# Patient Record
Sex: FEMALE | Race: WHITE | NOT HISPANIC OR LATINO | Employment: OTHER | URBAN - METROPOLITAN AREA
[De-identification: names, ages, dates, MRNs, and addresses within clinical notes are randomized per-mention and may not be internally consistent; named-entity substitution may affect disease eponyms.]

---

## 2017-01-23 ENCOUNTER — TELEPHONE (OUTPATIENT)
Dept: PEDIATRICS | Facility: CLINIC | Age: 72
End: 2017-01-23

## 2017-01-23 DIAGNOSIS — F98.8 ATTENTION DEFICIT DISORDER: Primary | ICD-10-CM

## 2017-01-23 RX ORDER — METHYLPHENIDATE HYDROCHLORIDE 20 MG/1
20 CAPSULE, EXTENDED RELEASE ORAL DAILY
Qty: 30 CAPSULE | Refills: 0 | Status: CANCELLED | OUTPATIENT
Start: 2017-01-23

## 2017-01-23 NOTE — TELEPHONE ENCOUNTER
[facility] Call Center    Phone Message    Name of Caller: Karen    Phone Number: Home number on file [phone] (home)    Best time to return call: Any    May a detailed message be left on voicemail: yes    Relation to patient: Self    Reason for Call: Karen is requesting a care team member review her chart to see if there a more cost effective alternatives to methylphenidate (RITALIN LA) 20 MG CP24 and Omeprazole.  Advised that Karen she could also check with her insurance carrier to see if they have suggestions as well.  Please advise.  Thank you.      Action Taken: [id number]

## 2017-01-23 NOTE — TELEPHONE ENCOUNTER
Routed to PCP.    Patient requesting cheaper alternatives to ritalin and omeprazole?    Antonette Main RN,   [facility]

## 2017-01-23 NOTE — TELEPHONE ENCOUNTER
[facility] Call Center    Phone Message    Name of Caller: RUDDY MA    Phone Number: Home number on file [phone] (home)    Best time to return call: ANY    May a detailed message be left on voicemail: no    Relation to patient: Self    Reason for Call: Other: Please call patient back regarding med check/alternatives.     Action Taken: Message routed to:  Primary Care p [id number]

## 2017-01-31 NOTE — TELEPHONE ENCOUNTER
Omeprazole: can use ranitidine 150 mg bid (OTC)  Ritalin: there are alternatives, may not have the same efficacy or side effect profile. If she would like to discuss alternative to Ritalin, please schedule a clinic appointment. Bring back the Rx for Ritalin I gave her at the last visit (future Rx not yet filled).

## 2017-01-31 NOTE — TELEPHONE ENCOUNTER
Notified patient of provider note below. Patient states that she should like to schedule an appointment with PCP to discuss an alternative to Ritalin as medication is costly for the patient. Patient states that scripts for Ritalin with start dates of 02/01/17 and 03/03/17 have been profiled at [facility] in [place]. Patient does not want prescriptions canceled until after appointment with PCP that has been scheduled for 02/01/17 at 2:40 PM in case an alternative is not decided on.    Called [facility]. Pharmacist, Felipe, states that prescriptions are both on profile for the patient at the pharmacy.  Maria Luisa Barkley, [facility]

## 2017-02-01 ENCOUNTER — OFFICE VISIT (OUTPATIENT)
Dept: PEDIATRICS | Facility: CLINIC | Age: 72
End: 2017-02-01
Payer: COMMERCIAL

## 2017-02-01 VITALS
WEIGHT: 225 LBS | DIASTOLIC BLOOD PRESSURE: 84 MMHG | TEMPERATURE: 96.8 F | SYSTOLIC BLOOD PRESSURE: 148 MMHG | BODY MASS INDEX: 40.17 KG/M2 | OXYGEN SATURATION: 95 % | HEART RATE: 86 BPM

## 2017-02-01 DIAGNOSIS — K21.9 GASTROESOPHAGEAL REFLUX DISEASE WITHOUT ESOPHAGITIS: ICD-10-CM

## 2017-02-01 DIAGNOSIS — F98.8 ATTENTION DEFICIT DISORDER: Primary | ICD-10-CM

## 2017-02-01 PROCEDURE — 99213 OFFICE O/P EST LOW 20 MIN: CPT | Performed by: INTERNAL MEDICINE

## 2017-02-01 RX ORDER — METHYLPHENIDATE HYDROCHLORIDE 18 MG/1
18 TABLET ORAL EVERY MORNING
Qty: 30 TABLET | Refills: 0 | Status: SHIPPED | OUTPATIENT
Start: 2017-02-01 | End: 2017-02-06

## 2017-02-01 RX ORDER — NICOTINE POLACRILEX 4 MG/1
20 GUM, CHEWING ORAL DAILY
Qty: 30 TABLET | Refills: 0 | Status: SHIPPED | OUTPATIENT
Start: 2017-02-01 | End: 2017-06-19

## 2017-02-01 NOTE — PROGRESS NOTES
SUBJECTIVE:                                                    Karen Donis is a 71 year old female who presents to clinic today for the following health issues:      Medication Followup of all meds    Taking Medication as prescribed: yes    Side Effects:  None    Medication Helping Symptoms:  yes       Patient with adult ADD, years ago, was on another drug for a long time. Change to Ritalin LA 20 mg since 2006. This is getting more expensive. She reports she needs long acting medication. She would not remember the second dose if only regular release.     She also needs a prescription of omeprazole for GERD.     Problem list, Medication list, Allergies, and Medical/Social/Surgical histories reviewed in [facility] and updated as appropriate.    OBJECTIVE:                                                    /84  Pulse 86  Temp 96.8  F (36  C) (Temporal)  Wt 225 lb (102.1 kg)  SpO2 95%  BMI 40.18 kg/m2    GEN: healthy, alert and no distress       Diagnostic test results:  No results found for this or any previous visit (from the past 24 hour(s)).       ASSESSMENT/PLAN:                                                      72 year old female with the following diagnoses and treatment plan:      ICD-10-CM    1. Attention deficit disorder F98.8 DISCONTINUED: methylphenidate ER (CONCERTA) 18 MG CR tablet   2. Gastroesophageal reflux disease without esophagitis K21.9 omeprazole ([facility] OMEPRAZOLE) 20 MG tablet     Will have her try Concerta. Refilled omeprazole.       Lakia Hunter MD-PhD  [facility]    (Note: Chart documentation was done in part with Dragon Voice Recognition software. Although reviewed after completion, some word and grammatical errors may remain.)

## 2017-02-01 NOTE — NURSING NOTE
"Chief Complaint   Patient presents with     Recheck Medication     Would like to change Ritalin       Initial /84 mmHg  Pulse 86  Temp(Src) 96.8  F (36  C) (Temporal)  Wt 225 lb (102.059 kg)  SpO2 95% Estimated body mass index is 40.17 kg/(m^2) as calculated from the following:    Height as of 6/6/16: 5' 2.75\" (1.594 m).    Weight as of this encounter: 225 lb (102.059 kg).  BP completed using cuff size: yasmine Hoover RMA        "

## 2017-02-01 NOTE — MR AVS SNAPSHOT
After Visit Summary   2017    Karen Donis    MRN: [id number]           Patient Information     Date Of Birth          1945        Visit Information        Provider Department      2017 2:40 PM Laika Hunter MD [facility]        Today's Diagnoses     Attention deficit disorder    -  1     Gastroesophageal reflux disease without esophagitis           Care Instructions    Do a nurse visit in 3 weeks in any of the [facility].         Follow-ups after your visit        Who to contact     If you have questions or need follow up information about today's clinic visit or your schedule please contact [facility] directly at [phone].  Normal or non-critical lab and imaging results will be communicated to you by Shiram Credithart, letter or phone within 4 business days after the clinic has received the results. If you do not hear from us within 7 days, please contact the clinic through Shiram Credithart or phone. If you have a critical or abnormal lab result, we will notify you by phone as soon as possible.  Submit refill requests through Mapluck or call your pharmacy and they will forward the refill request to us. Please allow 3 business days for your refill to be completed.          Additional Information About Your Visit        MyChart Information     Mapluck is an electronic gateway that provides easy, online access to your medical records. With Mapluck, you can request a clinic appointment, read your test results, renew a prescription or communicate with your care team.     To sign up for Mapluck visit the website at [email or web address]   You will be asked to enter the access code listed below, as well as some personal information. Please follow the directions to create your username and password.     Your access code is: [email or web address]  Expires: 3/7/2017 11:26 AM     Your access code will  in 90 days. If you need help or a new code, please contact your  [facility] or call [phone] for assistance.        Care EveryWhere ID     This is your Care EveryWhere ID. This could be used by other organizations to access your [facility] medical records  [id number]        Your Vitals Were     Pulse Temperature Pulse Oximetry             86 96.8  F (36  C) (Temporal) 95%          Blood Pressure from Last 3 Encounters:   02/01/17 148/84   12/07/16 124/78   10/07/16 142/90    Weight from Last 3 Encounters:   02/01/17 225 lb (102.059 kg)   12/08/16 228 lb (103.42 kg)   12/07/16 228 lb (103.42 kg)              Today, you had the following     No orders found for display         Today's Medication Changes          These changes are accurate as of: 2/1/17  3:33 PM.  If you have any questions, ask your nurse or doctor.               Start taking these medicines.        Dose/Directions    methylphenidate ER 18 MG CR tablet   Commonly known as:  CONCERTA   Used for:  Attention deficit disorder   Replaces:  methylphenidate 20 MG Cp24   Started by:  Lakia Hunter MD        Dose:  18 mg   Take 1 tablet (18 mg) by mouth every morning   Quantity:  30 tablet   Refills:  0       omeprazole 20 MG tablet   Commonly known as:  CVS OMEPRAZOLE   Used for:  Gastroesophageal reflux disease without esophagitis   Replaces:  omeprazole 20 MG CR capsule   Started by:  Lakia Hunter MD        Dose:  20 mg   Take 1 tablet (20 mg) by mouth daily   Quantity:  30 tablet   Refills:  0         These medicines have changed or have updated prescriptions.        Dose/Directions    econazole nitrate 1 % cream   This may have changed:    - when to take this  - reasons to take this   Used for:  Tinea pedis        Apply topically daily   Quantity:  30 g   Refills:  3         Stop taking these medicines if you haven't already. Please contact your care team if you have questions.     methylphenidate 20 MG Cp24   Commonly known as:  RITALIN LA   Replaced by:  methylphenidate ER 18 MG CR tablet    Stopped by:  Lakia Hunter MD           omeprazole 20 MG CR capsule   Commonly known as:  priLOSEC   Replaced by:  omeprazole 20 MG tablet   Stopped by:  Lakia Hunter MD                Where to get your medicines      These medications were sent to [id number] - [place] - [address]  [address] [address], [place] [address]     Phone:  [phone]    - omeprazole 20 MG tablet      Some of these will need a paper prescription and others can be bought over the counter.  Ask your nurse if you have questions.     Bring a paper prescription for each of these medications    - methylphenidate ER 18 MG CR tablet             Primary Care Provider Office Phone # Fax #    Lakia Hunter [phone]       [facility] [address] [facility] [address]        Thank you!     Thank you for choosing [facility]  for your care. Our goal is always to provide you with excellent care. Hearing back from our patients is one way we can continue to improve our services. Please take a few minutes to complete the written survey that you may receive in the mail after your visit with us. Thank you!             Your Updated Medication List - Protect others around you: Learn how to safely use, store and throw away your medicines at www.disposemymeds.org.          This list is accurate as of: 2/1/17  3:33 PM.  Always use your most recent med list.                   Brand Name Dispense Instructions for use    aspirin 81 MG tablet      1 TABLET DAILY       BEANO PO      Take by mouth as needed       biotin 1000 MCG Tabs tablet      Take 1,000 mcg by mouth daily       blood glucose lancing device     1 each    Device to be used with lancets.       * blood glucose monitoring lancets     1 Box    Use to test blood sugar 1 times daily or as directed.  Ok to substitute alternative if insurance prefers.       * blood glucose monitoring lancets     1 Box    Use to test blood sugar 1daily  or as directed.       * blood glucose monitoring test strip    ACCU-CHEK SMARTVIEW    50 each    Use to test blood sugar 1 times daily or as directed.  Ok to substitute alternative if insurance prefers.       * blood glucose monitoring test strip    no brand specified    100 each    Use to test blood sugars daily or as directed. Accu-check smartview strips       CALCIUM 1200 PO          cholecalciferol 1000 UNITS capsule    vitamin  -D    180 capsule    Take 2 capsules (2,000 Units) by mouth daily       citalopram 20 MG tablet    celeXA    90 tablet    TAKE ONE TABLET BY MOUTH ONE TIME DAILY       CO Q 10 PO      Take 1 capsule by mouth daily.       Cranberry 300 MG Tabs          econazole nitrate 1 % cream     30 g    Apply topically daily       flax seed oil 1000 MG capsule      Take 1 capsule by mouth daily       losartan-hydrochlorothiazide 100-25 MG per tablet    HYZAAR    90 tablet    Take 1 tablet by mouth daily       MAGNESIUM PO      1 capsule daily       methylphenidate ER 18 MG CR tablet    CONCERTA    30 tablet    Take 1 tablet (18 mg) by mouth every morning       omeprazole 20 MG tablet    CVS OMEPRAZOLE    30 tablet    Take 1 tablet (20 mg) by mouth daily       pravastatin 10 MG tablet    PRAVACHOL    90 tablet    Take 1 tablet (10 mg) by mouth daily       UNABLE TO FIND      MEDICATION NAME: Cheea seed       VITAMIN B COMPLEX-C Caps      1 capsule PO qd       vitamin E 400 UNIT capsule      Take by mouth daily       * Notice:  This list has 4 medication(s) that are the same as other medications prescribed for you. Read the directions carefully, and ask your doctor or other care provider to review them with you.

## 2017-02-02 ENCOUNTER — TELEPHONE (OUTPATIENT)
Dept: PEDIATRICS | Facility: CLINIC | Age: 72
End: 2017-02-02

## 2017-02-02 DIAGNOSIS — F98.8 ATTENTION DEFICIT DISORDER: Primary | ICD-10-CM

## 2017-02-02 NOTE — TELEPHONE ENCOUNTER
[facility] Call Center    Phone Message    Name of Caller: Karen     Phone Number: Home number on file [phone] (home) or Cell number on file:    Telephone Information:   Mobile [phone]       Best time to return call: ANy    May a detailed message be left on voicemail: yes    Relation to patient: Self    Reason for Call: Other: Patient is calling to let  know her insurance will be contacting her regarding a change in her insurance tier and for coverage regarding her medication. Patient is paying 111 dollars for the adderall and wanted to know if the tier change would help the cost of her Rx. If not able to get insurance to help cover the Rx wanted to know if there is an alternative for this. Please advise.      Action Taken: Message routed to:  Primary Care p [id number]

## 2017-02-02 NOTE — TELEPHONE ENCOUNTER
Please advise, is a tier change starting a PA?  So start a PA or change the medication?  Chasity ROOTA

## 2017-02-03 NOTE — TELEPHONE ENCOUNTER
Called patient, patient states that she took the Concerta prescription dated 02/01/17 to her pharmacy.  Medication cost patient $111.00.  Patient could not afford this so she had the pharmacy shred the prescription.  Medication costs this much as it is a tier 4 medication.  Is there an alternative medication that is more cost effective?  Patient was informed that PCP is out of clinic until 02/06/17.  Patient has enough medication until Monday.     Zohra Dean CMA

## 2017-02-03 NOTE — TELEPHONE ENCOUNTER
Please let patient know Dr. Hunter is out of office  Sometime we do not know how this will affect coverage until we place the next refill  Then Dr. Hunter can determine what would be appropriate substitute if need be

## 2017-02-06 RX ORDER — METHYLPHENIDATE HYDROCHLORIDE EXTENDED RELEASE 20 MG/1
20 TABLET ORAL DAILY
Qty: 30 TABLET | Refills: 0 | Status: SHIPPED | OUTPATIENT
Start: 2017-02-06 | End: 2017-03-13

## 2017-02-07 ENCOUNTER — TELEPHONE (OUTPATIENT)
Dept: PEDIATRICS | Facility: CLINIC | Age: 72
End: 2017-02-07

## 2017-02-07 NOTE — TELEPHONE ENCOUNTER
Call placed to pharmacy to clarify question of medication Concerta, as there is no active order for patient to take Concerta.  Pharmacy stated that patient does not have Concerta ordered and is uncertain why this was brought up.  Pharmacy is filling script for Ritalin.      Anita Vazquez at 2/7/2017 11:57 AM      Status: Signed         Expand All [facility] All    [facility] Call Center    Phone Message    Name of Caller: CAREBEBO.  Pharmacy states they need department to withdraw prescription for Concerta for patient.  They are aware that pt will  script for Ritalin instead.       Action Taken: Message routed to:  Primary Care p [phone]

## 2017-02-07 NOTE — TELEPHONE ENCOUNTER
[facility] Call Center    Phone Message    Name of Caller: CARMENZA.  Pharmacy states they need department to withdraw prescription for Concerta for patient.  They are aware that pt will  script for Ritalin instead.       Action Taken: Message routed to:  Primary Care p [phone]

## 2017-03-13 ENCOUNTER — TELEPHONE (OUTPATIENT)
Dept: PEDIATRICS | Facility: CLINIC | Age: 72
End: 2017-03-13

## 2017-03-13 DIAGNOSIS — F98.8 ATTENTION DEFICIT DISORDER: ICD-10-CM

## 2017-03-13 RX ORDER — METHYLPHENIDATE HYDROCHLORIDE EXTENDED RELEASE 20 MG/1
20 TABLET ORAL EVERY MORNING
Qty: 30 TABLET | Refills: 0 | Status: SHIPPED | OUTPATIENT
Start: 2017-03-13 | End: 2017-04-12

## 2017-03-13 NOTE — TELEPHONE ENCOUNTER
Rx given to pt while in clinic today. Chasity MEJIA    Advised to follow up in clinic with the new change. Chasity MEJIA

## 2017-03-13 NOTE — TELEPHONE ENCOUNTER
Patient is wanted to know if she can switch from the Methylphenidate back to the Metadate that she was on last month (Scott). Because it is much cheaper ($100 vs the $24 it used to be)

## 2017-04-06 DIAGNOSIS — F32.5 MAJOR DEPRESSION IN COMPLETE REMISSION (H): ICD-10-CM

## 2017-04-06 RX ORDER — CITALOPRAM HYDROBROMIDE 20 MG/1
20 TABLET ORAL DAILY
Qty: 90 TABLET | Refills: 1 | Status: CANCELLED | OUTPATIENT
Start: 2017-04-06

## 2017-04-06 NOTE — TELEPHONE ENCOUNTER
[facility] Call Center    Phone Message    Name of Caller: Karen    Phone Number: [phone]     Best time to return call: any    May a detailed message be left on voicemail: yes    Relation to patient: Self    Reason for Call: Patient is asking when she is supposed to be seen next, and what Labs need to be done, Patient requesting Refills: citalopram (CELEXA) 20 MG tablet, Pravastatin Sodium, 10 mg Tablets  Action Taken: Message routed to:  Primary Care p [id number]

## 2017-04-11 ENCOUNTER — TELEPHONE (OUTPATIENT)
Dept: PEDIATRICS | Facility: CLINIC | Age: 72
End: 2017-04-11

## 2017-04-11 DIAGNOSIS — F98.8 ATTENTION DEFICIT DISORDER: ICD-10-CM

## 2017-04-11 NOTE — TELEPHONE ENCOUNTER
Metadate 20 mg CR tablet      Last Written Prescription Date: 3/13/2017    Last Fill Quantity: 30  # refills: 0   Last Office Visit with [facility], [facility] or [facility] prescribing provider: 2/1/2017                                                 Refill routed to provider to review and approve.  Not part of the MG refill protocol.      Preferred pharmacy [facility] [address] [place]

## 2017-04-11 NOTE — TELEPHONE ENCOUNTER
[facility] Call Center    Phone Message    Name of Caller: Karen    Phone Number: Home number on file [phone] (home)    Best time to return call: Any    May a detailed message be left on voicemail: no    Relation to patient: Self    Reason for Call: Medication Refill Request    Has the patient contacted the pharmacy for the refill? Yes   Name of medication being requested: methylphenidate (METADATE ER) 20 MG CR tablet [82434] (Order [id number])    Provider who prescribed the medication: Dr. Hunter  Pharmacy: [facility]  Date medication is needed: ASAP   If possible patient would like to have the Rx ready for  tomorrow morning by 11 am. Please advise.       Action Taken: Message routed to:  Primary Care p [phone]

## 2017-04-11 NOTE — TELEPHONE ENCOUNTER
Patient would like to request that the Rx be sent home with her brother Madi Joshi who has an appt with Dr. Hunter.

## 2017-04-12 RX ORDER — METHYLPHENIDATE HYDROCHLORIDE EXTENDED RELEASE 20 MG/1
20 TABLET ORAL EVERY MORNING
Qty: 30 TABLET | Refills: 0 | Status: SHIPPED | OUTPATIENT
Start: 2017-04-12 | End: 2017-05-17

## 2017-04-12 NOTE — TELEPHONE ENCOUNTER
Left message with pt. And informed her that RX was filled and placed at check in 1 for .    Rx placed at check in 1.    Janeen Kelly, CMA

## 2017-04-12 NOTE — TELEPHONE ENCOUNTER
Left message for patient to return call to clinic and ask to speak with RN.      Pravastatin was discontinued on 2/6/17 due to side effects.   Is patient still taking?  She requested refill.    Due for nurse visit for blood pressure.  Also due for diabetic follow up with non fasting labs(A1c, BMP) in June 2017.    Antonette Main RN,   [facility]

## 2017-04-12 NOTE — TELEPHONE ENCOUNTER
celexa     Last Written Prescription Date: 12/26/16  Last Fill Quantity: 90, # refills: 1  Last Office Visit with [facility] primary care provider:  2/1/17        Last PHQ-9 score on record=   PHQ-9 SCORE 6/7/2016   Total Score -   Total Score 4               ______________________________________________________________________  pravastatin  Was discontinued on 2/6/17 due to side effects.      pravastatin (PRAVACHOL) 10 MG tablet (Discontinued) 90 tablet 3 8/23/2016 2/6/2017 --      Sig: Take 1 tablet (10 mg) by mouth daily     Class: E-Prescribe     Route: Oral     Reason for Discontinue: Side effects       Last Office Visit with [facility], [facility] or [facility] prescribing provider: 2/1/17       Lab Results   Component Value Date    CHOL 160 12/07/2016     Lab Results   Component Value Date    HDL 60 12/07/2016     Lab Results   Component Value Date    LDL 84 12/07/2016     Lab Results   Component Value Date    TRIG 82 12/07/2016     Lab Results   Component Value Date    CHOLHDLRATIO 3.3 10/28/2014

## 2017-04-20 NOTE — TELEPHONE ENCOUNTER
Left message for patient to return call to clinic and ask to speak with RN.    Antonette Main RN,   [facility]

## 2017-04-21 NOTE — TELEPHONE ENCOUNTER
Called [facility] pharmacy.  Patient picked up both refills of Celexa and pravastatin on 4/9/17.    Called patient to inquire if she has side effects from pravastatin and if she wishes to continue taking.    Routing to Rx response as no refill needed.    Maritza Wisdom RN, [facility]

## 2017-04-24 NOTE — TELEPHONE ENCOUNTER
Left message for patient.  Informed her we have pravastatin listed as discontinued due to side effects.  Is she still taking this med and if so is she having side effects?      Also informed patient she is due for nurse visit for blood pressure check and in June she will be due for diabetic recheck with labs.    Antonette Main RN,   [facility]

## 2017-04-25 ENCOUNTER — ALLIED HEALTH/NURSE VISIT (OUTPATIENT)
Dept: PEDIATRICS | Facility: CLINIC | Age: 72
End: 2017-04-25
Payer: COMMERCIAL

## 2017-04-25 VITALS — SYSTOLIC BLOOD PRESSURE: 124 MMHG | HEART RATE: 72 BPM | DIASTOLIC BLOOD PRESSURE: 76 MMHG

## 2017-04-25 DIAGNOSIS — I10 ESSENTIAL HYPERTENSION WITH GOAL BLOOD PRESSURE LESS THAN 140/90: Primary | ICD-10-CM

## 2017-04-25 PROCEDURE — 99207 ZZC NO CHARGE NURSE ONLY: CPT | Performed by: INTERNAL MEDICINE

## 2017-04-25 NOTE — MR AVS SNAPSHOT
After Visit Summary   2017    Karen Donis    MRN: [id number]           Patient Information     Date Of Birth          1945        Visit Information        Provider Department      2017 4:53 PM Lakia Hunter MD [facility]        Today's Diagnoses     Essential hypertension with goal blood pressure less than 140/90    -  1       Follow-ups after your visit        Who to contact     If you have questions or need follow up information about today's clinic visit or your schedule please contact [facility] directly at [phone].  Normal or non-critical lab and imaging results will be communicated to you by CBIT A/Shart, letter or phone within 4 business days after the clinic has received the results. If you do not hear from us within 7 days, please contact the clinic through CBIT A/Shart or phone. If you have a critical or abnormal lab result, we will notify you by phone as soon as possible.  Submit refill requests through [email or web address] or call your pharmacy and they will forward the refill request to us. Please allow 3 business days for your refill to be completed.          Additional Information About Your Visit        MyChart Information     [email or web address] is an electronic gateway that provides easy, online access to your medical records. With [email or web address], you can request a clinic appointment, read your test results, renew a prescription or communicate with your care team.     To sign up for [email or web address] visit the website at [email or web address]   You will be asked to enter the access code listed below, as well as some personal information. Please follow the directions to create your username and password.     Your access code is: [email or web address]  Expires: 2017  4:55 PM     Your access code will  in 90 days. If you need help or a new code, please contact your [facility] or call [phone] for assistance.        Care EveryWhere ID      This is your Care EveryWhere ID. This could be used by other organizations to access your [place] medical records  [id number]        Your Vitals Were     Pulse                   72            Blood Pressure from Last 3 Encounters:   04/25/17 124/76   02/01/17 148/84   12/07/16 124/78    Weight from Last 3 Encounters:   02/01/17 225 lb (102.1 kg)   12/08/16 228 lb (103.4 kg)   12/07/16 228 lb (103.4 kg)              Today, you had the following     No orders found for display         Today's Medication Changes          These changes are accurate as of: 4/25/17  4:55 PM.  If you have any questions, ask your nurse or doctor.               These medicines have changed or have updated prescriptions.        Dose/Directions    econazole nitrate 1 % cream   This may have changed:    - when to take this  - reasons to take this   Used for:  Tinea pedis        Apply topically daily   Quantity:  30 g   Refills:  3                Primary Care Provider Office Phone # Fax #    Lakia Hunter [phone]       [facility] [address] [facility] [address]        Thank you!     Thank you for choosing [facility]  for your care. Our goal is always to provide you with excellent care. Hearing back from our patients is one way we can continue to improve our services. Please take a few minutes to complete the written survey that you may receive in the mail after your visit with us. Thank you!             Your Updated Medication List - Protect others around you: Learn how to safely use, store and throw away your medicines at www.disposemymeds.org.          This list is accurate as of: 4/25/17  4:55 PM.  Always use your most recent med list.                   Brand Name Dispense Instructions for use    aspirin 81 MG tablet      1 TABLET DAILY       BEANO PO      Take by mouth as needed       biotin 1000 MCG Tabs tablet      Take 1,000 mcg by mouth daily       blood glucose lancing device     1  each    Device to be used with lancets.       * blood glucose monitoring lancets     1 Box    Use to test blood sugar 1 times daily or as directed.  Ok to substitute alternative if insurance prefers.       * blood glucose monitoring lancets     1 Box    Use to test blood sugar 1daily or as directed.       * blood glucose monitoring test strip    ACCU-CHEK SMARTVIEW    50 each    Use to test blood sugar 1 times daily or as directed.  Ok to substitute alternative if insurance prefers.       * blood glucose monitoring test strip    no brand specified    100 each    Use to test blood sugars daily or as directed. Accu-check smartview strips       CALCIUM 1200 PO          cholecalciferol 1000 UNITS capsule    vitamin  -D    180 capsule    Take 2 capsules (2,000 Units) by mouth daily       citalopram 20 MG tablet    celeXA    90 tablet    TAKE ONE TABLET BY MOUTH ONE TIME DAILY       CO Q 10 PO      Take 1 capsule by mouth daily.       Cranberry 300 MG Tabs          econazole nitrate 1 % cream     30 g    Apply topically daily       flax seed oil 1000 MG capsule      Take 1 capsule by mouth daily       losartan-hydrochlorothiazide 100-25 MG per tablet    HYZAAR    90 tablet    TAKE ONE TABLET BY MOUTH EVERY DAY       MAGNESIUM PO      1 capsule daily       methylphenidate 20 MG CR tablet    METADATE ER    30 tablet    Take 1 tablet (20 mg) by mouth every morning       omeprazole 20 MG tablet    CVS OMEPRAZOLE    30 tablet    Take 1 tablet (20 mg) by mouth daily       UNABLE TO FIND      MEDICATION NAME: Cheea seed       VITAMIN B COMPLEX-C Caps      1 capsule PO qd       vitamin E 400 UNIT capsule      Take by mouth daily       * Notice:  This list has 4 medication(s) that are the same as other medications prescribed for you. Read the directions carefully, and ask your doctor or other care provider to review them with you.

## 2017-04-25 NOTE — PROGRESS NOTES
Karen Donis is enrolled/participating in the retail pharmacy Blood Pressure Goals Achievement Program (BPGAP).  Karen Donis was evaluated at [facility] on April 25, 2017 at which time her blood pressure was:    BP Readings from Last 3 Encounters:   04/25/17 124/76   02/01/17 148/84   12/07/16 124/78     Reviewed lifestyle modifications for blood pressure control and reduction: including making healthy food choices, managing weight, getting regular exercise, smoking cessation, reducing alcohol consumption, monitoring blood pressure regularly.     Karen Donis is not experiencing symptoms.    Follow-Up: BP is at goal of < 140/90mmHg (patient 18+ years of age with or without diabetes).  Recommended follow-up at pharmacy in 6 months.     Completed by: Jolie Cote PharmD  [facility]

## 2017-04-26 ENCOUNTER — TELEPHONE (OUTPATIENT)
Dept: PEDIATRICS | Facility: CLINIC | Age: 72
End: 2017-04-26

## 2017-04-26 NOTE — TELEPHONE ENCOUNTER
[facility] Call Center    Phone Message    Name of Caller: Karen    Phone Number: Home number on file [phone] (home) or Cell number on file:    Telephone Information:   Mobile [phone]       Best time to return call: Any    May a detailed message be left on voicemail: yes    Relation to patient: Self    Reason for Call: Medication Question or concern regarding medication   Prescription Clarification:   Name of Medication: losartan-hydrochlorothiazide (HYZAAR) 100-25 MG per tablet [77103] (Order [id number]) ; pravastatin (PRAVACHOL) 10 MG tablet [05443] (Order [id number])       Prescribing Provider: Dr. Hunter   Pharmacy: [facility] #[id number] [place] [address] [address]   What on the order needs clarification?     Is patient symptomatic? No. Describe question or concern: Patient is calling to clarify if she should be taking either one of the Rx. Please call to advise.        Action Taken: Message routed to:  Primary Care p [phone]

## 2017-04-26 NOTE — TELEPHONE ENCOUNTER
Left message Informing her we have pravastatin listed as discontinued due to side effects.  Is she still taking this med and if so is she having side effects?      Also she is to be taking lisinopril-hctz and a new RX was sent to [facility] pharmacy on 4/4/17.    Antonette Main RN,   [facility], [facility]

## 2017-04-27 NOTE — TELEPHONE ENCOUNTER
Called patient, she is taking pravastatin, she was unaware this was for cholesterol or it was discontinued for side effects. She never stopped.    She does complain of ongoing muscle aches/pains buttocks, calves and on the sides of her legs.    Patient is taking losartan-hydrochlorothiazide as directed.  She denies taking any other medication for BP.     Routing to provider to please advise on pravastatin.    Maritza Wisdom RN, [facility]

## 2017-05-02 NOTE — TELEPHONE ENCOUNTER
Called patient, left message with male in home, left phone number to call back.       Maritza Wisdom RN, [facility]

## 2017-05-03 ENCOUNTER — OFFICE VISIT (OUTPATIENT)
Dept: ORTHOPEDICS | Facility: CLINIC | Age: 72
End: 2017-05-03
Payer: COMMERCIAL

## 2017-05-03 VITALS
DIASTOLIC BLOOD PRESSURE: 75 MMHG | SYSTOLIC BLOOD PRESSURE: 148 MMHG | HEIGHT: 63 IN | BODY MASS INDEX: 39.51 KG/M2 | WEIGHT: 223 LBS

## 2017-05-03 DIAGNOSIS — M25.521 RIGHT ELBOW PAIN: ICD-10-CM

## 2017-05-03 DIAGNOSIS — R20.2 PARESTHESIA OF RIGHT UPPER EXTREMITY: ICD-10-CM

## 2017-05-03 DIAGNOSIS — M25.531 PAIN IN JOINT, FOREARM, RIGHT: Primary | ICD-10-CM

## 2017-05-03 PROCEDURE — 99214 OFFICE O/P EST MOD 30 MIN: CPT | Performed by: FAMILY MEDICINE

## 2017-05-03 NOTE — PROGRESS NOTES
"Karen Donis  :  1945  DOS: 5/3/2017  MRN: [id number]    Sports Medicine Clinic Visit    PCP: Lakia Hunter    Karen Donis is a 72 year old Right hand dominant female who is seen as an AIC patient presenting with right hand numbness/tingling.    Injury: Insidious onset of right hand numbness/tingling over last 3 days (17).  Pain located over right lateral hand, little and ring fingers, radiating to right elbow.  Additional Features:  Positive: numbness and weakness.  Symptoms are better with Rest.  Symptoms are worse with: gripping/grasping activity.  Other evaluation and/or treatments so far consists of: Ibuprofen and Rest.  Recent imaging completed: No recent imaging completed.  Prior History of related problems: none    Social History: retired     Review of Systems  Musculoskeletal: as above  Remainder of review of systems is negative including constitutional, CV, pulmonary, GI, Skin and Neurologic except as noted in HPI or medical history.    Past Medical History:   Diagnosis Date     Colon cancer (H)      Hypertension      Shortness of breath      Sleep apnea      Thyroid disease      Past Surgical History:   Procedure Laterality Date     COLON SURGERY       THYROIDECTOMY  2011    Procedure:THYROIDECTOMY; Right Thyroid Lobectomy and bilateral removal of skin tags; Surgeon:SAJI ZAZUETA; Location:[facility] OR     TONSILLECTOMY      While she was in 6th grade       Objective  /75  Ht 5' 2.75\" (1.594 m)  Wt 223 lb (101.2 kg)  BMI 39.82 kg/m2    General: healthy, alert and in no distress    HEENT: no scleral icterus or conjunctival erythema   Skin: no suspicious lesions or rash. No jaundice.   CV: regular rhythm by palpation, 2+ distal pulses, no pedal edema    Resp: normal respiratory effort without conversational dyspnea   Psych: normal mood and affect    Gait: nonantalgic, appropriate coordination and balance   Neuro: normal light touch sensory exam of the extremities. Motor " strength as noted below     Right Wrist and Hand exam    Inspection:       No swelling, bruising or deformity bilateral    Non Tender:       distal radius right,      anatomic snuffbox right,      scapholunate interval right and      TFCC right    ROM:       Full and symmetric active and passive range of motion of the forearm, wrist and digits right    Strength:       5/5 strength in the muscles of the hand, wrist and forearm bilateral    Special Tests:        neg (-) Tinel's test right,       neg (-) Phatlen's test right,       neg (-) TFCC compression test right and       neg (-) Finkelstein's maneuver right    Neurovascular:       2+ radial pulses bilaterally with brisk capillary refill and      normal sensation to light touch in the radial, median and ulnar nerve distributions    Right Elbow exam:    Inspection:       no ecchymosis       no edema or effusion    ROM:       full with flexion, extension, forearm supination and pronation.    Strength:       flexion 5/5       extension 5/5       forearm supination 5/5       forearm pronation 5/5    Tender:       medial epicondyle       Cubital tunnel    Non-Tender:       remainder of elbow area       lateral epicondyle       radial head       olecranon       antecubital space    Sensation:       intact throughout hand       intact throughout forearm     Skin:       well perfused       capillary refill less than 2 seconds    Neg spurling's and adson's, full ROM of cervical spine without increase in sx    Radiology:  None performed today    Assessment:  1. Pain in joint, forearm, right    2. Right elbow pain    3. Paresthesia of right upper extremity        Plan:  Discussed the assessment with the patient.  Follow up: 1-2 weeks prn  ddx includes wrist strain with ulnar neuritis vs cubital tunnel syndrome vs less likely more proximal radiculopathy  Use of short 1-2 wk course of NSAIDs reviewed, dosing and precautions reviewed if utilized  Wrist brace and elbow  compression sleeve use reviewed  Suspect will resolve with some time  Consider advanced imaging vs OT vs EMG for ongoing sx, depending on clinical progress  Can consider gabapentin for sx management if persistent  We discussed modified progressive pain-free activity as tolerated  Home handouts provided and supportive care reviewed  All questions were answered today  Contact us with additional questions or concerns  Signs and sx of concern reviewed      Dagoberto Foster DO, SIDNEY  Primary Care Sports Medicine  [facility]             Disclaimer: This note consists of symbols derived from keyboarding, dictation and/or voice recognition software. As a result, there may be errors in the script that have gone undetected. Please consider this when interpreting information found in this chart.

## 2017-05-03 NOTE — NURSING NOTE
"Chief Complaint   Patient presents with     Musculoskeletal Problem     right hand numbness/tingling > 3 days       Initial /75  Ht 5' 2.75\" (1.594 m)  Wt 223 lb (101.2 kg)  BMI 39.82 kg/m2 Estimated body mass index is 39.82 kg/(m^2) as calculated from the following:    Height as of this encounter: 5' 2.75\" (1.594 m).    Weight as of this encounter: 223 lb (101.2 kg).  Medication Reconciliation: complete     Isidro Mora ATC  "

## 2017-05-15 NOTE — TELEPHONE ENCOUNTER
Gave patient the message from Dr. Hunter below.  She verbalized understanding.    She states she found a sheet from [employer] long ago that puts the losartan-hctz under cholesterol medication category.  She thought she wasn't taking a blood pressure medication.  Informed her that the losartan-hctz is for her high blood pressure.  She then states well it isn't helping my blood pressure very good.  BP , yesterday 148/ 75.  Today /77.  Patient denies headache, chest pain/pressure or dizziness.  She states she does have weakness but its from her muscles klaudia so much from the pravastatin.    She has an appointment on 6/19/17 with Dr. Hunter.  Will route to Dr. Hunter to address BP issues.    Antonette Main RN,   [facility]

## 2017-05-15 NOTE — TELEPHONE ENCOUNTER
Late follow up: have patient stop pravastatin for 2 weeks and let us know if the muscle aches resolve. If they do, we'll try an alternative statin.

## 2017-05-16 ENCOUNTER — TELEPHONE (OUTPATIENT)
Dept: PEDIATRICS | Facility: CLINIC | Age: 72
End: 2017-05-16

## 2017-05-16 NOTE — TELEPHONE ENCOUNTER
Called patient.   This is been an ongoing problem that she has seen Sports Medicine, tingling, numbness right hand.  Today, her symptom is more of a quivering from the R forearm.  She has no other symptoms.  Please see office visit notes on 5/3/17 from Dr. Foster.    She will call sports medicine provider to advise.      Will route to Dr. Hunter to advise as well when she returns to clinic.    Maritza Wisdom RN, [facility]

## 2017-05-16 NOTE — TELEPHONE ENCOUNTER
"[facility] Call Center    Phone Message    Name of Caller: Karen    Phone Number: Home number on file [phone] (home)    Best time to return call: Any    May a detailed message be left on voicemail: yes    Relation to patient: Self    Reason for Call: Symptoms or Concerns     Does patient have any of the following \"red flag\" symptoms: None    Does patient have any \"Red Flag\" symptoms? No.     Current symptom or concern: Patient is having a tingle feeling in her right hand and also some shakiness in her right elbow toward the hand. She would like to discuss her symptoms with a nurse. Please advise.     Symptoms have been present for:  1 day(s)          Action Taken: Message routed to:  Primary Care p [id number]    "

## 2017-05-17 DIAGNOSIS — F98.8 ATTENTION DEFICIT DISORDER: ICD-10-CM

## 2017-05-17 RX ORDER — METHYLPHENIDATE HYDROCHLORIDE EXTENDED RELEASE 20 MG/1
20 TABLET ORAL EVERY MORNING
Qty: 30 TABLET | Refills: 0 | Status: SHIPPED | OUTPATIENT
Start: 2017-05-17 | End: 2017-06-22

## 2017-05-17 NOTE — TELEPHONE ENCOUNTER
Dr. Foster has been trying to reach her with a plan. Ask patient to contact Dr. Foster's office to connect with him.

## 2017-05-17 NOTE — TELEPHONE ENCOUNTER
Patient notified.  She will call Dr. Fraga office.      Antonette Main RN,   [facility]

## 2017-05-17 NOTE — TELEPHONE ENCOUNTER
Routing refill request to provider for review/approval because:  Drug not on the [facility] refill protocol         metadate ER             Last Written Prescription Date: 4/12/17  Last Fill Quantity: 30, # refills: 0    Last Office Visit with [facility], [facility] or [facility] prescribing provider:  2/1/17   Future Office Visit:    Next 5 appointments (look out 90 days)     Jun 19, 2017  1:20 PM CDT   Return Visit with Lakia Hunter MD   [facility]    [address] [place] [address] [phone]                    BP Readings from Last 3 Encounters:   05/03/17 148/75   04/25/17 124/76   02/01/17 148/84       Antonette Main RN,   [facility]

## 2017-05-17 NOTE — TELEPHONE ENCOUNTER
[facility] Call Center    Phone Message    Name of Caller: Karen  Phone Number: Home number on file [phone] (home)    Best time to return call: any    May a detailed message be left on voicemail: yes    Relation to patient: Self    Reason for Call: methylphenidate (METADATE ER) 20 MG CR tablet, patient has 2 left, and would like it to be picked up today at the       Action Taken: Message routed to:  Primary Care p [id number]

## 2017-05-24 ENCOUNTER — THERAPY VISIT (OUTPATIENT)
Dept: OCCUPATIONAL THERAPY | Facility: CLINIC | Age: 72
End: 2017-05-24
Payer: COMMERCIAL

## 2017-05-24 DIAGNOSIS — R20.2 PARESTHESIAS IN RIGHT HAND: ICD-10-CM

## 2017-05-24 DIAGNOSIS — M25.521 RIGHT ELBOW PAIN: Primary | ICD-10-CM

## 2017-05-24 PROCEDURE — 97112 NEUROMUSCULAR REEDUCATION: CPT | Mod: GO | Performed by: OCCUPATIONAL THERAPIST

## 2017-05-24 PROCEDURE — 97110 THERAPEUTIC EXERCISES: CPT | Mod: GO | Performed by: OCCUPATIONAL THERAPIST

## 2017-05-24 PROCEDURE — 97165 OT EVAL LOW COMPLEX 30 MIN: CPT | Mod: GO | Performed by: OCCUPATIONAL THERAPIST

## 2017-05-24 NOTE — MR AVS SNAPSHOT
"              After Visit Summary   5/24/2017    Karen Donis    MRN: [id number]           Patient Information     Date Of Birth          1945        Visit Information        Provider Department      5/24/2017 2:00 PM Virgen Tian [facility] [place] Jose Daniel        Today's Diagnoses     Right elbow pain    -  1    Paresthesias in right hand           Follow-ups after your visit        Your next 10 appointments already scheduled     Jun 19, 2017  1:20 PM CDT   Return Visit with Lakia Hunter MD   [facility]    [address] [place] [id number] [phone]              Who to contact     If you have questions or need follow up information about today's clinic visit or your schedule please contact [facility] JOSE DANIEL directly at [phone].  Normal or non-critical lab and imaging results will be communicated to you by MyChart, letter or phone within 4 business days after the clinic has received the results. If you do not hear from us within 7 days, please contact the clinic through Blazenthart or phone. If you have a critical or abnormal lab result, we will notify you by phone as soon as possible.  Submit refill requests through AirXpanders or call your pharmacy and they will forward the refill request to us. Please allow 3 business days for your refill to be completed.          Additional Information About Your Visit        MyChart Information     AirXpanders lets you send messages to your doctor, view your test results, renew your prescriptions, schedule appointments and more. To sign up, go to [email or web address] . Click on \"Log in\" on the left side of the screen, which will take you to the Welcome page. Then click on \"Sign up Now\" on the right side of the page.     You will be asked to enter the access code listed below, as well as some personal information. Please follow the directions to " create your username and password.     Your access code is: [email or web address]  Expires: 2017  4:55 PM     Your access code will  in 90 days. If you need help or a new code, please call your [place] clinic or [phone].        Care EveryWhere ID     This is your Care EveryWhere ID. This could be used by other organizations to access your [place] medical records  [id number]         Blood Pressure from Last 3 Encounters:   17 148/75   17 124/76   17 148/84    Weight from Last 3 Encounters:   17 101.2 kg (223 lb)   17 102.1 kg (225 lb)   16 103.4 kg (228 lb)              We Performed the Following     HC OT EVAL, LOW COMPLEXITY     NEUROMUSCULAR RE-EDUCATION     THERAPEUTIC EXERCISES        Primary Care Provider Office Phone # Fax #    Lakia Hunter [phone]       [employer] [address]  [facility] [address]        Thank you!     Thank you for choosing [facility]  for your care. Our goal is always to provide you with excellent care. Hearing back from our patients is one way we can continue to improve our services. Please take a few minutes to complete the written survey that you may receive in the mail after your visit with us. Thank you!             Your Updated Medication List - Protect others around you: Learn how to safely use, store and throw away your medicines at www.disposemymeds.org.          This list is accurate as of: 17  3:12 PM.  Always use your most recent med list.                   Brand Name Dispense Instructions for use    aspirin 81 MG tablet      1 TABLET DAILY       BEANO PO      Take by mouth as needed Reported on 5/3/2017       biotin 1000 MCG Tabs tablet      Take 1,000 mcg by mouth daily       blood glucose lancing device     1 each    Device to be used with lancets.       * blood glucose monitoring lancets     1 Box    Use to test blood sugar 1 times daily or as  directed.  Ok to substitute alternative if insurance prefers.       * blood glucose monitoring lancets     1 Box    Use to test blood sugar 1daily or as directed.       * blood glucose monitoring test strip    ACCU-CHEK SMARTVIEW    50 each    Use to test blood sugar 1 times daily or as directed.  Ok to substitute alternative if insurance prefers.       * blood glucose monitoring test strip    no brand specified    100 each    Use to test blood sugars daily or as directed. Accu-check smartview strips       CALCIUM 1200 PO          cholecalciferol 1000 UNITS capsule    vitamin  -D    180 capsule    Take 2 capsules (2,000 Units) by mouth daily       citalopram 20 MG tablet    celeXA    90 tablet    TAKE ONE TABLET BY MOUTH ONE TIME DAILY       CO Q 10 PO      Take 1 capsule by mouth daily.       Cranberry 300 MG Tabs          econazole nitrate 1 % cream     30 g    Apply topically daily       flax seed oil 1000 MG capsule      Take 1 capsule by mouth daily       losartan-hydrochlorothiazide 100-25 MG per tablet    HYZAAR    90 tablet    TAKE ONE TABLET BY MOUTH EVERY DAY       MAGNESIUM PO      1 capsule daily       methylphenidate 20 MG CR tablet    METADATE ER    30 tablet    Take 1 tablet (20 mg) by mouth every morning       omeprazole 20 MG tablet    [facility]S OMEPRAZOLE    30 tablet    Take 1 tablet (20 mg) by mouth daily       UNABLE TO FIND      Reported on 5/3/2017       VITAMIN B COMPLEX-C Caps      1 capsule PO qd       vitamin E 400 UNIT capsule      Take by mouth daily       * Notice:  This list has 4 medication(s) that are the same as other medications prescribed for you. Read the directions carefully, and ask your doctor or other care provider to review them with you.

## 2017-05-24 NOTE — PROGRESS NOTES
Hand Therapy Initial Evaluation    Current Date:  5/24/2017    Subjective:  Karen Donis is a 72 year old right hand dominant female.    Diagnosis:   Right elbow pain and hand numbness  DOI:  5/5/17 (therapy referral)    Patient reports symptoms of pain, stiffness/loss of motion, weakness/loss of strength, numbness and tingling  of the right hand and elbow which occurred due to gradual onset over the past 3-4 weeks with unknown etiology. Since onset symptoms are gradually getting better.  Special tests:  none.  Previous treatment: elbow sleeve but too tight and arm band without relief.  General health as reported by patient is fair.  Pertinent medical history includes:osteoarthritis, cancer, diabetes, overweight, high blood pressure, depression , fibromyalgia, menopausal, numbness/tingling, pain at rest/night, weakness  Medical allergies: See list in EMR.  Surgical history: cancer: colon, orthopedic: right trigger thumb, other: tonsils.  Medication history: anti-depressants, high blood pressure, see list in EMR.    Current occupation is Retired   Barriers include:none  Prior functional level:  no limitations    Additional Occupational Profile Information (patterns of daily living, interests, values and needs): Cannot currently shashank     Functional Outcome Measure:  Upper Extremity Functional Index  SCORE:   Column Totals: 50/80  (A lower score indicates greater disability.)    Objective:  MMT:   Date 5/24/17   Elbow ext -   Elbow flex -   Wrist flexion with RD -   Wrist flexion with UD -   Pronation +   Supination -   FDS + I, S fingers     Strength:   (Measured in pounds)    5/24/17   Trials Left Right   1  2  3 20  24  28 20-  15-  20-   Average: 24 17     Special Tests:  Pain Report:  - none    + mild    ++ moderate    +++ severe   Special Tests:   5/24/17   Tinels at CT -   Phalens -   Median nerve compression at CT -   Newberry test for lumbrical incursion  (fist x 30 secs) -   Median nerve compression at  Pronator -   Tinels at cubital tunnel -   Elbow Flexion test -   ULTT ulnar ++   ULTT median ++   Ulnar nerve subluxation at elbow -       Palpation:  TP trigger point, + mild pain, ++ moderate pain, +++ severe pain  Date 5/24/17   MEP -   Flexor Wad Origin +   Flexors ++   Cubital Tunnel -   Volar wrist -   LEP +   Extensors + slight   1st dc -     Sensation:  Decreased Median and Ulnar Nerve distribution; per patient report  Collins-Balbir Monofilament Sensory Testing:  Right hand 5/24/17   Thumb 2.83   Index 2.83   Long 2.83   Ring RDN 2.83   Ring UDN 2.83   Small 2.83   2.83:  Normal  3.61:  Diminished light touch  4.31:  Diminished protective sensation  4.56:  Loss of protective sensation  6.65:  Deep pressure sensation  Absent:  Tested with no response    Pain Report:  VAS(0-10) 5/24/17   Least: 2-3/10   Worst: 3/10   Location:  elbow and index finger  Pain Quality:  Tingling and Tight  Frequency: constant    Pain is worst:  Sleeping  Exacerbated by:  Crocheting   Relieved by:  none  Progression:  Gradually improving  Assessment:  Patient presents with symptoms consistent with diagnosis of diffuse arm pain and paraesthesias (unable to localize symptoms today on exam), with conservative intervention.     Patient's limitations or Problem List includes:  Pain, Weakness, Sensory disturbance, Decreased  and Tightness in musculature of the right elbow, wrist and hand which interferes with the patient's ability to perform Self Care Tasks (dressing, eating, bathing), Sleep Patterns, Recreational Activities, Household Chores and Driving  as compared to previous level of function.    Rehab Potential:  Good - Return to full activity, some limitations    Patient will benefit from skilled Occupational Therapy to increase flexibility, overall strength,  strength and sensation and decrease pain to return to previous activity level and resume normal daily tasks and to reach their rehab potential.    Barriers to  Learning:  No barrier    Communication Issues:  Patient appears to be able to clearly communicate and understand verbal and written communication and follow directions correctly.    Assessment of Occupational Performance:  5 or more Performance Deficits  Identified Performance Deficits: bathing/showering, dressing, feeding, hygiene and grooming, communication management, driving and community mobility, home establishment and management, meal preparation and cleanup, sleep and leisure activities      Clinical Decision Making (Complexity): Low complexity    Treatment Explanation:  The following has been discussed with the patient:  RX ordered/plan of care  Anticipated outcomes  Possible risks and side effects    Plan:  Frequency:  1 X week, once daily  Duration:  for 6 weeks  Treatment Plan:    Modalities:  US and Paraffin  Therapeutic Exercise:  AROM, PROM, Tendon Gliding, Isotonics and Isometrics  Neuromuscular re-education:  Nerve Gliding, Posture and Kinesiotaping  Manual Techniques:  Myofascial release  Orthotic Fabrication:  Forearm based orthosis  Self Care:  Self Care Tasks and Diagnostic Education    Discharge Plan:  Achieve all LTG.  Independent in home treatment program.  Reach maximal therapeutic benefit.    Home Exercise Program:  Warmth for stiffness to forearm, wrist and hand  MFR/ball massage to flexors and extensors  Tendon gliding with 3 fists  Flexor and extensor wad stretches  Partial proximal median nerve gliding    Next Visit:  See in 1 week  Progress nerve gliding as tolerated  Add strengthening for   Consider night resting orthosis to improve sleeping  Consider adaptive equipment to increase ease with ADL's

## 2017-06-19 ENCOUNTER — OFFICE VISIT (OUTPATIENT)
Dept: PEDIATRICS | Facility: CLINIC | Age: 72
End: 2017-06-19
Payer: COMMERCIAL

## 2017-06-19 ENCOUNTER — OFFICE VISIT (OUTPATIENT)
Dept: DERMATOLOGY | Facility: CLINIC | Age: 72
End: 2017-06-19
Attending: INTERNAL MEDICINE
Payer: COMMERCIAL

## 2017-06-19 VITALS
HEART RATE: 80 BPM | OXYGEN SATURATION: 93 % | WEIGHT: 228.7 LBS | SYSTOLIC BLOOD PRESSURE: 116 MMHG | TEMPERATURE: 97.8 F | BODY MASS INDEX: 40.84 KG/M2 | DIASTOLIC BLOOD PRESSURE: 62 MMHG

## 2017-06-19 DIAGNOSIS — F33.41 RECURRENT MAJOR DEPRESSIVE DISORDER, IN PARTIAL REMISSION (H): ICD-10-CM

## 2017-06-19 DIAGNOSIS — L65.9 NON-SCARRING HAIR LOSS: Primary | ICD-10-CM

## 2017-06-19 DIAGNOSIS — I10 ESSENTIAL HYPERTENSION WITH GOAL BLOOD PRESSURE LESS THAN 140/90: ICD-10-CM

## 2017-06-19 DIAGNOSIS — L98.9 FACIAL LESION: ICD-10-CM

## 2017-06-19 DIAGNOSIS — E78.5 HYPERLIPIDEMIA LDL GOAL <100: ICD-10-CM

## 2017-06-19 DIAGNOSIS — F42.4 SKIN PICKING HABIT: ICD-10-CM

## 2017-06-19 DIAGNOSIS — K21.9 GASTROESOPHAGEAL REFLUX DISEASE WITHOUT ESOPHAGITIS: ICD-10-CM

## 2017-06-19 DIAGNOSIS — E11.9 DIET-CONTROLLED DIABETES MELLITUS (H): Primary | ICD-10-CM

## 2017-06-19 DIAGNOSIS — L98.1 NEUROTIC EXCORIATIONS: ICD-10-CM

## 2017-06-19 LAB
ANION GAP SERPL CALCULATED.3IONS-SCNC: 5 MMOL/L (ref 3–14)
BUN SERPL-MCNC: 17 MG/DL (ref 7–30)
CALCIUM SERPL-MCNC: 9.4 MG/DL (ref 8.5–10.1)
CHLORIDE SERPL-SCNC: 103 MMOL/L (ref 94–109)
CO2 SERPL-SCNC: 31 MMOL/L (ref 20–32)
CREAT SERPL-MCNC: 1.01 MG/DL (ref 0.52–1.04)
FERRITIN SERPL-MCNC: 72 NG/ML (ref 8–252)
GFR SERPL CREATININE-BSD FRML MDRD: 54 ML/MIN/1.7M2
GLUCOSE SERPL-MCNC: 194 MG/DL (ref 70–99)
HBA1C MFR BLD: 6.5 % (ref 4.3–6)
IRON SATN MFR SERPL: 28 % (ref 15–46)
IRON SERPL-MCNC: 88 UG/DL (ref 35–180)
POTASSIUM SERPL-SCNC: 4.1 MMOL/L (ref 3.4–5.3)
SODIUM SERPL-SCNC: 139 MMOL/L (ref 133–144)
TIBC SERPL-MCNC: 319 UG/DL (ref 240–430)
TSH SERPL DL<=0.005 MIU/L-ACNC: 1.5 MU/L (ref 0.4–4)

## 2017-06-19 PROCEDURE — 82728 ASSAY OF FERRITIN: CPT | Performed by: DERMATOLOGY

## 2017-06-19 PROCEDURE — 99214 OFFICE O/P EST MOD 30 MIN: CPT | Performed by: INTERNAL MEDICINE

## 2017-06-19 PROCEDURE — 36415 COLL VENOUS BLD VENIPUNCTURE: CPT | Performed by: DERMATOLOGY

## 2017-06-19 PROCEDURE — 99202 OFFICE O/P NEW SF 15 MIN: CPT | Performed by: DERMATOLOGY

## 2017-06-19 PROCEDURE — 80048 BASIC METABOLIC PNL TOTAL CA: CPT | Performed by: INTERNAL MEDICINE

## 2017-06-19 PROCEDURE — 83540 ASSAY OF IRON: CPT | Performed by: DERMATOLOGY

## 2017-06-19 PROCEDURE — 84443 ASSAY THYROID STIM HORMONE: CPT | Performed by: DERMATOLOGY

## 2017-06-19 PROCEDURE — 83036 HEMOGLOBIN GLYCOSYLATED A1C: CPT | Performed by: INTERNAL MEDICINE

## 2017-06-19 PROCEDURE — 83550 IRON BINDING TEST: CPT | Performed by: DERMATOLOGY

## 2017-06-19 RX ORDER — DULOXETIN HYDROCHLORIDE 30 MG/1
30 CAPSULE, DELAYED RELEASE ORAL DAILY
Qty: 30 CAPSULE | Refills: 0 | Status: SHIPPED | OUTPATIENT
Start: 2017-06-19 | End: 2017-08-21

## 2017-06-19 RX ORDER — DULOXETIN HYDROCHLORIDE 30 MG/1
30 CAPSULE, DELAYED RELEASE ORAL DAILY
Qty: 60 CAPSULE | Refills: 0 | Status: SHIPPED | OUTPATIENT
Start: 2017-06-19 | End: 2017-06-19

## 2017-06-19 ASSESSMENT — ANXIETY QUESTIONNAIRES
3. WORRYING TOO MUCH ABOUT DIFFERENT THINGS: NOT AT ALL
7. FEELING AFRAID AS IF SOMETHING AWFUL MIGHT HAPPEN: NOT AT ALL
GAD7 TOTAL SCORE: 0
2. NOT BEING ABLE TO STOP OR CONTROL WORRYING: NOT AT ALL
6. BECOMING EASILY ANNOYED OR IRRITABLE: NOT AT ALL
1. FEELING NERVOUS, ANXIOUS, OR ON EDGE: NOT AT ALL
5. BEING SO RESTLESS THAT IT IS HARD TO SIT STILL: NOT AT ALL

## 2017-06-19 ASSESSMENT — PATIENT HEALTH QUESTIONNAIRE - PHQ9: 5. POOR APPETITE OR OVEREATING: NOT AT ALL

## 2017-06-19 NOTE — TELEPHONE ENCOUNTER
Omeprazole 20mg capsule    - last prescribed as tablet, pharmacy requesting capsule form of medication  Last Written Prescription Date: 2/1/17  Last Fill Quantity: 30,  # refills: 0   Last Office Visit with [facility], [facility] or [facility] prescribing provider: 2/1/17                                         Next 5 appointments (look out 90 days)     Jun 19, 2017  1:20 PM CDT   Return Visit with Lakia Hunter MD   [facility] ([facility])    [address] [place] [id number] [phone]

## 2017-06-19 NOTE — MR AVS SNAPSHOT
After Visit Summary   6/19/2017    Karen Donis    MRN: [id number]           Patient Information     Date Of Birth          1945        Visit Information        Provider Department      6/19/2017 4:15 PM Will Longoria MD [facility]        Today's Diagnoses     Non-scarring hair loss    -  1    Neurotic excoriations          Care Instructions    1) Benzoyl peroxide wash 5-10% soap. This can bleach fabrics. I use this in the shower. Just do it 3 time a week. Please also wash the back of the neck.    2) Vanicream moisturizer twice a day at least. Whenever you feel like picking, please use a moisturizer.    3) I will check to see if the blood drawn already can be used for the lab testing. If not, we will call you and you can schedule a lab appointment at [facility].          Follow-ups after your visit        Your next 10 appointments already scheduled     Jul 05, 2017  1:00 PM CDT   New Visit with Dejon Horvath DPM   [facility]    [address] [id number] [phone]            Jul 17, 2017 11:15 AM CDT   Return Visit with Will Longoria MD   [facility])    [address] [place] [id number] [phone]            Jul 17, 2017  1:20 PM CDT   Return Visit with Lakia Hunter MD   [facility])    [address] [place] [id number] [phone]              Who to contact     If you have questions or need follow up information about today's clinic visit or your schedule please contact [facility] directly at [phone].  Normal or non-critical lab and imaging results will be communicated to you by MyChart, letter or phone within 4 business days after the clinic has received the results. If you do not hear from us within 7 days, please contact the clinic through [employer]  or phone. If you have a critical or abnormal lab result, we will notify you by phone as soon as possible.  Submit refill requests through [employer] or call your pharmacy and they will forward the refill request to us. Please allow 3 business days for your refill to be completed.          Additional Information About Your Visit        [email or web address] Information     [employer] is an electronic gateway that provides easy, online access to your medical records. With [employer], you can request a clinic appointment, read your test results, renew a prescription or communicate with your care team.     To sign up for [employer] visit the website at [email or web address]   You will be asked to enter the access code listed below, as well as some personal information. Please follow the directions to create your username and password.     Your access code is: [email or web address]  Expires: 2017  4:55 PM     Your access code will  in 90 days. If you need help or a new code, please contact your [facility] or call [phone] for assistance.        Care EveryWhere ID     This is your Care EveryWhere ID. This could be used by other organizations to access your [place] medical records  [id number]         Blood Pressure from Last 3 Encounters:   17 116/62   17 148/75   17 124/76    Weight from Last 3 Encounters:   17 103.7 kg (228 lb 11.2 oz)   17 101.2 kg (223 lb)   17 102.1 kg (225 lb)              Today, you had the following     No orders found for display         Today's Medication Changes          These changes are accurate as of: 17  4:29 PM.  If you have any questions, ask your nurse or doctor.               Start taking these medicines.        Dose/Directions    DULoxetine 30 MG EC capsule   Commonly known as:  CYMBALTA   Used for:  Recurrent major depressive disorder, in partial remission (H)   Started by:  Lakia Hunter MD        Dose:  30 mg   Take 1 capsule  (30 mg) by mouth daily   Quantity:  30 capsule   Refills:  0       omeprazole 20 MG CR capsule   Commonly known as:  priLOSEC   Used for:  Gastroesophageal reflux disease without esophagitis   Started by:  Lakia Hunter MD        Dose:  20 mg   Take 1 capsule (20 mg) by mouth daily   Quantity:  30 capsule   Refills:  3            Where to get your medicines      These medications were sent to [id number] - [place], [place] - [address]  [address] [place] [address]     Phone:  [phone]     DULoxetine 30 MG EC capsule    omeprazole 20 MG CR capsule                Primary Care Provider Office Phone # Fax #    Lakia Hunter [phone]       [facility] [address] [facility] [address]        Thank you!     Thank you for choosing [facility]  for your care. Our goal is always to provide you with excellent care. Hearing back from our patients is one way we can continue to improve our services. Please take a few minutes to complete the written survey that you may receive in the mail after your visit with us. Thank you!             Your Updated Medication List - Protect others around you: Learn how to safely use, store and throw away your medicines at www.disposemymeds.org.          This list is accurate as of: 6/19/17  4:29 PM.  Always use your most recent med list.                   Brand Name Dispense Instructions for use    aspirin 81 MG tablet      1 TABLET DAILY       BEANO PO      Take by mouth as needed Reported on 5/3/2017       biotin 1000 MCG Tabs tablet      Take 1,000 mcg by mouth daily       blood glucose lancing device     1 each    Device to be used with lancets.       * blood glucose monitoring lancets     1 Box    Use to test blood sugar 1 times daily or as directed.  Ok to substitute alternative if insurance prefers.       * blood glucose monitoring lancets     1 Box    Use to test blood sugar 1daily or as directed.       *  blood glucose monitoring test strip    ACCU-CHEK SMARTVIEW    50 each    Use to test blood sugar 1 times daily or as directed.  Ok to substitute alternative if insurance prefers.       * blood glucose monitoring test strip    no brand specified    100 each    Use to test blood sugars daily or as directed. Accu-check smartview strips       CALCIUM 1200 PO      Take by mouth daily       cholecalciferol 1000 UNITS capsule    vitamin  -D    180 capsule    Take 2 capsules (2,000 Units) by mouth daily       CO Q 10 PO      Take 1 capsule by mouth daily.       Cranberry 300 MG Tabs      Take by mouth daily       DULoxetine 30 MG EC capsule    CYMBALTA    30 capsule    Take 1 capsule (30 mg) by mouth daily       econazole nitrate 1 % cream     30 g    Apply topically daily       flax seed oil 1000 MG capsule      Take 1 capsule by mouth daily       losartan-hydrochlorothiazide 100-25 MG per tablet    HYZAAR    90 tablet    TAKE ONE TABLET BY MOUTH EVERY DAY       MAGNESIUM PO      1 capsule daily       methylphenidate 20 MG CR tablet    METADATE ER    30 tablet    Take 1 tablet (20 mg) by mouth every morning       omeprazole 20 MG CR capsule    priLOSEC    30 capsule    Take 1 capsule (20 mg) by mouth daily       UNABLE TO FIND      Reported on 5/3/2017       VITAMIN B COMPLEX-C Caps      1 capsule PO qd       vitamin E 400 UNIT capsule      Take by mouth daily       * Notice:  This list has 4 medication(s) that are the same as other medications prescribed for you. Read the directions carefully, and ask your doctor or other care provider to review them with you.

## 2017-06-19 NOTE — PATIENT INSTRUCTIONS
Make appointment(s) for:   -- get A1c today.   -- dermatology  -- follow up in 4 weeks      Stop citalopram, and start duloxetine (new)      Medication(s) prescribed today:    Orders Placed This Encounter   Medications     DULoxetine (CYMBALTA) 30 MG EC capsule     Sig: Take 1 capsule (30 mg) by mouth daily     Dispense:  60 capsule     Refill:  0

## 2017-06-19 NOTE — PROGRESS NOTES
Send letter with the following comment:         Dear Karen Donis,     Enclosed is a copy of your test results.    -- A1c is excellent  -- Electrolytes are at baseline. Slightly reduced GFR which is overall stable.    If you have additional question, please call or make a follow-up appointment.    Lakia Hunter MD

## 2017-06-19 NOTE — NURSING NOTE
"Chief Complaint   Patient presents with     Recheck Medication       Initial /62  Pulse 80  Temp 97.8  F (36.6  C) (Oral)  Wt 228 lb 11.2 oz (103.7 kg)  SpO2 93%  BMI 40.84 kg/m2 Estimated body mass index is 40.84 kg/(m^2) as calculated from the following:    Height as of 5/3/17: 5' 2.75\" (1.594 m).    Weight as of this encounter: 228 lb 11.2 oz (103.7 kg).  BP completed using cuff size: yasmine Charles, CMA    "

## 2017-06-19 NOTE — PROGRESS NOTES
SUBJECTIVE:                                                    Karen Donis is a 72 year old female who presents to clinic today for the following health issues:      Diabetes Follow-up    Patient is checking blood sugars: once daily.  Results are as follows:         lunchtime - 130-150's    Diabetic concerns: None     Symptoms of hypoglycemia (low blood sugar): none     Paresthesias (numbness or burning in feet) or sores: No     Date of last diabetic eye exam: 2017 (had eye surgery 2/14/2017)       Amount of exercise or physical activity: None    Problems taking medications regularly: Yes,  problems remembering to take    Medication side effects: none    Diet: low salt, low fat/cholesterol and diabetic         Problem list, Medication list, Allergies, and Medical/Social/Surgical histories reviewed in [facility] and updated as appropriate.    OBJECTIVE:                                                    /62  Pulse 80  Temp 97.8  F (36.6  C) (Oral)  Wt 228 lb 11.2 oz (103.7 kg)  SpO2 93%  BMI 40.84 kg/m2    GEN: healthy, alert and no distress  HEENT: unremarkable.  Neck:     supple, no thyromegaly, no cervical lymphadenopathy.   LACHELLE:  CTA B/L, no wheezing or crackles.  CV:  RRR no murmur.  Intact distal pulses, good cap refill.  Abd: soft, normal active bowel sounds, non tender, non distended. No mass or organomegaly.   Ext:  no cyanosis, clubbing or edema.    Foot exam: normal sensation, DP 2+, filament test normal.   Skin; multiple scabs from picking and some dry patches. Heavy foundation so difficult to assess underlying rash       Diagnostic test results:  Results for orders placed or performed in visit on 06/19/17   Hemoglobin A1c   Result Value Ref Range    Hemoglobin A1C 6.5 (H) 4.3 - 6.0 %   Basic metabolic panel  (Ca, Cl, CO2, Creat, Gluc, K, Na, BUN)   Result Value Ref Range    Sodium 139 133 - 144 mmol/L    Potassium 4.1 3.4 - 5.3 mmol/L    Chloride 103 94 - 109 mmol/L    Carbon Dioxide 31 20 - 32  mmol/L    Anion Gap 5 3 - 14 mmol/L    Glucose 194 (H) 70 - 99 mg/dL    Urea Nitrogen 17 7 - 30 mg/dL    Creatinine 1.01 0.52 - 1.04 mg/dL    GFR Estimate 54 (L) >60 mL/min/1.7m2    GFR Estimate If Black 65 >60 mL/min/1.7m2    Calcium 9.4 8.5 - 10.1 mg/dL           ASSESSMENT/PLAN:                                                      72 year old female with the following diagnoses and treatment plan:      ICD-10-CM    1. Diet-controlled diabetes mellitus (H) E11.9 Hemoglobin A1c     PODIATRY/FOOT & ANKLE SURGERY REFERRAL   2. Facial lesion L98.9 DERMATOLOGY REFERRAL   3. Recurrent major depressive disorder, in partial remission (H) F33.41 DULoxetine (CYMBALTA) 30 MG EC capsule     DISCONTINUED: DULoxetine (CYMBALTA) 30 MG EC capsule   4. Essential hypertension with goal blood pressure less than 140/90 I10 Basic metabolic panel  (Ca, Cl, CO2, Creat, Gluc, K, Na, BUN)   5. Skin picking habit F42.4 DERMATOLOGY REFERRAL   6. Hyperlipidemia LDL goal <100 E78.5        -- diabetes well controlled. Depression is a little worse. Pt also reports body aches. So will change citalopram to cymbalta.   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     Lakia Hunter MD-PhD  [facility]    (Note: Chart documentation was done in part with Dragon Voice Recognition software. Although reviewed after completion, some word and grammatical errors may remain.)

## 2017-06-19 NOTE — PROGRESS NOTES
"Former[facility] Dermatology Note      Dermatology Problem List:  1. 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  2. Androgenetic alopecia. Check TSH, Ferritin, and Iron studies,.    Encounter Date: Jun 19, 2017    CC:  Chief Complaint   Patient presents with     Derm Problem     spots on the face. she has dry skin and picks her face. She is also loosing her hair as well from the dry skin       History of Present Illness:  Ms. Karen Donis is a 72 year old female who is a referral from Dr. Lakia Hunter for a facial lesion and xerosis causing her to pick at her skin and to lose hair. She reports tiny \"bumps\" underneath her skin that she will find and feels compelled to pick at. If she gets it out, it looks like a small \"red ball\". She states she is continually searching for these lesions. She says she has ADHD and will just pick at things whether or not they itch. Pt's mother has hx of thyroid disease. Pt reports using econazole cream on feet. No other medications for today's problems. She has ADD and says she forgets to take medications.       In terms of hair loss, no family history of hair loss in women. Hair loss is mostly in the frontal scalp with retention of hairs in the occipitals and parietal scalp.    Past Medical History:   Patient Active Problem List   Diagnosis     Seborrheic dermatitis     Alopecia     Xerosis cutis     Vitamin D deficiency     Nodules, thyroid     Postmenopausal     Colon cancer     CARDIOVASCULAR SCREENING; LDL GOAL LESS THAN 160     Impingement syndrome of right shoulder     AC (acromioclavicular) joint arthritis     Anxiety state     Attention deficit disorder     ACP (advance care planning)     Gastroesophageal reflux disease without esophagitis     Diet-controlled diabetes mellitus (H)     Hyperlipidemia LDL goal <100     Essential hypertension with goal blood pressure less than 140/90     Fatty liver     Major depression in complete " remission (H)     Chronic bilateral low back pain with bilateral sciatica     Morbid obesity due to excess calories (H)     Right elbow pain     Paresthesias in right hand     Past Medical History:   Diagnosis Date     Colon cancer (H)      Hypertension      Shortness of breath      Sleep apnea      Thyroid disease      Past Surgical History:   Procedure Laterality Date     COLON SURGERY  2003     THYROIDECTOMY  12/6/2011    Procedure:THYROIDECTOMY; Right Thyroid Lobectomy and bilateral removal of skin tags; Surgeon:SAJI ZAZUETA; Location:[facility] OR     TONSILLECTOMY      While she was in 6th grade       Social History:  The patient is retired. The patient denies use of tanning beds.    Family History:  There is a family history of unspecified skin cancer in the patient's paternal grandfather, mother, and sister.    Medications:  Current Outpatient Prescriptions   Medication Sig Dispense Refill     omeprazole (PRILOSEC) 20 MG CR capsule Take 1 capsule (20 mg) by mouth daily 30 capsule 3     DULoxetine (CYMBALTA) 30 MG EC capsule Take 1 capsule (30 mg) by mouth daily 30 capsule 0     [DISCONTINUED] DULoxetine (CYMBALTA) 30 MG EC capsule Take 1 capsule (30 mg) by mouth daily 60 capsule 0     methylphenidate (METADATE ER) 20 MG CR tablet Take 1 tablet (20 mg) by mouth every morning 30 tablet 0     losartan-hydrochlorothiazide (HYZAAR) 100-25 MG per tablet TAKE ONE TABLET BY MOUTH EVERY DAY 90 tablet 1     blood glucose monitoring (ACCU-CHEK FASTCLIX) lancets Use to test blood sugar 1 times daily or as directed.  Ok to substitute alternative if insurance prefers. 1 Box 6     blood glucose monitoring (ACCU-CHEK SMARTVIEW) test strip Use to test blood sugar 1 times daily or as directed.  Ok to substitute alternative if insurance prefers. 50 each 6     blood glucose (ACCU-CHEK FASTCLIX) lancing device Device to be used with lancets. 1 each 0     blood glucose monitoring (ACCU-CHEK FASTCLIX) lancets Use to test blood  sugar 1daily or as directed. 1 Box 3     blood glucose monitoring (NO BRAND SPECIFIED) test strip Use to test blood sugars daily or as directed. Accu-check smartview strips 100 each 3     Calcium Carbonate-Vit D-Min (CALCIUM 1200 PO) Take by mouth daily        Flaxseed, Linseed, (FLAX SEED OIL) 1000 MG capsule Take 1 capsule by mouth daily       UNABLE TO FIND Reported on 5/3/2017       vitamin E 400 UNIT capsule Take by mouth daily       econazole nitrate 1 % cream Apply topically daily 30 g 3     Cranberry 300 MG TABS Take by mouth daily        biotin 1000 MCG TABS tablet Take 1,000 mcg by mouth daily       Alpha-D-Galactosidase (BEANO PO) Take by mouth as needed Reported on 5/3/2017       cholecalciferol (VITAMIN  -D) 1000 UNITS capsule Take 2 capsules (2,000 Units) by mouth daily 180 capsule 3     Coenzyme Q10 (CO Q 10 PO) Take 1 capsule by mouth daily.       ASPIRIN 81 MG OR TABS 1 TABLET DAILY       MAGNESIUM OR 1 capsule daily       VITAMIN B COMPLEX-C OR CAPS 1 capsule PO qd         Allergies   Allergen Reactions     Bactrim Other (See Comments)     Burning when voiding     Belladonna Alkaloids-Opium [B & O] Blisters     Bellagril     Definity [Octafluoropropane] Other (See Comments)     Pain in the tailbone     Ergotamine Other (See Comments)     Patient unsure of reaction     Phenobarbital Other (See Comments)     Patient unsure of reaction     Sulfa Drugs Blisters     Tape [Adhesive Tape] Other (See Comments)     Burning feeling of the skin     Lisinopril Cough              Review of Systems:  -Skin: The patient denies any new rash, pruritus, or lesions that are symptomatic, changing or bleeding, except as per HPI.  -Constitutional: The patient denies fatigue, fevers, chills, unintended weight loss, and night sweats.    Physical exam:  Vitals: There were no vitals taken for this visit.  GEN: This is a well developed, well-nourished female in no acute distress, in a pleasant mood.    NEURO: Alert and  oriented  PSYCH: In pleasant mood, appropriate affect  SKIN: Focused examination of the face, scalp, posterior neck, and hair was performed.  -there are numerous areas of excoriation on the forehead, cheeks, upper cutaneous lip with hemorrhagic crust. Some look very fresh.   -there is decrease in hair density in the front scalp, with intact frontal hairline with unaffected hair density in temporal and parietal scalps  -No other lesions of concern on areas examined.       Impression/Plan:  1. Neurotic Excoriations, pt admits to picking 2/2 to her ADHD. Unclear if there is actually a rash. Possibly superinfected. Will treat with BPO wash 3x a week.    Start Vanicream BID and in lieu of picking    Start OTC BPO 5-10% soap x3 week    2. Non-Scarring Alopecia, most likely Androgenetic alopecia. Pt declined biopsy will check labs. Pt seemed unconvinced but I'm fairly certain.    TSH, Ferritin, Iron studies.     CC Lakia Hunter MD, MD on close of this encounter.  Follow-up in 1 months, earlier for new or changing lesions.       Staff Involved:  Scribe/Staff    Scribe Disclosure:   I, Merritt Shah, am serving as a scribe to document services personally performed by Dr. Will Longoria, based on data collection and the provider's statements to me.     Provider Disclosure:   I have reviewed the documentation recorded by the scribe and have edited it as needed. I have personally performed the services documented here and the documentation accurately represents those services and the decisions made by me.     Will Longoria MD, MS    Department of Dermatology  [facility]: Phone: [phone], Fax:[phone]  [facility]: Phone: [phone], Fax: [phone]

## 2017-06-19 NOTE — PATIENT INSTRUCTIONS
1) Benzoyl peroxide wash 5-10% soap. This can bleach fabrics. I use this in the shower. Just do it 3 time a week. Please also wash the back of the neck.    2) Vanicream moisturizer twice a day at least. Whenever you feel like picking, please use a moisturizer.    3) I will check to see if the blood drawn already can be used for the lab testing. If not, we will call you and you can schedule a lab appointment at [facility].

## 2017-06-19 NOTE — LETTER
June 19, 2017      Karen Donis  [address] [address] [place] [address]                  Dear Karen Donis,     Enclosed is a copy of your test results.    -- A1c is excellent  -- Electrolytes are at baseline. Slightly reduced GFR which is overall stable.    If you have additional question, please call or make a follow-up appointment.    Lakia Hunter MD                                      Resulted Orders   Hemoglobin A1c   Result Value Ref Range    Hemoglobin A1C 6.5 (H) 4.3 - 6.0 %   Basic metabolic panel  (Ca, Cl, CO2, Creat, Gluc, K, Na, BUN)   Result Value Ref Range    Sodium 139 133 - 144 mmol/L    Potassium 4.1 3.4 - 5.3 mmol/L    Chloride 103 94 - 109 mmol/L    Carbon Dioxide 31 20 - 32 mmol/L    Anion Gap 5 3 - 14 mmol/L    Glucose 194 (H) 70 - 99 mg/dL      Comment:      Non Fasting    Urea Nitrogen 17 7 - 30 mg/dL    Creatinine 1.01 0.52 - 1.04 mg/dL    GFR Estimate 54 (L) >60 mL/min/1.7m2      Comment:      Non  GFR Calc    GFR Estimate If Black 65 >60 mL/min/1.7m2      Comment:       GFR Calc    Calcium 9.4 8.5 - 10.1 mg/dL

## 2017-06-19 NOTE — MR AVS SNAPSHOT
After Visit Summary   6/19/2017    Karen Donis    MRN: [id number]           Patient Information     Date Of Birth          1945        Visit Information        Provider Department      6/19/2017 1:20 PM Lakia Hunter MD [facility]        Today's Diagnoses     Diet-controlled diabetes mellitus (H)    -  1    Facial lesion        Recurrent major depressive disorder, in partial remission (H)        Essential hypertension with goal blood pressure less than 140/90        Skin picking habit        Hyperlipidemia LDL goal <100          Care Instructions       Make appointment(s) for:   -- get A1c today.   -- dermatology  -- follow up in 4 weeks      Stop citalopram, and start duloxetine (new)      Medication(s) prescribed today:    Orders Placed This Encounter   Medications     DULoxetine (CYMBALTA) 30 MG EC capsule     Sig: Take 1 capsule (30 mg) by mouth daily     Dispense:  60 capsule     Refill:  0                   Follow-ups after your visit        Additional Services     DERMATOLOGY REFERRAL       Your provider has referred you to: [facility]: [facility] [phone]   [email or web address]    Neurodermatitis, dry patches    Please be aware that coverage of these services is subject to the terms and limitations of your health insurance plan.  Call member services at your health plan with any benefit or coverage questions.      Please bring the following with you to your appointment:    (1) Any X-Rays, CTs or MRIs which have been performed.  Contact the facility where they were done to arrange for  prior to your scheduled appointment.    (2) List of current medications  (3) This referral request   (4) Any documents/labs given to you for this referral            PODIATRY/FOOT & ANKLE SURGERY REFERRAL       Your provider has referred you to: [facility]: [place] [facility] [place] [phone]    [email or web address]    Diabetes foot care, shoe inserts    Please be aware that coverage of these services is subject to the terms and limitations of your health insurance plan.  Call member services at your health plan with any benefit or coverage questions.      Please bring the following to your appointment:  >>   Any x-rays, CTs or MRIs which have been performed.  Contact the facility where they were done to arrange for  prior to your scheduled appointment.  Any new CT, MRI or other procedures ordered by your specialist must be performed at a [facility] facility or coordinated by your clinic's referral office.    >>   List of current medications   >>   This referral request   >>   Any documents/labs given to you for this referral                  Who to contact     If you have questions or need follow up information about today's clinic visit or your schedule please contact [facility] directly at [phone].  Normal or non-critical lab and imaging results will be communicated to you by MyChart, letter or phone within 4 business days after the clinic has received the results. If you do not hear from us within 7 days, please contact the clinic through [employer] or phone. If you have a critical or abnormal lab result, we will notify you by phone as soon as possible.  Submit refill requests through [employer] or call your pharmacy and they will forward the refill request to us. Please allow 3 business days for your refill to be completed.          Additional Information About Your Visit        [employer] Information     [employer] is an electronic gateway that provides easy, online access to your medical records. With [employer], you can request a clinic appointment, read your test results, renew a prescription or communicate with your care team.     To sign up for [employer] visit the website at [email or web address]   You will be asked to enter the access code listed below, as  well as some personal information. Please follow the directions to create your username and password.     Your access code is: [email or web address]  Expires: 2017  4:55 PM     Your access code will  in 90 days. If you need help or a new code, please contact your [facility] or call [phone] for assistance.        Care EveryWhere ID     This is your Care EveryWhere ID. This could be used by other organizations to access your [facility] medical records  [id number]        Your Vitals Were     Pulse Temperature Pulse Oximetry BMI (Body Mass Index)          80 97.8  F (36.6  C) (Oral) 93% 40.84 kg/m2         Blood Pressure from Last 3 Encounters:   17 116/62   17 148/75   17 124/76    Weight from Last 3 Encounters:   17 228 lb 11.2 oz (103.7 kg)   17 223 lb (101.2 kg)   17 225 lb (102.1 kg)              We Performed the Following     Basic metabolic panel  (Ca, Cl, CO2, Creat, Gluc, K, Na, BUN)     DERMATOLOGY REFERRAL     Hemoglobin A1c     PODIATRY/FOOT & ANKLE SURGERY REFERRAL          Today's Medication Changes          These changes are accurate as of: 17  2:01 PM.  If you have any questions, ask your nurse or doctor.               Start taking these medicines.        Dose/Directions    DULoxetine 30 MG EC capsule   Commonly known as:  CYMBALTA   Used for:  Recurrent major depressive disorder, in partial remission (H)   Started by:  Lakia Hunter MD        Dose:  30 mg   Take 1 capsule (30 mg) by mouth daily   Quantity:  30 capsule   Refills:  0       omeprazole 20 MG CR capsule   Commonly known as:  priLOSEC   Used for:  Gastroesophageal reflux disease without esophagitis   Started by:  Lakia Hunter MD        Dose:  20 mg   Take 1 capsule (20 mg) by mouth daily   Quantity:  30 capsule   Refills:  3            Where to get your medicines      These medications were sent to [id number] - [place] - [address] [address]  [address]  [address] [place] [address]     Phone:  [phone]     DULoxetine 30 MG EC capsule    omeprazole 20 MG CR capsule                Primary Care Provider Office Phone # Fax #    Lakia Hunter [phone]       [facility] [address]  [facility] [address]        Thank you!     Thank you for choosing [facility]  for your care. Our goal is always to provide you with excellent care. Hearing back from our patients is one way we can continue to improve our services. Please take a few minutes to complete the written survey that you may receive in the mail after your visit with us. Thank you!             Your Updated Medication List - Protect others around you: Learn how to safely use, store and throw away your medicines at www.disposemymeds.org.          This list is accurate as of: 6/19/17  2:01 PM.  Always use your most recent med list.                   Brand Name Dispense Instructions for use    aspirin 81 MG tablet      1 TABLET DAILY       BEANO PO      Take by mouth as needed Reported on 5/3/2017       biotin 1000 MCG Tabs tablet      Take 1,000 mcg by mouth daily       blood glucose lancing device     1 each    Device to be used with lancets.       * blood glucose monitoring lancets     1 Box    Use to test blood sugar 1 times daily or as directed.  Ok to substitute alternative if insurance prefers.       * blood glucose monitoring lancets     1 Box    Use to test blood sugar 1daily or as directed.       * blood glucose monitoring test strip    ACCU-CHEK SMARTVIEW    50 each    Use to test blood sugar 1 times daily or as directed.  Ok to substitute alternative if insurance prefers.       * blood glucose monitoring test strip    no brand specified    100 each    Use to test blood sugars daily or as directed. Accu-check smartview strips       CALCIUM 1200 PO      Take by mouth daily       cholecalciferol 1000 UNITS capsule    vitamin  -D    180 capsule    Take 2  capsules (2,000 Units) by mouth daily       CO Q 10 PO      Take 1 capsule by mouth daily.       Cranberry 300 MG Tabs      Take by mouth daily       DULoxetine 30 MG EC capsule    CYMBALTA    30 capsule    Take 1 capsule (30 mg) by mouth daily       econazole nitrate 1 % cream     30 g    Apply topically daily       flax seed oil 1000 MG capsule      Take 1 capsule by mouth daily       losartan-hydrochlorothiazide 100-25 MG per tablet    HYZAAR    90 tablet    TAKE ONE TABLET BY MOUTH EVERY DAY       MAGNESIUM PO      1 capsule daily       methylphenidate 20 MG CR tablet    METADATE ER    30 tablet    Take 1 tablet (20 mg) by mouth every morning       omeprazole 20 MG CR capsule    priLOSEC    30 capsule    Take 1 capsule (20 mg) by mouth daily       UNABLE TO FIND      Reported on 5/3/2017       VITAMIN B COMPLEX-C Caps      1 capsule PO qd       vitamin E 400 UNIT capsule      Take by mouth daily       * Notice:  This list has 4 medication(s) that are the same as other medications prescribed for you. Read the directions carefully, and ask your doctor or other care provider to review them with you.

## 2017-06-20 ENCOUNTER — TELEPHONE (OUTPATIENT)
Dept: DERMATOLOGY | Facility: CLINIC | Age: 72
End: 2017-06-20

## 2017-06-20 ASSESSMENT — ANXIETY QUESTIONNAIRES: GAD7 TOTAL SCORE: 0

## 2017-06-20 ASSESSMENT — PATIENT HEALTH QUESTIONNAIRE - PHQ9: SUM OF ALL RESPONSES TO PHQ QUESTIONS 1-9: 12

## 2017-06-20 NOTE — PROGRESS NOTES
Please call pt and let her know that we do not need for her to do any extra blood draws as I was able to add on those labs I spoke about yesterday during clinic.    Her thyroid hormone is normal. Her iron is normal.    I still emphasize that her hair loss is androgenetic alopecia (female pattern) and due to her genetics. Topical Minoxidil 5% daily or twice a day (over the counter) may help over time with a transient hair shedding in the beginning of using it. Again, I offered biopsies which she turned down. We can still do biopsies if she doesn't believe that I told her.    Will Longoria MD, MS    Department of Dermatology  [facility]: Phone: [phone], Fax:[phone]  [facility]: Phone: [phone], Fax: [phone]

## 2017-06-20 NOTE — TELEPHONE ENCOUNTER
Attempted to contact pt.  No answer.  Message left on machine to return call.    Fauzia Sullivan LPN      Calling to notifiy pt of lab results dated 6/19/17 and Dr. Longoria's Result Note with recommendations:    Notes Recorded by Will Longoria MD on 6/20/2017 at 7:41 AM  Please call pt and let her know that we do not need for her to do any extra blood draws as I was able to add on those labs I spoke about yesterday during clinic.    Her thyroid hormone is normal. Her iron is normal.    I still emphasize that her hair loss is androgenetic alopecia (female pattern) and due to her genetics. Topical Minoxidil 5% daily or twice a day (over the counter) may help over time with a transient hair shedding in the beginning of using it. Again, I offered biopsies which she turned down. We can still do biopsies if she doesn't believe that I told her.    Results for orders placed or performed in visit on 06/19/17   Ferritin   Result Value Ref Range    Ferritin 72 8 - 252 ng/mL   Iron and iron binding capacity   Result Value Ref Range    Iron 88 35 - 180 ug/dL    Iron Binding Cap 319 240 - 430 ug/dL    Iron Saturation Index 28 15 - 46 %   TSH with free T4 reflex   Result Value Ref Range    TSH 1.50 0.40 - 4.00 mU/L

## 2017-06-20 NOTE — TELEPHONE ENCOUNTER
Voicemail message received from pt.  Attempted return call.  No answer.  Message left on machine to return call to [facility] [place] at [phone]    Fauzia Sullivan LPN

## 2017-06-22 DIAGNOSIS — F98.8 ATTENTION DEFICIT DISORDER: ICD-10-CM

## 2017-06-22 RX ORDER — METHYLPHENIDATE HYDROCHLORIDE EXTENDED RELEASE 20 MG/1
20 TABLET ORAL EVERY MORNING
Qty: 30 TABLET | Refills: 0 | Status: SHIPPED | OUTPATIENT
Start: 2017-06-22 | End: 2017-07-21

## 2017-06-22 NOTE — TELEPHONE ENCOUNTER
Routing refill request to provider for review/approval because:  Drug not on the [facility] refill protocol     Methylphenidate 20 mg cr tablet  Last Written Prescription Date: 5/17/2017  Last Fill Quantity: 30,  # refills: 0   Last Office Visit with [facility] or [facility] prescribing provider: 6/19/2017                                         Next 5 appointments (look out 90 days)     Jul 17, 2017 11:15 AM CDT   Return Visit with Will Longoria MD   [facility])    [address] [place] [id number] [phone]            Jul 17, 2017  1:20 PM CDT   Return Visit with Lakia Hunter MD   [facility])    [address] [place] [id number] [phone]                  Jane Choe RN

## 2017-06-22 NOTE — TELEPHONE ENCOUNTER
Called and spoke with pt.  Read back to pt Dr. Longoria's Result Note dated 6/20/17 with Recommendations.    Pt verbalized understanding and agreed with plan.   Return appt scheduled with Dr. Longoria 7/17/17 at 11:15am.    Advised pt to call if any further questions or concerns.    Fauzia Sullivan LPN

## 2017-07-21 DIAGNOSIS — F98.8 ATTENTION DEFICIT DISORDER (ADD) WITHOUT HYPERACTIVITY: ICD-10-CM

## 2017-07-24 RX ORDER — METHYLPHENIDATE HYDROCHLORIDE EXTENDED RELEASE 20 MG/1
20 TABLET ORAL EVERY MORNING
Qty: 30 TABLET | Refills: 0 | Status: SHIPPED | OUTPATIENT
Start: 2017-07-24 | End: 2017-08-21

## 2017-07-24 NOTE — TELEPHONE ENCOUNTER
Routing refill request to provider for review/approval because:  Drug not on the [facility] refill protocol     methylphenidate (METADATE ER) 20 MG CR tablet 30 tablet 0 6/22/2017  No      Sig: Take 1 tablet (20 mg) by mouth every morning     Maritza Wisdom RN, [facility]

## 2017-07-26 PROBLEM — R20.2 PARESTHESIAS IN RIGHT HAND: Status: RESOLVED | Noted: 2017-05-24 | Resolved: 2017-07-26

## 2017-07-26 PROBLEM — M25.521 RIGHT ELBOW PAIN: Status: RESOLVED | Noted: 2017-05-24 | Resolved: 2017-07-26

## 2017-08-08 ENCOUNTER — NURSE TRIAGE (OUTPATIENT)
Dept: NURSING | Facility: CLINIC | Age: 72
End: 2017-08-08

## 2017-08-08 ENCOUNTER — TELEPHONE (OUTPATIENT)
Dept: NURSING | Facility: CLINIC | Age: 72
End: 2017-08-08

## 2017-08-08 NOTE — TELEPHONE ENCOUNTER
Clinic Action Needed: please call back Patient to advise.   Reason for Call: Patient requesting a referral - had dental procedure 1 month ago  and having persisting lip  numbness / burning sensation to the touch . Dentist aware of symptom  . Dentist  at [facility] in [place]   on 7/20/17 and gave novacaine to cap a tooth and novacaine caused and persisting to cause  around my  lip area  Numbness  &  burning pain to the touch  since 7/20/17  .   Dentist is aware of it and not concerned .  Patient would like note sent to PCP requesting a referral , declines triage  .Patient Recommendations/Teaching: Advised to call back if symptom changes or if triage requested.   Routed to: Sent to PCP's nurse pool.   Celsa Alberts RN, [facility]

## 2017-08-09 NOTE — TELEPHONE ENCOUNTER
Does she want to see a different dentist for evaluation? Or see ENT? We cannot refer to dentistry but can refer to ENT, not sure if they will be able to address this issue.

## 2017-08-09 NOTE — TELEPHONE ENCOUNTER
This is a dental question. I would suggest seeing a different dentist for a second opinion. I don't have any specific dentist in mind, so she will check with her dental coverage.

## 2017-08-09 NOTE — TELEPHONE ENCOUNTER
Gave patient information below from Dr. Hunter.  She states she just wants to know if the numbness and burning pain to her lip will go away and when it will go away.  Patient has had this since she had the novacaine on 7/20/17 at the dentist office.    Patient asked me to call ENT to see if they would be able to help.    Spoke to JULISSA Vernon in ENT and she states they usually see patient who have seen the dentist and then have sinus problems following not usually for the numbness.  Unsure what ENT would do for this.  Probably would need to follow up with dentist since it could be nerve damage and also they would know more about the novacaine side effects.    Informed patient of ENT information.  She states when she spoke to her dentist he was surprised it was happening and said very little and told her to rinse with warm water.  She called [facility] and they told her to see what her PCP says.    Routed to Dr. Hunter.  Patient would like to know if numbness and burning pain to her lip will go away and when it would go away?  Should she see another dentist for 2nd opinion?    Antonette Main RN,   [facility]

## 2017-08-09 NOTE — TELEPHONE ENCOUNTER
Left detailed message with Dr. Hunter's note below.  If she has further questions to please contact clinic and ask to speak with nurse.    Antonette Main RN,   [facility]

## 2017-08-21 ENCOUNTER — TELEPHONE (OUTPATIENT)
Dept: PEDIATRICS | Facility: CLINIC | Age: 72
End: 2017-08-21

## 2017-08-21 DIAGNOSIS — F98.8 ATTENTION DEFICIT DISORDER (ADD) WITHOUT HYPERACTIVITY: ICD-10-CM

## 2017-08-21 DIAGNOSIS — F33.41 RECURRENT MAJOR DEPRESSIVE DISORDER, IN PARTIAL REMISSION (H): ICD-10-CM

## 2017-08-21 DIAGNOSIS — E11.9 DIET-CONTROLLED DIABETES MELLITUS (H): Primary | ICD-10-CM

## 2017-08-21 NOTE — TELEPHONE ENCOUNTER
[facility] Call Center    Phone Message    Name of Caller: Karen    Phone Number: Home number on file [phone] (home)    Best time to return call: Any    May a detailed message be left on voicemail: yes    Relation to patient: Self    Reason for Call: Karen is requesting refills of methylphenidate (METADATE ER) 20 MG CR tablet and Duloxetine.  She is requesting the Duloxetine is sent to the [facility] in [place] and she would like the methylphenidate (METADATE ER) 20 MG CR tablet filled at the [facility] located in the ealth building.  Thank you.     Action Taken: Message routed to:  Primary Care p [phone]

## 2017-08-21 NOTE — TELEPHONE ENCOUNTER
Duloxetine RX to [facility]    METHYLPHENIDATE TO [facility]    Antonette Main RN,   [facility]

## 2017-08-21 NOTE — TELEPHONE ENCOUNTER
Routing refill request to provider for review/approval because:  PHQ9 score>4.  To provider to authorize per RN refill protocol      duloxetine    Last Written Prescription Date: 6/19/17  Last Fill Quantity: 30, # refills: 0  Last Office Visit with [facility], [facility] or  Health prescribing provider: 6/19/17        BP Readings from Last 3 Encounters:   06/19/17 116/62   05/03/17 148/75   04/25/17 124/76     Pulse: (for Fetzima)  Creatinine   Date Value Ref Range Status   06/19/2017 1.01 0.52 - 1.04 mg/dL Final   ]    Last PHQ-9 score on record=   PHQ-9 SCORE 6/19/2017   Total Score -   Total Score 12     ______________________________________________________________________    Routing refill request to provider for review/approval because:  Drug not on the [facility] refill protocol       methylphenidate             Last Written Prescription Date: 7/24/17  Last Fill Quantity: 30, # refills: 0    Last Office Visit with [facility], [facility] or  Health prescribing provider:  6/19/17   Future Office Visit:        BP Readings from Last 3 Encounters:   06/19/17 116/62   05/03/17 148/75   04/25/17 124/76

## 2017-08-21 NOTE — TELEPHONE ENCOUNTER
We can have her see nutritionist first to go over her diet and see if there is room for improvement in her diet and then see me in 2 weeks to review her readings and decide if we need to consider starting medication for diabetes.

## 2017-08-21 NOTE — TELEPHONE ENCOUNTER
Patient given note from Dr. Hunter below.  She will call KENYETTA Sky to schedule nutritionist appoinment as this is closer to her home.    Antonette Main RN,   [facility]

## 2017-08-21 NOTE — TELEPHONE ENCOUNTER
Karen called and said her Blood Glucose has been trending high.  She take readings between 11a and 12p.  Yesterday her BG was 186mg/dl and today it was 176mg/dl.      She is trying to keep her blood sugars under 150.  Her blood sugars have been trending upwards since the 1st of August.  She cannot think of any changes, illness, stress that she has had differently.  She is currently not on diabetic medication.  Patient denies, thirst, frequent need to urinate, n/v , fasth breathing or fruity breath, blurry vision, weakness or dizziness.  She states she always feels tired, that is not new.    Routed to Dr. Hunter to advise.    Antonette Main RN,   [facility]

## 2017-08-22 RX ORDER — METHYLPHENIDATE HYDROCHLORIDE EXTENDED RELEASE 20 MG/1
20 TABLET ORAL EVERY MORNING
Qty: 30 TABLET | Refills: 0 | Status: SHIPPED | OUTPATIENT
Start: 2017-08-22 | End: 2017-09-21

## 2017-08-22 RX ORDER — DULOXETIN HYDROCHLORIDE 30 MG/1
30 CAPSULE, DELAYED RELEASE ORAL DAILY
Qty: 30 CAPSULE | Refills: 3 | Status: SHIPPED | OUTPATIENT
Start: 2017-08-22 | End: 2017-09-20

## 2017-08-22 RX ORDER — DULOXETIN HYDROCHLORIDE 30 MG/1
CAPSULE, DELAYED RELEASE ORAL
Qty: 30 CAPSULE | Refills: 0 | OUTPATIENT
Start: 2017-08-22

## 2017-08-22 NOTE — TELEPHONE ENCOUNTER
This is a duplicate, was just ordered today by Dr. Hunter.    DULoxetine (CYMBALTA) 30 MG EC capsule 30 capsule 3 8/22/2017  No      Sig: Take 1 capsule (30 mg) by mouth daily     Maritza Wisdom RN, [facility]

## 2017-08-22 NOTE — TELEPHONE ENCOUNTER
DULoxetine (CYMBALTA) 30 MG EC capsule    Last Written Prescription Date: 6/19/17  Last Fill Quantity: 30, # refills: 0  Last Office Visit with [facility], [facility] or [facility] prescribing provider: 6/19/17        BP Readings from Last 3 Encounters:   06/19/17 116/62   05/03/17 148/75   04/25/17 124/76     Pulse: (for Fetzima)  Creatinine   Date Value Ref Range Status   06/19/2017 1.01 0.52 - 1.04 mg/dL Final   ]    Last PHQ-9 score on record=   PHQ-9 SCORE 6/19/2017   Total Score -   Total Score 12

## 2017-08-22 NOTE — TELEPHONE ENCOUNTER
Patient advised methylphenidate script has been sent to [facility] pharmacy per patient requested.  Patient advised via voicemail message.    Zohra Dean CMA

## 2017-08-25 DIAGNOSIS — E11.9 DIET-CONTROLLED DIABETES MELLITUS (H): ICD-10-CM

## 2017-08-25 NOTE — TELEPHONE ENCOUNTER
ACCU-CHECK STRIPS         Last Written Prescription Date: 6-24-17  Last Fill Quantity: 50, # refills: 6  Last Office Visit with [facility] or [facility] prescribing provider:  6-19-17   Next 5 appointments (look out 90 days)     Sep 20, 2017 12:00 PM CDT   PHYSICAL with Lakia [facility] MD   [facility]    [address] [place] [address] [phone]                   BP Readings from Last 3 Encounters:   06/19/17 116/62   05/03/17 148/75   04/25/17 124/76     Lab Results   Component Value Date    MICROL 9 12/07/2016     Lab Results   Component Value Date    UMALCR 6.34 12/07/2016     Creatinine   Date Value Ref Range Status   06/19/2017 1.01 0.52 - 1.04 mg/dL Final   ]  GFR Estimate   Date Value Ref Range Status   06/19/2017 54 (L) >60 mL/min/1.7m2 Final     Comment:     Non  GFR Calc   06/06/2016 69 >60 mL/min/1.7m2 Final     Comment:     Non  GFR Calc   10/27/2015 56 (L) >60 mL/min/1.7m2 Final     Comment:     Non  GFR Calc     GFR Estimate If Black   Date Value Ref Range Status   06/19/2017 65 >60 mL/min/1.7m2 Final     Comment:      GFR Calc   06/06/2016 83 >60 mL/min/1.7m2 Final     Comment:      GFR Calc   10/27/2015 68 >60 mL/min/1.7m2 Final     Comment:      GFR Calc     Lab Results   Component Value Date    CHOL 160 12/07/2016     Lab Results   Component Value Date    HDL 60 12/07/2016     Lab Results   Component Value Date    LDL 84 12/07/2016     Lab Results   Component Value Date    TRIG 82 12/07/2016     Lab Results   Component Value Date    CHOLHDLRATIO 3.3 10/28/2014     Lab Results   Component Value Date    AST 17 06/06/2016     Lab Results   Component Value Date    ALT 29 06/06/2016     Lab Results   Component Value Date    A1C 6.5 06/19/2017    A1C 7.0 12/07/2016    A1C 7.1 06/06/2016    A1C 6.7 10/27/2015    A1C 7.0 06/15/2015     Potassium   Date  Value Ref Range Status   06/19/2017 4.1 3.4 - 5.3 mmol/L Final

## 2017-08-28 ENCOUNTER — TELEPHONE (OUTPATIENT)
Dept: PEDIATRICS | Facility: CLINIC | Age: 72
End: 2017-08-28

## 2017-08-28 NOTE — TELEPHONE ENCOUNTER
Patient called back, wants referral to [facility] off [address] for diagnosis and treatment. Please call if any questions [phone] (jheg)

## 2017-08-28 NOTE — TELEPHONE ENCOUNTER
Ask the patient if she can see me to go over these requests. I'm not clear about the facial pain issue.     There are two referral request in this message. Oral and maxillofacial surgeons do not treat facial pain unless it is caused by dental issues so they are usually referred to by dentist, not us.     Plastic surgeons do not usually evaluate and treat facial pain unless it is caused by scars that needs revision.

## 2017-08-28 NOTE — TELEPHONE ENCOUNTER
[facility] Call Center    Phone Message    Name of Caller: RUDDY MA    Phone Number: Home number on file [phone] (home)    Best time to return call: TODAY    May a detailed message be left on voicemail: yes    Relation to patient: Self    Reason for Call: Other: Pt is asking for a referral for facial pain.  She looked in her medical book and found a Dr Michaela Xiong. Plastic surgery in [place].  [address] [place] [address] [phone]     Action Taken: Message routed to:  Primary Care p [phone]   Subsequent Stages Histo Method Verbiage: Using a similar technique to that described above, a thin layer of tissue was removed from all areas where tumor was visible on the previous stage.  The tissue was again oriented, mapped, dyed, and processed as above.

## 2017-08-29 ENCOUNTER — ALLIED HEALTH/NURSE VISIT (OUTPATIENT)
Dept: EDUCATION SERVICES | Facility: CLINIC | Age: 72
End: 2017-08-29
Attending: INTERNAL MEDICINE
Payer: COMMERCIAL

## 2017-08-29 DIAGNOSIS — E11.9 DIET-CONTROLLED DIABETES MELLITUS (H): Primary | ICD-10-CM

## 2017-08-29 PROCEDURE — G0108 DIAB MANAGE TRN  PER INDIV: HCPCS

## 2017-08-29 NOTE — PROGRESS NOTES
Diabetes Self Management Training: Individual Review Visit    Karen Donis presents today for education and evaluation of glucose control related to Type 2 diabetes.    She is accompanied by self    Patient could only stay for 30 minutes of 60 minute appointment, so time was limited    Patient's diabetes management related comments/concerns: stays up late, eats all night till bedtime - always wants to snack. bedtime 12:30 - 1 am, wakes up around 10:30 - 11 am. Blood sugars are higher than she wants them to be, BP are also higher than she would like.    Patient's emotional response to diabetes: expresses readiness to learn and concern for health and well-being    Patient would like this visit to be focused around the following diabetes-related behaviors and goals: Assistance with making lifestyle changes and Healthy Eating    ASSESSMENT:  Patient Problem List and Family Medical History reviewed for relevant medical history, current medical status, and diabetes risk factors.    Current Diabetes Management per Patient:  Taking diabetes medications? no    Past Diabetes Education: Yes    Patient glucose self monitoring as follows: one time daily, fasting most often.   BG meter: Accu-Chek Mirela Connect meter  BG results: fasting glucose- 186, 176, 169, 157, 152, 169, 176, pre-supper 130 mg/dL    BG values are: Not in goal  Patient's most recent   Lab Results   Component Value Date    A1C 6.5 06/19/2017    is meeting goal of <7.0    Nutrition:  Patient eats 3 meals per day, snacks frequently throughout the day and has an inconsistent intake of carbohydrates    Breakfast - 11 am - Elder oatmeal with apples OR unsweetened almond milk in cereal  Lunch - tortilla with slice of ham, spinach and spinach dip spread OR 1/4 banana   Dinner - 5:30 pm - pork chop, ham sandwich wrap and 2 thin mint cookies, Chill water  Snacks - 2-3 cookies at [employer] on Sunday, peanuts, string cheese    Beverages: Milk (almond) in cereal/day and  "Water    Cultural/Faith diet restrictions: No     Biggest Challenge to Healthy Eating: portion control, emotional eating and evening snacking    Physical Activity:    Limitations: none    Did not have time to assess activity further    Diabetes Risk Factors:  family history, age over 45 years, overweight/obesity and inactivity    Diabetes Complications:  Not discussed today.    Vitals:  Estimated body mass index is 40.84 kg/(m^2) as calculated from the following:    Height as of 5/3/17: 1.594 m (5' 2.75\").    Weight as of 6/19/17: 103.7 kg (228 lb 11.2 oz).   Last 3 BP:   BP Readings from Last 3 Encounters:   06/19/17 116/62   05/03/17 148/75   04/25/17 124/76       History   Smoking Status     Former Smoker     Quit date: 12/12/1982   Smokeless Tobacco     Never Used       Labs:  Lab Results   Component Value Date    A1C 6.5 06/19/2017     Lab Results   Component Value Date     06/19/2017     Lab Results   Component Value Date    LDL 84 12/07/2016     HDL Cholesterol   Date Value Ref Range Status   12/07/2016 60 >49 mg/dL Final   ]  GFR Estimate   Date Value Ref Range Status   06/19/2017 54 (L) >60 mL/min/1.7m2 Final     Comment:     Non  GFR Calc     GFR Estimate If Black   Date Value Ref Range Status   06/19/2017 65 >60 mL/min/1.7m2 Final     Comment:      GFR Calc     Lab Results   Component Value Date    CR 1.01 06/19/2017     No results found for: MICROALBUMIN    Socio/Economic Considerations:    Support system: family and friend(s)    Health Beliefs and Attitudes:   Patient Activation Measure Survey Score:  HEIDY Score (Last Two) 6/7/2016   HEIDY Raw Score 32   Activation Score 40.1   HEIDY Level 1       Stage of Change: PREPARATION (Decided to change - considering how)      Diabetes knowledge and skills assessment:     Patient is knowledgeable in diabetes management concepts related to: n/a - would benefit from additional diabetes education visits in addition to " today    Patient needs further education on the following diabetes management concepts: Healthy Eating, Being Active, Problem Solving, Reducing Risks and Healthy Coping    Barriers to Learning Assessment: No Barriers identified    Based on learning assessment above, most appropriate setting for further diabetes education would be: Group or Individual setting.    INTERVENTION:   Education provided today on:  AADE Self-Care Behaviors:  Healthy Eating: consistency in amount, composition, and timing of food intake, portion control and timing bedtime snack to reduce hepatic glucose output overnight  Healthy Coping: recognize feelings about diagnosis and benefits of making appropriate lifestyle changes    Opportunities for ongoing education and support in diabetes-self management were discussed.    Pt verbalized understanding of concepts discussed and recommendations provided today.       Education Materials Provided:  Diabetes Self-Management magazine    PLAN:  See Patient Instructions for co-developed, patient-stated behavior change goals.  Instructions written and given to patient on business card, per her preference.    FOLLOW-UP:  Follow-up appointment scheduled on 11/10/17 at 1 pm.  Chart routed to referring provider.    Ongoing plan for education and support: Written resources (magazines, books, etc.), Follow-up visit with diabetes educator in 2-3 months and Follow-up with primary care provider    Radha Mora, MPH, RD, LD, CDE   Time Spent: 30 minutes  Encounter Type: Individual

## 2017-08-29 NOTE — Clinical Note
Boo Hunter, I met with Karen today for diabetes education. Her fasting BG are elevated most of the time. We discussed adjusting evening snacking pattern to help prevent hepatic glucose output overnight. I see that she is following up with you in September and she will follow-up with diabetes education in ~2 months. If no improvement in FBG when you see her, may want to consider addition of medication to target fasting glucose (range was 152-186 mg/dL in the last week).   Thanks! Radha Mora, MPH, RD, LD, CDE  [facility]

## 2017-08-29 NOTE — MR AVS SNAPSHOT
"              After Visit Summary   8/29/2017    Karen Donis    MRN: [id number]           Patient Information     Date Of Birth          1945        Visit Information        Provider Department      8/29/2017 10:15 AM [facility]        Today's Diagnoses     Diet-controlled diabetes mellitus (H)    -  1       Follow-ups after your visit        Your next 10 appointments already scheduled     Sep 20, 2017 12:00 PM CDT   PHYSICAL with Lakia Hunter MD   [facility] ([facility]    [address] [place] [address] [phone]            Nov 10, 2017  1:00 PM CST   Diabetic Education with BE DIABETIC ED RESOURCE   [facility] ([facility])    [address] [facility] [address] [phone]              Who to contact     If you have questions or need follow up information about today's clinic visit or your schedule please contact [facility] directly at [phone].  Normal or non-critical lab and imaging results will be communicated to you by [employer], letter or phone within 4 business days after the clinic has received the results. If you do not hear from us within 7 days, please contact the clinic through [employer] or phone. If you have a critical or abnormal lab result, we will notify you by phone as soon as possible.  Submit refill requests through [employer] or call your pharmacy and they will forward the refill request to us. Please allow 3 business days for your refill to be completed.          Additional Information About Your Visit        [employer] Information     [employer] lets you send messages to your doctor, view your test results, renew your prescriptions, schedule appointments and more. To sign up, go to [email or web address] . Click on \"Log in\" on the left side of the screen, which will take you to the Welcome page. Then click on \"Sign up Now\" on the right side of the " page.     You will be asked to enter the access code listed below, as well as some personal information. Please follow the directions to create your username and password.     Your access code is: [email or web address]  Expires: 2017  4:47 PM     Your access code will  in 90 days. If you need help or a new code, please call your [facility] clinic or [phone].        Care EveryWhere ID     This is your Care EveryWhere ID. This could be used by other organizations to access your [facility] medical records  [id number]         Blood Pressure from Last 3 Encounters:   17 116/62   17 148/75   17 124/76    Weight from Last 3 Encounters:   17 103.7 kg (228 lb 11.2 oz)   17 101.2 kg (223 lb)   17 102.1 kg (225 lb)              We Performed the Following     DIABETES EDUCATION - Individual  []        Primary Care Provider Office Phone # Fax #    Lakia Hunter [phone]       [address]  [facility] [address]        Equal Access to Services     [facility]: Hadii aad ku hadasho Soomaali, waaxda luqadaha, qaybta kaalmada adeegyada, luiza robles hayrajann logan espinal . So [facility] [phone].    ATENCIÓN: Si habla español, tiene a ashley disposición servicios gratuitos de asistencia lingüística. Llame al [phone].    We comply with applicable federal civil rights laws and [place] laws. We do not discriminate on the basis of race, color, national origin, age, disability sex, sexual orientation or gender identity.            Thank you!     Thank you for choosing [facility]  for your care. Our goal is always to provide you with excellent care. Hearing back from our patients is one way we can continue to improve our services. Please take a few minutes to complete the written survey that you may receive in the mail after your visit with us. Thank you!             Your Updated Medication List - Protect others around you: Learn how to safely use,  store and throw away your medicines at www.disposemymeds.org.          This list is accurate as of: 8/29/17  4:47 PM.  Always use your most recent med list.                   Brand Name Dispense Instructions for use Diagnosis    aspirin 81 MG tablet      1 TABLET DAILY        BEANO PO      Take by mouth as needed Reported on 5/3/2017        biotin 1000 MCG Tabs tablet      Take 1,000 mcg by mouth daily        blood glucose lancing device     1 each    Device to be used with lancets.    Diet-controlled diabetes mellitus (H)       * blood glucose monitoring lancets     1 Box    Use to test blood sugar 1 times daily or as directed.  Ok to substitute alternative if insurance prefers.    Diet-controlled diabetes mellitus (H)       * blood glucose monitoring lancets     1 Box    Use to test blood sugar 1daily or as directed.    Diet-controlled diabetes mellitus (H)       * blood glucose monitoring test strip    no brand specified    100 each    Use to test blood sugars daily or as directed. Accu-check smartview strips    Diet-controlled diabetes mellitus (H)       * blood glucose monitoring test strip    ACCU-CHEK SMARTVIEW    100 each    Use to test blood sugar 1 times daily or as directed.  Ok to substitute alternative if insurance prefers.    Diet-controlled diabetes mellitus (H)       CALCIUM 1200 PO      Take by mouth daily        cholecalciferol 1000 UNITS capsule    vitamin  -D    180 capsule    Take 2 capsules (2,000 Units) by mouth daily    Vitamin D deficiency       CO Q 10 PO      Take 1 capsule by mouth daily.        Cranberry 300 MG Tabs      Take by mouth daily        DULoxetine 30 MG EC capsule    CYMBALTA    30 capsule    Take 1 capsule (30 mg) by mouth daily    Recurrent major depressive disorder, in partial remission (H)       econazole nitrate 1 % cream     30 g    Apply topically daily    Tinea pedis       flax seed oil 1000 MG capsule      Take 1 capsule by mouth daily         losartan-hydrochlorothiazide 100-25 MG per tablet    HYZAAR    90 tablet    TAKE ONE TABLET BY MOUTH EVERY DAY    Essential hypertension with goal blood pressure less than 140/90       MAGNESIUM PO      1 capsule daily        methylphenidate 20 MG CR tablet    METADATE ER    30 tablet    Take 1 tablet (20 mg) by mouth every morning    Attention deficit disorder (ADD) without hyperactivity       omeprazole 20 MG CR capsule    priLOSEC    30 capsule    Take 1 capsule (20 mg) by mouth daily    Gastroesophageal reflux disease without esophagitis       UNABLE TO FIND      Reported on 5/3/2017        VITAMIN B COMPLEX-C Caps      1 capsule PO qd        vitamin E 400 UNIT capsule      Take by mouth daily        * Notice:  This list has 4 medication(s) that are the same as other medications prescribed for you. Read the directions carefully, and ask your doctor or other care provider to review them with you.

## 2017-08-29 NOTE — TELEPHONE ENCOUNTER
Called and spoke with patient she reports that the facial pain is caused from a dental procedure she had done. The procedure involved Novocain and having had a cap put on a tooth. Writer advised patient to speak with the dentist who performed the procedure, and that they will be able to further assist her. Patient verbalized understanding. Jane Choe RN

## 2017-09-20 ENCOUNTER — OFFICE VISIT (OUTPATIENT)
Dept: PEDIATRICS | Facility: CLINIC | Age: 72
End: 2017-09-20
Payer: COMMERCIAL

## 2017-09-20 ENCOUNTER — TELEPHONE (OUTPATIENT)
Dept: PEDIATRICS | Facility: CLINIC | Age: 72
End: 2017-09-20

## 2017-09-20 VITALS
WEIGHT: 225 LBS | OXYGEN SATURATION: 97 % | SYSTOLIC BLOOD PRESSURE: 118 MMHG | BODY MASS INDEX: 40.18 KG/M2 | TEMPERATURE: 96.6 F | HEART RATE: 84 BPM | DIASTOLIC BLOOD PRESSURE: 72 MMHG

## 2017-09-20 DIAGNOSIS — G89.29 CHRONIC MIDLINE LOW BACK PAIN WITHOUT SCIATICA: ICD-10-CM

## 2017-09-20 DIAGNOSIS — M54.50 CHRONIC MIDLINE LOW BACK PAIN WITHOUT SCIATICA: ICD-10-CM

## 2017-09-20 DIAGNOSIS — F33.41 RECURRENT MAJOR DEPRESSIVE DISORDER, IN PARTIAL REMISSION (H): ICD-10-CM

## 2017-09-20 DIAGNOSIS — Z23 FLU VACCINE NEED: ICD-10-CM

## 2017-09-20 DIAGNOSIS — E11.9 DIET-CONTROLLED DIABETES MELLITUS (H): ICD-10-CM

## 2017-09-20 DIAGNOSIS — E11.9 TYPE 2 DIABETES MELLITUS WITHOUT COMPLICATION, WITHOUT LONG-TERM CURRENT USE OF INSULIN (H): ICD-10-CM

## 2017-09-20 DIAGNOSIS — M79.7 FIBROMYALGIA: ICD-10-CM

## 2017-09-20 DIAGNOSIS — E11.9 DIET-CONTROLLED DIABETES MELLITUS (H): Primary | ICD-10-CM

## 2017-09-20 DIAGNOSIS — H81.11 BPPV (BENIGN PAROXYSMAL POSITIONAL VERTIGO), RIGHT: Primary | ICD-10-CM

## 2017-09-20 DIAGNOSIS — M54.2 CERVICALGIA: ICD-10-CM

## 2017-09-20 DIAGNOSIS — M54.6 PAIN IN THORACIC SPINE: ICD-10-CM

## 2017-09-20 LAB — HBA1C MFR BLD: 7.2 % (ref 4.3–6)

## 2017-09-20 PROCEDURE — 36415 COLL VENOUS BLD VENIPUNCTURE: CPT | Performed by: INTERNAL MEDICINE

## 2017-09-20 PROCEDURE — 99214 OFFICE O/P EST MOD 30 MIN: CPT | Mod: 25 | Performed by: INTERNAL MEDICINE

## 2017-09-20 PROCEDURE — 90471 IMMUNIZATION ADMIN: CPT | Performed by: INTERNAL MEDICINE

## 2017-09-20 PROCEDURE — 90662 IIV NO PRSV INCREASED AG IM: CPT | Performed by: INTERNAL MEDICINE

## 2017-09-20 PROCEDURE — 83036 HEMOGLOBIN GLYCOSYLATED A1C: CPT | Performed by: INTERNAL MEDICINE

## 2017-09-20 RX ORDER — MECLIZINE HYDROCHLORIDE 25 MG/1
25 TABLET ORAL EVERY 6 HOURS PRN
Qty: 30 TABLET | Refills: 1 | Status: SHIPPED | OUTPATIENT
Start: 2017-09-20 | End: 2020-03-11

## 2017-09-20 RX ORDER — METFORMIN HCL 500 MG
500 TABLET, EXTENDED RELEASE 24 HR ORAL
Qty: 30 TABLET | Refills: 1 | Status: SHIPPED | OUTPATIENT
Start: 2017-09-20 | End: 2017-12-15

## 2017-09-20 RX ORDER — DULOXETIN HYDROCHLORIDE 30 MG/1
30 CAPSULE, DELAYED RELEASE ORAL 2 TIMES DAILY
Qty: 60 CAPSULE | Refills: 1 | Status: SHIPPED | OUTPATIENT
Start: 2017-09-20 | End: 2018-02-05

## 2017-09-20 ASSESSMENT — ANXIETY QUESTIONNAIRES
GAD7 TOTAL SCORE: 3
6. BECOMING EASILY ANNOYED OR IRRITABLE: MORE THAN HALF THE DAYS
5. BEING SO RESTLESS THAT IT IS HARD TO SIT STILL: NOT AT ALL
2. NOT BEING ABLE TO STOP OR CONTROL WORRYING: NOT AT ALL
1. FEELING NERVOUS, ANXIOUS, OR ON EDGE: NOT AT ALL
3. WORRYING TOO MUCH ABOUT DIFFERENT THINGS: NOT AT ALL
7. FEELING AFRAID AS IF SOMETHING AWFUL MIGHT HAPPEN: NOT AT ALL

## 2017-09-20 ASSESSMENT — PATIENT HEALTH QUESTIONNAIRE - PHQ9
SUM OF ALL RESPONSES TO PHQ QUESTIONS 1-9: 6
5. POOR APPETITE OR OVEREATING: SEVERAL DAYS

## 2017-09-20 NOTE — MR AVS SNAPSHOT
After Visit Summary   9/20/2017    Karen Donis    MRN: [id number]           Patient Information     Date Of Birth          1945        Visit Information        Provider Department      9/20/2017 12:00 PM Lakia Hunter, PhD [facility]        Today's Diagnoses     Fibromyalgia    -  1    Recurrent major depressive disorder, in partial remission (H)        BPPV (benign paroxysmal positional vertigo), right        Cervicalgia        Pain in thoracic spine        Chronic midline low back pain without sciatica        Flu vaccine need        Type 2 diabetes mellitus without complication, without long-term current use of insulin (H)          Care Instructions    Make appointment(s) for:   -- physical therapy.   -- sports medicine at [place]  -- clinic follow up in one month  -- mammogram        Medication(s) prescribed today:    Orders Placed This Encounter   Medications     DULoxetine (CYMBALTA) 30 MG EC capsule     Sig: Take 1 capsule (30 mg) by mouth 2 times daily     Dispense:  60 capsule     Refill:  1     Dose increased     meclizine (ANTIVERT) 25 MG tablet     Sig: Take 1 tablet (25 mg) by mouth every 6 hours as needed for dizziness     Dispense:  30 tablet     Refill:  1           Benign Paroxysmal Positional Vertigo    Benign paroxysmal positional vertigo is a common condition. You feel as if the room is spinning after changing position, moving your head quickly, or even just rolling over in bed.  Vertigo is a false feeling of motion plus disorientation that makes it seem as though the room is spinning. A vertigo attack may cause sudden nausea, vomiting, and heavy sweating. Severe vertigo causes a loss of balance. You may even fall down.  Vertigo is caused by a problem with the inner ear. The inner ear is located behind the middle ear. It is a part of the balance center of the body. It contains small calcium particles within fluid-filled canals (semi-circular canals). These  particles can move out of position as a result of aging, head trauma, or disease of the inner ear. Once that happens, moving your head in certain ways may cause the particles to stimulate the inner ear. This creates the feeling of vertigo.  An episode of vertigo may last seconds, minutes, or hours. Once you are over the first episode of vertigo, it may never return. Sometimes symptoms return off and on over several weeks or longer.  Home care  Follow these guidelines when caring for yourself at home:    Rest quietly in bed if your symptoms are severe. Change position slowly. There is usually one position that will feel best. This might be lying on one side or lying on your back with your head slightly raised on pillows.    Do not drive or work with dangerous machinery for one week after symptoms disappear. This is in case symptoms return suddenly.    Take medicine as prescribed to relieve your symptoms. Unless another medicine was prescribed for nausea, vomiting, and vertigo, you may use over-the-counter motion sickness medicine, such as meclizine or dimenhydrinate.  Follow-up care  Follow up with your healthcare provider, or as directed. Tell your provider about any ringing in the ear or hearing loss.  If you had a CT or MRI scan, a specialist will review it. You will be told of any new findings that may affect your care.  When to seek medical advice  Call your healthcare provider right away if any of these occur:    Vertigo gets worse even after taking prescribed medicine    Repeated vomiting even after taking prescribed medicine    Increased weakness or fainting    Severe headache or unusual drowsiness or confusion    Weakness of an arm or leg or one side of the face    Difficulty walking    Difficulty with speech or vision    Seizure    Trouble hearing    Fever of 100.4 F (38 C) or higher, or as directed by your healthcare provider    Fast heart rate    Chest pain   Date Last Reviewed: 7/10/2015    3118-2571 The  "[employer]. [address] [place] [address], [place] [address]. All rights reserved. This information is not intended as a substitute for professional medical care. Always follow your healthcare professional's instructions.                Follow-ups after your visit        Additional Services     PHYSICAL THERAPY REFERRAL       *This therapy referral will be filtered to a centralized scheduling office at [facility] and the patient will receive a call to schedule an appointment at a [place] location most convenient for them. *     [facility] provides Physical Therapy evaluation and treatment and many specialty services across the [place] system.  If requesting a specialty program, please choose from the list below.    If you have not heard from the scheduling office within 2 business days, please call [phone] for all locations, with the exception of [place], please call [phone].  Treatment: Evaluation & Treatment  Special Instructions/Modalities:   Special Programs: Balance/Vestibular    Please be aware that coverage of these services is subject to the terms and limitations of your health insurance plan.  Call member services at your health plan with any benefit or coverage questions.      **Note to Provider:  If you are referring outside of [place] for the therapy appointment, please list the name of the location in the \"special instructions\" above, print the referral and give to the patient to schedule the appointment.            SPORTS MEDICINE REFERRAL       Your provider has referred you to:  [facility]: [facility] [place] [facility]  [phone]   [email or web address]    Please be aware that coverage of these services is subject to the terms and limitations of your health insurance plan.  Call member services at your health plan with any benefit or coverage " questions.      Please bring the following to your appointment:    >>   Any x-rays, CTs or MRIs which have been performed.  Contact the facility where they were done to arrange for  prior to your scheduled appointment.    >>   List of current medications   >>   This referral request   >>   Any documents/labs given to you for this referral                  Your next 10 appointments already scheduled     Oct 25, 2017 12:10 PM CDT   Return Visit with Lakia Hunter, PhD   [facility] ([facility])    [address] [place] [address] [phone]            Nov 10, 2017  1:00 PM CST   Diabetic Education with  DIABETIC ED RESOURCE   [facility] ([facility])    [address] [address] [facility] [address] [phone]              Who to contact     If you have questions or need follow up information about today's clinic visit or your schedule please contact [facility] directly at [phone].  Normal or non-critical lab and imaging results will be communicated to you by [employer], letter or phone within 4 business days after the clinic has received the results. If you do not hear from us within 7 days, please contact the clinic through [employer] or phone. If you have a critical or abnormal lab result, we will notify you by phone as soon as possible.  Submit refill requests through [employer] or call your pharmacy and they will forward the refill request to us. Please allow 3 business days for your refill to be completed.          Additional Information About Your Visit        [employer] Information     [employer] is an electronic gateway that provides easy, online access to your medical records. With [employer], you can request a clinic appointment, read your test results, renew a prescription or communicate with your care team.     To sign up for [employer] visit the website at [email or web address]   You will be asked to enter  the access code listed below, as well as some personal information. Please follow the directions to create your username and password.     Your access code is: [email or web address]  Expires: 2017  4:47 PM     Your access code will  in 90 days. If you need help or a new code, please contact your [facility] or call [phone] for assistance.        Care EveryWhere ID     This is your Care EveryWhere ID. This could be used by other organizations to access your [facility] medical records  [id number]        Your Vitals Were     Pulse Temperature Pulse Oximetry BMI (Body Mass Index)          84 96.6  F (35.9  C) (Temporal) 97% 40.18 kg/m2         Blood Pressure from Last 3 Encounters:   17 118/72   17 116/62   17 148/75    Weight from Last 3 Encounters:   17 225 lb (102.1 kg)   17 228 lb 11.2 oz (103.7 kg)   17 223 lb (101.2 kg)              We Performed the Following     HC FLU VACCINE, INCREASED ANTIGEN, PRESV FREE     PHYSICAL THERAPY REFERRAL     SPORTS MEDICINE REFERRAL          Today's Medication Changes          These changes are accurate as of: 17 12:48 PM.  If you have any questions, ask your nurse or doctor.               Start taking these medicines.        Dose/Directions    meclizine 25 MG tablet   Commonly known as:  ANTIVERT   Used for:  BPPV (benign paroxysmal positional vertigo), right   Started by:  Lakia Hunter PhD        Dose:  25 mg   Take 1 tablet (25 mg) by mouth every 6 hours as needed for dizziness   Quantity:  30 tablet   Refills:  1       metFORMIN 500 MG 24 hr tablet   Commonly known as:  GLUCOPHAGE-XR   Used for:  Type 2 diabetes mellitus without complication, without long-term current use of insulin (H)   Started by:  Lakia Hunter PhD        Dose:  500 mg   Take 1 tablet (500 mg) by mouth daily (with dinner)   Quantity:  30 tablet   Refills:  1         These medicines have changed or have updated prescriptions.         Dose/Directions    DULoxetine 30 MG EC capsule   Commonly known as:  CYMBALTA   This may have changed:  when to take this   Used for:  Recurrent major depressive disorder, in partial remission (H), Fibromyalgia   Changed by:  Lakia Hunter, PhD        Dose:  30 mg   Take 1 capsule (30 mg) by mouth 2 times daily   Quantity:  60 capsule   Refills:  1            Where to get your medicines      These medications were sent to [id number] - [place] - [address]  [address] [address] [address]     Phone:  [phone]     DULoxetine 30 MG EC capsule         These medications were sent to [facility] - [place], [place] - [address]  [address], [facility] [address]     Phone:  [phone]     meclizine 25 MG tablet    metFORMIN 500 MG 24 hr tablet                Primary Care Provider Office Phone # Fax #    Lakia Hunter, PhD [phone]       [address]  [facility] [address]        Equal Access to Services     [facility]: Hadii stephanie ku hadasho Soomaali, waaxda luqadaha, qaybta kaalmada adeegyanicole, luiza espinal . So [facility] [phone].    ATENCIÓN: Si habla español, tiene a ashley disposición servicios gratuitos de asistencia lingüística. [facility] [phone].    We comply with applicable federal civil rights laws and [place] laws. We do not discriminate on the basis of race, color, national origin, age, disability sex, sexual orientation or gender identity.            Thank you!     Thank you for choosing [facility]  for your care. Our goal is always to provide you with excellent care. Hearing back from our patients is one way we can continue to improve our services. Please take a few minutes to complete the written survey that you may receive in the mail after your visit with us. Thank you!             Your Updated Medication List - Protect others around you: Learn how to  safely use, store and throw away your medicines at www.disposemymeds.org.          This list is accurate as of: 9/20/17 12:48 PM.  Always use your most recent med list.                   Brand Name Dispense Instructions for use Diagnosis    aspirin 81 MG tablet      1 TABLET DAILY        BEANO PO      Take by mouth as needed Reported on 5/3/2017        biotin 1000 MCG Tabs tablet      Take 1,000 mcg by mouth daily        blood glucose lancing device     1 each    Device to be used with lancets.    Diet-controlled diabetes mellitus (H)       * blood glucose monitoring lancets     1 Box    Use to test blood sugar 1 times daily or as directed.  Ok to substitute alternative if insurance prefers.    Diet-controlled diabetes mellitus (H)       * blood glucose monitoring lancets     1 Box    Use to test blood sugar 1daily or as directed.    Diet-controlled diabetes mellitus (H)       * blood glucose monitoring test strip    no brand specified    100 each    Use to test blood sugars daily or as directed. Accu-check smartview strips    Diet-controlled diabetes mellitus (H)       * blood glucose monitoring test strip    ACCU-CHEK SMARTVIEW    100 each    Use to test blood sugar 1 times daily or as directed.  Ok to substitute alternative if insurance prefers.    Diet-controlled diabetes mellitus (H)       CALCIUM 1200 PO      Take by mouth daily        cholecalciferol 1000 UNITS capsule    vitamin  -D    180 capsule    Take 2 capsules (2,000 Units) by mouth daily    Vitamin D deficiency       CO Q 10 PO      Take 1 capsule by mouth daily.        Cranberry 300 MG Tabs      Take by mouth daily        DULoxetine 30 MG EC capsule    CYMBALTA    60 capsule    Take 1 capsule (30 mg) by mouth 2 times daily    Recurrent major depressive disorder, in partial remission (H), Fibromyalgia       econazole nitrate 1 % cream     30 g    Apply topically daily    Tinea pedis       flax seed oil 1000 MG capsule      Take 1 capsule by mouth  daily        losartan-hydrochlorothiazide 100-25 MG per tablet    HYZAAR    90 tablet    TAKE ONE TABLET BY MOUTH EVERY DAY    Essential hypertension with goal blood pressure less than 140/90       MAGNESIUM PO      1 capsule daily        meclizine 25 MG tablet    ANTIVERT    30 tablet    Take 1 tablet (25 mg) by mouth every 6 hours as needed for dizziness    BPPV (benign paroxysmal positional vertigo), right       metFORMIN 500 MG 24 hr tablet    GLUCOPHAGE-XR    30 tablet    Take 1 tablet (500 mg) by mouth daily (with dinner)    Type 2 diabetes mellitus without complication, without long-term current use of insulin (H)       methylphenidate 20 MG CR tablet    METADATE ER    30 tablet    Take 1 tablet (20 mg) by mouth every morning    Attention deficit disorder (ADD) without hyperactivity       omeprazole 20 MG CR capsule    priLOSEC    30 capsule    Take 1 capsule (20 mg) by mouth daily    Gastroesophageal reflux disease without esophagitis       UNABLE TO FIND      Reported on 5/3/2017        VITAMIN B COMPLEX-C Caps      1 capsule PO qd        vitamin E 400 UNIT capsule      Take by mouth daily        * Notice:  This list has 4 medication(s) that are the same as other medications prescribed for you. Read the directions carefully, and ask your doctor or other care provider to review them with you.

## 2017-09-20 NOTE — TELEPHONE ENCOUNTER
[facility] Call Center    Phone Message    Name of Caller: Karne    Phone Number: Home number on file [phone] (home)    Best time to return call: Any    May a detailed message be left on voicemail: yes    Relation to patient: Self    Reason for Call: Order(s): Other:   What is being requested: Lab A1C  Reason for requested:   Date needed: Today, Patient has appt scheduled with Dr. Hunter at Noon today  Provider name: Dr. Hunter        Action Taken: Message routed to:  Primary Care p [id number]

## 2017-09-20 NOTE — LETTER
September 20, 2017      Karen Donis  [address] [place] [address]              Dear Karen,        Enclosed is a copy of your test results of the labs we reviewed at your last appointment.  Please keep this for your record.     Start metformin as we discussed.       Hemoglobin A1c   Status:  Final result   Visible to patient:  No (Not Released) Dx:  Diet-controlled diabetes mellitus (H) Order: [id number]                  Ref Range & Units 11:51 AM   3mo ago   9mo ago        Hemoglobin A1C 4.3 - 6.0 % 7.2 (H) 6.5 (H) 7.0 (H)     Resulting Agency  MG MG MG              If you have additional question, please call or make a follow-up appointment.      Lakia Hunter MD

## 2017-09-20 NOTE — PATIENT INSTRUCTIONS
Make appointment(s) for:   -- physical therapy.   -- sports medicine at [place]  -- clinic follow up in one month  -- mammogram        Medication(s) prescribed today:    Orders Placed This Encounter   Medications     DULoxetine (CYMBALTA) 30 MG EC capsule     Sig: Take 1 capsule (30 mg) by mouth 2 times daily     Dispense:  60 capsule     Refill:  1     Dose increased     meclizine (ANTIVERT) 25 MG tablet     Sig: Take 1 tablet (25 mg) by mouth every 6 hours as needed for dizziness     Dispense:  30 tablet     Refill:  1           Benign Paroxysmal Positional Vertigo    Benign paroxysmal positional vertigo is a common condition. You feel as if the room is spinning after changing position, moving your head quickly, or even just rolling over in bed.  Vertigo is a false feeling of motion plus disorientation that makes it seem as though the room is spinning. A vertigo attack may cause sudden nausea, vomiting, and heavy sweating. Severe vertigo causes a loss of balance. You may even fall down.  Vertigo is caused by a problem with the inner ear. The inner ear is located behind the middle ear. It is a part of the balance center of the body. It contains small calcium particles within fluid-filled canals (semi-circular canals). These particles can move out of position as a result of aging, head trauma, or disease of the inner ear. Once that happens, moving your head in certain ways may cause the particles to stimulate the inner ear. This creates the feeling of vertigo.  An episode of vertigo may last seconds, minutes, or hours. Once you are over the first episode of vertigo, it may never return. Sometimes symptoms return off and on over several weeks or longer.  Home care  Follow these guidelines when caring for yourself at home:    Rest quietly in bed if your symptoms are severe. Change position slowly. There is usually one position that will feel best. This might be lying on one side or lying on your back with your head  slightly raised on pillows.    Do not drive or work with dangerous machinery for one week after symptoms disappear. This is in case symptoms return suddenly.    Take medicine as prescribed to relieve your symptoms. Unless another medicine was prescribed for nausea, vomiting, and vertigo, you may use over-the-counter motion sickness medicine, such as meclizine or dimenhydrinate.  Follow-up care  Follow up with your healthcare provider, or as directed. Tell your provider about any ringing in the ear or hearing loss.  If you had a CT or MRI scan, a specialist will review it. You will be told of any new findings that may affect your care.  When to seek medical advice  Call your healthcare provider right away if any of these occur:    Vertigo gets worse even after taking prescribed medicine    Repeated vomiting even after taking prescribed medicine    Increased weakness or fainting    Severe headache or unusual drowsiness or confusion    Weakness of an arm or leg or one side of the face    Difficulty walking    Difficulty with speech or vision    Seizure    Trouble hearing    Fever of 100.4 F (38 C) or higher, or as directed by your healthcare provider    Fast heart rate    Chest pain   Date Last Reviewed: 7/10/2015    [id number] The [employer]. [address] [place] [address], [place] [address]. All rights reserved. This information is not intended as a substitute for professional medical care. Always follow your healthcare professional's instructions.

## 2017-09-20 NOTE — TELEPHONE ENCOUNTER
Patient has a physical appt today with Dr. Hunter and requesting labs to be ordered prior to appt. Hgb A1C     Zohra Dean [facility]    Message routed to Dr. Hunter

## 2017-09-20 NOTE — PROGRESS NOTES
Send letter with the following comment:         Dear Karen Donis,     Enclosed is a copy of your test results of the labs we reviewed at your last appointment.  Please keep this for your record.     Start metformin as we discussed.       If you have additional question, please call or make a follow-up appointment.    Lakia Hunter MD

## 2017-09-20 NOTE — NURSING NOTE
"Chief Complaint   Patient presents with     Recheck Medication       Initial /72  Pulse 84  Temp 96.6  F (35.9  C) (Temporal)  Wt 225 lb (102.1 kg)  SpO2 97%  BMI 40.18 kg/m2 Estimated body mass index is 40.18 kg/(m^2) as calculated from the following:    Height as of 5/3/17: 5' 2.75\" (1.594 m).    Weight as of this encounter: 225 lb (102.1 kg).  Medication Reconciliation: complete    "

## 2017-09-21 ENCOUNTER — TELEPHONE (OUTPATIENT)
Dept: PEDIATRICS | Facility: CLINIC | Age: 72
End: 2017-09-21

## 2017-09-21 DIAGNOSIS — F98.8 ATTENTION DEFICIT DISORDER (ADD) WITHOUT HYPERACTIVITY: ICD-10-CM

## 2017-09-21 RX ORDER — METHYLPHENIDATE HYDROCHLORIDE EXTENDED RELEASE 20 MG/1
20 TABLET ORAL EVERY MORNING
Qty: 30 TABLET | Refills: 0 | Status: SHIPPED | OUTPATIENT
Start: 2017-09-21 | End: 2017-10-20

## 2017-09-21 ASSESSMENT — ANXIETY QUESTIONNAIRES: GAD7 TOTAL SCORE: 3

## 2017-09-21 NOTE — TELEPHONE ENCOUNTER
Routing refill request to provider for review/approval because:  Drug not on the [facility] refill protocol       methylphenidate             Last Written Prescription Date: 8/22/17  Last Fill Quantity: 30, # refills: 0    [place] [facility] [place] [facility] prescribing provider:  9/21/17   Future Office Visit:    Next 5 appointments (look out 90 days)     Oct 25, 2017 12:10 PM CDT   Return Visit with Lakia Hunter, PhD   [facility]    [address] [place] [address] [phone]                    BP Readings from Last 3 Encounters:   09/20/17 118/72   06/19/17 116/62   05/03/17 148/75       Antonette Main RN,   [facility]

## 2017-09-21 NOTE — TELEPHONE ENCOUNTER
[facility] Call Center    Phone Message    Name of Caller: Karen    Phone Number: Home number on file [phone] (home)    Best time to return call: ANY    May a detailed message be left on voicemail: yes    Relation to patient: Self    Reason for Call: Other: Refill of methylphenidate (METADATE ER) 20 MG CR tablet [90832] (Order [id number]) . call once ready for      Action Taken: Message routed to:  Primary Care p [id number]

## 2017-09-22 ENCOUNTER — OFFICE VISIT (OUTPATIENT)
Dept: ORTHOPEDICS | Facility: CLINIC | Age: 72
End: 2017-09-22
Payer: COMMERCIAL

## 2017-09-22 VITALS
SYSTOLIC BLOOD PRESSURE: 118 MMHG | HEIGHT: 63 IN | WEIGHT: 230 LBS | BODY MASS INDEX: 40.75 KG/M2 | DIASTOLIC BLOOD PRESSURE: 76 MMHG

## 2017-09-22 DIAGNOSIS — G89.29 CHRONIC BILATERAL LOW BACK PAIN WITHOUT SCIATICA: Primary | ICD-10-CM

## 2017-09-22 DIAGNOSIS — M54.50 CHRONIC BILATERAL LOW BACK PAIN WITHOUT SCIATICA: Primary | ICD-10-CM

## 2017-09-22 DIAGNOSIS — G89.29 CHRONIC BILATERAL THORACIC BACK PAIN: ICD-10-CM

## 2017-09-22 DIAGNOSIS — M51.369 LUMBAR DEGENERATIVE DISC DISEASE: ICD-10-CM

## 2017-09-22 DIAGNOSIS — M54.6 CHRONIC BILATERAL THORACIC BACK PAIN: ICD-10-CM

## 2017-09-22 PROCEDURE — 99214 OFFICE O/P EST MOD 30 MIN: CPT | Performed by: PEDIATRICS

## 2017-09-22 NOTE — Clinical Note
I had the opportunity to see Karen Donis in [facility] clinic for her back pain. Please see chart for details of the visit. Thanks.

## 2017-09-22 NOTE — TELEPHONE ENCOUNTER
Unable to reach patient. LM letting patient know script is at  waiting and hours of operation today. Jane Choe RN

## 2017-09-22 NOTE — PATIENT INSTRUCTIONS
Schedule MRI     Begin Physical Therapy       Advanced imaging is done by appointment. Some insurance require a prior authorization to be completed which may delay the time until you are able to schedule your appointment.  Please call [place], [place] and [place]: [phone] to schedule your MRI.  Depending on your availability you can usually schedule within the next 1-2 days.    The clinic will call you with results, if you have not heard from the clinic within 3-4 days following your MRI please contact us at the number listed below.     If you have any further questions for your physician or physician s care team you can call [phone] and use option 3 to leave a voice message. Calls received during business hours will be returned same day.

## 2017-09-22 NOTE — PROGRESS NOTES
Sports Medicine Clinic Visit    PCP: Lakia Hunter CORINE Donis is a 72 year old female who is seen  in consultation at the request of  Lakia Hunter  PhD presenting with low back pain which has been present for at least 6 months.  No known injury.  No treatment to day.  Denies any pain or numbness/tingling down her legs.  Pain around her waist on both side.  Denies any groin pain.      **  Patient explains that she has a bone spur, previously diagnosed, in the thoracic spine, encroaching on the spinal cord.  Was told she may need surgery, but surgeon decided not to operate.  [facility].    Pain in the low back worsened with standing.  Feels knot in the back.  Has not had treatment recently.  No radicular symptoms.  Does have a history of radicular pain into the right leg, which was relieved by a spine injection (NEETA).  Also has pain in tailbone.  Right index finger is numb and tingles, may be related to carrying plastic bags.      Left shoulder pain was relieved with injection.     Injury: gradual onset     Location of Pain: bilateral low back, midline   Duration of Pain: 6 month(s)  Rating of Pain at worst: 8/10  Rating of Pain Currently: 3/10  Symptoms are better with: heat  Symptoms are worse with: walking, bending over, lifting   Additional Features:   Positive:    Negative: swelling, bruising, popping, grinding, catching, locking, instability, paresthesias, numbness, weakness, pain in other joints and systemic symptoms  Other evaluation and/or treatments so far consists of: Nothing  Prior History of related problems: HX of back sprain.  Chart review shows MRI of thoracic and Lumbar 2005.  Possible injection about 10 years ago.     Social History: retired     Karen was asked to complete the Oswestry Low Back Disability Index today in the office.  Disability score: 53.33%.     Review of Systems  Musculoskeletal: as above  Remainder of review of systems is negative including constitutional, CV,  "pulmonary, GI, Skin and Neurologic except as noted in HPI or medical history.    This document serves as a record of the services and decisions personally performed and made by Dagoberto Swanson DO, CAQ. It was created on his behalf by Luis F Duran, a trained medical scribe. The creation of this document is based the provider's statements to the medical scribe.  Luis F Duran September 22, 2017 2:07 PM       Past Medical History:   Diagnosis Date     Colon cancer (H)      Hypertension      Shortness of breath      Sleep apnea      Thyroid disease      Past Surgical History:   Procedure Laterality Date     COLON SURGERY  2003     THYROIDECTOMY  12/6/2011    Procedure:THYROIDECTOMY; Right Thyroid Lobectomy and bilateral removal of skin tags; Surgeon:SAJI ZAZUETA; Location:[facility]     TONSILLECTOMY      While she was in 6th grade     Family History   Problem Relation Age of Onset     DIABETES Mother      Hypertension Mother      Thyroid Disease Mother      HEART DISEASE Father      Cardiovascular Father      Neurologic Disorder Father      Parkinson's     Arthritis Father      Fibromyalgia     GASTROINTESTINAL DISEASE Maternal Grandmother      gallbladder removed     HEART DISEASE Maternal Grandfather      DIABETES Brother      Hypertension Brother      Neurologic Disorder Brother      ADHD     Unknown/Adopted Son      Unknown/Adopted Daughter      Neurologic Disorder Brother      ADHD     Social History     Social History     Marital status:      Spouse name: Anand     Number of children: 2     Years of education: 12     Occupational History     Assembly/factory Homemaker     2005     Social History Main Topics     Smoking status: Former Smoker     Quit date: 12/12/1982     Smokeless tobacco: Never Used     Alcohol use No     Drug use: No     Sexual activity: Yes     Partners: Male     Other Topics Concern     Not on file     Social History Narrative       Objective  /76  Ht 5' 2.75\" (1.594 " m)  Wt 230 lb (104.3 kg)  BMI 41.07 kg/m2    GENERAL APPEARANCE: alert, no distress and obese   GAIT: ambulates independently; pain with position changes  SKIN: no suspicious lesions or rashes  NEURO: Normal strength and tone, mentation intact and speech normal  PSYCH:  mentation appears normal and affect normal/bright  HEENT: no scleral icterus  CV: no extremity edema   RESP: nonlabored breathing      Low back exam:    Inspection:       no visible deformity in the low back       normal skin       normal vascular       normal lymphatic  Sits with slumped posture, leaning back in chair    ROM:      limited flexion, hands to mid lower leg, + pain      Mildly limited extension, pain in the low mid back    Tender:       Midline T7-T8 area  Lower thoracic and upper lumbar paraspinals, mild         Non Tender:       remainder of lumbar spine       SI joint s    Strength:       hip flexion 5-/5 increased pain in the low back and the paraspinals bilaterally   No change in pain with below        knee extension 5/5       ankle dorsiflexion 5/5       ankle plantarflexion 5/5       dorsiflexion of the great toe 5/5       Knee flexion 5-/5    Reflexes:       patellar (L3, L4) symmetric normal       achilles tendons (S1) symmetric diminished    Sensation:      grossly intact throughout lower extremities    Skin:       well perfused       capillary refill brisk    Special tests:       straight leg raise left no change in pain         straight leg raise right no change in pain     **  Hip ROM symmetric     ** **  She has difficulty holding her head off pillow in supine position.  Difficulty with trying to raise straight leg actively bilaterally. Unable to hold both legs off table. + pain with testing.      Radiology:  Visualized radiographs of lumbar spine 10/16, and reviewed the images with the patient.  Impression: DDD, facet arthrosis.  Report reviewed:    3 views lumbar spine radiographs     History: Low back pain, Other  chronic pain     Comparison: December 2, 2005     Findings:     AP, lateral including lumbosacral spot film lateral view views of the  lumbar spine were obtained.     5  lumbar type vertebral bodies are assumed for the purpose of this  dictation.     There is no acute osseous abnormality.       There is progression of degenerative changes of the lumbar spine  particularly at L1-L2 with associated Modic type III endplate  degenerative changes with moderate disc space loss. Disc vacuum  phenomenon suspected at L1-L2, and L2-L3. Additionally lower lumbar  spine predominant facet arthropathy.     Vascular calcifications. The visualized bowel gas pattern is  non-obstructive.         Impression: Progression of degenerative changes of lumbar spine  especially pronounced at the L1-L2.     CHRIS HALL  =================================================  Reviewed report of thoracic MRI 2007 (she states bone spur on this): no noted bone spur.    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     TECHNIQUE: Sagittal T1, T2, gradient-echo, and inversion recovery, and  transverse gradient echo and T2-weighted pulse sequences.      FINDINGS: Vertebral body heights and sagittal alignment are within  normal limits. Marrow signal is within normal limits. No abnormal  signal is seen within the thoracic cord.     At T3-4, there is a small 1-2 mm central disc herniation or protrusion  which does not contact or efface the thoracic cord. There is no  central stenosis. The appearance is essentially unchanged from  12/2/2005.     At T10-11, there are degenerative changes in the apophyseal joints  bilaterally with some ligamentum flavum thickening dorsally, indenting  the thecal sac with mild to moderate central stenosis. This also is  essentially unchanged from 2005. No effacement of the distal thoracic  cord is  demonstrated.     IMPRESSION:     1. Mild to moderate central stenosis at T10-11 due to prominent  ligamentum flavum thickening and dorsal indentation upon the thecal  sac which is similar if not unchanged from 12/2/2005.     2. Small central disc herniation at T3-4, also unchanged.    Assessment:  1. Chronic bilateral low back pain without sciatica    2. Chronic bilateral thoracic back pain    3. Lumbar degenerative disc disease    decreased core strength, with underlying degenerative change.    Plan:  Discussed the assessment with the patient. I think rehab and exercise are her best options. She is very skeptical of therapy however given lack of benefit in past, and her perception of her pain.    Pertinent imaging of the area reviewed with the patient.    Options:  *Symptom Treatment   *Activity Modification   *Injection - Facet joint injection pending MRI   *Rehab  *Imaging     Discussed trying PT first, 4-6 weeks, then considering MRI/Injection if symptoms persisted;  However, once we discussed injection, she was very keen to pursue that. Advanced imaging required first.  Topical Treatments: Ice or heat prn   Over the counter medication: Patient's preferred OTC medication as directed on packaging.  Rehab: Physical Therapy: [facility] - [phone]; referral updated to include lumbar and thoracic spine (not just vertigo treatment); patient will start when able.  Encouraged to start right away.  MRI of the Lumbar spine; considered open MRI or standing MRI . She is concerned about not tolerating regular MRI, getting stuck or pressure on arms. We will start with that regardless.   Once again, I advised PT first. However, I don't have the sense that she will give PT a full effort if she is experiencing pain, additionally she has concerns about an increase in pain with therapy. Will obtain MRI for better characterization of degenerative changes, possibly injection target.  Activity Modification:  as discussed   Consider injection pending MRI results, facet.   Follow up: call with results of MRI.   Questions answered. The patient indicates understanding of these issues and agrees with the plan.     Dagoberto Swanson DO, CAQ    CC: Lakia Hunter        Disclaimer: This note consists of symbols derived from keyboarding, dictation and/or voice recognition software. As a result, there may be errors in the script that have gone undetected. Please consider this when interpreting information found in this chart.    The information in this document, created by the medical scribe for me, accurately reflects the services I personally performed and the decisions made by me. I have reviewed and approved this document for accuracy.   Dagoberto Swanson DO, CAQ

## 2017-09-22 NOTE — MR AVS SNAPSHOT
After Visit Summary   9/22/2017    Karen Donis    MRN: [id number]           Patient Information     Date Of Birth          1945        Visit Information        Provider Department      9/22/2017 2:00 PM Dagoberot Swanson,  [facility] [place]        Today's Diagnoses     Chronic bilateral low back pain without sciatica    -  1    Chronic bilateral thoracic back pain          Care Instructions    Schedule MRI     Begin Physical Therapy       Advanced imaging is done by appointment. Some insurance require a prior authorization to be completed which may delay the time until you are able to schedule your appointment.  Please call [facility]: [phone] to schedule your MRI.  Depending on your availability you can usually schedule within the next 1-2 days.    The clinic will call you with results, if you have not heard from the clinic within 3-4 days following your MRI please contact us at the number listed below.     If you have any further questions for your physician or physician s care team you can call [phone] and use option 3 to leave a voice message. Calls received during business hours will be returned same day.                  Follow-ups after your visit        Additional Services     [facility] PT, HAND, AND CHIROPRACTIC REFERRAL       **This order will print in the [facility] Scheduling Office**    Physical Therapy, Hand Therapy and Chiropractic Care are available through:    *[facility]  *[facility]  *[facility]    Call one number to schedule at any of the above locations: [phone].    Your provider has referred you to: Physical Therapy at [facility] or [facility]    Indication/Reason for Referral: Low Back Pain  Onset of Illness:   Therapy Orders: Evaluate and Treat  Special Programs: None  Special Request: None    Luly Mauricio      Additional Comments for the Therapist or Chiropractor:  "      Please be aware that coverage of these services is subject to the terms and limitations of your health insurance plan.  Call member services at your health plan with any benefit or coverage questions.      Please bring the following to your appointment:    *Your personal calendar for scheduling future appointments  *Comfortable clothing                  Your next 10 appointments already scheduled     Sep 26, 2017 11:20 AM CDT   (Arrive by 11:05 AM)   IDA Extremity with Antony Baugh PT   [facility] [place] [facility] ([facility] [place])    [address]  [address]  [place] [id number] [phone]            Oct 25, 2017 11:45 AM CDT   MA SCREENING DIGITAL BILATERAL with MGMA1, MG MA TECH   [facility]    [address] [place] [id number] [phone]           Do not use any powder, lotion or deodorant under your arms or on your breast. If you do, we will ask you to remove it before your exam.  Wear comfortable, two-piece clothing.  If you have any allergies, tell your care team.  Bring any previous mammograms from other facilities or have them mailed to the breast center. Three-dimensional (3D) mammograms are available at [facility] locations in [facility], [facility], [facility], and [place]. [facility] locations include [place] [facility] in [place]. Benefits of 3D mammograms include: - Improved rate of cancer detection - Decreases your chance of having to go back for more tests, which means fewer: - \"False-positive\" results (This means that there is an abnormal area but it isn't cancer.) - Invasive testing procedures, such as a biopsy or surgery - Can provide clearer images of the breast if you have dense breast tissue. 3D mammography is an optional exam that anyone can have with a 2D mammogram. It doesn't replace or take the place of a 2D mammogram. " "2D mammograms remain an effective screening test for all women.  Not all insurance companies cover the cost of a 3D mammogram. Check with your insurance.            Oct 25, 2017 12:10 PM CDT   Return Visit with Lakia Hunter MD PhD   [facility] ([facility])    [address] [place] [id number] [phone]            Nov 10, 2017  1:00 PM CST   Diabetic Education with BE DIABETIC ED RESOURCE   [facility] ([facility])    [address] [facility] [id number] [phone]              Future tests that were ordered for you today     Open Future Orders        Priority Expected Expires Ordered    MR Lumbar Spine w/o Contrast Routine  9/22/2018 9/22/2017            Who to contact     If you have questions or need follow up information about today's clinic visit or your schedule please contact [facility] directly at [phone].  Normal or non-critical lab and imaging results will be communicated to you by [employer], letter or phone within 4 business days after the clinic has received the results. If you do not hear from us within 7 days, please contact the clinic through [employer] or phone. If you have a critical or abnormal lab result, we will notify you by phone as soon as possible.  Submit refill requests through [employer] or call your pharmacy and they will forward the refill request to us. Please allow 3 business days for your refill to be completed.          Additional Information About Your Visit        [employer] Information     [employer] lets you send messages to your doctor, view your test results, renew your prescriptions, schedule appointments and more. To sign up, go to [email or web address] . Click on \"Log in\" on the left side of the screen, which will take you to the Welcome page. Then click on \"Sign up Now\" on the right side of the page.     You will be asked to enter the access code listed " "below, as well as some personal information. Please follow the directions to create your username and password.     Your access code is: [email or web address]  Expires: 2017  4:47 PM     Your access code will  in 90 days. If you need help or a new code, please call your [facility] clinic or [phone].        Care EveryWhere ID     This is your Care EveryWhere ID. This could be used by other organizations to access your [facility] medical records  [id number]        Your Vitals Were     Height BMI (Body Mass Index)                5' 2.75\" (1.594 m) 41.07 kg/m2           Blood Pressure from Last 3 Encounters:   17 118/76   17 118/72   17 116/62    Weight from Last 3 Encounters:   17 230 lb (104.3 kg)   17 225 lb (102.1 kg)   17 228 lb 11.2 oz (103.7 kg)              We Performed the Following     IDA PT, HAND, AND CHIROPRACTIC REFERRAL        Primary Care Provider Office Phone # Fax #    Lakia Hunter MD PhD [phone]       [address] [place] [address]        Equal Access to Services     LEE STREETER : Hadii stephanie ku hadasho Socharbelali, waaxda luqadaha, qaybta kaalmada adeegyada, waxay margaret espinal . So [facility] [phone].    ATENCIÓN: Si habla español, tiene a ashley disposición servicios gratuitos de asistencia lingüística. Llame al [phone].    We comply with applicable federal civil rights laws and [place] laws. We do not discriminate on the basis of race, color, national origin, age, disability sex, sexual orientation or gender identity.            Thank you!     Thank you for choosing [facility]  for your care. Our goal is always to provide you with excellent care. Hearing back from our patients is one way we can continue to improve our services. Please take a few minutes to complete the written survey that you may receive in the mail after your visit with us. Thank you!             Your Updated " Medication List - Protect others around you: Learn how to safely use, store and throw away your medicines at www.disposemymeds.org.          This list is accurate as of: 9/22/17  3:02 PM.  Always use your most recent med list.                   Brand Name Dispense Instructions for use Diagnosis    aspirin 81 MG tablet      1 TABLET DAILY        BEANO PO      Take by mouth as needed Reported on 5/3/2017        biotin 1000 MCG Tabs tablet      Take 1,000 mcg by mouth daily        blood glucose lancing device     1 each    Device to be used with lancets.    Diet-controlled diabetes mellitus (H)       * blood glucose monitoring lancets     1 Box    Use to test blood sugar 1 times daily or as directed.  Ok to substitute alternative if insurance prefers.    Diet-controlled diabetes mellitus (H)       * blood glucose monitoring lancets     1 Box    Use to test blood sugar 1daily or as directed.    Diet-controlled diabetes mellitus (H)       * blood glucose monitoring test strip    no brand specified    100 each    Use to test blood sugars daily or as directed. Accu-check smartview strips    Diet-controlled diabetes mellitus (H)       * blood glucose monitoring test strip    ACCU-CHEK SMARTVIEW    100 each    Use to test blood sugar 1 times daily or as directed.  Ok to substitute alternative if insurance prefers.    Diet-controlled diabetes mellitus (H)       CALCIUM 1200 PO      Take by mouth daily        cholecalciferol 1000 UNITS capsule    vitamin  -D    180 capsule    Take 2 capsules (2,000 Units) by mouth daily    Vitamin D deficiency       CO Q 10 PO      Take 1 capsule by mouth daily.        Cranberry 300 MG Tabs      Take by mouth daily        DULoxetine 30 MG EC capsule    CYMBALTA    60 capsule    Take 1 capsule (30 mg) by mouth 2 times daily    Recurrent major depressive disorder, in partial remission (H), Fibromyalgia       econazole nitrate 1 % cream     30 g    Apply topically daily    Tinea pedis       flax  seed oil 1000 MG capsule      Take 1 capsule by mouth daily        losartan-hydrochlorothiazide 100-25 MG per tablet    HYZAAR    90 tablet    TAKE ONE TABLET BY MOUTH EVERY DAY    Essential hypertension with goal blood pressure less than 140/90       MAGNESIUM PO      1 capsule daily        meclizine 25 MG tablet    ANTIVERT    30 tablet    Take 1 tablet (25 mg) by mouth every 6 hours as needed for dizziness    BPPV (benign paroxysmal positional vertigo), right       metFORMIN 500 MG 24 hr tablet    GLUCOPHAGE-XR    30 tablet    Take 1 tablet (500 mg) by mouth daily (with dinner)    Type 2 diabetes mellitus without complication, without long-term current use of insulin (H)       methylphenidate 20 MG CR tablet    METADATE ER    30 tablet    Take 1 tablet (20 mg) by mouth every morning    Attention deficit disorder (ADD) without hyperactivity       omeprazole 20 MG CR capsule    priLOSEC    30 capsule    Take 1 capsule (20 mg) by mouth daily    Gastroesophageal reflux disease without esophagitis       UNABLE TO FIND      Reported on 5/3/2017        VITAMIN B COMPLEX-C Caps      1 capsule PO qd        vitamin E 400 UNIT capsule      Take by mouth daily        * Notice:  This list has 4 medication(s) that are the same as other medications prescribed for you. Read the directions carefully, and ask your doctor or other care provider to review them with you.

## 2017-09-27 ENCOUNTER — HOSPITAL ENCOUNTER (OUTPATIENT)
Dept: PHYSICAL THERAPY | Facility: CLINIC | Age: 72
Setting detail: THERAPIES SERIES
End: 2017-09-27
Attending: INTERNAL MEDICINE
Payer: MEDICARE

## 2017-09-27 PROCEDURE — 97112 NEUROMUSCULAR REEDUCATION: CPT | Mod: GP | Performed by: PHYSICAL THERAPIST

## 2017-09-27 PROCEDURE — G8981 BODY POS CURRENT STATUS: HCPCS | Mod: GP,CJ | Performed by: PHYSICAL THERAPIST

## 2017-09-27 PROCEDURE — G8982 BODY POS GOAL STATUS: HCPCS | Mod: GP,CI | Performed by: PHYSICAL THERAPIST

## 2017-09-27 PROCEDURE — 97161 PT EVAL LOW COMPLEX 20 MIN: CPT | Mod: GP | Performed by: PHYSICAL THERAPIST

## 2017-09-27 PROCEDURE — 40000840 ZZHC STATISTIC PT VESTIBULAR VISIT: Performed by: PHYSICAL THERAPIST

## 2017-09-27 NOTE — DISCHARGE INSTRUCTIONS
9/27/17  - Habituation exercises: When an activity makes you dizzy, please repeat activity 3-5 times. Do not repeat until symptoms of dizziness goes away.   - VOR exercises: 3-5x/day
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
## 2017-09-27 NOTE — PROGRESS NOTES
[facility]        OUTPATIENT PHYSICAL THERAPY FUNCTIONAL EVALUATION  PLAN OF TREATMENT FOR OUTPATIENT REHABILITATION  (COMPLETE FOR INITIAL CLAIMS ONLY)  Patient's Last Name, First Name, M.I.  YOB: 1945  Karen Donis     Provider's Name   [facility]   Medical Record No.  [id number]     Start of Care Date:  09/27/17   Onset Date:  09/20/17   Type:     _X__PT   ____OT  ____SLP Medical Diagnosis:  Vertigo      PT Diagnosis:  Dizziness Visits from SOC:  1                              __________________________________________________________________________________  Plan of Treatment/Functional Goals:  other (see comments) (Canalith repositioning if needed )           GOALS  HEP   Pt will be independent in HEP in order to decrease symptoms of dizziness and return to prior level of function   12/25/17    DHI  Pt will score < 20 in order to demonstrate improvements of dizziness symptoms   12/25/17        Therapy Frequency:  other (see comments) (Patient to make appt if dizziness does not go away )   Predicted Duration of Therapy Intervention:  Up to 90 days     Rin Montesinos, PT                                    I CERTIFY THE NEED FOR THESE SERVICES FURNISHED UNDER        THIS PLAN OF TREATMENT AND WHILE UNDER MY CARE     (Physician co-signature of this document indicates review and certification of the therapy plan).                  Certification Date From:  09/27/17   Certification Date To:  12/25/17    Referring Provider:  Lakia Hunter, PhD     Initial Assessment  See Epic Evaluation- Start of Care Date: 09/27/17

## 2017-09-27 NOTE — IP AVS SNAPSHOT
"                  MRN:[id number]                      After Visit Summary   9/27/2017    Karen Donis    MRN: [id number]           Visit Information        Provider Department      9/27/2017  2:00 PM Rin Montesinos, PT [facility]        Your next 10 appointments already scheduled     Oct 25, 2017 11:45 AM CDT   MA SCREENING DIGITAL BILATERAL with MGMA1, MG MA TECH   [facility]    [address] [place] [id number] [phone]           Do not use any powder, lotion or deodorant under your arms or on your breast. If you do, we will ask you to remove it before your exam.  Wear comfortable, two-piece clothing.  If you have any allergies, tell your care team.  Bring any previous mammograms from other facilities or have them mailed to the breast center. Three-dimensional (3D) mammograms are available at [place] locations in [facility], [facility], [facility], and [place]. [facility] locations include [place] [facility] in [place]. Benefits of 3D mammograms include: - Improved rate of cancer detection - Decreases your chance of having to go back for more tests, which means fewer: - \"False-positive\" results (This means that there is an abnormal area but it isn't cancer.) - Invasive testing procedures, such as a biopsy or surgery - Can provide clearer images of the breast if you have dense breast tissue. 3D mammography is an optional exam that anyone can have with a 2D mammogram. It doesn't replace or take the place of a 2D mammogram. 2D mammograms remain an effective screening test for all women.  Not all insurance companies cover the cost of a 3D mammogram. Check with your insurance.            Oct 25, 2017 12:10 PM CDT   Return Visit with Lakia Hunter MD PhD   [facility]    [address] [place] [id number] " "  [phone]            Nov 10, 2017  1:00 PM CST   Diabetic Education with [facility] DIABETIC ED RESOURCE   [facility] [place] ([facility] [place])    [address]  [place] [address] [phone]                Further instructions from your care team       17  - Habituation exercises: When an activity makes you dizzy, please repeat activity 3-5 times. Do not repeat until symptoms of dizziness goes away.   - VOR exercises: 3-5x/day     MyChart Information     [employer] lets you send messages to your doctor, view your test results, renew your prescriptions, schedule appointments and more. To sign up, go to [email or web address] . Click on \"Log in\" on the left side of the screen, which will take you to the Welcome page. Then click on \"Sign up Now\" on the right side of the page.     You will be asked to enter the access code listed below, as well as some personal information. Please follow the directions to create your username and password.     Your access code is: [email or web address]  Expires: 2017  4:47 PM     Your access code will  in 90 days. If you need help or a new code, please call your [place] clinic or [phone].        Care EveryWhere ID     This is your Care EveryWhere ID. This could be used by other organizations to access your [place] medical records  [id number]        Equal Access to Services     LEE STREETER : Hadii stephanie cartero Somaury, waaxda luqadaha, qaybta kaalmada adesherylyada, luiza espinal . So [facility] [phone].    ATENCIÓN: Si habla español, tiene a ashley disposición servicios gratuitos de asistencia lingüística. Llame al [phone].    We comply with applicable federal civil rights laws and [place] laws. We do not discriminate on the basis of race, color, national origin, age, disability sex, sexual orientation or gender identity.            "

## 2017-09-30 ENCOUNTER — HEALTH MAINTENANCE LETTER (OUTPATIENT)
Age: 72
End: 2017-09-30

## 2017-10-02 ENCOUNTER — TELEPHONE (OUTPATIENT)
Dept: PEDIATRICS | Facility: CLINIC | Age: 72
End: 2017-10-02

## 2017-10-02 DIAGNOSIS — E78.5 HYPERLIPIDEMIA LDL GOAL <100: Primary | ICD-10-CM

## 2017-10-04 RX ORDER — PRAVASTATIN SODIUM 10 MG
10 TABLET ORAL DAILY
Qty: 90 TABLET | Refills: 0 | Status: SHIPPED | OUTPATIENT
Start: 2017-10-04 | End: 2018-01-22

## 2017-10-04 NOTE — TELEPHONE ENCOUNTER
Pravastatin Sodium Oral Tablet 10 mg, Take one tablet (10 mg) by oral route once daily  Last Written Prescription Date:  04/09/17  Last Fill Quantity: 90,   # refills: 3  Last Office Visit with KISHOR, SANDRA or [facility] prescribing provider: 09/20/17  Future Office visit:  10/25/17  Next 5 appointments (look out 90 days)     Oct 25, 2017 12:10 PM CDT   [facility]    [address] [place] [id number] [phone]                   Routing refill request to provider for review/approval because:  Drug not active on patient's medication list    [facility] [id number] [place] [address] [place]  Routed to Dr. Dale Dean CMA

## 2017-10-13 DIAGNOSIS — I10 ESSENTIAL HYPERTENSION WITH GOAL BLOOD PRESSURE LESS THAN 140/90: ICD-10-CM

## 2017-10-13 RX ORDER — LOSARTAN POTASSIUM AND HYDROCHLOROTHIAZIDE 25; 100 MG/1; MG/1
TABLET ORAL
Qty: 90 TABLET | Refills: 1 | Status: SHIPPED | OUTPATIENT
Start: 2017-10-13 | End: 2018-05-05

## 2017-10-13 NOTE — TELEPHONE ENCOUNTER
hyzaar      Last Written Prescription Date: 4/4/17  Last Fill Quantity: 90, # refills: 1  Last Office Visit with FMEPI, SANDRA or [facility] prescribing provider: 9/20/17  Next 5 appointments (look out 90 days)     Oct 25, 2017 12:10 PM CDT   [facility]    [address] [place] [address] [phone]                   Potassium   Date Value Ref Range Status   06/19/2017 4.1 3.4 - 5.3 mmol/L Final     Creatinine   Date Value Ref Range Status   06/19/2017 1.01 0.52 - 1.04 mg/dL Final     BP Readings from Last 3 Encounters:   09/22/17 118/76   09/20/17 118/72   06/19/17 116/62     Medication filled for 6 months per protocol.      Antonette Main RN, [facility]

## 2017-10-20 DIAGNOSIS — F98.8 ATTENTION DEFICIT DISORDER (ADD) WITHOUT HYPERACTIVITY: ICD-10-CM

## 2017-10-20 NOTE — TELEPHONE ENCOUNTER
[facility] Call Center    Phone Message    Name of Caller: Karen    Phone Number: Home number on file [phone] (home)    Best time to return call: Any    May a detailed message be left on voicemail: yes    Relation to patient: Self    Reason for Call: Karen is requesting a refill of methylphenidate (METADATE ER) 20 MG CR tablet.  [facility] [address]  She will be at the facility on Wednesday and would like to pick the medication up at that time.  Thank you.    Action Taken: Message routed to:  Primary Care p [phone]

## 2017-10-23 NOTE — TELEPHONE ENCOUNTER
methylphenidate      Last Written Prescription Date:  9/21/17  Last Fill Quantity: 30,   # refills: 0      Routing refill request to provider for review/approval because:  Drug not on the [facility], [facility] or [facility] refill protocol or controlled substance    Kelley Gill RN   [facility]

## 2017-10-24 RX ORDER — METHYLPHENIDATE HYDROCHLORIDE EXTENDED RELEASE 20 MG/1
20 TABLET ORAL EVERY MORNING
Qty: 30 TABLET | Refills: 0 | Status: SHIPPED | OUTPATIENT
Start: 2017-10-24 | End: 2017-11-17

## 2017-10-24 NOTE — TELEPHONE ENCOUNTER
Rx tubed to onsite pharmacy. Per original request patient will  at onsite pharmacy tomorrow.  Jocelyn Charles, CMA

## 2017-10-25 ENCOUNTER — RADIANT APPOINTMENT (OUTPATIENT)
Dept: MAMMOGRAPHY | Facility: CLINIC | Age: 72
End: 2017-10-25
Attending: INTERNAL MEDICINE
Payer: COMMERCIAL

## 2017-10-25 ENCOUNTER — TELEPHONE (OUTPATIENT)
Dept: PEDIATRICS | Facility: CLINIC | Age: 72
End: 2017-10-25

## 2017-10-25 DIAGNOSIS — E11.9 DIET-CONTROLLED DIABETES MELLITUS (H): Primary | ICD-10-CM

## 2017-10-25 DIAGNOSIS — Z12.31 VISIT FOR SCREENING MAMMOGRAM: ICD-10-CM

## 2017-10-25 PROCEDURE — 77063 BREAST TOMOSYNTHESIS BI: CPT | Performed by: STUDENT IN AN ORGANIZED HEALTH CARE EDUCATION/TRAINING PROGRAM

## 2017-10-25 PROCEDURE — G0202 SCR MAMMO BI INCL CAD: HCPCS | Performed by: STUDENT IN AN ORGANIZED HEALTH CARE EDUCATION/TRAINING PROGRAM

## 2017-10-25 NOTE — TELEPHONE ENCOUNTER
[facility] Call Center    Phone Message    Name of Caller: Karen    Phone Number: Home number on file [phone] (home)    Best time to return call: ANY    May a detailed message be left on voicemail: yes    Relation to patient: Self    Reason for Call: Other: Patient is going to come in today for labs at 11:20. She wants an a1c ordered. please call if there are any issues     Action Taken: Message routed to:  Primary Care p [phone]

## 2017-10-26 ENCOUNTER — TELEPHONE (OUTPATIENT)
Dept: PHARMACY | Facility: OTHER | Age: 72
End: 2017-10-26

## 2017-10-26 ENCOUNTER — TELEPHONE (OUTPATIENT)
Dept: PEDIATRICS | Facility: CLINIC | Age: 72
End: 2017-10-26

## 2017-11-17 DIAGNOSIS — F98.8 ATTENTION DEFICIT DISORDER (ADD) WITHOUT HYPERACTIVITY: ICD-10-CM

## 2017-11-17 RX ORDER — METHYLPHENIDATE HYDROCHLORIDE EXTENDED RELEASE 20 MG/1
20 TABLET ORAL EVERY MORNING
Qty: 30 TABLET | Refills: 0 | Status: SHIPPED | OUTPATIENT
Start: 2017-11-23 | End: 2017-11-17

## 2017-11-17 RX ORDER — METHYLPHENIDATE HYDROCHLORIDE EXTENDED RELEASE 20 MG/1
20 TABLET ORAL EVERY MORNING
Qty: 30 TABLET | Refills: 0 | Status: SHIPPED | OUTPATIENT
Start: 2017-11-22 | End: 2018-01-03

## 2017-11-17 NOTE — TELEPHONE ENCOUNTER
Patient is here with her brother.  She was not seen today, but was with her bother at an office visit.    She is asking for methylphenidate. Due on 11/23/17.  Can call her when she an pick it up as 23rd is on a holiday      Maritza Wisdom RN, [facility]

## 2017-11-17 NOTE — TELEPHONE ENCOUNTER
Please inform patient, refill/prescription approved and when prescription hard copy is ready to be picked up at .  She can fill on 11/22/17

## 2017-11-20 NOTE — TELEPHONE ENCOUNTER
Patient advised of information below.  [facility]ethylphenidate script tube to  [facility] per patient request.    Zohra Dean CMA

## 2018-01-03 DIAGNOSIS — F98.8 ATTENTION DEFICIT DISORDER (ADD) WITHOUT HYPERACTIVITY: ICD-10-CM

## 2018-01-03 NOTE — TELEPHONE ENCOUNTER
Metadate      Last Written Prescription Date:  11/22/17  Last Fill Quantity: 30,   # refills: 0  Last Office Visit: 09/20/17  Future Office visit:       Routing refill request to provider for review/approval because:  Drug not on the [facility], [facility] or [facility] refill protocol or controlled substance

## 2018-01-04 RX ORDER — METHYLPHENIDATE HYDROCHLORIDE EXTENDED RELEASE 20 MG/1
20 TABLET ORAL EVERY MORNING
Qty: 30 TABLET | Refills: 0 | Status: SHIPPED | OUTPATIENT
Start: 2018-01-04 | End: 2018-02-05

## 2018-01-22 DIAGNOSIS — E78.5 HYPERLIPIDEMIA LDL GOAL <100: ICD-10-CM

## 2018-01-22 NOTE — TELEPHONE ENCOUNTER
Pravastatin    Last Written Prescription Date:  10/4/17  Last Fill Quantity: 90 tab,  # refills: 0   Last Office Visit with [facility], [facility] or [facility] prescribing provider:  9/20/17   Future Office Visit:

## 2018-01-22 NOTE — LETTER
January 23, 2018      Karen Donis  [address] [place] [address]              Dear Karen,      We recently received a call from your pharmacy requesting a refill of your medication. We have provided a 30 day refill of your medication, pravastatin (PRAVACHOL) 10 MG tablet, as requested.      A review of your chart indicates that your last fasting labs (lipids/cholesterol) was on  12/7/2016.  Lab appointments are required yearly with your provider.    We have authorized one time 30 day refill of your medication to allow time for you to schedule.     If you have a history of diabetes or high cholesterol, please come in fasting for the appointment. Fasting entails nothing to eat or drink 8 hours prior to your appointment; with the exception on water. You may take your medication the day of the appointment.    Please call the clinic to schedule your appointment.    Thank you for taking an active role in your healthcare.      Sincerely,    [facility]

## 2018-01-23 RX ORDER — PRAVASTATIN SODIUM 10 MG
10 TABLET ORAL DAILY
Qty: 30 TABLET | Refills: 0 | Status: SHIPPED | OUTPATIENT
Start: 2018-01-23 | End: 2018-02-27

## 2018-01-23 NOTE — TELEPHONE ENCOUNTER
LDL Cholesterol Calculated   Date Value Ref Range Status   12/07/2016 84 <100 mg/dL Final     Comment:     Desirable:       <100 mg/dl   ]  Patient due for fasting labs. No future appointments at this time. [place] refill. Letter sent to patient. Jane Choe RN

## 2018-02-05 DIAGNOSIS — F33.41 RECURRENT MAJOR DEPRESSIVE DISORDER, IN PARTIAL REMISSION (H): ICD-10-CM

## 2018-02-05 DIAGNOSIS — M79.7 FIBROMYALGIA: ICD-10-CM

## 2018-02-05 DIAGNOSIS — F98.8 ATTENTION DEFICIT DISORDER (ADD) WITHOUT HYPERACTIVITY: ICD-10-CM

## 2018-02-05 RX ORDER — METHYLPHENIDATE HYDROCHLORIDE EXTENDED RELEASE 20 MG/1
20 TABLET ORAL EVERY MORNING
Qty: 30 TABLET | Refills: 0 | Status: SHIPPED | OUTPATIENT
Start: 2018-02-05 | End: 2018-03-01

## 2018-02-05 RX ORDER — DULOXETIN HYDROCHLORIDE 30 MG/1
30 CAPSULE, DELAYED RELEASE ORAL 2 TIMES DAILY
Qty: 60 CAPSULE | Refills: 0 | Status: SHIPPED | OUTPATIENT
Start: 2018-02-05 | End: 2018-03-01

## 2018-02-05 NOTE — TELEPHONE ENCOUNTER
methylphenidate (METADATE ER) 20 MG CR tablet      Last Written Prescription Date:  01/04/18  Last Fill Quantity: 30,   # refills: 0  Last Office Visit: 09/20/17  Future Office visit:       SEND SCRIPT TO ONSITE PHARMACY    Routing refill request to provider for review/approval because:  Drug not on the [facility], [facility] or [facility] refill protocol or controlled substance    DULoxetine (CYMBALTA) 30 MG EC capsule      Last Written Prescription Date:  09/20/17  Last Fill Quantity: 60,   # refills: 1  Last Office Visit: 09/20/17  Future Office visit:       [facility]

## 2018-02-05 NOTE — TELEPHONE ENCOUNTER
Patient is calling to request a refill for methylphenidate (METADATE ER) 20 MG CR tablet [07383] (Order [id number]) and DULoxetine (CYMBALTA) 30 MG EC capsule [[id number]] (Order [id number]) . Please send the Metadate ER to the [facility] pharmacy and the Cymbalta to the [facility] in [place] off [address]. Please advise.

## 2018-02-27 DIAGNOSIS — E78.5 HYPERLIPIDEMIA LDL GOAL <100: ICD-10-CM

## 2018-02-28 RX ORDER — PRAVASTATIN SODIUM 10 MG
10 TABLET ORAL DAILY
Qty: 30 TABLET | Refills: 0 | Status: SHIPPED | OUTPATIENT
Start: 2018-02-28 | End: 2018-03-01

## 2018-02-28 NOTE — TELEPHONE ENCOUNTER
pravastatin (PRAVACHOL) 10 MG tablet 30 tablet 0 1/23/2018  No   Sig: Take 1 tablet (10 mg) by mouth daily   Class: E-Prescribe   Notes to Pharmacy: Due for fasting labs for further refill. Farzaneh refill. Letter sent to patient.     Last OV with Dr Hunter: 9/20/2017    Future OV with Dr. Hunter; 3/1/2018      Statins Protocol Failed2/27 1:11 PM   LDL on file in past 12 months    No positive pregnancy test in past 12 months    No abnormal creatine kinase in past 12 months    Recent or future visit with authorizing provider    Patient is age 18 or older    No active pregnancy on record     2nd farzaneh refill to get patient to appointment above. Jane Choe RN

## 2018-03-01 ENCOUNTER — TELEPHONE (OUTPATIENT)
Dept: PEDIATRICS | Facility: CLINIC | Age: 73
End: 2018-03-01

## 2018-03-01 ENCOUNTER — OFFICE VISIT (OUTPATIENT)
Dept: PEDIATRICS | Facility: CLINIC | Age: 73
End: 2018-03-01
Payer: COMMERCIAL

## 2018-03-01 VITALS
OXYGEN SATURATION: 95 % | DIASTOLIC BLOOD PRESSURE: 80 MMHG | HEIGHT: 63 IN | SYSTOLIC BLOOD PRESSURE: 144 MMHG | BODY MASS INDEX: 39.34 KG/M2 | HEART RATE: 72 BPM | WEIGHT: 222 LBS

## 2018-03-01 DIAGNOSIS — C18.2 MALIGNANT NEOPLASM OF ASCENDING COLON (H): ICD-10-CM

## 2018-03-01 DIAGNOSIS — M79.7 FIBROMYALGIA: ICD-10-CM

## 2018-03-01 DIAGNOSIS — E11.9 CONTROLLED TYPE 2 DIABETES MELLITUS WITHOUT COMPLICATION, WITHOUT LONG-TERM CURRENT USE OF INSULIN (H): Primary | ICD-10-CM

## 2018-03-01 DIAGNOSIS — I10 HYPERTENSION GOAL BP (BLOOD PRESSURE) < 140/90: ICD-10-CM

## 2018-03-01 DIAGNOSIS — B35.1 DERMATOPHYTOSIS OF NAIL: ICD-10-CM

## 2018-03-01 DIAGNOSIS — E11.9 DIET-CONTROLLED DIABETES MELLITUS (H): ICD-10-CM

## 2018-03-01 DIAGNOSIS — F98.8 ATTENTION DEFICIT DISORDER (ADD) WITHOUT HYPERACTIVITY: ICD-10-CM

## 2018-03-01 DIAGNOSIS — Z51.81 ENCOUNTER FOR THERAPEUTIC DRUG MONITORING: ICD-10-CM

## 2018-03-01 DIAGNOSIS — L84 CALLUS OF FOOT: ICD-10-CM

## 2018-03-01 DIAGNOSIS — F33.41 RECURRENT MAJOR DEPRESSIVE DISORDER, IN PARTIAL REMISSION (H): ICD-10-CM

## 2018-03-01 DIAGNOSIS — Z12.11 SPECIAL SCREENING FOR MALIGNANT NEOPLASMS, COLON: ICD-10-CM

## 2018-03-01 DIAGNOSIS — K21.9 GASTROESOPHAGEAL REFLUX DISEASE WITHOUT ESOPHAGITIS: ICD-10-CM

## 2018-03-01 DIAGNOSIS — E78.5 HYPERLIPIDEMIA LDL GOAL <100: ICD-10-CM

## 2018-03-01 LAB
CHOLEST SERPL-MCNC: 161 MG/DL
CREAT UR-MCNC: NORMAL MG/DL
HBA1C MFR BLD: 6.9 % (ref 4.3–6)
HDLC SERPL-MCNC: 65 MG/DL
HGB BLD-MCNC: 13.7 G/DL (ref 11.7–15.7)
LDLC SERPL CALC-MCNC: 72 MG/DL
MICROALBUMIN UR-MCNC: NORMAL MG/L
MICROALBUMIN/CREAT UR: NORMAL MG/G CR (ref 0–25)
NONHDLC SERPL-MCNC: 96 MG/DL
PAIN DRUG SCR UR W RPTD MEDS: NORMAL
TRIGL SERPL-MCNC: 119 MG/DL

## 2018-03-01 PROCEDURE — 36415 COLL VENOUS BLD VENIPUNCTURE: CPT | Performed by: INTERNAL MEDICINE

## 2018-03-01 PROCEDURE — 99215 OFFICE O/P EST HI 40 MIN: CPT | Performed by: INTERNAL MEDICINE

## 2018-03-01 PROCEDURE — 80061 LIPID PANEL: CPT | Performed by: INTERNAL MEDICINE

## 2018-03-01 PROCEDURE — 99207 C FOOT EXAM  NO CHARGE: CPT | Performed by: INTERNAL MEDICINE

## 2018-03-01 PROCEDURE — 85018 HEMOGLOBIN: CPT | Performed by: INTERNAL MEDICINE

## 2018-03-01 PROCEDURE — 83036 HEMOGLOBIN GLYCOSYLATED A1C: CPT | Performed by: INTERNAL MEDICINE

## 2018-03-01 RX ORDER — METOPROLOL SUCCINATE 25 MG/1
25 TABLET, EXTENDED RELEASE ORAL DAILY
Qty: 30 TABLET | Refills: 0 | Status: SHIPPED | OUTPATIENT
Start: 2018-03-01 | End: 2018-03-27

## 2018-03-01 RX ORDER — DULOXETIN HYDROCHLORIDE 60 MG/1
60 CAPSULE, DELAYED RELEASE ORAL DAILY
Qty: 90 CAPSULE | Refills: 1 | Status: SHIPPED | OUTPATIENT
Start: 2018-03-01 | End: 2018-09-04

## 2018-03-01 RX ORDER — PRAVASTATIN SODIUM 10 MG
10 TABLET ORAL DAILY
Qty: 90 TABLET | Refills: 3 | Status: SHIPPED | OUTPATIENT
Start: 2018-03-01 | End: 2019-03-26

## 2018-03-01 RX ORDER — METHYLPHENIDATE HYDROCHLORIDE EXTENDED RELEASE 20 MG/1
20 TABLET ORAL EVERY MORNING
Qty: 30 TABLET | Refills: 0 | Status: SHIPPED | OUTPATIENT
Start: 2018-03-07 | End: 2018-04-22

## 2018-03-01 ASSESSMENT — PATIENT HEALTH QUESTIONNAIRE - PHQ9: 5. POOR APPETITE OR OVEREATING: NOT AT ALL

## 2018-03-01 ASSESSMENT — ANXIETY QUESTIONNAIRES
3. WORRYING TOO MUCH ABOUT DIFFERENT THINGS: NOT AT ALL
1. FEELING NERVOUS, ANXIOUS, OR ON EDGE: NOT AT ALL
5. BEING SO RESTLESS THAT IT IS HARD TO SIT STILL: NOT AT ALL
GAD7 TOTAL SCORE: 1
7. FEELING AFRAID AS IF SOMETHING AWFUL MIGHT HAPPEN: NOT AT ALL
2. NOT BEING ABLE TO STOP OR CONTROL WORRYING: NOT AT ALL
6. BECOMING EASILY ANNOYED OR IRRITABLE: SEVERAL DAYS

## 2018-03-01 NOTE — NURSING NOTE
"Chief Complaint   Patient presents with     Recheck Medication       Initial Pulse 72  Ht 5' 2.75\" (1.594 m)  Wt 222 lb (100.7 kg)  SpO2 95%  BMI 39.64 kg/m2 Estimated body mass index is 39.64 kg/(m^2) as calculated from the following:    Height as of this encounter: 5' 2.75\" (1.594 m).    Weight as of this encounter: 222 lb (100.7 kg).  Medication Reconciliation: complete    "

## 2018-03-01 NOTE — LETTER
My Depression Action Plan  Name: Karen Donis   Date of Birth 1945  Date: 3/1/2018    My doctor: Lakia Hunter   My clinic: [address] [address] [phone]          GREEN    ZONE   Good Control    What it looks like:     Things are going generally well. You have normal up s and down s. You may even feel depressed from time to time, but bad moods usually last less than a day.   What you need to do:  1. Continue to care for yourself (see self care plan)  2. Check your depression survival kit and update it as needed  3. Follow your physician s recommendations including any medication.  4. Do not stop taking medication unless you consult with your physician first.           YELLOW         ZONE Getting Worse    What it looks like:     Depression is starting to interfere with your life.     It may be hard to get out of bed; you may be starting to isolate yourself from others.    Symptoms of depression are starting to last most all day and this has happened for several days.     You may have suicidal thoughts but they are not constant.   What you need to do:     1. Call your care team, your response to treatment will improve if you keep your care team informed of your progress. Yellow periods are signs an adjustment may need to be made.     2. Continue your self-care, even if you have to fake it!    3. Talk to someone in your support network    4. Open up your depression survival kit           RED    ZONE Medical Alert - Get Help    What it looks like:     Depression is seriously interfering with your life.     You may experience these or other symptoms: You can t get out of bed most days, can t work or engage in other necessary activities, you have trouble taking care of basic hygiene, or basic responsibilities, thoughts of suicide or death that will not go away, self-injurious behavior.     What you need to do:  1. Call your care team and request  a same-day appointment. If they are not available (weekends or after hours) call your local crisis line, emergency room or 911.      Electronically signed by: Chasity Hoover, March 1, 2018    Depression Self Care Plan / Survival Kit    Self-Care for Depression  Here s the deal. Your body and mind are really not as separate as most people think.  What you do and think affects how you feel and how you feel influences what you do and think. This means if you do things that people who feel good do, it will help you feel better.  Sometimes this is all it takes.  There is also a place for medication and therapy depending on how severe your depression is, so be sure to consult with your medical provider and/ or Behavioral Health Consultant if your symptoms are worsening or not improving.     In order to better manage my stress, I will:    Exercise  Get some form of exercise, every day. This will help reduce pain and release endorphins, the  feel good  chemicals in your brain. This is almost as good as taking antidepressants!  This is not the same as joining a gym and then never going! (they count on that by the way ) It can be as simple as just going for a walk or doing some gardening, anything that will get you moving.      Hygiene   Maintain good hygiene (Get out of bed in the morning, Make your bed, Brush your teeth, Take a shower, and Get dressed like you were going to work, even if you are unemployed).  If your clothes don't fit try to get ones that do.    Diet  I will strive to eat foods that are good for me, drink plenty of water, and avoid excessive sugar, caffeine, alcohol, and other mood-altering substances.  Some foods that are helpful in depression are: complex carbohydrates, B vitamins, flaxseed, fish or fish oil, fresh fruits and vegetables.    Psychotherapy  I agree to participate in Individual Therapy (if recommended).    Medication  If prescribed medications, I agree to take them.  Missing doses can  result in serious side effects.  I understand that drinking alcohol, or other illicit drug use, may cause potential side effects.  I will not stop my medication abruptly without first discussing it with my provider.    Staying Connected With Others  I will stay in touch with my friends, family members, and my primary care provider/team.    Use your imagination  Be creative.  We all have a creative side; it doesn t matter if it s oil painting, sand castles, or mud pies! This will also kick up the endorphins.    Witness Beauty  (AKA stop and smell the roses) Take a look outside, even in mid-winter. Notice colors, textures. Watch the squirrels and birds.     Service to others  Be of service to others.  There is always someone else in need.  By helping others we can  get out of ourselves  and remember the really important things.  This also provides opportunities for practicing all the other parts of the program.    Humor  Laugh and be silly!  Adjust your TV habits for less news and crime-drama and more comedy.    Control your stress  Try breathing deep, massage therapy, biofeedback, and meditation. Find time to relax each day.     My support system    Clinic Contact:  Phone number:    Contact 1:  Phone number:    Contact 2:  Phone number:    Jew/:  Phone number:    Therapist:  Phone number:    Local crisis center:    Phone number:    Other community support:  Phone number:

## 2018-03-01 NOTE — TELEPHONE ENCOUNTER
Please call to notify the patient of the below.     Notes Recorded by Lakia Hunter MD PhD on 3/1/2018 at 1:36 PM  Please call patient. She needs to do the urine drug screen for future refills of methophenidate. She can get it done at any [facility] today.    Notes Recorded by Lakia Hunter MD PhD on 3/1/2018 at 1:37 PM  Please send normal result letter.     Dear Karen,    The results of your recent tests were normal. Enclosed is a copy of your test results.      If you have additional question, please call or make a follow-up appointment.    Lakia Hunter MD-PhD    Kelley Gill RN   [facility], Primary Care

## 2018-03-01 NOTE — PATIENT INSTRUCTIONS
Make appointment(s) for:   -- get labs today.   -- nurse visit for BP check in 2-3 weeks.   -- wellness visit with non fasting lab in 6 months.   -- colonoscopy sometimes this year.         Medication(s) prescribed today:    Orders Placed This Encounter   Medications     DULoxetine (CYMBALTA) 60 MG EC capsule     Sig: Take 1 capsule (60 mg) by mouth daily     Dispense:  90 capsule     Refill:  1     pravastatin (PRAVACHOL) 10 MG tablet     Sig: Take 1 tablet (10 mg) by mouth daily     Dispense:  90 tablet     Refill:  3     methylphenidate (METADATE ER) 20 MG CR tablet     Sig: Take 1 tablet (20 mg) by mouth every morning     Dispense:  30 tablet     Refill:  0     blood glucose monitoring (NO BRAND SPECIFIED) meter device kit     Sig: Use to test blood sugar daily or as directed.     Dispense:  1 kit     Refill:  0     omeprazole (PRILOSEC) 20 MG CR capsule     Sig: Take 1 capsule (20 mg) by mouth daily     Dispense:  90 capsule     Refill:  3     metoprolol succinate (TOPROL-XL) 25 MG 24 hr tablet     Sig: Take 1 tablet (25 mg) by mouth daily     Dispense:  30 tablet     Refill:  0

## 2018-03-01 NOTE — MR AVS SNAPSHOT
After Visit Summary   3/1/2018    Karen Donis    MRN: [id number]           Patient Information     Date Of Birth          1945        Visit Information        Provider Department      3/1/2018 11:50 AM Lakia Hunter MD PhD [facility]        Today's Diagnoses     Controlled type 2 diabetes mellitus without complication, without long-term current use of insulin (H)    -  1    Hyperlipidemia LDL goal <100        Attention deficit disorder (ADD) without hyperactivity        Encounter for therapeutic drug monitoring        Special screening for malignant neoplasms, colon        Malignant neoplasm of ascending colon (H)        Recurrent major depressive disorder, in partial remission (H)        Fibromyalgia        Dermatophytosis of nail        Callus of foot        Gastroesophageal reflux disease without esophagitis        Hypertension goal BP (blood pressure) < 140/90          Care Instructions    Make appointment(s) for:   -- get labs today.   -- nurse visit for BP check in 2-3 weeks.   -- wellness visit with non fasting lab.   -- colonoscopy sometimes this year.         Medication(s) prescribed today:    Orders Placed This Encounter   Medications     DULoxetine (CYMBALTA) 60 MG EC capsule     Sig: Take 1 capsule (60 mg) by mouth daily     Dispense:  90 capsule     Refill:  1     pravastatin (PRAVACHOL) 10 MG tablet     Sig: Take 1 tablet (10 mg) by mouth daily     Dispense:  90 tablet     Refill:  3     methylphenidate (METADATE ER) 20 MG CR tablet     Sig: Take 1 tablet (20 mg) by mouth every morning     Dispense:  30 tablet     Refill:  0     blood glucose monitoring (NO BRAND SPECIFIED) meter device kit     Sig: Use to test blood sugar daily or as directed.     Dispense:  1 kit     Refill:  0     omeprazole (PRILOSEC) 20 MG CR capsule     Sig: Take 1 capsule (20 mg) by mouth daily     Dispense:  90 capsule     Refill:  3     metoprolol succinate (TOPROL-XL) 25 MG 24 hr tablet      Sig: Take 1 tablet (25 mg) by mouth daily     Dispense:  30 tablet     Refill:  0                   Follow-ups after your visit        Additional Services     GASTROENTEROLOGY ADULT REF PROCEDURE ONLY       Last Lab Result: Creatinine (mg/dL)       Date                     Value                 06/19/2017               1.01             ----------  Body mass index is 39.64 kg/(m^2).     Needed:  No  Language:  English    Patient will be contacted to schedule procedure.     Please be aware that coverage of these services is subject to the terms and limitations of your health insurance plan.  Call member services at your health plan with any benefit or coverage questions.  Any procedures must be performed at a [facility] facility OR coordinated by your clinic's referral office.    Please bring the following with you to your appointment:    (1) Any X-Rays, CTs or MRIs which have been performed.  Contact the facility where they were done to arrange for  prior to your scheduled appointment.    (2) List of current medications   (3) This referral request   (4) Any documents/labs given to you for this referral            PODIATRY/FOOT & ANKLE SURGERY REFERRAL       Your provider has referred you to: [facility]: [place] [facility]: [address] [place] [address] Patient Clinic Line: [phone]     Please be aware that coverage of these services is subject to the terms and limitations of your health insurance plan.  Call member services at your health plan with any benefit or coverage questions.      Please bring the following to your appointment:  >>   Any x-rays, CTs or MRIs which have been performed.  Contact the facility where they were done to arrange for  prior to your scheduled appointment.  Any new CT, MRI or other procedures ordered by your specialist must be performed at a [facility] facility or coordinated by your clinic's referral office.    >>   List of current medications   >>   This  referral request   >>   Any documents/labs given to you for this referral                  Your next 10 appointments already scheduled     May 11, 2018 11:30 AM CDT   LAB with LAB FIRST FLOOR [facility] ([facility]    [address] [place] [id number] [phone]           Please do not eat 10-12 hours before your appointment if you are coming in fasting for labs on lipids, cholesterol, or glucose (sugar). This does not apply to pregnant women. Water, hot tea and black coffee (with nothing added) are okay. Do not drink other fluids, diet soda or chew gum.            May 11, 2018 11:50 AM CDT   PHYSICAL with Lakia Hunter MD PhD   [facility]    [address] [address] [place] [id number] [phone]              Who to contact     If you have questions or need follow up information about today's clinic visit or your schedule please contact [facility] directly at [phone].  Normal or non-critical lab and imaging results will be communicated to you by [employer], letter or phone within 4 business days after the clinic has received the results. If you do not hear from us within 7 days, please contact the clinic through [employer] or phone. If you have a critical or abnormal lab result, we will notify you by phone as soon as possible.  Submit refill requests through [employer] or call your pharmacy and they will forward the refill request to us. Please allow 3 business days for your refill to be completed.          Additional Information About Your Visit        [employer] Information     [employer] is an electronic gateway that provides easy, online access to your medical records. With [employer], you can request a clinic appointment, read your test results, renew a prescription or communicate with your care team.     To sign up for [employer] visit the website at [email or web address]   You will be  "asked to enter the access code listed below, as well as some personal information. Please follow the directions to create your username and password.     Your access code is: [email or web address]  Expires: 2018 12:52 PM     Your access code will  in 90 days. If you need help or a new code, please contact your [facility] or call [phone] for assistance.        Care EveryWhere ID     This is your Care EveryWhere ID. This could be used by other organizations to access your [facility] medical records  [id number]        Your Vitals Were     Pulse Height Pulse Oximetry BMI (Body Mass Index)          72 5' 2.75\" (1.594 m) 95% 39.64 kg/m2         Blood Pressure from Last 3 Encounters:   18 144/80   17 118/76   17 118/72    Weight from Last 3 Encounters:   18 222 lb (100.7 kg)   17 230 lb (104.3 kg)   17 225 lb (102.1 kg)              We Performed the Following     Albumin Random Urine Quantitative with Creat Ratio     C FOOT EXAM  NO CHARGE     DEPRESSION ACTION PLAN (DAP)     Drug  Screen Comprehensive , Urine with Reported Meds (Med[employer]ox) (Pain Care Package)     GASTROENTEROLOGY ADULT REF PROCEDURE ONLY     Hemoglobin     Lipid panel reflex to direct LDL Fasting     PODIATRY/FOOT & ANKLE SURGERY REFERRAL          Today's Medication Changes          These changes are accurate as of 3/1/18 12:52 PM.  If you have any questions, ask your nurse or doctor.               Start taking these medicines.        Dose/Directions    blood glucose monitoring meter device kit   Commonly known as:  no brand specified   Used for:  Controlled type 2 diabetes mellitus without complication, without long-term current use of insulin (H)   Started by:  Lakia Hunter MD PhD        Use to test blood sugar daily or as directed.   Quantity:  1 kit   Refills:  0       metoprolol succinate 25 MG 24 hr tablet   Commonly known as:  TOPROL-XL   Used for:  Hypertension goal BP (blood " pressure) < 140/90   Started by:  Lakia Hunter MD PhD        Dose:  25 mg   Take 1 tablet (25 mg) by mouth daily   Quantity:  30 tablet   Refills:  0         These medicines have changed or have updated prescriptions.        Dose/Directions    DULoxetine 60 MG EC capsule   Commonly known as:  CYMBALTA   This may have changed:    - medication strength  - how much to take  - when to take this   Used for:  Recurrent major depressive disorder, in partial remission (H), Fibromyalgia   Changed by:  Lakia Hunter MD PhD        Dose:  60 mg   Take 1 capsule (60 mg) by mouth daily   Quantity:  90 capsule   Refills:  1            Where to get your medicines      These medications were sent to [id number] - [place], [place] - [address]  [address] [address] [address]     Phone:  [phone]     blood glucose monitoring meter device kit    DULoxetine 60 MG EC capsule    metoprolol succinate 25 MG 24 hr tablet    omeprazole 20 MG CR capsule    pravastatin 10 MG tablet         Some of these will need a paper prescription and others can be bought over the counter.  Ask your nurse if you have questions.     Bring a paper prescription for each of these medications     methylphenidate 20 MG CR tablet                Primary Care Provider Office Phone # Fax #    Lakia Hunter MD PhD [phone]       [address]  [facility] [address]        Equal Access to Services     ASTER STREETER AH: Hadhansa cartero Soomaali, waaxda luqadaha, qaybta kaalmada adeegyada, luiza vanegas. So [facility] [phone].    ATENCIÓN: Si habla español, tiene a ashley disposición servicios gratuitos de asistencia lingüística. Llame al [phone].    We comply with applicable federal civil rights laws and [place] laws. We do not discriminate on the basis of race, color, national origin, age, disability, sex, sexual orientation, or gender identity.            Thank you!     Thank you for choosing [facility]  for your care. Our goal is always to provide you with excellent care. Hearing back from our patients is one way we can continue to improve our services. Please take a few minutes to complete the written survey that you may receive in the mail after your visit with us. Thank you!             Your Updated Medication List - Protect others around you: Learn how to safely use, store and throw away your medicines at www.disposemymeds.org.          This list is accurate as of 3/1/18 12:52 PM.  Always use your most recent med list.                   Brand Name Dispense Instructions for use Diagnosis    aspirin 81 MG tablet      1 TABLET DAILY        BEANO PO      Take by mouth as needed Reported on 5/3/2017        biotin 1000 MCG Tabs tablet      Take 1,000 mcg by mouth daily        blood glucose lancing device     1 each    Device to be used with lancets.    Diet-controlled diabetes mellitus (H)       * blood glucose monitoring lancets     1 Box    Use to test blood sugar 1 times daily or as directed.  Ok to substitute alternative if insurance prefers.    Diet-controlled diabetes mellitus (H)       * blood glucose monitoring lancets     1 Box    Use to test blood sugar 1daily or as directed.    Diet-controlled diabetes mellitus (H)       blood glucose monitoring meter device kit    no brand specified    1 kit    Use to test blood sugar daily or as directed.    Controlled type 2 diabetes mellitus without complication, without long-term current use of insulin (H)       * blood glucose monitoring test strip    no brand specified    100 each    Use to test blood sugars daily or as directed. Accu-check smartview strips    Diet-controlled diabetes mellitus (H)       * blood glucose monitoring test strip    ACCU-CHEK SMARTVIEW    100 each    Use to test blood sugar 1 times daily or as directed.  Ok to substitute alternative if insurance prefers.    Diet-controlled diabetes mellitus (H)       CALCIUM  1200 PO      Take by mouth daily        cholecalciferol 1000 UNITS capsule    vitamin  -D    180 capsule    Take 2 capsules (2,000 Units) by mouth daily    Vitamin D deficiency       CO Q 10 PO      Take 1 capsule by mouth daily.        Cranberry 300 MG Tabs      Take by mouth daily        DULoxetine 60 MG EC capsule    CYMBALTA    90 capsule    Take 1 capsule (60 mg) by mouth daily    Recurrent major depressive disorder, in partial remission (H), Fibromyalgia       econazole nitrate 1 % cream     30 g    Apply topically daily    Tinea pedis       flax seed oil 1000 MG capsule      Take 1 capsule by mouth daily        losartan-hydrochlorothiazide 100-25 MG per tablet    HYZAAR    90 tablet    TAKE 1 TABLET BY MOUTH EVERY DAY    Essential hypertension with goal blood pressure less than 140/90       MAGNESIUM PO      1 capsule daily        meclizine 25 MG tablet    ANTIVERT    30 tablet    Take 1 tablet (25 mg) by mouth every 6 hours as needed for dizziness    BPPV (benign paroxysmal positional vertigo), right       metFORMIN 500 MG 24 hr tablet    GLUCOPHAGE-XR    30 tablet    Take 1 tablet (500 mg) by mouth daily (with dinner)    Type 2 diabetes mellitus without complication, without long-term current use of insulin (H)       methylphenidate 20 MG CR tablet   Start taking on:  3/7/2018    METADATE ER    30 tablet    Take 1 tablet (20 mg) by mouth every morning    Attention deficit disorder (ADD) without hyperactivity       metoprolol succinate 25 MG 24 hr tablet    TOPROL-XL    30 tablet    Take 1 tablet (25 mg) by mouth daily    Hypertension goal BP (blood pressure) < 140/90       omeprazole 20 MG CR capsule    priLOSEC    90 capsule    Take 1 capsule (20 mg) by mouth daily    Gastroesophageal reflux disease without esophagitis       pravastatin 10 MG tablet    PRAVACHOL    90 tablet    Take 1 tablet (10 mg) by mouth daily    Hyperlipidemia LDL goal <100       UNABLE TO FIND      Reported on 5/3/2017        VITAMIN  B COMPLEX-C Caps      1 capsule PO qd        vitamin E 400 UNIT capsule      Take by mouth daily        * Notice:  This list has 4 medication(s) that are the same as other medications prescribed for you. Read the directions carefully, and ask your doctor or other care provider to review them with you.

## 2018-03-01 NOTE — PROGRESS NOTES
Please send normal result letter.         Dear Karen,    The results of your recent tests were normal. Enclosed is a copy of your test results.      If you have additional question, please call or make a follow-up appointment.    Lakia Hunter MD-PhD

## 2018-03-01 NOTE — PROGRESS NOTES
SUBJECTIVE:   Karen Donis is a 73 year old female who presents to clinic today for the following health issues:      Diabetes Follow-up      Patient is checking blood sugars: Downloaded    Diabetic concerns: None     Symptoms of hypoglycemia (low blood sugar): none     Paresthesias (numbness or burning in feet) or sores: Yes Stinging     Date of last diabetic eye exam: UTD    BP Readings from Last 2 Encounters:   03/01/18 144/80   09/22/17 118/76     Hemoglobin A1C (%)   Date Value   03/01/2018 6.9 (H)   09/20/2017 7.2 (H)     LDL Cholesterol Calculated (mg/dL)   Date Value   03/01/2018 72   12/07/2016 84     Hypertension Follow-up      Outpatient blood pressures are being checked at home.  Results are 134/83, 158/88.    Low Salt Diet: no added salt      Amount of exercise or physical activity: None    Problems taking medications regularly: No    Medication side effects: none    Diet: low salt and carbohydrate counting    Acid reflux: Patient stopped taking omeprazole after hearing about potential side effects of PPIs on kidney function.  Since then she has had more reflux symptoms.    ADHD: She is currently taking methylphenidate.  She has noted intermittent increased heart rates.  She is on metoprolol.    Fibromyalgia and depression.  She is on duloxetine 30 mg twice a day.  Her depression is adequately controlled.  PHQ 9 score is 9, RAGHAVENDRA 7 score is 8 today.  Patient reports that she does not remember taking duloxetine twice a day.  On average she remembers the second dose about 2 days a week.     She has history of facial rash, refer to see dermatology.  Diagnosed with neurodermatitis, advised her to stop picking.  Patient was disappointed with that visit.    Reports that the right foot sometimes is painful at the callus.  She has had more trouble trimming her nails.  When she tries to bend over to trim her nails, her back hurts.  .    ROS:  Constitutional, HEENT, cardiovascular, pulmonary, gi and gu systems  are negative, except as otherwise noted.         Current Outpatient Prescriptions on File Prior to Visit:  metFORMIN (GLUCOPHAGE-XR) 500 MG 24 hr tablet Take 1 tablet (500 mg) by mouth daily (with dinner)   losartan-hydrochlorothiazide (HYZAAR) 100-25 MG per tablet TAKE 1 TABLET BY MOUTH EVERY DAY   econazole nitrate 1 % cream Apply topically daily   cholecalciferol (VITAMIN  -D) 1000 UNITS capsule Take 2 capsules (2,000 Units) by mouth daily   Coenzyme Q10 (CO Q 10 PO) Take 1 capsule by mouth daily.   ASPIRIN 81 MG OR TABS 1 TABLET DAILY   meclizine (ANTIVERT) 25 MG tablet Take 1 tablet (25 mg) by mouth every 6 hours as needed for dizziness   blood glucose monitoring (ACCU-CHEK SMARTVIEW) test strip Use to test blood sugar 1 times daily or as directed.  Ok to substitute alternative if insurance prefers.   blood glucose monitoring (ACCU-CHEK FASTCLIX) lancets Use to test blood sugar 1 times daily or as directed.  Ok to substitute alternative if insurance prefers.   blood glucose (ACCU-CHEK FASTCLIX) lancing device Device to be used with lancets.   blood glucose monitoring (ACCU-CHEK FASTCLIX) lancets Use to test blood sugar 1daily or as directed.   blood glucose monitoring (NO BRAND SPECIFIED) test strip Use to test blood sugars daily or as directed. Accu-check smartview strips   Calcium Carbonate-Vit D-Min (CALCIUM 1200 PO) Take by mouth daily    Flaxseed, Linseed, (FLAX SEED OIL) 1000 MG capsule Take 1 capsule by mouth daily   UNABLE TO FIND Reported on 5/3/2017   vitamin E 400 UNIT capsule Take by mouth daily   Cranberry 300 MG TABS Take by mouth daily    biotin 1000 MCG TABS tablet Take 1,000 mcg by mouth daily   Alpha-D-Galactosidase (BEANO PO) Take by mouth as needed Reported on 5/3/2017   MAGNESIUM OR 1 capsule daily   VITAMIN B COMPLEX-C OR CAPS 1 capsule PO qd     No current facility-administered medications on file prior to visit.        Patient Active Problem List   Diagnosis     Seborrheic dermatitis      Alopecia     Xerosis cutis     Vitamin D deficiency     Nodules, thyroid     Postmenopausal     Colon cancer     CARDIOVASCULAR SCREENING; LDL GOAL LESS THAN 160     Impingement syndrome of right shoulder     AC (acromioclavicular) joint arthritis     Anxiety state     Attention deficit disorder     ACP (advance care planning)     Gastroesophageal reflux disease without esophagitis     Diet-controlled diabetes mellitus (H)     Hyperlipidemia LDL goal <100     Essential hypertension with goal blood pressure less than 140/90     Fatty liver     Major depression in complete remission (H)     Chronic bilateral low back pain with bilateral sciatica     Morbid obesity due to excess calories (H)     BPPV (benign paroxysmal positional vertigo), right     Past Surgical History:   Procedure Laterality Date     COLON SURGERY  2003     THYROIDECTOMY  12/6/2011    Procedure:THYROIDECTOMY; Right Thyroid Lobectomy and bilateral removal of skin tags; Surgeon:SAJI ZAZUETA; Location:[facility] OR     TONSILLECTOMY      While she was in 6th grade       Social History   Substance Use Topics     Smoking status: Former Smoker     Quit date: 12/12/1982     Smokeless tobacco: Never Used     Alcohol use No     Family History   Problem Relation Age of Onset     DIABETES Mother      Hypertension Mother      Thyroid Disease Mother      HEART DISEASE Father      Cardiovascular Father      Neurologic Disorder Father      Parkinson's     Arthritis Father      Fibromyalgia     GASTROINTESTINAL DISEASE Maternal Grandmother      gallbladder removed     HEART DISEASE Maternal Grandfather      DIABETES Brother      Hypertension Brother      Neurologic Disorder Brother      ADHD     Unknown/Adopted Son      Unknown/Adopted Daughter      Neurologic Disorder Brother      ADHD             Problem list, Medication list, Allergies, and Medical/Social/Surgical histories reviewed in [facility] and updated as appropriate.    OBJECTIVE:                                 "                    /80  Pulse 72  Ht 5' 2.75\" (1.594 m)  Wt 222 lb (100.7 kg)  SpO2 95%  BMI 39.64 kg/m2    GENERAL: healthy, alert and no distress  HEENT: unremarkable  Neck: no adenopathy/mass/stiffness. Thyroid normal.  Lung: clear, no wheezing/rhonchi/crackles  Heart: RRR, normal S1/2, no murmur/gallop/rup  Abd: soft, normal BS, non tender, no organomegaly   Ext: no cyanosis/clubbing/edema   Foot exam: normal sensation, DP 2+, filament test normal.   she has a big callus at the bottom of the right first metatarsal, also multiple thickened nails on both feet.      Diagnostic test results:  Orders Only on 03/01/2018   Component Date Value Ref Range Status     Hemoglobin A1C 03/01/2018 6.9* 4.3 - 6.0 % Final            ASSESSMENT/PLAN:                                                      73 year old female with the following diagnoses and treatment plan:      ICD-10-CM    1. Controlled type 2 diabetes mellitus without complication, without long-term current use of insulin (H) E11.9 Hemoglobin     Albumin Random Urine Quantitative with Creat Ratio     PODIATRY/FOOT & ANKLE SURGERY REFERRAL     C FOOT EXAM  NO CHARGE     blood glucose monitoring (NO BRAND SPECIFIED) meter device kit   2. Hyperlipidemia LDL goal <100 E78.5 Lipid panel reflex to direct LDL Fasting     pravastatin (PRAVACHOL) 10 MG tablet   3. Attention deficit disorder (ADD) without hyperactivity F98.8 Drug  Screen Comprehensive , Urine with Reported Meds (MedTox) (Pain Care Package)     methylphenidate (METADATE ER) 20 MG CR tablet   4. Encounter for therapeutic drug monitoring Z51.81 Drug  Screen Comprehensive , Urine with Reported Meds (MedTox) (Pain Care Package)   5. Special screening for malignant neoplasms, colon Z12.11 GASTROENTEROLOGY ADULT REF PROCEDURE ONLY   6. Malignant neoplasm of ascending colon (H) C18.2 GASTROENTEROLOGY ADULT REF PROCEDURE ONLY   7. Recurrent major depressive disorder, in partial remission (H) F33.41 " DULoxetine (CYMBALTA) 60 MG EC capsule   8. Fibromyalgia M79.7 DULoxetine (CYMBALTA) 60 MG EC capsule   9. Dermatophytosis of nail B35.1 PODIATRY/FOOT & ANKLE SURGERY REFERRAL     C FOOT EXAM  NO CHARGE   10. Callus of foot L84 PODIATRY/FOOT & ANKLE SURGERY REFERRAL     C FOOT EXAM  NO CHARGE   11. Gastroesophageal reflux disease without esophagitis K21.9 omeprazole (PRILOSEC) 20 MG CR capsule   12. Hypertension goal BP (blood pressure) < 140/90 I10 metoprolol succinate (TOPROL-XL) 25 MG 24 hr tablet       --Diabetes is well controlled with metformin.  No change go to  -- Depression and fibromyalgia: She has not been consistent in taking duloxetine twice a day due to ADHD.  We will have her take 60 mg once a day  --Acid reflux: It is okay to continue take omeprazole on a regular basis.    -- hypertension: Not well controlled.  Increase metoprolol to 25 mg daily.  --Patient is due for colon cancer screening this year.  Order placed.    --Drug monitoring: Being on controlled medication methylphenidate.  Patient will get urine drug screen test today.  --Nurse visit for blood pressure recheck in 2-3 weeks, see me back in 6 months for wellness visit and nonfasting lab (A1c)    Will call or return to clinic if worsening or symptoms not improving as discussed.  See Patient Instructions.      Lakia Hunter MD-PhD  [facility]    (Note: Chart documentation was done in part with Dragon Voice Recognition software. Although reviewed after completion, some word and grammatical errors may remain.)

## 2018-03-03 ASSESSMENT — PATIENT HEALTH QUESTIONNAIRE - PHQ9: SUM OF ALL RESPONSES TO PHQ QUESTIONS 1-9: 13

## 2018-03-03 ASSESSMENT — ANXIETY QUESTIONNAIRES: GAD7 TOTAL SCORE: 1

## 2018-03-06 ENCOUNTER — DOCUMENTATION ONLY (OUTPATIENT)
Dept: LAB | Facility: CLINIC | Age: 73
End: 2018-03-06

## 2018-03-06 DIAGNOSIS — Z51.81 THERAPEUTIC DRUG MONITORING: Primary | ICD-10-CM

## 2018-03-06 NOTE — PROGRESS NOTES
This patient has a lab only appointment on 3/7/2018 but does not have future orders. Telephone chart notes state she is supposed to have a urine drug screen. Please place orders for this test. If not needed, please have your care team contact the patient and have her cancel this appointment.    Thank you,    Gómez Erazo

## 2018-03-07 DIAGNOSIS — Z51.81 THERAPEUTIC DRUG MONITORING: ICD-10-CM

## 2018-03-07 PROCEDURE — 80307 DRUG TEST PRSMV CHEM ANLYZR: CPT | Mod: 90 | Performed by: INTERNAL MEDICINE

## 2018-03-07 PROCEDURE — 99000 SPECIMEN HANDLING OFFICE-LAB: CPT | Performed by: INTERNAL MEDICINE

## 2018-03-09 NOTE — PROGRESS NOTES
Dear Karen,   Here are your recent results.   -- A1c improved. No change in diabetes medication.     Please call or Mychart to our office if you have further questions.     Lakia Hunter MD-PhD

## 2018-03-10 LAB — PAIN DRUG SCR UR W RPTD MEDS: NORMAL

## 2018-03-27 DIAGNOSIS — I10 HYPERTENSION GOAL BP (BLOOD PRESSURE) < 140/90: ICD-10-CM

## 2018-03-27 RX ORDER — METOPROLOL SUCCINATE 25 MG/1
TABLET, EXTENDED RELEASE ORAL
Qty: 90 TABLET | Refills: 0 | Status: SHIPPED | OUTPATIENT
Start: 2018-03-27 | End: 2018-06-08

## 2018-03-27 NOTE — TELEPHONE ENCOUNTER
metoprolol succinate (TOPROL-XL) 25 MG 24 hr tablet 30 tablet 0 3/1/2018  --   Sig: Take 1 tablet (25 mg) by mouth daily     Last OV with Dr. Hunter: 3/1/2018    Next 5 appointments (look out 90 days)     May 11, 2018 11:50 AM CDT   PHYSICAL with Lakia Hunter MD PhD   [facility] ([facility])    [address] [place] [id number] [phone]                BP Readings from Last 3 Encounters:   03/01/18 144/80   09/22/17 118/76   09/20/17 118/72     Beta-Blockers Protocol Failed3/27 7:01 AM   Blood pressure under 140/90 in past 12 months    Patient is age 6 or older    Recent (12 mo) or future (30 days) visit within the authorizing provider's specialty     Refilled per [facility] protocol.    Jane Choe RN

## 2018-04-22 DIAGNOSIS — F98.8 ATTENTION DEFICIT DISORDER (ADD) WITHOUT HYPERACTIVITY: ICD-10-CM

## 2018-04-23 RX ORDER — METHYLPHENIDATE HYDROCHLORIDE EXTENDED RELEASE 20 MG/1
20 TABLET ORAL EVERY MORNING
Qty: 30 TABLET | Refills: 0 | Status: SHIPPED | OUTPATIENT
Start: 2018-04-23 | End: 2018-07-31

## 2018-05-05 DIAGNOSIS — I10 ESSENTIAL HYPERTENSION WITH GOAL BLOOD PRESSURE LESS THAN 140/90: ICD-10-CM

## 2018-05-07 RX ORDER — LOSARTAN POTASSIUM AND HYDROCHLOROTHIAZIDE 25; 100 MG/1; MG/1
TABLET ORAL
Qty: 90 TABLET | Refills: 0 | Status: SHIPPED | OUTPATIENT
Start: 2018-05-07 | End: 2018-06-08

## 2018-05-07 NOTE — TELEPHONE ENCOUNTER
losartan-hydrochlorothiazide (HYZAAR) 100-25 MG per tablet 90 tablet 1 10/13/2017  No   Sig: TAKE 1 TABLET BY MOUTH EVERY DAY     Last OV with Dr. Hunter: 3/1/2018    Next 5 appointments (look out 90 days)     May 11, 2018 11:50 AM CDT   [facility] MD [facility]    [address] [place] [id number] [phone]                BP Readings from Last 3 Encounters:   03/01/18 144/80   09/22/17 118/76   09/20/17 118/72     Creatinine   Date Value Ref Range Status   06/19/2017 1.01 0.52 - 1.04 mg/dL Final   ]  Potassium   Date Value Ref Range Status   06/19/2017 4.1 3.4 - 5.3 mmol/L Final   ]    Angiotensin-II Receptors Failed5/5 2:35 PM   Blood pressure under 140/90 in past 12 months    Recent (12 mo) or future (30 days) visit within the authorizing provider's specialty    Patient is age 18 or older    No active pregnancy on record    Normal serum creatinine on file in past 12 months    Normal serum potassium on file in past 12 months    No positive pregnancy test in past 12 months     Refilled per [facility] protocol.    Jane Choe RN

## 2018-05-09 ENCOUNTER — DOCUMENTATION ONLY (OUTPATIENT)
Dept: LAB | Facility: CLINIC | Age: 73
End: 2018-05-09

## 2018-05-09 DIAGNOSIS — Z13.6 CARDIOVASCULAR SCREENING; LDL GOAL LESS THAN 160: Primary | ICD-10-CM

## 2018-05-21 ENCOUNTER — TELEPHONE (OUTPATIENT)
Dept: PEDIATRICS | Facility: CLINIC | Age: 73
End: 2018-05-21

## 2018-05-21 DIAGNOSIS — F98.8 ATTENTION DEFICIT DISORDER (ADD) WITHOUT HYPERACTIVITY: Primary | ICD-10-CM

## 2018-05-21 RX ORDER — METHYLPHENIDATE HYDROCHLORIDE 20 MG/1
1 CAPSULE, EXTENDED RELEASE ORAL DAILY
Qty: 30 CAPSULE | Refills: 0 | Status: SHIPPED | OUTPATIENT
Start: 2018-05-23 | End: 2018-06-08

## 2018-05-21 NOTE — TELEPHONE ENCOUNTER
[facility] Call Center    Phone Message: Karen  Phone: [phone]    May a detailed message be left on voicemail: yes    Reason for Call: Karen called and said the  is no longer making methylphenidate (METADATE ER) 20 MG CR tablet.  She is requesting a call to discuss alternatives.  Please advise.  Thank you.     Action Taken: [id number]

## 2018-05-21 NOTE — TELEPHONE ENCOUNTER
Routing to Dr. Hunter.,    Antonette Main RN,   [facility]

## 2018-05-22 NOTE — TELEPHONE ENCOUNTER
Rx placed on Dr. Lakia Hunter's desk to sign upon return to clinic on 5/23/18.  Jocelyn Charles, CMA

## 2018-05-24 ENCOUNTER — TELEPHONE (OUTPATIENT)
Dept: PEDIATRICS | Facility: CLINIC | Age: 73
End: 2018-05-24

## 2018-05-24 NOTE — TELEPHONE ENCOUNTER
methylphenidate (METADATE ER) 20 MG CR tablet not covered by insurance so Methylphenidate HCl ER, XR, 20 MG CP24 was prescribed as an alternative. Medication is also not covered by insurance. Pharmacy requesting another change of medication or a Prior Authorization. Per call from pharmacy staffLucy, patient is almost out of medication.  Maria Luisa Barkley, CMA

## 2018-05-24 NOTE — TELEPHONE ENCOUNTER
Prior Authorization Retail Medication Request    Medication/Dose: metadate cd 20mg capsule  ICD code (if different than what is on RX):  F98.8  Previously Tried and Failed:  n/a  Rationale:  n/a    Insurance Name:  [employer] Part D  Insurance ID:  [id number]  Insurance Phone # [phone]    Pharmacy Information (if different than what is on RX)  Name:  [facility]  Phone:  [phone]

## 2018-05-25 ENCOUNTER — TELEPHONE (OUTPATIENT)
Dept: PEDIATRICS | Facility: CLINIC | Age: 73
End: 2018-05-25

## 2018-05-25 NOTE — TELEPHONE ENCOUNTER
Called onsite pharmacy and spoke with Lucy. Lucy states that she spoke with Dr. Hunter over the phone last evening. The only medication that Lucy was able to get patient's insurance to approve was Ritalin SR (generic) but would still cost the patient $84. Per Lucy, patient was in the clinic last evening and picked up the paper script for Methylphenidate HCl ER, XR, 20 MG CP24. She was going to contact her insurance regarding coverage and try to fill script over the weekend at a different pharmacy as onsite pharmacy will not be open.     Lucy did note that patient stated to her that she does not want to change medications and would like a Prior Authorization initiated for Methylphenidate HCl ER, XR, 20 MG CP24. Routing message to Primary Care provider pool as PCP is now out of clinic until 05/31.  Maria Luisa Barkley, [facility]

## 2018-05-25 NOTE — TELEPHONE ENCOUNTER
Patient states its the [facility] in [place].      Methylphenidate HCl ER, XR, 20 MG CP24 30 capsule 0 5/23/2018        Sig - Route: Take 1 capsule by mouth daily - Oral      Class: Local Print          Cannot get the brand name metadate tabs any longer but can get the methylphenidate HCI ER, XR .  Got PA approval for the medication above.  Called pharmacist, he needs to know if the capsules will be CD or LA? Or do we want tablets 20mg of above medication?    Routed to covering provider for Dr. Hunter.    Antonette Main RN,   [facility]

## 2018-05-25 NOTE — TELEPHONE ENCOUNTER
Pharmacist informed its the LA.  He ran the med through insurance and the LA and the CD are not covered by insurance.  The 20mg TABLETS are covered for 2$.  He states that he can do a one time approval to dispense meds without a hard copy since the clinic closes at 5pm.    Dr. Davidson left for the day.  Asked Tisha Carrillo for approval for the methlyphenidate 20mg HCL ER tablets.  Verbal order given by Tisha Carrillo NP.    Gave verbal order to  Jose Daniel pharmacist.    Called patient to inform her I called in the script and pharmacy will be calling her to inform her when its ready to .    Antonette Main RN,   [facility]

## 2018-05-25 NOTE — TELEPHONE ENCOUNTER
Routing message to Prior Authorization department to initiate PA for Methylphenidate HCl ER, XR, 20 MG CP24.  Maria Luisa Barkley, CMA

## 2018-05-25 NOTE — TELEPHONE ENCOUNTER
[facility] Call Center    Phone Message    May a detailed message be left on voicemail: yes    Reason for Call: Other: Pharmacy needs clarification on Methylphenidate HCl ER, XR, 20 MG CP24 [[id number]] (Order [id number]) . Please call them ASAP     Action Taken: Message routed to:  Primary Care p [phone]

## 2018-05-25 NOTE — TELEPHONE ENCOUNTER
Central Prior Authorization Team   Phone: [phone]      Left message for patient to call back. Per insurance, PA is not needed. Need to know where she is filling the script to have the pharmacy reprocess.

## 2018-06-08 ENCOUNTER — OFFICE VISIT (OUTPATIENT)
Dept: PEDIATRICS | Facility: CLINIC | Age: 73
End: 2018-06-08
Payer: COMMERCIAL

## 2018-06-08 VITALS
BODY MASS INDEX: 39.94 KG/M2 | SYSTOLIC BLOOD PRESSURE: 122 MMHG | HEART RATE: 67 BPM | TEMPERATURE: 97.8 F | DIASTOLIC BLOOD PRESSURE: 78 MMHG | OXYGEN SATURATION: 96 % | HEIGHT: 63 IN | WEIGHT: 225.4 LBS

## 2018-06-08 DIAGNOSIS — Z23 NEED FOR TDAP VACCINATION: ICD-10-CM

## 2018-06-08 DIAGNOSIS — Z00.00 MEDICARE ANNUAL WELLNESS VISIT, SUBSEQUENT: Primary | ICD-10-CM

## 2018-06-08 DIAGNOSIS — E11.9 TYPE 2 DIABETES MELLITUS WITHOUT COMPLICATION, WITHOUT LONG-TERM CURRENT USE OF INSULIN (H): ICD-10-CM

## 2018-06-08 DIAGNOSIS — I10 HYPERTENSION GOAL BP (BLOOD PRESSURE) < 140/90: ICD-10-CM

## 2018-06-08 DIAGNOSIS — K21.9 GASTROESOPHAGEAL REFLUX DISEASE WITHOUT ESOPHAGITIS: ICD-10-CM

## 2018-06-08 DIAGNOSIS — R41.3 MEMORY DEFICIT: ICD-10-CM

## 2018-06-08 DIAGNOSIS — F33.41 RECURRENT MAJOR DEPRESSIVE DISORDER, IN PARTIAL REMISSION (H): ICD-10-CM

## 2018-06-08 DIAGNOSIS — H91.93 DECREASED HEARING OF BOTH EARS: ICD-10-CM

## 2018-06-08 DIAGNOSIS — Z13.6 CARDIOVASCULAR SCREENING; LDL GOAL LESS THAN 160: ICD-10-CM

## 2018-06-08 DIAGNOSIS — M79.7 FIBROMYALGIA: ICD-10-CM

## 2018-06-08 DIAGNOSIS — F98.8 ATTENTION DEFICIT DISORDER (ADD) WITHOUT HYPERACTIVITY: ICD-10-CM

## 2018-06-08 DIAGNOSIS — I10 ESSENTIAL HYPERTENSION WITH GOAL BLOOD PRESSURE LESS THAN 140/90: ICD-10-CM

## 2018-06-08 DIAGNOSIS — E78.5 HYPERLIPIDEMIA LDL GOAL <100: ICD-10-CM

## 2018-06-08 LAB
ANION GAP SERPL CALCULATED.3IONS-SCNC: 4 MMOL/L (ref 3–14)
BUN SERPL-MCNC: 20 MG/DL (ref 7–30)
CALCIUM SERPL-MCNC: 9.3 MG/DL (ref 8.5–10.1)
CHLORIDE SERPL-SCNC: 104 MMOL/L (ref 94–109)
CHOLEST SERPL-MCNC: 179 MG/DL
CO2 SERPL-SCNC: 31 MMOL/L (ref 20–32)
CREAT SERPL-MCNC: 1.12 MG/DL (ref 0.52–1.04)
GFR SERPL CREATININE-BSD FRML MDRD: 47 ML/MIN/1.7M2
GLUCOSE SERPL-MCNC: 179 MG/DL (ref 70–99)
HDLC SERPL-MCNC: 59 MG/DL
LDLC SERPL CALC-MCNC: 96 MG/DL
NONHDLC SERPL-MCNC: 120 MG/DL
POTASSIUM SERPL-SCNC: 4.2 MMOL/L (ref 3.4–5.3)
SODIUM SERPL-SCNC: 139 MMOL/L (ref 133–144)
TRIGL SERPL-MCNC: 118 MG/DL

## 2018-06-08 PROCEDURE — 99213 OFFICE O/P EST LOW 20 MIN: CPT | Mod: 25 | Performed by: INTERNAL MEDICINE

## 2018-06-08 PROCEDURE — 36415 COLL VENOUS BLD VENIPUNCTURE: CPT | Performed by: INTERNAL MEDICINE

## 2018-06-08 PROCEDURE — 90471 IMMUNIZATION ADMIN: CPT | Performed by: INTERNAL MEDICINE

## 2018-06-08 PROCEDURE — 80048 BASIC METABOLIC PNL TOTAL CA: CPT | Performed by: INTERNAL MEDICINE

## 2018-06-08 PROCEDURE — 90715 TDAP VACCINE 7 YRS/> IM: CPT | Performed by: INTERNAL MEDICINE

## 2018-06-08 PROCEDURE — G0439 PPPS, SUBSEQ VISIT: HCPCS | Performed by: INTERNAL MEDICINE

## 2018-06-08 PROCEDURE — 80061 LIPID PANEL: CPT | Performed by: INTERNAL MEDICINE

## 2018-06-08 RX ORDER — METFORMIN HCL 500 MG
500 TABLET, EXTENDED RELEASE 24 HR ORAL
Qty: 90 TABLET | Refills: 1 | Status: SHIPPED | OUTPATIENT
Start: 2018-06-08 | End: 2018-10-04

## 2018-06-08 RX ORDER — OMEPRAZOLE 40 MG/1
40 CAPSULE, DELAYED RELEASE ORAL DAILY
Qty: 90 CAPSULE | Refills: 3 | Status: SHIPPED | OUTPATIENT
Start: 2018-06-08 | End: 2019-05-11

## 2018-06-08 RX ORDER — METHYLPHENIDATE HYDROCHLORIDE 20 MG/1
1 CAPSULE, EXTENDED RELEASE ORAL DAILY
Qty: 30 CAPSULE | Refills: 0 | Status: SHIPPED | OUTPATIENT
Start: 2018-06-23 | End: 2018-06-08

## 2018-06-08 RX ORDER — METOPROLOL SUCCINATE 25 MG/1
TABLET, EXTENDED RELEASE ORAL
Qty: 90 TABLET | Refills: 3 | Status: SHIPPED | OUTPATIENT
Start: 2018-06-08 | End: 2019-06-12

## 2018-06-08 RX ORDER — LOSARTAN POTASSIUM AND HYDROCHLOROTHIAZIDE 25; 100 MG/1; MG/1
1 TABLET ORAL DAILY
Qty: 90 TABLET | Refills: 3 | Status: SHIPPED | OUTPATIENT
Start: 2018-06-08 | End: 2019-06-12

## 2018-06-08 ASSESSMENT — ANXIETY QUESTIONNAIRES
5. BEING SO RESTLESS THAT IT IS HARD TO SIT STILL: NOT AT ALL
6. BECOMING EASILY ANNOYED OR IRRITABLE: NOT AT ALL
7. FEELING AFRAID AS IF SOMETHING AWFUL MIGHT HAPPEN: NOT AT ALL
GAD7 TOTAL SCORE: 0
2. NOT BEING ABLE TO STOP OR CONTROL WORRYING: NOT AT ALL
1. FEELING NERVOUS, ANXIOUS, OR ON EDGE: NOT AT ALL
3. WORRYING TOO MUCH ABOUT DIFFERENT THINGS: NOT AT ALL

## 2018-06-08 ASSESSMENT — PATIENT HEALTH QUESTIONNAIRE - PHQ9: 5. POOR APPETITE OR OVEREATING: NOT AT ALL

## 2018-06-08 NOTE — PROGRESS NOTES
SUBJECTIVE:   Karen Donis is a 73 year old female who presents for Preventive Visit.    Has nasal drainage and cough since April. Has been to ER 3 times, had Zpak with amoxicillin, it got rid of it but came back. The second time, told to continue with inhaler and cough suppressant, the third time, ER didn't do anything.   Has had palptation for 10 years off and on. Once a week, seconds to 5 minutes. Goes away. On Methylphenidate ER for ADD. Recently had some formulary issue. One of the ones we prescribed is covered. She is not sure which one.   Are you in the first 12 months of your Medicare Part B coverage?  No    Healthy Habits:    Do you get at least three servings of calcium containing foods daily (dairy, green leafy vegetables, etc.)? yes    Amount of exercise or daily activities, outside of work: None    Problems taking medications regularly No    Medication side effects: No    Have you had an eye exam in the past two years? yes    Do you see a dentist twice per year? Once a year    Do you have sleep apnea, excessive snoring or daytime drowsiness?yes/has a sleep apnea machine      Ability to successfully perform activities of daily living: Yes, no assistance needed    Home safety:  none identified     Hearing impairment: Yes, Hard to hear certain letters when people speak to her    Fall risk:  Fallen 2 or more times in the past year?: No  Any fall with injury in the past year?: No        COGNITIVE SCREEN  1) Repeat 3 items (Banana, Sunrise, Chair)    2) Clock draw: NORMAL  3) 3 item recall: Recalls 3 objects  Results: 3 items recalled: COGNITIVE IMPAIRMENT LESS LIKELY    Mini-CogTM Gil Hernandez. Licensed by the author for use in [facility]; reprinted with permission ([email or web address]). All rights reserved.        Reviewed and updated as needed this visit by clinical staff      Reviewed and updated as needed this visit by Provider        Social History   Substance Use Topics     Smoking  status: Former Smoker     Quit date: 12/12/1982     Smokeless tobacco: Never Used     Alcohol use No       If you drink alcohol do you typically have >3 drinks per day or >7 drinks per week? No                        Today's PHQ-2 Score:   PHQ-2 ( 1999 Pfizer) 3/1/2018 9/20/2017   Q1: Little interest or pleasure in doing things 2 1   Q2: Feeling down, depressed or hopeless 1 0   PHQ-2 Score 3 1       Do you feel safe in your environment - Yes    Do you have a Health Care Directive?: Yes: Advance Directive has been received and scanned.    Current providers sharing in care for this patient include:   Patient Care Team:  Lakia Hunter MD PhD as PCP - General (Internal Medicine)    The following health maintenance items are reviewed in Epic and correct as of today:  Health Maintenance   Topic Date Due     WELLNESS VISIT Q1 YR  06/15/2016     FALL RISK ASSESSMENT  06/06/2017     TETANUS IMMUNIZATION (SYSTEM ASSIGNED)  04/14/2018     EYE EXAM Q1 YEAR  06/19/2018     BMP Q1 YR  06/19/2018     A1C Q6 MO  09/01/2018     PHQ-9 Q6 MONTHS  09/01/2018     MAMMO Q1 YR  10/25/2018     COLONOSCOPY Q5 YR  11/26/2018     FOOT EXAM Q1 YEAR  03/01/2019     HEMOGLOBIN Q1 YR  03/01/2019     LIPID MONITORING Q1 YEAR  03/01/2019     MICROALBUMIN Q1 YEAR  03/01/2019     RAGHAVENDRA QUESTIONNAIRE 1 YEAR  03/01/2019     DEPRESSION ACTION PLAN Q1 YR  03/01/2019     URINE DRUG SCREEN Q1 YR  03/07/2019     TSH W/ FREE T4 REFLEX Q2 YEAR  06/19/2019     ADVANCE DIRECTIVE PLANNING Q5 YRS  08/19/2020     DEXA SCAN SCREENING (SYSTEM ASSIGNED)  Completed     PNEUMOCOCCAL  Completed     INFLUENZA VACCINE  Completed     Labs reviewed in [facility]    Mammogram Screening: Patient over age 75, has elected to continue with mammography screening.    ROS:  Constitutional, HEENT, cardiovascular, pulmonary, gi and gu systems are negative, except as otherwise noted.    OBJECTIVE:   There were no vitals taken for this visit. Estimated body mass index is 39.64 kg/(m^2) as  "calculated from the following:    Height as of 3/1/18: 5' 2.75\" (1.594 m).    Weight as of 3/1/18: 222 lb (100.7 kg).  EXAM:   GENERAL: healthy, alert and no distress  EYES: Eyes grossly normal to inspection, PERRL and conjunctivae and sclerae normal  HENT: ear canals and TM's normal, nose and mouth without ulcers or lesions  NECK: no adenopathy, no asymmetry, masses, or scars and thyroid normal to palpation  RESP: lungs clear to auscultation - no rales, rhonchi or wheezes  BREAST: normal without masses, tenderness or nipple discharge and no palpable axillary masses or adenopathy  CV: regular rate and rhythm, normal S1 S2, no S3 or S4, no murmur, click or rub, no peripheral edema and peripheral pulses strong  ABDOMEN: soft, nontender, no hepatosplenomegaly, no masses and bowel sounds normal  MS: no gross musculoskeletal defects noted, no edema  SKIN: no suspicious lesions or rashes  NEURO: Normal strength and tone, mentation intact and speech normal  PSYCH: mentation appears normal, affect normal/bright    Results for orders placed or performed in visit on 06/08/18   Lipid panel reflex to direct LDL Non-fasting   Result Value Ref Range    Cholesterol 179 <200 mg/dL    Triglycerides 118 <150 mg/dL    HDL Cholesterol 59 >49 mg/dL    LDL Cholesterol Calculated 96 <100 mg/dL    Non HDL Cholesterol 120 <130 mg/dL          ASSESSMENT / PLAN:       ICD-10-CM    1. Medicare annual wellness visit, subsequent Z00.00    2. Hyperlipidemia LDL goal <100 E78.5    3. Essential hypertension with goal blood pressure less than 140/90 I10 Basic metabolic panel     losartan-hydrochlorothiazide (HYZAAR) 100-25 MG per tablet   4. Hypertension goal BP (blood pressure) < 140/90 I10 metoprolol succinate (TOPROL-XL) 25 MG 24 hr tablet   5. Attention deficit disorder (ADD) without hyperactivity F98.8 DISCONTINUED: Methylphenidate HCl ER, XR, 20 MG CP24   6. Type 2 diabetes mellitus without complication, without long-term current use of " "insulin (H) E11.9 metFORMIN (GLUCOPHAGE-XR) 500 MG 24 hr tablet     **A1C FUTURE 3mo   7. Recurrent major depressive disorder, in partial remission (H) F33.41    8. Fibromyalgia M79.7    9. Decreased hearing of both ears H91.93 AUDIOLOGY ADULT REFERRAL   10. Need for Tdap vaccination Z23 TDAP VACCINE (ADACEL)     -- decreased hearing. Refer to audiology  -- stable chronic health issues. Medications refilled.    -- diabetes: due for lab in 3 months. Follow up in September.    End of Life Planning:  Patient currently has an advanced directive: Yes.  Practitioner is supportive of decision.    COUNSELING:  Reviewed preventive health counseling, as reflected in patient instructions        Estimated body mass index is 39.64 kg/(m^2) as calculated from the following:    Height as of 3/1/18: 5' 2.75\" (1.594 m).    Weight as of 3/1/18: 222 lb (100.7 kg).  Weight management plan: stable     reports that she quit smoking about 35 years ago. She has never used smokeless tobacco.      Appropriate preventive services were discussed with this patient, including applicable screening as appropriate for cardiovascular disease, diabetes, osteopenia/osteoporosis, and glaucoma.  As appropriate for age/gender, discussed screening for colorectal cancer, prostate cancer, breast cancer, and cervical cancer. Checklist reviewing preventive services available has been given to the patient.    Reviewed patients plan of care and provided an AVS. The Intermediate Care Plan ( asthma action plan, low back pain action plan, and migraine action plan) for Karen meets the Care Plan requirement. This Care Plan has been established and reviewed with the Patient.    Counseling Resources:  ATP IV Guidelines  Pooled Cohorts Equation Calculator  Breast Cancer Risk Calculator  FRAX Risk Assessment  ICSI Preventive Guidelines  Dietary Guidelines for Americans, 2010  [employer]'s MyPlate  ASA Prophylaxis  Lung CA Screening    Lakia Hunter MD PhD  [facility] " CLINICS

## 2018-06-08 NOTE — MR AVS SNAPSHOT
After Visit Summary   6/8/2018    Karen Donis    MRN: [id number]           Patient Information     Date Of Birth          1945        Visit Information        Provider Department      6/8/2018 1:30 PM Lakia Hunter MD PhD [facility]        Today's Diagnoses     Medicare annual wellness visit, subsequent    -  1    Hyperlipidemia LDL goal <100        Essential hypertension with goal blood pressure less than 140/90        Hypertension goal BP (blood pressure) < 140/90        Attention deficit disorder (ADD) without hyperactivity        Type 2 diabetes mellitus without complication, without long-term current use of insulin (H)        Recurrent major depressive disorder, in partial remission (H)        Fibromyalgia        Decreased hearing of both ears        Need for Tdap vaccination        Gastroesophageal reflux disease without esophagitis        Memory deficit          Care Instructions    Make appointment(s) for:   -- diabetes follow up in 3 months with non fasting lab.   -- audiology.   -- optometry for eye exam.       Medication(s) prescribed today:    Orders Placed This Encounter   Medications     losartan-hydrochlorothiazide (HYZAAR) 100-25 MG per tablet     Sig: Take 1 tablet by mouth daily     Dispense:  90 tablet     Refill:  3     Please put on file. Do not fill until patient calls.     metoprolol succinate (TOPROL-XL) 25 MG 24 hr tablet     Sig: Take 1 tablet (25 mg) by mouth daily     Dispense:  90 tablet     Refill:  3     Please put on file. Do not fill until patient calls.     DISCONTD: Methylphenidate HCl ER, XR, 20 MG CP24     Sig: Take 1 capsule by mouth daily     Dispense:  30 capsule     Refill:  0     metFORMIN (GLUCOPHAGE-XR) 500 MG 24 hr tablet     Sig: Take 1 tablet (500 mg) by mouth daily (with dinner)     Dispense:  90 tablet     Refill:  1     Please put on file. Do not fill until patient calls.     omeprazole (PRILOSEC) 40 MG capsule     Sig: Take 1  capsule (40 mg) by mouth daily Take 30-60 minutes before a meal.     Dispense:  90 capsule     Refill:  3     Dose increased.           Preventive Health Recommendations    Female Ages 65 +    Yearly exam:     See your health care provider every year in order to  o Review health changes.   o Discuss preventive care.    o Review your medicines if your doctor has prescribed any.      You no longer need a yearly Pap test unless you've had an abnormal Pap test in the past 10 years. If you have vaginal symptoms, such as bleeding or discharge, be sure to talk with your provider about a Pap test.      Every 1 to 2 years, have a mammogram.  If you are over 69, talk with your health care provider about whether or not you want to continue having screening mammograms.      Every 10 years, have a colonoscopy. Or, have a yearly FIT test (stool test). These exams will check for colon cancer.       Have a cholesterol test every 5 years, or more often if your doctor advises it.       Have a diabetes test (fasting glucose) every three years. If you are at risk for diabetes, you should have this test more often.       At age 65, have a bone density scan (DEXA) to check for osteoporosis (brittle bone disease).    Shots:    Get a flu shot each year.    Get a tetanus shot every 10 years.    Talk to your doctor about your pneumonia vaccines. There are now two you should receive - Pneumovax (PPSV 23) and Prevnar (PCV 13).    Talk to your doctor about the shingles vaccine.    Talk to your doctor about the hepatitis B vaccine.    Nutrition:     Eat at least 5 servings of fruits and vegetables each day.      Eat whole-grain bread, whole-wheat pasta and brown rice instead of white grains and rice.      Talk to your provider about Calcium and Vitamin D.     Lifestyle    Exercise at least 150 minutes a week (30 minutes a day, 5 days a week). This will help you control your weight and prevent disease.      Limit alcohol to one drink per  day.      No smoking.       Wear sunscreen to prevent skin cancer.       See your dentist twice a year for an exam and cleaning.      See your eye doctor every 1 to 2 years to screen for conditions such as glaucoma, macular degeneration and cataracts.          Follow-ups after your visit        Additional Services     AUDIOLOGY ADULT REFERRAL       Your provider has referred you to: [facility]: [facility] [place] [phone]   [email or web address]    Treatment:  Evaluation & Treatment  Specialty Testing:  Audiogram w/Tymps and Reflexes    Please be aware that coverage of these services is subject to the terms and limitations of your health insurance plan.  Call member services at your health plan with any benefit or coverage questions.      Please bring the following to your appointment:  >>   Any x-rays, CTs or MRIs which have been performed.  Contact the facility where they were done to arrange for  prior to your scheduled appointment.   >>   List of current medications  >>   This referral request   >>   Any documents/labs given to you for this referral            NEUROPSYCHOLOGY REFERRAL       Your provider has referred you to:    [facility]: [facility], [place] [facility] [phone]   [email or web address]    All scheduling is subject to the client's specific insurance plan & benefits, provider/location availability, and provider clinical specialities.  Please arrive 15 minutes early for your first appointment and bring your completed paperwork.    Please be aware that coverage of these services is subject to the terms and limitations of your health insurance plan.  Call member services at your health plan with any benefit or coverage questions.    Please bring the following to your appointment:  >>   Any x-rays, CTs or MRIs which have been performed.  Contact the facility where they were done to arrange  for  prior to your scheduled appointment.  Any new CT, MRI or other procedures ordered by your specialist must be performed at a [place] facility or coordinated by your clinic's referral office.    >>   List of current medications   >>   This referral request   >>   Any documents/labs given to you for this referral                  Future tests that were ordered for you today     Open Future Orders        Priority Expected Expires Ordered    **A1C FUTURE 3mo Routine 2018 10/6/2018 2018            Who to contact     If you have questions or need follow up information about today's clinic visit or your schedule please contact [facility] directly at [phone].  Normal or non-critical lab and imaging results will be communicated to you by Helpstreamhart, letter or phone within 4 business days after the clinic has received the results. If you do not hear from us within 7 days, please contact the clinic through Helpstream[employer] or phone. If you have a critical or abnormal lab result, we will notify you by phone as soon as possible.  Submit refill requests through [employer] or call your pharmacy and they will forward the refill request to us. Please allow 3 business days for your refill to be completed.          Additional Information About Your Visit        Al[employer] Information     Al[employer] is an electronic gateway that provides easy, online access to your medical records. With Al[employer], you can request a clinic appointment, read your test results, renew a prescription or communicate with your care team.     To sign up for [employer] visit the website at [email or web address]   You will be asked to enter the access code listed below, as well as some personal information. Please follow the directions to create your username and password.     Your access code is: [email or web address]  Expires: 2018  2:30 PM     Your access code will  in 90 days. If you need help or a new code, please contact your  "[facility] or call [phone] for assistance.        Care EveryWhere ID     This is your Care EveryWhere ID. This could be used by other organizations to access your [facility] medical records  [id number]        Your Vitals Were     Pulse Temperature Height Pulse Oximetry BMI (Body Mass Index)       67 97.8  F (36.6  C) (Temporal) 5' 2.75\" (1.594 m) 96% 40.25 kg/m2        Blood Pressure from Last 3 Encounters:   06/08/18 122/78   03/01/18 144/80   09/22/17 118/76    Weight from Last 3 Encounters:   06/08/18 225 lb 6.4 oz (102.2 kg)   03/01/18 222 lb (100.7 kg)   09/22/17 230 lb (104.3 kg)              We Performed the Following     AUDIOLOGY ADULT REFERRAL     Basic metabolic panel     NEUROPSYCHOLOGY REFERRAL     TDAP VACCINE (ADACEL)          Today's Medication Changes          These changes are accurate as of 6/8/18  2:30 PM.  If you have any questions, ask your nurse or doctor.               These medicines have changed or have updated prescriptions.        Dose/Directions    losartan-hydrochlorothiazide 100-25 MG per tablet   Commonly known as:  HYZAAR   This may have changed:  See the new instructions.   Used for:  Essential hypertension with goal blood pressure less than 140/90   Changed by:  Lakia Hunter MD PhD        Dose:  1 tablet   Take 1 tablet by mouth daily   Quantity:  90 tablet   Refills:  3       methylphenidate 20 MG CR tablet   Commonly known as:  METADATE ER   This may have changed:  Another medication with the same name was removed. Continue taking this medication, and follow the directions you see here.   Used for:  Attention deficit disorder (ADD) without hyperactivity   Changed by:  Lakia Hunter MD PhD        Dose:  20 mg   Take 1 tablet (20 mg) by mouth every morning   Quantity:  30 tablet   Refills:  0       omeprazole 40 MG capsule   Commonly known as:  priLOSEC   This may have changed:    - medication strength  - how much to take  - additional instructions "   Used for:  Gastroesophageal reflux disease without esophagitis   Changed by:  Lakia Hunter MD PhD        Dose:  40 mg   Take 1 capsule (40 mg) by mouth daily Take 30-60 minutes before a meal.   Quantity:  90 capsule   Refills:  3            Where to get your medicines      These medications were sent to [id number] - [place] - [address]  [address] [facility], [place] [address]     Phone:  [phone]     losartan-hydrochlorothiazide 100-25 MG per tablet    metFORMIN 500 MG 24 hr tablet    metoprolol succinate 25 MG 24 hr tablet    omeprazole 40 MG capsule                Primary Care Provider Office Phone # Fax #    Lakia Hunter MD PhD [phone] [address] [facility] [address]        Equal Access to Services     LEE [facility]aksRAVINDER AH: Hadii stephanie ríos hadasho Soomaali, waaxda luqadaha, qaybta kaalmada adeegyada, luiza espinal . So [facility] [phone].    ATENCIÓN: Si habla español, tiene a ashley disposición servicios gratuitos de asistencia lingüística. Ll[facility] [phone].    We comply with applicable federal civil rights laws and [place] laws. We do not discriminate on the basis of race, color, national origin, age, disability, sex, sexual orientation, or gender identity.            Thank you!     Thank you for choosing [facility]  for your care. Our goal is always to provide you with excellent care. Hearing back from our patients is one way we can continue to improve our services. Please take a few minutes to complete the written survey that you may receive in the mail after your visit with us. Thank you!             Your Updated Medication List - Protect others around you: Learn how to safely use, store and throw away your medicines at www.disposemymeds.org.          This list is accurate as of 6/8/18  2:30 PM.  Always use your most recent med list.                   Brand Name Dispense Instructions for use Diagnosis     aspirin 81 MG tablet      1 TABLET DAILY        BEANO PO      Take by mouth as needed Reported on 5/3/2017        biotin 1000 MCG Tabs tablet      Take 1,000 mcg by mouth daily        blood glucose lancing device     1 each    Device to be used with lancets.    Diet-controlled diabetes mellitus (H)       * blood glucose monitoring lancets     1 Box    Use to test blood sugar 1 times daily or as directed.  Ok to substitute alternative if insurance prefers.    Diet-controlled diabetes mellitus (H)       * blood glucose monitoring lancets     1 Box    Use to test blood sugar 1daily or as directed.    Diet-controlled diabetes mellitus (H)       blood glucose monitoring meter device kit    no brand specified    1 kit    Use to test blood sugar daily or as directed.    Controlled type 2 diabetes mellitus without complication, without long-term current use of insulin (H)       * blood glucose monitoring test strip    no brand specified    100 each    Use to test blood sugars daily or as directed. Accu-check smartview strips    Diet-controlled diabetes mellitus (H)       * blood glucose monitoring test strip    ACCU-CHEK SMARTVIEW    100 each    Use to test blood sugar 1 times daily or as directed.  Ok to substitute alternative if insurance prefers.    Diet-controlled diabetes mellitus (H)       CALCIUM 1200 PO      Take by mouth daily        cholecalciferol 1000 units capsule    vitamin  -D    180 capsule    Take 2 capsules (2,000 Units) by mouth daily    Vitamin D deficiency       CO Q 10 PO      Take 1 capsule by mouth daily.        Cranberry 300 MG Tabs      Take by mouth daily        DULoxetine 60 MG EC capsule    CYMBALTA    90 capsule    Take 1 capsule (60 mg) by mouth daily    Recurrent major depressive disorder, in partial remission (H), Fibromyalgia       econazole nitrate 1 % cream     30 g    Apply topically daily    Tinea pedis       flax seed oil 1000 MG capsule      Take 1 capsule by mouth daily         losartan-hydrochlorothiazide 100-25 MG per tablet    HYZAAR    90 tablet    Take 1 tablet by mouth daily    Essential hypertension with goal blood pressure less than 140/90       MAGNESIUM PO      1 capsule daily        meclizine 25 MG tablet    ANTIVERT    30 tablet    Take 1 tablet (25 mg) by mouth every 6 hours as needed for dizziness    BPPV (benign paroxysmal positional vertigo), right       metFORMIN 500 MG 24 hr tablet    GLUCOPHAGE-XR    90 tablet    Take 1 tablet (500 mg) by mouth daily (with dinner)    Type 2 diabetes mellitus without complication, without long-term current use of insulin (H)       methylphenidate 20 MG CR tablet    METADATE ER    30 tablet    Take 1 tablet (20 mg) by mouth every morning    Attention deficit disorder (ADD) without hyperactivity       metoprolol succinate 25 MG 24 hr tablet    TOPROL-XL    90 tablet    Take 1 tablet (25 mg) by mouth daily    Hypertension goal BP (blood pressure) < 140/90       omeprazole 40 MG capsule    priLOSEC    90 capsule    Take 1 capsule (40 mg) by mouth daily Take 30-60 minutes before a meal.    Gastroesophageal reflux disease without esophagitis       pravastatin 10 MG tablet    PRAVACHOL    90 tablet    Take 1 tablet (10 mg) by mouth daily    Hyperlipidemia LDL goal <100       UNABLE TO FIND      Reported on 5/3/2017        VITAMIN B COMPLEX-C Caps      1 capsule PO qd        vitamin E 400 UNIT capsule      Take by mouth daily        * Notice:  This list has 4 medication(s) that are the same as other medications prescribed for you. Read the directions carefully, and ask your doctor or other care provider to review them with you.

## 2018-06-08 NOTE — PATIENT INSTRUCTIONS
Make appointment(s) for:   -- diabetes follow up in 3 months with non fasting lab.   -- audiology.   -- optometry for eye exam.     See referral for neuropsychologic testing.     Call us about the Methylphenidate formulation that is covered.     Medication(s) prescribed today:    Orders Placed This Encounter   Medications     losartan-hydrochlorothiazide (HYZAAR) 100-25 MG per tablet     Sig: Take 1 tablet by mouth daily     Dispense:  90 tablet     Refill:  3     Please put on file. Do not fill until patient calls.     metoprolol succinate (TOPROL-XL) 25 MG 24 hr tablet     Sig: Take 1 tablet (25 mg) by mouth daily     Dispense:  90 tablet     Refill:  3     Please put on file. Do not fill until patient calls.     DISCONTD: Methylphenidate HCl ER, XR, 20 MG CP24     Sig: Take 1 capsule by mouth daily     Dispense:  30 capsule     Refill:  0     metFORMIN (GLUCOPHAGE-XR) 500 MG 24 hr tablet     Sig: Take 1 tablet (500 mg) by mouth daily (with dinner)     Dispense:  90 tablet     Refill:  1     Please put on file. Do not fill until patient calls.     omeprazole (PRILOSEC) 40 MG capsule     Sig: Take 1 capsule (40 mg) by mouth daily Take 30-60 minutes before a meal.     Dispense:  90 capsule     Refill:  3     Dose increased.           Preventive Health Recommendations    Female Ages 65 +    Yearly exam:     See your health care provider every year in order to  o Review health changes.   o Discuss preventive care.    o Review your medicines if your doctor has prescribed any.      You no longer need a yearly Pap test unless you've had an abnormal Pap test in the past 10 years. If you have vaginal symptoms, such as bleeding or discharge, be sure to talk with your provider about a Pap test.      Every 1 to 2 years, have a mammogram.  If you are over 69, talk with your health care provider about whether or not you want to continue having screening mammograms.      Every 10 years, have a colonoscopy. Or, have a yearly FIT  test (stool test). These exams will check for colon cancer.       Have a cholesterol test every 5 years, or more often if your doctor advises it.       Have a diabetes test (fasting glucose) every three years. If you are at risk for diabetes, you should have this test more often.       At age 65, have a bone density scan (DEXA) to check for osteoporosis (brittle bone disease).    Shots:    Get a flu shot each year.    Get a tetanus shot every 10 years.    Talk to your doctor about your pneumonia vaccines. There are now two you should receive - Pneumovax (PPSV 23) and Prevnar (PCV 13).    Talk to your doctor about the shingles vaccine.    Talk to your doctor about the hepatitis B vaccine.    Nutrition:     Eat at least 5 servings of fruits and vegetables each day.      Eat whole-grain bread, whole-wheat pasta and brown rice instead of white grains and rice.      Talk to your provider about Calcium and Vitamin D.     Lifestyle    Exercise at least 150 minutes a week (30 minutes a day, 5 days a week). This will help you control your weight and prevent disease.      Limit alcohol to one drink per day.      No smoking.       Wear sunscreen to prevent skin cancer.       See your dentist twice a year for an exam and cleaning.      See your eye doctor every 1 to 2 years to screen for conditions such as glaucoma, macular degeneration and cataracts.   Breath sounds clear and equal bilaterally.

## 2018-06-08 NOTE — PROGRESS NOTES
Dear Karen,  Enclosed is a copy of your test results of the labs we reviewed at your last appointment.    Please keep this for your record and follow up as planned.   -- labs were stable.     If you have additional question, please call or make a follow-up appointment.    Lakia Hunter MD

## 2018-06-09 ASSESSMENT — ANXIETY QUESTIONNAIRES: GAD7 TOTAL SCORE: 0

## 2018-06-09 ASSESSMENT — PATIENT HEALTH QUESTIONNAIRE - PHQ9: SUM OF ALL RESPONSES TO PHQ QUESTIONS 1-9: 16

## 2018-06-11 ENCOUNTER — NURSE TRIAGE (OUTPATIENT)
Dept: NURSING | Facility: CLINIC | Age: 73
End: 2018-06-11

## 2018-06-11 ENCOUNTER — TELEPHONE (OUTPATIENT)
Dept: PEDIATRICS | Facility: CLINIC | Age: 73
End: 2018-06-11

## 2018-06-11 NOTE — TELEPHONE ENCOUNTER
Patient states her blood sugars typically had been running 150, 160.  She sleeps til noon then checks her blood sugars.    Last week Dr. Hunter told her that her blood sugars would be higher when she woke up.  She told her to get up, eat breakfast and then check her blood sugars 1-2 hours later.    For the last week her blood sugars have been 170, 199 but today it was 302, patient waited a while to recheck it then it was 264.    Patient states that she did speak with FNA.  She is not having any symptoms other than being tired.      Discussed with Tisha Carrillo.  Verbal order:  Monitor blood sugars and call tomorrow with update.  Check blood sugars 2 hours after eating.      She will call back tomorrow with update on blood sugars.      Patient also said Dr. Hunter wanted to confirm what ritalin med she was taking.  She is taking the medthyphenidate (metadate ER) 20mg CR tablet as listed on current med list.        Antonette Main RN,   [facility]

## 2018-06-11 NOTE — TELEPHONE ENCOUNTER
Home advice, also routed message to PCP.  Anita Britt RN  [facility]      Additional Information    Negative: Unconscious or difficult to awaken    Negative: Acting confused (e.g., disoriented, slurred speech)    Negative: Very weak (e.g., can't stand)    Negative: Sounds like a life-threatening emergency to the triager    Negative: [1] Vomiting AND [2] signs of dehydration (e.g., very dry mouth, lightheaded, etc.)    Negative: [1] Blood glucose > 240 mg/dl (13 mmol/l) AND [2] rapid breathing    Negative: Blood glucose > 500 mg/dl (27.5 mmol/l)    Negative: [1] Blood glucose > 240 mg/dl (13 mmol/l) AND [2] urine ketones moderate-large (or more than 1+)    Negative: [1] Blood glucose > 240 mg/dl (13 mmol/l) AND [2] blood ketones > 1.5 mmol/l    Negative: [1] Blood glucose > 240 mg/dl (13 mmol/l) AND [2] vomiting AND [3] unable to check for ketones (in blood or urine)    Negative: [1] New onset Diabetes suspected (e.g., frequent urination, weak, weight loss) AND [2] vomiting or rapid breathing    Negative: Vomiting lasts > 4 hours    Negative: Patient sounds very sick or weak to the triager    Negative: Fever > 100.5 F (38.1 C)    Negative: Blood glucose > 400 mg/dl (22 mmol/l)    Negative: [1] Blood glucose > 300 mg/dl (16.5 mmol/l) AND [2] two or more times in a row    Negative: Urine ketones moderate - large    Negative: Caller has URGENT medication or insulin pump question and triager unable to answer question    Negative: [1] Symptoms of high blood sugar (e.g., frequent urination, weak, weight loss) AND [2] not able to test blood glucose    Negative: New onset Diabetes suspected (e.g., frequent urination, weakness, weight loss)    Negative: [1] Blood glucose > 240 mg/dl (13 mmol/l) AND [2] uses insulin (e.g., insulin-dependent, all type 1 diabetics)   (all triage questions negative)    [1] Blood glucose > 240  mg/dl (13 mmol/l) AND [2] does not  use insulin (e.g., not insulin-dependent; most type  2 diabetics)   (all triage questions negative)    Protocols used: DIABETES - HIGH BLOOD SUGAR-ADULT-AH

## 2018-06-11 NOTE — TELEPHONE ENCOUNTER
Patient would like a call about her blood sugar, which was 302 then down to 264 upon recheck. Care guidelines were home care but patient would like a call from clinic.  Routed to [facility]  Anita Britt RN  [facility]

## 2018-07-31 ENCOUNTER — TELEPHONE (OUTPATIENT)
Dept: PEDIATRICS | Facility: CLINIC | Age: 73
End: 2018-07-31

## 2018-07-31 DIAGNOSIS — F98.8 ATTENTION DEFICIT DISORDER (ADD) WITHOUT HYPERACTIVITY: ICD-10-CM

## 2018-07-31 NOTE — TELEPHONE ENCOUNTER
Routing refill request to provider for review/approval because:  Drug not on the [facility] refill protocol   **please mail to [facility]    methylphenidate (METADATE ER) 20 MG CR tablet 30 tablet 0 4/23/2018  No   Sig - Route: Take 1 tablet (20 mg) by mouth every morning - Oral     Last OV with Dr. Hunter: 3/1/2018    No future apts.     Jane Choe RN

## 2018-07-31 NOTE — TELEPHONE ENCOUNTER
[facility] Call Center    Phone Message    May a detailed message be left on voicemail: yes    Reason for Call: Other: methylphenidate (METADATE ER) 20 MG CR tablet  pateint requests that a hard copy be mailed to the [facility] pharmacy....     Action Taken: Message routed to:  Primary Care p [phone]

## 2018-08-01 RX ORDER — METHYLPHENIDATE HYDROCHLORIDE EXTENDED RELEASE 20 MG/1
20 TABLET ORAL EVERY MORNING
Qty: 30 TABLET | Refills: 0 | Status: SHIPPED | OUTPATIENT
Start: 2018-08-01 | End: 2018-09-04

## 2018-08-01 NOTE — TELEPHONE ENCOUNTER
Metadate ER 20 mg script mailed to [facility]: [id number]  [place] [address].    Zohra Dean CMA

## 2018-08-26 DIAGNOSIS — E11.9 DIET-CONTROLLED DIABETES MELLITUS (H): ICD-10-CM

## 2018-08-27 RX ORDER — BLOOD SUGAR DIAGNOSTIC
STRIP MISCELLANEOUS
Qty: 50 EACH | Refills: 5 | Status: SHIPPED | OUTPATIENT
Start: 2018-08-27 | End: 2020-03-31

## 2018-08-27 NOTE — TELEPHONE ENCOUNTER
blood glucose monitoring (NO BRAND SPECIFIED) test strip 100 each 3 9/8/2016  --   Sig: Use to test blood sugars daily or as directed. Accu-check smartview strips     Last OV with Dr. Hunter: 6/8/2018    No future apt.s     Diabetic Supplies Protocol Passed8/26 7:01 AM   Patient is 18 years of age or older    Recent (6 mo) or future (30 days) visit within the authorizing provider's specialty     Refilled per P protocol.    Jane Choe RN

## 2018-08-31 ENCOUNTER — TELEPHONE (OUTPATIENT)
Dept: PEDIATRICS | Facility: CLINIC | Age: 73
End: 2018-08-31

## 2018-08-31 DIAGNOSIS — F98.8 ATTENTION DEFICIT DISORDER (ADD) WITHOUT HYPERACTIVITY: ICD-10-CM

## 2018-08-31 RX ORDER — METHYLPHENIDATE HYDROCHLORIDE EXTENDED RELEASE 20 MG/1
20 TABLET ORAL EVERY MORNING
Qty: 30 TABLET | Refills: 0 | Status: CANCELLED | OUTPATIENT
Start: 2018-08-31

## 2018-08-31 NOTE — TELEPHONE ENCOUNTER
"Clinic Action Needed:YES, please check with MD and call pharmacy and patient 8/31/18.  Patient Recommendations/Teaching:I called this am regarding my RX for   methylphenidate (METADATE ER) 20 MG CR tablet 30 tablet 0 8/1/2018  No   Sig - Route: Take 1 tablet (20 mg) by mouth every morning - Oral     I haven't heard back form anyone, I need it mailed to my pharmacy. Monday is a holiday.\"   Routed to:I will route message to PCP  Taylor Osorio RN [facility]          "

## 2018-08-31 NOTE — TELEPHONE ENCOUNTER
Methylphenidate 20 mg cr tablet  Last Written Prescription Date:  08/01/2018  Last Fill Quantity: 30,   # refills: 0  Last Office Visit: 06/08/18  Future Office visit:    Next 5 appointments (look out 90 days)     Sep 13, 2018  1:30 PM CDT   Return Visit with Lakia Hunter MD PhD   [facility]    [address] [place] [id number] [phone]                   Routing refill request to provider for review/approval because:  Drug not on the [facility], [facility] or [facility] refill protocol or controlled substance    *THE PATIENT WOULD LIKE THE HARD COPY TO BE MAILED TO THE [facility].     Starr Chacko CPhT  [facility]

## 2018-09-04 RX ORDER — METHYLPHENIDATE HYDROCHLORIDE EXTENDED RELEASE 20 MG/1
20 TABLET ORAL EVERY MORNING
Qty: 30 TABLET | Refills: 0 | Status: SHIPPED | OUTPATIENT
Start: 2018-09-04 | End: 2018-10-04

## 2018-09-04 NOTE — TELEPHONE ENCOUNTER
methylphenidate (METADATE ER) 20 MG CR tablet 30 tablet 0 9/4/2018  No   Sig - Route: Take 1 tablet (20 mg) by mouth every morning - Oral   Class: Local [facility]     Pharmacy   [facility] - [address] [place]

## 2018-09-04 NOTE — TELEPHONE ENCOUNTER
Rx placed in mail to [facility] as requested. Patient informed.  Jocelyn Charles, [facility]

## 2018-09-04 NOTE — TELEPHONE ENCOUNTER
This request was first received on Friday, 8/31/18.    Will route to covering providers to please advise on Rx refill request.  To be mailed to [facility].  Can this be called in also?    Maritza Wisdom RN, [facility]

## 2018-09-06 ENCOUNTER — OFFICE VISIT (OUTPATIENT)
Dept: PODIATRY | Facility: CLINIC | Age: 73
End: 2018-09-06
Attending: INTERNAL MEDICINE
Payer: COMMERCIAL

## 2018-09-06 VITALS — DIASTOLIC BLOOD PRESSURE: 68 MMHG | SYSTOLIC BLOOD PRESSURE: 122 MMHG

## 2018-09-06 DIAGNOSIS — E11.49 TYPE II OR UNSPECIFIED TYPE DIABETES MELLITUS WITH NEUROLOGICAL MANIFESTATIONS, NOT STATED AS UNCONTROLLED(250.60) (H): ICD-10-CM

## 2018-09-06 DIAGNOSIS — L84 TYLOMA: ICD-10-CM

## 2018-09-06 DIAGNOSIS — M20.41 HAMMER TOES OF BOTH FEET: ICD-10-CM

## 2018-09-06 DIAGNOSIS — B35.3 TINEA PEDIS OF BOTH FEET: ICD-10-CM

## 2018-09-06 DIAGNOSIS — B35.1 DERMATOPHYTOSIS OF NAIL: Primary | ICD-10-CM

## 2018-09-06 DIAGNOSIS — M20.42 HAMMER TOES OF BOTH FEET: ICD-10-CM

## 2018-09-06 PROCEDURE — 99202 OFFICE O/P NEW SF 15 MIN: CPT | Performed by: PODIATRIST

## 2018-09-06 NOTE — PATIENT INSTRUCTIONS
Thanks for coming today.  [facility]  [address] [place] [address]    To schedule future appointments in Ortho Clinic, you may call [phone].    To schedule ordered imaging by your provider:   Call Central Imaging [phone]    To schedule an injection ordered by your provider:  Call Central Imaging Injection scheduling line: [phone]  [email or web address] available online at:  [email or web address]    Please call if any further questions or concerns ([phone]).  Clinic hours 8 am to 5 pm.    Return to clinic (call) if symptoms worsen or fail to improve.

## 2018-09-06 NOTE — NURSING NOTE
Karen Donis's chief complaint for this visit includes:  Chief Complaint   Patient presents with     Right Foot - Pain   Hit toe with can, nail started growing up.  PCP: Lakia Hunter    Referring Provider:  Lakia Hunter MD PhD  [address] [place] [address]    There were no vitals taken for this visit.  Data Unavailable     Do you need any medication refills at today's visit? No  Nallely Kendrick RN

## 2018-09-06 NOTE — LETTER
9/6/2018         RE: Karen Donis  [address] [place] [address]        Dear Colleague,    Thank you for referring your patient, Karen Donis, to the [facility]. Please see a copy of my visit note below.    Past Medical History:   Diagnosis Date     Colon cancer (H)      Hypertension      Shortness of breath      Sleep apnea      Thyroid disease      Patient Active Problem List   Diagnosis     Seborrheic dermatitis     Alopecia     Xerosis cutis     Vitamin D deficiency     Nodules, thyroid     Postmenopausal     Colon cancer     CARDIOVASCULAR SCREENING; LDL GOAL LESS THAN 160     Impingement syndrome of right shoulder     AC (acromioclavicular) joint arthritis     Anxiety state     Attention deficit disorder     ACP (advance care planning)     Gastroesophageal reflux disease without esophagitis     Diet-controlled diabetes mellitus (H)     Hyperlipidemia LDL goal <100     Essential hypertension with goal blood pressure less than 140/90     Fatty liver     Major depression in complete remission (H)     Chronic bilateral low back pain with bilateral sciatica     Morbid obesity due to excess calories (H)     BPPV (benign paroxysmal positional vertigo), right     Past Surgical History:   Procedure Laterality Date     COLON SURGERY  2003     THYROIDECTOMY  12/6/2011    Procedure:THYROIDECTOMY; Right Thyroid Lobectomy and bilateral removal of skin tags; Surgeon:SAJI ZAZUETA; Location:[facility]     TONSILLECTOMY      While she was in 6th grade     Social History     Social History     Marital status:      Spouse name: Anand     Number of children: 2     Years of education: 12     Occupational History     Assembly/factory Homemaker     2005     Social History Main Topics     Smoking status: Former Smoker     Quit date: 12/12/1982     Smokeless tobacco: Never Used     Alcohol use No     Drug use: No     Sexual activity: Yes     Partners: Male     Other Topics Concern      Not on file     Social History Narrative     Family History   Problem Relation Age of Onset     Diabetes Mother      Hypertension Mother      Thyroid Disease Mother      HEART DISEASE Father      Cardiovascular Father      Neurologic Disorder Father      Parkinson's     Arthritis Father      Fibromyalgia     GASTROINTESTINAL DISEASE Maternal Grandmother      gallbladder removed     HEART DISEASE Maternal Grandfather      Diabetes Brother      Hypertension Brother      Neurologic Disorder Brother      ADHD     Unknown/Adopted Son      Unknown/Adopted Daughter      Neurologic Disorder Brother      ADHD     Lab Results   Component Value Date    A1C 6.9 03/01/2018      Last Comprehensive Metabolic Panel:  Sodium   Date Value Ref Range Status   06/08/2018 139 133 - 144 mmol/L Final     Potassium   Date Value Ref Range Status   06/08/2018 4.2 3.4 - 5.3 mmol/L Final     Chloride   Date Value Ref Range Status   06/08/2018 104 94 - 109 mmol/L Final     Carbon Dioxide   Date Value Ref Range Status   06/08/2018 31 20 - 32 mmol/L Final     Anion Gap   Date Value Ref Range Status   06/08/2018 4 3 - 14 mmol/L Final     Glucose   Date Value Ref Range Status   06/08/2018 179 (H) 70 - 99 mg/dL Final     Comment:     Fasting specimen     Urea Nitrogen   Date Value Ref Range Status   06/08/2018 20 7 - 30 mg/dL Final     Creatinine   Date Value Ref Range Status   06/08/2018 1.12 (H) 0.52 - 1.04 mg/dL Final     GFR Estimate   Date Value Ref Range Status   06/08/2018 47 (L) >60 mL/min/1.7m2 Final     Comment:     Non  GFR Calc     Calcium   Date Value Ref Range Status   06/08/2018 9.3 8.5 - 10.1 mg/dL Final       SUBJECTIVE FINDINGS:  A 73-year-old female presents for right foot.  She relates the big toenail, something fell on it, like a can.  This was 6+ months ago.  It turned black and blue and then it kind of got loose.  She kind of cut and tore it off.  She relates it does not hurt.  It is a little tender at times.   She relates she is diabetic.  She relates she has numbness and tingling in her feet.  No history of ulcers or sores.  She relates she is now wearing diabetic shoes.  She has SAS shoes with inserts in it, in which she needs new ones.  She is intermittently using econazole cream on her feet.      OBJECTIVE FINDINGS:  DP and PT are 2/4 bilaterally.  She has hyperkeratotic tissue buildup, plantar first and fifth MPJ bilaterally.  Plantar medial hallux on the right.  She has dorsally contracted digits 2-5 bilaterally.  She has dorsomedial first MPJ prominence bilaterally.  Sharp/dull is intact with 5.07 Hammond-Balbir monofilament bilaterally, although she did not feel it on the plantar first and second MPJs the first time I did it.  Then the second time, she did.  She has dry scaly skin in a moccasin pattern bilaterally.  She has right hallux nails with some subungual debris, dystrophy, new nail growing in, the nails come off.  There is no erythema, no drainage, no odor, no calor bilaterally.  She has some dystrophy and discoloration of the nails bilaterally with some subungual debris and thickening to differing degrees.      ASSESSMENT AND PLAN:  Onychomycosis bilaterally.  Tinea pedis bilaterally.  She is diabetic with peripheral neuropathy.  She has hammertoes and callus present.  Diagnosis and treatment options discussed with the patient.  I advised her on the econazole cream use.  She will start using that regularly on the toes and the feet.  Prescription for diabetic shoes and inserts given and use discussed with her.  She will return to clinic and see me in about 3 months.         Again, thank you for allowing me to participate in the care of your patient.        Sincerely,        Brent Bunn DPM

## 2018-09-06 NOTE — MR AVS SNAPSHOT
After Visit Summary   2018    Karen Donis    MRN: [id number]           Patient Information     Date Of Birth          1945        Visit Information        Provider Department      2018 12:30 PM Brent Bunn DPM [facility]        Care Instructions    Thanks for coming today.  [facility]  [address] [place] [id number]    To schedule future appointments in Ortho Clinic, you may call [phone].    To schedule ordered imaging by your provider:   Call Central Imaging [phone]    To schedule an injection ordered by your provider:  Call Central Imaging Injection scheduling line: [phone]  InThrMahart available online at:  [email or web address]    Please call if any further questions or concerns ([phone]).  Clinic hours 8 am to 5 pm.    Return to clinic (call) if symptoms worsen or fail to improve.            Follow-ups after your visit        Your next 10 appointments already scheduled     Sep 13, 2018  1:10 PM CDT   LAB with LAB FIRST FLOOR [facility])    [address] [place] [id number] [phone]           Please do not eat 10-12 hours before your appointment if you are coming in fasting for labs on lipids, cholesterol, or glucose (sugar). This does not apply to pregnant women. Water, hot tea and black coffee (with nothing added) are okay. Do not drink other fluids, diet soda or chew gum.            Sep 13, 2018  1:30 PM CDT   Return Visit with Lakia Hunter MD PhD   [facility])    [id number] [place] [id number] [phone]            2018  2:00 PM CST   Return Visit with Brent Bunn DPM   [facility])    [address] [place] [id number] [phone]              Who to contact     If you have questions  or need follow up information about today's clinic visit or your schedule please contact [facility] directly at [phone].  Normal or non-critical lab and imaging results will be communicated to you by Solexanthart, letter or phone within 4 business days after the clinic has received the results. If you do not hear from us within 7 days, please contact the clinic through Solexanthart or phone. If you have a critical or abnormal lab result, we will notify you by phone as soon as possible.  Submit refill requests through Flowdock or call your pharmacy and they will forward the refill request to us. Please allow 3 business days for your refill to be completed.          Additional Information About Your Visit        Flowdock Information     Flowdock is an electronic gateway that provides easy, online access to your medical records. With Flowdock, you can request a clinic appointment, read your test results, renew a prescription or communicate with your care team.     To sign up for Flowdock visit the website at [email or web address]   You will be asked to enter the access code listed below, as well as some personal information. Please follow the directions to create your username and password.     Your access code is: [email or web address]  Expires: 2018  2:30 PM     Your access code will  in 90 days. If you need help or a new code, please contact your [facility] or call [phone] for assistance.        Care EveryWhere ID     This is your Care EveryWhere ID. This could be used by other organizations to access your [place] medical records  [id number]         Blood Pressure from Last 3 Encounters:   18 122/68   18 122/78   18 144/80    Weight from Last 3 Encounters:   18 102.2 kg (225 lb 6.4 oz)   18 100.7 kg (222 lb)   17 104.3 kg (230 lb)              Today, you had the following     No orders found for display       Primary Care  Provider Office Phone # Fax #    Lakia Hunter MD PhD [phone]       [address]  [facility] [address]        Equal Access to Services     ASTER STREETER : Ming ríos lux Santos, waashleyda luqbernardo, qabetsyta kaazulda tanglea, luiza calzada laRichardgaro vanegas. So [facility] [phone].    ATENCIÓN: Si habla español, tiene a ashley disposición servicios gratuitos de asistencia lingüística. Llame al [phone].    We comply with applicable federal civil rights laws and [place] laws. We do not discriminate on the basis of race, color, national origin, age, disability, sex, sexual orientation, or gender identity.            Thank you!     Thank you for choosing [facility]  for your care. Our goal is always to provide you with excellent care. Hearing back from our patients is one way we can continue to improve our services. Please take a few minutes to complete the written survey that you may receive in the mail after your visit with us. Thank you!             Your Updated Medication List - Protect others around you: Learn how to safely use, store and throw away your medicines at www.disposemymeds.org.          This list is accurate as of 9/6/18 12:48 PM.  Always use your most recent med list.                   Brand Name Dispense Instructions for use Diagnosis    aspirin 81 MG tablet      1 TABLET DAILY        BEANO PO      Take by mouth as needed Reported on 5/3/2017        biotin 1000 MCG Tabs tablet      Take 1,000 mcg by mouth daily        blood glucose lancing device     1 each    Device to be used with lancets.    Diet-controlled diabetes mellitus (H)       * blood glucose monitoring lancets     1 Box    Use to test blood sugar 1 times daily or as directed.  Ok to substitute alternative if insurance prefers.    Diet-controlled diabetes mellitus (H)       * blood glucose monitoring lancets     1 Box    Use to test blood sugar 1daily or as directed.    Diet-controlled  diabetes mellitus (H)       blood glucose monitoring meter device kit    no brand specified    1 kit    Use to test blood sugar daily or as directed.    Controlled type 2 diabetes mellitus without complication, without long-term current use of insulin (H)       * blood glucose monitoring test strip    no brand specified    100 each    Use to test blood sugars daily or as directed. Accu-check smartview strips    Diet-controlled diabetes mellitus (H)       * ACCU-CHEK SMARTVIEW test strip   Generic drug:  blood glucose monitoring     50 each    use to test blood sugar once daily or as directed    Diet-controlled diabetes mellitus (H)       CALCIUM 1200 PO      Take by mouth daily        cholecalciferol 1000 units capsule    vitamin  -D    180 capsule    Take 2 capsules (2,000 Units) by mouth daily    Vitamin D deficiency       CO Q 10 PO      Take 1 capsule by mouth daily.        Cranberry 300 MG Tabs      Take by mouth daily        DULoxetine 60 MG EC capsule    CYMBALTA    90 capsule    Take 1 capsule (60 mg) by mouth daily    Recurrent major depressive disorder, in partial remission (H), Fibromyalgia       econazole nitrate 1 % cream     30 g    Apply topically daily    Tinea pedis       flax seed oil 1000 MG capsule      Take 1 capsule by mouth daily        losartan-hydrochlorothiazide 100-25 MG per tablet    HYZAAR    90 tablet    Take 1 tablet by mouth daily    Essential hypertension with goal blood pressure less than 140/90       MAGNESIUM PO      1 capsule daily        meclizine 25 MG tablet    ANTIVERT    30 tablet    Take 1 tablet (25 mg) by mouth every 6 hours as needed for dizziness    BPPV (benign paroxysmal positional vertigo), right       metFORMIN 500 MG 24 hr tablet    GLUCOPHAGE-XR    90 tablet    Take 1 tablet (500 mg) by mouth daily (with dinner)    Type 2 diabetes mellitus without complication, without long-term current use of insulin (H)       methylphenidate 20 MG CR tablet    METADATE ER    30  tablet    Take 1 tablet (20 mg) by mouth every morning    Attention deficit disorder (ADD) without hyperactivity       metoprolol succinate 25 MG 24 hr tablet    TOPROL-XL    90 tablet    Take 1 tablet (25 mg) by mouth daily    Hypertension goal BP (blood pressure) < 140/90       omeprazole 40 MG capsule    priLOSEC    90 capsule    Take 1 capsule (40 mg) by mouth daily Take 30-60 minutes before a meal.    Gastroesophageal reflux disease without esophagitis       pravastatin 10 MG tablet    PRAVACHOL    90 tablet    Take 1 tablet (10 mg) by mouth daily    Hyperlipidemia LDL goal <100       UNABLE TO FIND      Reported on 5/3/2017        VITAMIN B COMPLEX-C Caps      1 capsule PO qd        vitamin E 400 UNIT capsule      Take by mouth daily        * Notice:  This list has 4 medication(s) that are the same as other medications prescribed for you. Read the directions carefully, and ask your doctor or other care provider to review them with you.

## 2018-09-26 ENCOUNTER — MEDICAL CORRESPONDENCE (OUTPATIENT)
Dept: HEALTH INFORMATION MANAGEMENT | Facility: CLINIC | Age: 73
End: 2018-09-26

## 2018-10-02 ENCOUNTER — DOCUMENTATION ONLY (OUTPATIENT)
Dept: LAB | Facility: CLINIC | Age: 73
End: 2018-10-02

## 2018-10-02 NOTE — PROGRESS NOTES
A1c has already been pended.   Please review and add labs needed.     Patient has a fasting lab appt. Do not see Lipids are due.    Lab Results   Component Value Date    CHOL 179 06/08/2018     Lab Results   Component Value Date    HDL 59 06/08/2018     Lab Results   Component Value Date    LDL 96 06/08/2018     Lab Results   Component Value Date    TRIG 118 06/08/2018     Lab Results   Component Value Date    CHOLHDLRATIO 3.3 10/28/2014     Maritza Wisdom RN, [facility]

## 2018-10-04 ENCOUNTER — OFFICE VISIT (OUTPATIENT)
Dept: PEDIATRICS | Facility: CLINIC | Age: 73
End: 2018-10-04
Payer: COMMERCIAL

## 2018-10-04 VITALS
DIASTOLIC BLOOD PRESSURE: 74 MMHG | WEIGHT: 230 LBS | HEART RATE: 67 BPM | TEMPERATURE: 96 F | BODY MASS INDEX: 41.07 KG/M2 | OXYGEN SATURATION: 95 % | SYSTOLIC BLOOD PRESSURE: 126 MMHG

## 2018-10-04 DIAGNOSIS — E11.9 TYPE 2 DIABETES MELLITUS WITHOUT COMPLICATION, WITHOUT LONG-TERM CURRENT USE OF INSULIN (H): ICD-10-CM

## 2018-10-04 DIAGNOSIS — F98.8 ATTENTION DEFICIT DISORDER (ADD) WITHOUT HYPERACTIVITY: ICD-10-CM

## 2018-10-04 DIAGNOSIS — F33.41 RECURRENT MAJOR DEPRESSIVE DISORDER, IN PARTIAL REMISSION (H): ICD-10-CM

## 2018-10-04 DIAGNOSIS — M79.7 FIBROMYALGIA: ICD-10-CM

## 2018-10-04 LAB — HBA1C MFR BLD: 7.2 % (ref 0–5.6)

## 2018-10-04 PROCEDURE — 99214 OFFICE O/P EST MOD 30 MIN: CPT | Performed by: INTERNAL MEDICINE

## 2018-10-04 PROCEDURE — 83036 HEMOGLOBIN GLYCOSYLATED A1C: CPT | Performed by: INTERNAL MEDICINE

## 2018-10-04 PROCEDURE — 36416 COLLJ CAPILLARY BLOOD SPEC: CPT | Performed by: INTERNAL MEDICINE

## 2018-10-04 RX ORDER — METHYLPHENIDATE HYDROCHLORIDE EXTENDED RELEASE 20 MG/1
20 TABLET ORAL EVERY MORNING
Qty: 30 TABLET | Refills: 0 | Status: SHIPPED | OUTPATIENT
Start: 2018-10-04 | End: 2018-12-06

## 2018-10-04 RX ORDER — METHYLPHENIDATE HYDROCHLORIDE EXTENDED RELEASE 20 MG/1
20 TABLET ORAL EVERY MORNING
Qty: 30 TABLET | Refills: 0 | Status: SHIPPED | OUTPATIENT
Start: 2018-11-03 | End: 2018-12-06

## 2018-10-04 RX ORDER — METHYLPHENIDATE HYDROCHLORIDE EXTENDED RELEASE 20 MG/1
20 TABLET ORAL EVERY MORNING
Qty: 30 TABLET | Refills: 0 | Status: SHIPPED | OUTPATIENT
Start: 2018-12-03 | End: 2018-12-06

## 2018-10-04 RX ORDER — METFORMIN HCL 500 MG
1000 TABLET, EXTENDED RELEASE 24 HR ORAL
Qty: 180 TABLET | Refills: 1 | Status: SHIPPED | OUTPATIENT
Start: 2018-10-04 | End: 2019-03-13

## 2018-10-04 RX ORDER — DULOXETIN HYDROCHLORIDE 60 MG/1
60 CAPSULE, DELAYED RELEASE ORAL 2 TIMES DAILY
Qty: 60 CAPSULE | Refills: 5 | Status: SHIPPED | OUTPATIENT
Start: 2018-10-04 | End: 2019-03-13

## 2018-10-04 ASSESSMENT — ANXIETY QUESTIONNAIRES
3. WORRYING TOO MUCH ABOUT DIFFERENT THINGS: NOT AT ALL
6. BECOMING EASILY ANNOYED OR IRRITABLE: SEVERAL DAYS
1. FEELING NERVOUS, ANXIOUS, OR ON EDGE: NOT AT ALL
2. NOT BEING ABLE TO STOP OR CONTROL WORRYING: NOT AT ALL
7. FEELING AFRAID AS IF SOMETHING AWFUL MIGHT HAPPEN: SEVERAL DAYS
5. BEING SO RESTLESS THAT IT IS HARD TO SIT STILL: NOT AT ALL
GAD7 TOTAL SCORE: 2

## 2018-10-04 ASSESSMENT — PATIENT HEALTH QUESTIONNAIRE - PHQ9: 5. POOR APPETITE OR OVEREATING: NOT AT ALL

## 2018-10-04 NOTE — PROGRESS NOTES
Please mail result letter with the following comment:    Dear Karen,   Here is a copy of your test results that we reviewed at your recent clinic visit. Please continue the plan of care during the visit and follow up as planned.     Please call or make an appointment if you have further questions.     Lakia Hunter MD-PhD

## 2018-10-04 NOTE — PROGRESS NOTES
SUBJECTIVE:   Karen Donis is a 73 year old female who presents to clinic today for the following health issues:      Diabetes Follow-up      Patient is checking blood sugars: Downloaded. Not checking glucose regularly.     Diabetic concerns: None     Symptoms of hypoglycemia (low blood sugar): none     Paresthesias (numbness or burning in feet) or sores: Yes stinging     Date of last diabetic eye exam: DUE    BP Readings from Last 2 Encounters:   10/04/18 126/74   09/06/18 122/68     Hemoglobin A1C (%)   Date Value   10/04/2018 7.2 (H)   03/01/2018 6.9 (H)     LDL Cholesterol Calculated (mg/dL)   Date Value   06/08/2018 96   03/01/2018 72       Diabetes Management Resources    Amount of exercise or physical activity: None    Problems taking medications regularly: No    Medication side effects: none    Diet: regular (no restrictions)      Stressed lately due to brother's health. He has been hospitalized again.   Checked glucose only 4 days in the last 2 weeks. 152 and 267; 179, 173, 183  Sister passed away in February. Younger brother very irritable, gets angry easily.   More depressed lately.     Phq9=15, RAGHAVENDRA 7=2    ROS:  Constitutional, HEENT, cardiovascular, pulmonary, gi and gu systems are negative, except as otherwise noted.         Current Outpatient Prescriptions on File Prior to Visit:  ASPIRIN 81 MG OR TABS 1 TABLET DAILY   biotin 1000 MCG TABS tablet Take 1,000 mcg by mouth daily   Calcium Carbonate-Vit D-Min (CALCIUM 1200 PO) Take by mouth daily    cholecalciferol (VITAMIN  -D) 1000 UNITS capsule Take 2 capsules (2,000 Units) by mouth daily   Coenzyme Q10 (CO Q 10 PO) Take 1 capsule by mouth daily.   Cranberry 300 MG TABS Take by mouth daily    econazole nitrate 1 % cream Apply topically daily   losartan-hydrochlorothiazide (HYZAAR) 100-25 MG per tablet Take 1 tablet by mouth daily   MAGNESIUM OR 1 capsule daily   metoprolol succinate (TOPROL-XL) 25 MG 24 hr tablet Take 1 tablet (25 mg) by mouth daily    omeprazole (PRILOSEC) 40 MG capsule Take 1 capsule (40 mg) by mouth daily Take 30-60 minutes before a meal.   pravastatin (PRAVACHOL) 10 MG tablet Take 1 tablet (10 mg) by mouth daily   VITAMIN B COMPLEX-C OR CAPS 1 capsule PO qd   vitamin E 400 UNIT capsule Take by mouth daily   ACCU-CHEK SMARTVIEW test strip use to test blood sugar once daily or as directed   Alpha-D-Galactosidase (BEANO PO) Take by mouth as needed Reported on 5/3/2017   blood glucose (ACCU-CHEK FASTCLIX) lancing device Device to be used with lancets.   blood glucose monitoring (ACCU-CHEK FASTCLIX) lancets Use to test blood sugar 1 times daily or as directed.  Ok to substitute alternative if insurance prefers.   blood glucose monitoring (ACCU-CHEK FASTCLIX) lancets Use to test blood sugar 1daily or as directed.   blood glucose monitoring (NO BRAND SPECIFIED) meter device kit Use to test blood sugar daily or as directed.   blood glucose monitoring (NO BRAND SPECIFIED) test strip Use to test blood sugars daily or as directed. Accu-check smartview strips   Flaxseed, Linseed, (FLAX SEED OIL) 1000 MG capsule Take 1 capsule by mouth daily   meclizine (ANTIVERT) 25 MG tablet Take 1 tablet (25 mg) by mouth every 6 hours as needed for dizziness   [DISCONTINUED] DULoxetine (CYMBALTA) 60 MG EC capsule Take 1 capsule (60 mg) by mouth daily   [DISCONTINUED] metFORMIN (GLUCOPHAGE-XR) 500 MG 24 hr tablet Take 1 tablet (500 mg) by mouth daily (with dinner)   [DISCONTINUED] methylphenidate (METADATE ER) 20 MG CR tablet Take 1 tablet (20 mg) by mouth every morning     No current facility-administered medications on file prior to visit.        Patient Active Problem List   Diagnosis     Seborrheic dermatitis     Alopecia     Xerosis cutis     Vitamin D deficiency     Nodules, thyroid     Postmenopausal     Colon cancer     CARDIOVASCULAR SCREENING; LDL GOAL LESS THAN 160     Impingement syndrome of right shoulder     AC (acromioclavicular) joint arthritis      Anxiety state     Attention deficit disorder     ACP (advance care planning)     Gastroesophageal reflux disease without esophagitis     Diet-controlled diabetes mellitus (H)     Hyperlipidemia LDL goal <100     Essential hypertension with goal blood pressure less than 140/90     Fatty liver     Major depression in complete remission (H)     Chronic bilateral low back pain with bilateral sciatica     Morbid obesity due to excess calories (H)     BPPV (benign paroxysmal positional vertigo), right     Past Surgical History:   Procedure Laterality Date     COLON SURGERY  2003     THYROIDECTOMY  12/6/2011    Procedure:THYROIDECTOMY; Right Thyroid Lobectomy and bilateral removal of skin tags; Surgeon:SAJI ZAZUETA; Location:[facility] OR     TONSILLECTOMY      While she was in 6th grade       Social History   Substance Use Topics     Smoking status: Former Smoker     Quit date: 12/12/1982     Smokeless tobacco: Never Used     Alcohol use No     Family History   Problem Relation Age of Onset     Diabetes Mother      Hypertension Mother      Thyroid Disease Mother      HEART DISEASE Father      Cardiovascular Father      Neurologic Disorder Father      Parkinson's     Arthritis Father      Fibromyalgia     GASTROINTESTINAL DISEASE Maternal Grandmother      gallbladder removed     HEART DISEASE Maternal Grandfather      Diabetes Brother      Hypertension Brother      Neurologic Disorder Brother      ADHD     Unknown/Adopted Son      Unknown/Adopted Daughter      Neurologic Disorder Brother      ADHD             Problem list, Medication list, Allergies, and Medical/Social/Surgical histories reviewed in [facility] and updated as appropriate.    OBJECTIVE:                                                    /74  Pulse 67  Temp 96  F (35.6  C) (Temporal)  Wt 230 lb (104.3 kg)  SpO2 95%  BMI 41.07 kg/m2    GENERAL: healthy, alert and no distress  HEENT: unremarkable  Neck: no adenopathy/mass/stiffness. Thyroid normal.  Lung:  clear, no wheezing/rhonchi/crackles  Heart: RRR, normal S1/2, no murmur/gallop/rup  Abd: soft, normal BS, non tender, no organomegaly   Ext: no cyanosis/clubbing/edema      Diagnostic test results:  Results for orders placed or performed in visit on 10/04/18   **A1C FUTURE 3mo   Result Value Ref Range    Hemoglobin A1C 7.2 (H) 0 - 5.6 %         ASSESSMENT/PLAN:                                                      73 year old female with the following diagnoses and treatment plan:      ICD-10-CM    1. Recurrent major depressive disorder, in partial remission (H) F33.41 DULoxetine (CYMBALTA) 60 MG EC capsule   2. Fibromyalgia M79.7 DULoxetine (CYMBALTA) 60 MG EC capsule   3. Type 2 diabetes mellitus without complication, without long-term current use of insulin (H) E11.9 metFORMIN (GLUCOPHAGE-XR) 500 MG 24 hr tablet   4. Attention deficit disorder (ADD) without hyperactivity F98.8 methylphenidate (METADATE ER) 20 MG CR tablet     methylphenidate (METADATE ER) 20 MG CR tablet     methylphenidate (METADATE ER) 20 MG CR tablet       -- depression:  Worsening due to family situation. Increase Duloxetine to 60 mg bid. Follow up in 1 month  -- diabetes: remains controlled but A1c trending higher and fasting glucose trending higher. Increase Metformin to 1000 mg daily. Diabetes follow up in 6 months.   -- ADD: stable. Refilled Methyphenidate, 3 laddered scripts.   -- plans to get flu vaccine at pharmacy, plans to schedule eye exam. Has podiatry appointment coming up.     Will call or return to clinic if worsening or symptoms not improving as discussed.  See Patient Instructions.      Lakia Hunter MD-PhD  [facility]    (Note: Chart documentation was done in part with Dragon Voice Recognition software. Although reviewed after completion, some word and grammatical errors may remain.)

## 2018-10-04 NOTE — MR AVS SNAPSHOT
After Visit Summary   10/4/2018    Karen Donis    MRN: [id number]           Patient Information     Date Of Birth          1945        Visit Information        Provider Department      10/4/2018 11:30 AM Lakia Hunter MD PhD [facility]        Today's Diagnoses     Recurrent major depressive disorder, in partial remission (H)        Fibromyalgia        Type 2 diabetes mellitus without complication, without long-term current use of insulin (H)        Attention deficit disorder (ADD) without hyperactivity          Care Instructions    Make appointment(s) for:   -- Diabetes follow up with fasting labs in 6 months.   -- mammogram.   -- depression follow up in 1 month.         Medication(s) prescribed today:    Orders Placed This Encounter   Medications     DULoxetine (CYMBALTA) 60 MG EC capsule     Sig: Take 1 capsule (60 mg) by mouth 2 times daily     Dispense:  60 capsule     Refill:  5     Dose increased.     metFORMIN (GLUCOPHAGE-XR) 500 MG 24 hr tablet     Sig: Take 2 tablets (1,000 mg) by mouth daily (with breakfast)     Dispense:  180 tablet     Refill:  1     Dose increased.     methylphenidate (METADATE ER) 20 MG CR tablet     Sig: Take 1 tablet (20 mg) by mouth every morning     Dispense:  30 tablet     Refill:  0     methylphenidate (METADATE ER) 20 MG CR tablet     Sig: Take 1 tablet (20 mg) by mouth every morning     Dispense:  30 tablet     Refill:  0     methylphenidate (METADATE ER) 20 MG CR tablet     Sig: Take 1 tablet (20 mg) by mouth every morning     Dispense:  30 tablet     Refill:  0                   Follow-ups after your visit        Your next 10 appointments already scheduled     Nov 27, 2018  2:00 PM CST   Return Visit with Brent Bunn DPM   [facility] ([facility])    [id number] [place] [id number] [phone]              Who to contact     If you have questions or need follow up information about  today's clinic visit or your schedule please contact [facility] directly at [phone].  Normal or non-critical lab and imaging results will be communicated to you by Stormpulsehart, letter or phone within 4 business days after the clinic has received the results. If you do not hear from us within 7 days, please contact the clinic through Stormpulsehart or phone. If you have a critical or abnormal lab result, we will notify you by phone as soon as possible.  Submit refill requests through LAFASO or call your pharmacy and they will forward the refill request to us. Please allow 3 business days for your refill to be completed.          Additional Information About Your Visit        StormpulseharEnviance Information     LAFASO is an electronic gateway that provides easy, online access to your medical records. With LAFASO, you can request a clinic appointment, read your test results, renew a prescription or communicate with your care team.     To sign up for LAFASO visit the website at [email or web address]   You will be asked to enter the access code listed below, as well as some personal information. Please follow the directions to create your username and password.     Your access code is: [email or web address]  Expires: 2019 12:07 PM     Your access code will  in 90 days. If you need help or a new code, please contact your [facility] or call [phone] for assistance.        Care EveryWhere ID     This is your Care EveryWhere ID. This could be used by other organizations to access your [place] medical records  [id number]        Your Vitals Were     Pulse Temperature Pulse Oximetry BMI (Body Mass Index)          67 96  F (35.6  C) (Temporal) 95% 41.07 kg/m2         Blood Pressure from Last 3 Encounters:   10/04/18 126/74   18 122/68   18 122/78    Weight from Last 3 Encounters:   10/04/18 230 lb (104.3 kg)   18 225 lb 6.4 oz (102.2 kg)   18 222 lb (100.7  kg)              Today, you had the following     No orders found for display         Today's Medication Changes          These changes are accurate as of 10/4/18 12:07 PM.  If you have any questions, ask your nurse or doctor.               Start taking these medicines.        Dose/Directions    * methylphenidate 20 MG CR tablet   Commonly known as:  METADATE ER   Used for:  Attention deficit disorder (ADD) without hyperactivity   Started by:  Lakia Hunter MD PhD        Dose:  20 mg   Take 1 tablet (20 mg) by mouth every morning   Quantity:  30 tablet   Refills:  0       * methylphenidate 20 MG CR tablet   Commonly known as:  METADATE ER   Used for:  Attention deficit disorder (ADD) without hyperactivity   Started by:  Lakia Hunter MD PhD        Dose:  20 mg   Start taking on:  11/3/2018   Take 1 tablet (20 mg) by mouth every morning   Quantity:  30 tablet   Refills:  0       * methylphenidate 20 MG CR tablet   Commonly known as:  METADATE ER   Used for:  Attention deficit disorder (ADD) without hyperactivity   Started by:  Lakia Hunter MD PhD        Dose:  20 mg   Start taking on:  12/3/2018   Take 1 tablet (20 mg) by mouth every morning   Quantity:  30 tablet   Refills:  0       * Notice:  This list has 3 medication(s) that are the same as other medications prescribed for you. Read the directions carefully, and ask your doctor or other care provider to review them with you.      These medicines have changed or have updated prescriptions.        Dose/Directions    DULoxetine 60 MG EC capsule   Commonly known as:  CYMBALTA   This may have changed:  See the new instructions.   Used for:  Recurrent major depressive disorder, in partial remission (H), Fibromyalgia   Changed by:  Lakia Hunter MD PhD        Dose:  60 mg   Take 1 capsule (60 mg) by mouth 2 times daily   Quantity:  60 capsule   Refills:  5       metFORMIN 500 MG 24 hr tablet   Commonly known as:  GLUCOPHAGE-XR   This may have changed:    - how much to take  - when  to take this   Used for:  Type 2 diabetes mellitus without complication, without long-term current use of insulin (H)   Changed by:  Lakia Hunter MD PhD        Dose:  1000 mg   Take 2 tablets (1,000 mg) by mouth daily (with breakfast)   Quantity:  180 tablet   Refills:  1            Where to get your medicines      These medications were sent to [id number] - [place], [place] - [address]  [address] [place] [address]     Phone:  [phone]     DULoxetine 60 MG EC capsule    metFORMIN 500 MG 24 hr tablet         Some of these will need a paper prescription and others can be bought over the counter.  Ask your nurse if you have questions.     Bring a paper prescription for each of these medications     methylphenidate 20 MG CR tablet    methylphenidate 20 MG CR tablet    methylphenidate 20 MG CR tablet                Primary Care Provider Office Phone # Fax #    Lakia Hunter MD PhD [phone]       [address]  [facility] [address]        Equal Access to Services     [facility]: Hadii stephanie ku hadasho Soomaali, waaxda luqadaha, qaybta kaalmada adeegyada, waxay darriusin haygaro espinal . So [facility] [phone].    ATENCIÓN: Si habla español, tiene a ashley disposición servicios gratuitos de asistencia lingüística. Ll[facility] [phone].    We comply with applicable federal civil rights laws and [place] laws. We do not discriminate on the basis of race, color, national origin, age, disability, sex, sexual orientation, or gender identity.            Thank you!     Thank you for choosing [facility]  for your care. Our goal is always to provide you with excellent care. Hearing back from our patients is one way we can continue to improve our services. Please take a few minutes to complete the written survey that you may receive in the mail after your visit with us. Thank you!             Your Updated Medication List - Protect others around you: Learn  how to safely use, store and throw away your medicines at www.disposemymeds.org.          This list is accurate as of 10/4/18 12:07 PM.  Always use your most recent med list.                   Brand Name Dispense Instructions for use Diagnosis    aspirin 81 MG tablet      1 TABLET DAILY        BEANO PO      Take by mouth as needed Reported on 5/3/2017        biotin 1000 MCG Tabs tablet      Take 1,000 mcg by mouth daily        blood glucose lancing device     1 each    Device to be used with lancets.    Diet-controlled diabetes mellitus (H)       * blood glucose monitoring lancets     1 Box    Use to test blood sugar 1 times daily or as directed.  Ok to substitute alternative if insurance prefers.    Diet-controlled diabetes mellitus (H)       * blood glucose monitoring lancets     1 Box    Use to test blood sugar 1daily or as directed.    Diet-controlled diabetes mellitus (H)       blood glucose monitoring meter device kit    no brand specified    1 kit    Use to test blood sugar daily or as directed.    Controlled type 2 diabetes mellitus without complication, without long-term current use of insulin (H)       * blood glucose monitoring test strip    no brand specified    100 each    Use to test blood sugars daily or as directed. Accu-check smartview strips    Diet-controlled diabetes mellitus (H)       * ACCU-CHEK SMARTVIEW test strip   Generic drug:  blood glucose monitoring     50 each    use to test blood sugar once daily or as directed    Diet-controlled diabetes mellitus (H)       CALCIUM 1200 PO      Take by mouth daily        cholecalciferol 1000 units capsule    vitamin  -D    180 capsule    Take 2 capsules (2,000 Units) by mouth daily    Vitamin D deficiency       CO Q 10 PO      Take 1 capsule by mouth daily.        Cranberry 300 MG Tabs      Take by mouth daily        DULoxetine 60 MG EC capsule    CYMBALTA    60 capsule    Take 1 capsule (60 mg) by mouth 2 times daily    Recurrent major depressive  disorder, in partial remission (H), Fibromyalgia       econazole nitrate 1 % cream     30 g    Apply topically daily    Tinea pedis       flax seed oil 1000 MG capsule      Take 1 capsule by mouth daily        losartan-hydrochlorothiazide 100-25 MG per tablet    HYZAAR    90 tablet    Take 1 tablet by mouth daily    Essential hypertension with goal blood pressure less than 140/90       MAGNESIUM PO      1 capsule daily        meclizine 25 MG tablet    ANTIVERT    30 tablet    Take 1 tablet (25 mg) by mouth every 6 hours as needed for dizziness    BPPV (benign paroxysmal positional vertigo), right       metFORMIN 500 MG 24 hr tablet    GLUCOPHAGE-XR    180 tablet    Take 2 tablets (1,000 mg) by mouth daily (with breakfast)    Type 2 diabetes mellitus without complication, without long-term current use of insulin (H)       * methylphenidate 20 MG CR tablet    METADATE ER    30 tablet    Take 1 tablet (20 mg) by mouth every morning    Attention deficit disorder (ADD) without hyperactivity       * methylphenidate 20 MG CR tablet   Start taking on:  11/3/2018    METADATE ER    30 tablet    Take 1 tablet (20 mg) by mouth every morning    Attention deficit disorder (ADD) without hyperactivity       * methylphenidate 20 MG CR tablet   Start taking on:  12/3/2018    METADATE ER    30 tablet    Take 1 tablet (20 mg) by mouth every morning    Attention deficit disorder (ADD) without hyperactivity       metoprolol succinate 25 MG 24 hr tablet    TOPROL-XL    90 tablet    Take 1 tablet (25 mg) by mouth daily    Hypertension goal BP (blood pressure) < 140/90       omeprazole 40 MG capsule    priLOSEC    90 capsule    Take 1 capsule (40 mg) by mouth daily Take 30-60 minutes before a meal.    Gastroesophageal reflux disease without esophagitis       pravastatin 10 MG tablet    PRAVACHOL    90 tablet    Take 1 tablet (10 mg) by mouth daily    Hyperlipidemia LDL goal <100       VITAMIN B COMPLEX-C Caps      1 capsule PO qd         vitamin E 400 UNIT capsule      Take by mouth daily        * Notice:  This list has 7 medication(s) that are the same as other medications prescribed for you. Read the directions carefully, and ask your doctor or other care provider to review them with you.

## 2018-10-04 NOTE — PATIENT INSTRUCTIONS
Make appointment(s) for:   -- Diabetes follow up with fasting labs in 6 months.   -- mammogram.   -- depression follow up in 1 month.         Medication(s) prescribed today:    Orders Placed This Encounter   Medications     DULoxetine (CYMBALTA) 60 MG EC capsule     Sig: Take 1 capsule (60 mg) by mouth 2 times daily     Dispense:  60 capsule     Refill:  5     Dose increased.     metFORMIN (GLUCOPHAGE-XR) 500 MG 24 hr tablet     Sig: Take 2 tablets (1,000 mg) by mouth daily (with breakfast)     Dispense:  180 tablet     Refill:  1     Dose increased.     methylphenidate (METADATE ER) 20 MG CR tablet     Sig: Take 1 tablet (20 mg) by mouth every morning     Dispense:  30 tablet     Refill:  0     methylphenidate (METADATE ER) 20 MG CR tablet     Sig: Take 1 tablet (20 mg) by mouth every morning     Dispense:  30 tablet     Refill:  0     methylphenidate (METADATE ER) 20 MG CR tablet     Sig: Take 1 tablet (20 mg) by mouth every morning     Dispense:  30 tablet     Refill:  0            99

## 2018-10-05 ASSESSMENT — ANXIETY QUESTIONNAIRES: GAD7 TOTAL SCORE: 2

## 2018-10-05 ASSESSMENT — PATIENT HEALTH QUESTIONNAIRE - PHQ9: SUM OF ALL RESPONSES TO PHQ QUESTIONS 1-9: 15

## 2018-11-05 ENCOUNTER — TELEPHONE (OUTPATIENT)
Dept: PEDIATRICS | Facility: CLINIC | Age: 73
End: 2018-11-05

## 2018-11-05 DIAGNOSIS — E11.9 DIET-CONTROLLED DIABETES MELLITUS (H): ICD-10-CM

## 2018-11-05 DIAGNOSIS — E11.9 CONTROLLED TYPE 2 DIABETES MELLITUS WITHOUT COMPLICATION, WITHOUT LONG-TERM CURRENT USE OF INSULIN (H): Primary | ICD-10-CM

## 2018-11-05 NOTE — TELEPHONE ENCOUNTER
[facility] Call Center    Phone Message    May a detailed message be left on voicemail: yes    Reason for Call:  Broken Blood Sugar Monitor - Accu Check. Please follow up with patient to discuss ordering a new blood sugar monitor.     Action Taken: Message routed to:  Primary Care p [phone]

## 2018-11-06 NOTE — TELEPHONE ENCOUNTER
Called patient, left message asking for more detailed information.  Gave phone number to call back.  If she calls back, please ask:    Is the monitor the only thing needed?  What pharmacy?     Maritza Wisdom RN, [facility]

## 2018-11-07 NOTE — TELEPHONE ENCOUNTER
Patient returned clinics call- Monitor is the only thing that is needed- Please send to [facility]. Patient requesting a confirmation call once the request has been sent. Thank you

## 2018-11-07 NOTE — TELEPHONE ENCOUNTER
Blood glucose monitor: Medication filled per RN refill protocol.      Antonette Main RN, [facility]     Patient informed.  She also requested refill on test strips.    Test strips: Medication filled per RN refill protocol.      Antonette Main RN, [facility]

## 2018-11-15 ENCOUNTER — OFFICE VISIT (OUTPATIENT)
Dept: ORTHOPEDICS | Facility: CLINIC | Age: 73
End: 2018-11-15
Attending: INTERNAL MEDICINE
Payer: COMMERCIAL

## 2018-11-15 VITALS
HEART RATE: 73 BPM | HEIGHT: 63 IN | SYSTOLIC BLOOD PRESSURE: 150 MMHG | OXYGEN SATURATION: 96 % | DIASTOLIC BLOOD PRESSURE: 78 MMHG | BODY MASS INDEX: 40.75 KG/M2 | WEIGHT: 230 LBS

## 2018-11-15 DIAGNOSIS — M62.830 LUMBAR PARASPINAL MUSCLE SPASM: Primary | ICD-10-CM

## 2018-11-15 DIAGNOSIS — M51.34 THORACIC DEGENERATIVE DISC DISEASE: ICD-10-CM

## 2018-11-15 PROCEDURE — 99214 OFFICE O/P EST MOD 30 MIN: CPT | Performed by: PREVENTIVE MEDICINE

## 2018-11-15 ASSESSMENT — PAIN SCALES - GENERAL: PAINLEVEL: MILD PAIN (3)

## 2018-11-15 NOTE — NURSING NOTE
"Karen Donis's chief complaint for this visit includes:  Chief Complaint   Patient presents with     Consult     lumbar back and right hip pain X 3months.      PCP: Lakia Hunter    Referring Provider:  Lakia Hunter MD PhD  [address] [place] [address]    /78  Pulse 73  Ht 1.594 m (5' 2.75\")  Wt 104.3 kg (230 lb)  SpO2 96%  BMI 41.07 kg/m2  Mild Pain (3)     Do you need any medication refills at today's visit? No        "

## 2018-11-15 NOTE — PATIENT INSTRUCTIONS
Thanks for coming today.  [facility]  [address] [place] [address]    To schedule future appointments in Ortho Clinic, you may call [phone].    To schedule ordered imaging by your provider:   Call Central Imaging [phone]    To schedule an injection ordered by your provider:  Call Central [facility] Injection scheduling line: [phone]  [facility] available online at:  [email or web address]    Please call if any further questions or concerns ([phone]).  Clinic hours 8 am to 5 pm.    Return to clinic (call) if symptoms worsen or fail to improve.

## 2018-11-15 NOTE — PROGRESS NOTES
HISTORY OF PRESENT ILLNESS  Ms. Donis is a pleasant 73 year old year old female who presents to clinic today with lumbar/ thoracic  Pain  Has not had any MRIs       MEDICAL HISTORY  Patient Active Problem List   Diagnosis     Seborrheic dermatitis     Alopecia     Xerosis cutis     Vitamin D deficiency     Nodules, thyroid     Postmenopausal     Colon cancer     CARDIOVASCULAR SCREENING; LDL GOAL LESS THAN 160     Impingement syndrome of right shoulder     AC (acromioclavicular) joint arthritis     Anxiety state     Attention deficit disorder     ACP (advance care planning)     Gastroesophageal reflux disease without esophagitis     Diet-controlled diabetes mellitus (H)     Hyperlipidemia LDL goal <100     Essential hypertension with goal blood pressure less than 140/90     Fatty liver     Major depression in complete remission (H)     Chronic bilateral low back pain with bilateral sciatica     Morbid obesity due to excess calories (H)     BPPV (benign paroxysmal positional vertigo), right       Current Outpatient Prescriptions   Medication Sig Dispense Refill     ACCU-CHEK SMARTVIEW test strip use to test blood sugar once daily or as directed 50 each 5     Alpha-D-Galactosidase (BEANO PO) Take by mouth as needed Reported on 5/3/2017       ASPIRIN 81 MG OR TABS 1 TABLET DAILY       biotin 1000 MCG TABS tablet Take 1,000 mcg by mouth daily       blood glucose (ACCU-CHEK FASTCLIX) lancing device Device to be used with lancets. 1 each 0     blood glucose monitoring (ACCU-CHEK FASTCLIX) lancets Use to test blood sugar 1 times daily or as directed.  Ok to substitute alternative if insurance prefers. 1 Box 6     blood glucose monitoring (ACCU-CHEK FASTCLIX) lancets Use to test blood sugar 1daily or as directed. 1 Box 3     blood glucose monitoring (NO BRAND SPECIFIED) meter device kit Use to test blood sugar 1 times daily or as directed. 1 kit 0     blood glucose monitoring (NO BRAND SPECIFIED) meter device kit Use to  test blood sugar daily or as directed. 1 kit 0     blood glucose monitoring (NO BRAND SPECIFIED) test strip Use to test blood sugars daily or as directed. Accu-check smartview strips 100 each 3     Calcium Carbonate-Vit D-Min (CALCIUM 1200 PO) Take by mouth daily        cholecalciferol (VITAMIN  -D) 1000 UNITS capsule Take 2 capsules (2,000 Units) by mouth daily 180 capsule 3     Coenzyme Q10 (CO Q 10 PO) Take 1 capsule by mouth daily.       Cranberry 300 MG TABS Take by mouth daily        DULoxetine (CYMBALTA) 60 MG EC capsule Take 1 capsule (60 mg) by mouth 2 times daily 60 capsule 5     econazole nitrate 1 % cream Apply topically daily 30 g 3     Flaxseed, Linseed, (FLAX SEED OIL) 1000 MG capsule Take 1 capsule by mouth daily       losartan-hydrochlorothiazide (HYZAAR) 100-25 MG per tablet Take 1 tablet by mouth daily 90 tablet 3     MAGNESIUM OR 1 capsule daily       meclizine (ANTIVERT) 25 MG tablet Take 1 tablet (25 mg) by mouth every 6 hours as needed for dizziness 30 tablet 1     metFORMIN (GLUCOPHAGE-XR) 500 MG 24 hr tablet Take 2 tablets (1,000 mg) by mouth daily (with breakfast) 180 tablet 1     methylphenidate (METADATE ER) 20 MG CR tablet Take 1 tablet (20 mg) by mouth every morning 30 tablet 0     methylphenidate (METADATE ER) 20 MG CR tablet Take 1 tablet (20 mg) by mouth every morning 30 tablet 0     [START ON 12/3/2018] methylphenidate (METADATE ER) 20 MG CR tablet Take 1 tablet (20 mg) by mouth every morning 30 tablet 0     metoprolol succinate (TOPROL-XL) 25 MG 24 hr tablet Take 1 tablet (25 mg) by mouth daily 90 tablet 3     omeprazole (PRILOSEC) 40 MG capsule Take 1 capsule (40 mg) by mouth daily Take 30-60 minutes before a meal. 90 capsule 3     pravastatin (PRAVACHOL) 10 MG tablet Take 1 tablet (10 mg) by mouth daily 90 tablet 3     VITAMIN B COMPLEX-C OR CAPS 1 capsule PO qd       vitamin E 400 UNIT capsule Take by mouth daily         Allergies   Allergen Reactions     Bactrim Other (See  "Comments)     Burning when voiding     Belladonna Alkaloids-Opium [B & O] Blisters     Bellagril     Definity [Definity] Other (See Comments)     Pain in the tailbone     Ergotamine Other (See Comments)     Patient unsure of reaction     Phenobarbital Other (See Comments)     Patient unsure of reaction     Sulfa Drugs Blisters     Tape [Adhesive Tape] Other (See Comments)     Burning feeling of the skin     Lisinopril Cough              Family History   Problem Relation Age of Onset     Diabetes Mother      Hypertension Mother      Thyroid Disease Mother      HEART DISEASE Father      Cardiovascular Father      Neurologic Disorder Father      Parkinson's     Arthritis Father      Fibromyalgia     GASTROINTESTINAL DISEASE Maternal Grandmother      gallbladder removed     HEART DISEASE Maternal Grandfather      Diabetes Brother      Hypertension Brother      Neurologic Disorder Brother      ADHD     Unknown/Adopted Son      Unknown/Adopted Daughter      Neurologic Disorder Brother      ADHD       Additional medical/Social/Surgical histories reviewed in [facility] and updated as appropriate.     REVIEW OF SYSTEMS (11/15/2018)  10 point ROS of systems including Constitutional, Eyes, Respiratory, Cardiovascular, Gastroenterology, Genitourinary, Integumentary, Musculoskeletal, Psychiatric were all negative except for pertinent positives noted in my HPI.     PHYSICAL EXAM  Vitals:    11/15/18 1153   BP: 150/78   Pulse: 73   SpO2: 96%   Weight: 104.3 kg (230 lb)   Height: 1.594 m (5' 2.75\")     Vital Signs: /78  Pulse 73  Ht 1.594 m (5' 2.75\")  Wt 104.3 kg (230 lb)  SpO2 96%  BMI 41.07 kg/m2 Patient declined being weighed. Body mass index is 41.07 kg/(m^2).    General  - normal appearance, in no obvious distress  CV  - normal peripheral perfusion  Pulm  - normal respiratory pattern, non-labored  Musculoskeletal - lumbar spine  - stance: normal gait without limp, no obvious leg length discrepancy, normal heel and toe " walk  - inspection: normal bone and joint alignment, no obvious scoliosis  - palpation: no paravertebral or bony tenderness  - ROM: flexion exacerbates pain, normal extension, sidebending, rotation  - strength: lower extremities 5/5 in all planes  - special tests:  (+) straight leg raise  (+) slump test  Neuro  - patellar and Achilles DTRs 2+ bilaterally, lower extremity sensory deficit throughout L5 distribution, grossly normal coordination, normal muscle tone  Skin  - no ecchymosis, erythema, warmth, or induration, no obvious rash  Psych  - interactive, appropriate, normal mood and affect  Mid back : has pain over paraspinal muscles  ASSESSMENT & PLAN  72 yo female with lumbar and thoracic ddd  Reviewed her symptoms and MRis ordered  Start tizanadine  F/u after Mris    Danie Garcia MD, CAQSM

## 2018-11-15 NOTE — LETTER
11/15/2018         RE: Karen Donis  [address] [facility] [place] [address]        Dear Colleague,    Thank you for referring your patient, Karen Donis, to the [facility]. Please see a copy of my visit note below.    HISTORY OF PRESENT ILLNESS  Ms. Donis is a pleasant 73 year old year old female who presents to clinic today with lumbar/ thoracic  Pain  Has not had any MRIs       MEDICAL HISTORY  Patient Active Problem List   Diagnosis     Seborrheic dermatitis     Alopecia     Xerosis cutis     Vitamin D deficiency     Nodules, thyroid     Postmenopausal     Colon cancer     CARDIOVASCULAR SCREENING; LDL GOAL LESS THAN 160     Impingement syndrome of right shoulder     AC (acromioclavicular) joint arthritis     Anxiety state     Attention deficit disorder     ACP (advance care planning)     Gastroesophageal reflux disease without esophagitis     Diet-controlled diabetes mellitus (H)     Hyperlipidemia LDL goal <100     Essential hypertension with goal blood pressure less than 140/90     Fatty liver     Major depression in complete remission (H)     Chronic bilateral low back pain with bilateral sciatica     Morbid obesity due to excess calories (H)     BPPV (benign paroxysmal positional vertigo), right       Current Outpatient Prescriptions   Medication Sig Dispense Refill     ACCU-CHEK SMARTVIEW test strip use to test blood sugar once daily or as directed 50 each 5     Alpha-D-Galactosidase (BEANO PO) Take by mouth as needed Reported on 5/3/2017       ASPIRIN 81 MG OR TABS 1 TABLET DAILY       biotin 1000 MCG TABS tablet Take 1,000 mcg by mouth daily       blood glucose (ACCU-CHEK FASTCLIX) lancing device Device to be used with lancets. 1 each 0     blood glucose monitoring (ACCU-CHEK FASTCLIX) lancets Use to test blood sugar 1 times daily or as directed.  Ok to substitute alternative if insurance prefers. 1 Box 6     blood glucose monitoring (ACCU-CHEK FASTCLIX) lancets Use to  test blood sugar 1daily or as directed. 1 Box 3     blood glucose monitoring (NO BRAND SPECIFIED) meter device kit Use to test blood sugar 1 times daily or as directed. 1 kit 0     blood glucose monitoring (NO BRAND SPECIFIED) meter device kit Use to test blood sugar daily or as directed. 1 kit 0     blood glucose monitoring (NO BRAND SPECIFIED) test strip Use to test blood sugars daily or as directed. Accu-check smartview strips 100 each 3     Calcium Carbonate-Vit D-Min (CALCIUM 1200 PO) Take by mouth daily        cholecalciferol (VITAMIN  -D) 1000 UNITS capsule Take 2 capsules (2,000 Units) by mouth daily 180 capsule 3     Coenzyme Q10 (CO Q 10 PO) Take 1 capsule by mouth daily.       Cranberry 300 MG TABS Take by mouth daily        DULoxetine (CYMBALTA) 60 MG EC capsule Take 1 capsule (60 mg) by mouth 2 times daily 60 capsule 5     econazole nitrate 1 % cream Apply topically daily 30 g 3     Flaxseed, Linseed, (FLAX SEED OIL) 1000 MG capsule Take 1 capsule by mouth daily       losartan-hydrochlorothiazide (HYZAAR) 100-25 MG per tablet Take 1 tablet by mouth daily 90 tablet 3     MAGNESIUM OR 1 capsule daily       meclizine (ANTIVERT) 25 MG tablet Take 1 tablet (25 mg) by mouth every 6 hours as needed for dizziness 30 tablet 1     metFORMIN (GLUCOPHAGE-XR) 500 MG 24 hr tablet Take 2 tablets (1,000 mg) by mouth daily (with breakfast) 180 tablet 1     methylphenidate (METADATE ER) 20 MG CR tablet Take 1 tablet (20 mg) by mouth every morning 30 tablet 0     methylphenidate (METADATE ER) 20 MG CR tablet Take 1 tablet (20 mg) by mouth every morning 30 tablet 0     [START ON 12/3/2018] methylphenidate (METADATE ER) 20 MG CR tablet Take 1 tablet (20 mg) by mouth every morning 30 tablet 0     metoprolol succinate (TOPROL-XL) 25 MG 24 hr tablet Take 1 tablet (25 mg) by mouth daily 90 tablet 3     omeprazole (PRILOSEC) 40 MG capsule Take 1 capsule (40 mg) by mouth daily Take 30-60 minutes before a meal. 90 capsule 3      "pravastatin (PRAVACHOL) 10 MG tablet Take 1 tablet (10 mg) by mouth daily 90 tablet 3     VITAMIN B COMPLEX-C OR CAPS 1 capsule PO qd       vitamin E 400 UNIT capsule Take by mouth daily         Allergies   Allergen Reactions     Bactrim Other (See Comments)     Burning when voiding     Belladonna Alkaloids-Opium [B & O] Blisters     Bellagril     Definity [Definity] Other (See Comments)     Pain in the tailbone     Ergotamine Other (See Comments)     Patient unsure of reaction     Phenobarbital Other (See Comments)     Patient unsure of reaction     Sulfa Drugs Blisters     Tape [Adhesive Tape] Other (See Comments)     Burning feeling of the skin     Lisinopril Cough              Family History   Problem Relation Age of Onset     Diabetes Mother      Hypertension Mother      Thyroid Disease Mother      HEART DISEASE Father      Cardiovascular Father      Neurologic Disorder Father      Parkinson's     Arthritis Father      Fibromyalgia     GASTROINTESTINAL DISEASE Maternal Grandmother      gallbladder removed     HEART DISEASE Maternal Grandfather      Diabetes Brother      Hypertension Brother      Neurologic Disorder Brother      ADHD     Unknown/Adopted Son      Unknown/Adopted Daughter      Neurologic Disorder Brother      ADHD       Additional medical/Social/Surgical histories reviewed in [facility] and updated as appropriate.     REVIEW OF SYSTEMS (11/15/2018)  10 point ROS of systems including Constitutional, Eyes, Respiratory, Cardiovascular, Gastroenterology, Genitourinary, Integumentary, Musculoskeletal, Psychiatric were all negative except for pertinent positives noted in my HPI.     PHYSICAL EXAM  Vitals:    11/15/18 1153   BP: 150/78   Pulse: 73   SpO2: 96%   Weight: 104.3 kg (230 lb)   Height: 1.594 m (5' 2.75\")     Vital Signs: /78  Pulse 73  Ht 1.594 m (5' 2.75\")  Wt 104.3 kg (230 lb)  SpO2 96%  BMI 41.07 kg/m2 Patient declined being weighed. Body mass index is 41.07 kg/(m^2).    General  - " normal appearance, in no obvious distress  CV  - normal peripheral perfusion  Pulm  - normal respiratory pattern, non-labored  Musculoskeletal - lumbar spine  - stance: normal gait without limp, no obvious leg length discrepancy, normal heel and toe walk  - inspection: normal bone and joint alignment, no obvious scoliosis  - palpation: no paravertebral or bony tenderness  - ROM: flexion exacerbates pain, normal extension, sidebending, rotation  - strength: lower extremities 5/5 in all planes  - special tests:  (+) straight leg raise  (+) slump test  Neuro  - patellar and Achilles DTRs 2+ bilaterally, lower extremity sensory deficit throughout L5 distribution, grossly normal coordination, normal muscle tone  Skin  - no ecchymosis, erythema, warmth, or induration, no obvious rash  Psych  - interactive, appropriate, normal mood and affect  Mid back : has pain over paraspinal muscles  ASSESSMENT & PLAN  74 yo female with lumbar and thoracic ddd  Reviewed her symptoms and MRis ordered  Start tizanadine  F/u after Mris    Danie Garcia MD, CAQSM    Again, thank you for allowing me to participate in the care of your patient.        Sincerely,        Danie Garcia MD

## 2018-11-16 ENCOUNTER — TRANSFERRED RECORDS (OUTPATIENT)
Dept: HEALTH INFORMATION MANAGEMENT | Facility: CLINIC | Age: 73
End: 2018-11-16

## 2018-11-20 ENCOUNTER — OFFICE VISIT (OUTPATIENT)
Dept: ORTHOPEDICS | Facility: CLINIC | Age: 73
End: 2018-11-20
Payer: COMMERCIAL

## 2018-11-20 VITALS — WEIGHT: 230 LBS | BODY MASS INDEX: 40.75 KG/M2 | HEIGHT: 63 IN

## 2018-11-20 DIAGNOSIS — M51.369 LUMBAR DEGENERATIVE DISC DISEASE: Primary | ICD-10-CM

## 2018-11-20 PROCEDURE — 99213 OFFICE O/P EST LOW 20 MIN: CPT | Performed by: PREVENTIVE MEDICINE

## 2018-11-20 ASSESSMENT — PAIN SCALES - GENERAL: PAINLEVEL: MODERATE PAIN (4)

## 2018-11-20 NOTE — PROGRESS NOTES
HISTORY OF PRESENT ILLNESS  Ms. Donis is a pleasant 73 year old year old female who presents to clinic today for followup for MRI.   Overall feels about the same  Had some relief from medications, would like to discuss MRI and make a plan  MEDICAL HISTORY  Patient Active Problem List   Diagnosis     Seborrheic dermatitis     Alopecia     Xerosis cutis     Vitamin D deficiency     Nodules, thyroid     Postmenopausal     Colon cancer     CARDIOVASCULAR SCREENING; LDL GOAL LESS THAN 160     Impingement syndrome of right shoulder     AC (acromioclavicular) joint arthritis     Anxiety state     Attention deficit disorder     ACP (advance care planning)     Gastroesophageal reflux disease without esophagitis     Diet-controlled diabetes mellitus (H)     Hyperlipidemia LDL goal <100     Essential hypertension with goal blood pressure less than 140/90     Fatty liver     Major depression in complete remission (H)     Chronic bilateral low back pain with bilateral sciatica     Morbid obesity due to excess calories (H)     BPPV (benign paroxysmal positional vertigo), right       Current Outpatient Prescriptions   Medication Sig Dispense Refill     ACCU-CHEK SMARTVIEW test strip use to test blood sugar once daily or as directed 50 each 5     Alpha-D-Galactosidase (BEANO PO) Take by mouth as needed Reported on 5/3/2017       ASPIRIN 81 MG OR TABS 1 TABLET DAILY       biotin 1000 MCG TABS tablet Take 1,000 mcg by mouth daily       blood glucose (ACCU-CHEK FASTCLIX) lancing device Device to be used with lancets. 1 each 0     blood glucose monitoring (ACCU-CHEK FASTCLIX) lancets Use to test blood sugar 1 times daily or as directed.  Ok to substitute alternative if insurance prefers. 1 Box 6     blood glucose monitoring (ACCU-CHEK FASTCLIX) lancets Use to test blood sugar 1daily or as directed. 1 Box 3     blood glucose monitoring (NO BRAND SPECIFIED) meter device kit Use to test blood sugar 1 times daily or as directed. 1 kit 0      blood glucose monitoring (NO BRAND SPECIFIED) meter device kit Use to test blood sugar daily or as directed. 1 kit 0     blood glucose monitoring (NO BRAND SPECIFIED) test strip Use to test blood sugars daily or as directed. Accu-check smartview strips 100 each 3     Calcium Carbonate-Vit D-Min (CALCIUM 1200 PO) Take by mouth daily        cholecalciferol (VITAMIN  -D) 1000 UNITS capsule Take 2 capsules (2,000 Units) by mouth daily 180 capsule 3     Coenzyme Q10 (CO Q 10 PO) Take 1 capsule by mouth daily.       Cranberry 300 MG TABS Take by mouth daily        DULoxetine (CYMBALTA) 60 MG EC capsule Take 1 capsule (60 mg) by mouth 2 times daily 60 capsule 5     econazole nitrate 1 % cream Apply topically daily 30 g 3     Flaxseed, Linseed, (FLAX SEED OIL) 1000 MG capsule Take 1 capsule by mouth daily       losartan-hydrochlorothiazide (HYZAAR) 100-25 MG per tablet Take 1 tablet by mouth daily 90 tablet 3     MAGNESIUM OR 1 capsule daily       meclizine (ANTIVERT) 25 MG tablet Take 1 tablet (25 mg) by mouth every 6 hours as needed for dizziness 30 tablet 1     metFORMIN (GLUCOPHAGE-XR) 500 MG 24 hr tablet Take 2 tablets (1,000 mg) by mouth daily (with breakfast) 180 tablet 1     methylphenidate (METADATE ER) 20 MG CR tablet Take 1 tablet (20 mg) by mouth every morning 30 tablet 0     methylphenidate (METADATE ER) 20 MG CR tablet Take 1 tablet (20 mg) by mouth every morning 30 tablet 0     [START ON 12/3/2018] methylphenidate (METADATE ER) 20 MG CR tablet Take 1 tablet (20 mg) by mouth every morning 30 tablet 0     metoprolol succinate (TOPROL-XL) 25 MG 24 hr tablet Take 1 tablet (25 mg) by mouth daily 90 tablet 3     omeprazole (PRILOSEC) 40 MG capsule Take 1 capsule (40 mg) by mouth daily Take 30-60 minutes before a meal. 90 capsule 3     pravastatin (PRAVACHOL) 10 MG tablet Take 1 tablet (10 mg) by mouth daily 90 tablet 3     tiZANidine (ZANAFLEX) 4 MG tablet Take 1-2 tablets (4-8 mg) by mouth nightly as needed 30  "tablet 1     VITAMIN B COMPLEX-C OR CAPS 1 capsule PO qd       vitamin E 400 UNIT capsule Take by mouth daily         Allergies   Allergen Reactions     Bactrim Other (See Comments)     Burning when voiding     Belladonna Alkaloids-Opium [B & O] Blisters     Bellagril     Definity [Definity] Other (See Comments)     Pain in the tailbone     Ergotamine Other (See Comments)     Patient unsure of reaction     Phenobarbital Other (See Comments)     Patient unsure of reaction     Sulfa Drugs Blisters     Tape [Adhesive Tape] Other (See Comments)     Burning feeling of the skin     Lisinopril Cough              Family History   Problem Relation Age of Onset     Diabetes Mother      Hypertension Mother      Thyroid Disease Mother      HEART DISEASE Father      Cardiovascular Father      Neurologic Disorder Father      Parkinson's     Arthritis Father      Fibromyalgia     GASTROINTESTINAL DISEASE Maternal Grandmother      gallbladder removed     HEART DISEASE Maternal Grandfather      Diabetes Brother      Hypertension Brother      Neurologic Disorder Brother      ADHD     Unknown/Adopted Son      Unknown/Adopted Daughter      Neurologic Disorder Brother      ADHD       Additional medical/Social/Surgical histories reviewed in [facility] and updated as appropriate.     REVIEW OF SYSTEMS (11/20/2018)  10 point ROS of systems including Constitutional, Eyes, Respiratory, Cardiovascular, Gastroenterology, Genitourinary, Integumentary, Musculoskeletal, Psychiatric were all negative except for pertinent positives noted in my HPI.     PHYSICAL EXAM  Vitals:    11/20/18 1020   Weight: 104.3 kg (230 lb)   Height: 1.594 m (5' 2.75\")     Vital Signs: Ht 1.594 m (5' 2.75\")  Wt 104.3 kg (230 lb)  BMI 41.07 kg/m2 Patient declined being weighed. Body mass index is 41.07 kg/(m^2).    General  - normal appearance, in no obvious distress  CV  - normal peripheral perfusion  Pulm  - normal respiratory pattern, non-labored  Musculoskeletal - lumbar " spine  - stance: normal gait without limp, no obvious leg length discrepancy, normal heel and toe walk  - inspection: normal bone and joint alignment, no obvious scoliosis  - palpation: no paravertebral or bony tenderness  - ROM: flexion exacerbates pain, normal extension, sidebending, rotation  - strength: lower extremities 5/5 in all planes  - special tests:  (+) straight leg raise  (+) slump test  Neuro  - patellar and Achilles DTRs 2+ bilaterally, lower extremity sensory deficit throughout L5 distribution, grossly normal coordination, normal muscle tone  Skin  - no ecchymosis, erythema, warmth, or induration, no obvious rash  Psych  - interactive, appropriate, normal mood and affect    ASSESSMENT & PLAN  74 yo female with low back pain due to ddd  Reviewed MRI and ordered NEETA  Cont. Medications  F/u after NEETA    Danie Garcia MD, CAQSM

## 2018-11-20 NOTE — PATIENT INSTRUCTIONS
Thanks for coming today.  Ortho[facility] Medicine Clinic  [address] [place] [address]    To schedule future appointments in Ortho Clinic, you may call [phone].    To schedule ordered imaging by your provider:   Call [facility] [phone]    To schedule an injection ordered by your provider:  Call [facility] Injection scheduling line: [phone]  [employer] available online at:  [email or web address]    Please call if any further questions or concerns ([phone]).  Clinic hours 8 am to 5 pm.    Return to clinic (call) if symptoms worsen or fail to improve.

## 2018-11-20 NOTE — LETTER
11/20/2018         RE: Karen Donis  [address] [facility] [place] [address]        Dear Colleague,    Thank you for referring your patient, Karen Donis, to the [facility]. Please see a copy of my visit note below.    HISTORY OF PRESENT ILLNESS  Ms. Donis is a pleasant 73 year old year old female who presents to clinic today for followup for MRI.   Overall feels about the same  Had some relief from medications, would like to discuss MRI and make a plan  MEDICAL HISTORY  Patient Active Problem List   Diagnosis     Seborrheic dermatitis     Alopecia     Xerosis cutis     Vitamin D deficiency     Nodules, thyroid     Postmenopausal     Colon cancer     CARDIOVASCULAR SCREENING; LDL GOAL LESS THAN 160     Impingement syndrome of right shoulder     AC (acromioclavicular) joint arthritis     Anxiety state     Attention deficit disorder     ACP (advance care planning)     Gastroesophageal reflux disease without esophagitis     Diet-controlled diabetes mellitus (H)     Hyperlipidemia LDL goal <100     Essential hypertension with goal blood pressure less than 140/90     Fatty liver     Major depression in complete remission (H)     Chronic bilateral low back pain with bilateral sciatica     Morbid obesity due to excess calories (H)     BPPV (benign paroxysmal positional vertigo), right       Current Outpatient Prescriptions   Medication Sig Dispense Refill     ACCU-CHEK SMARTVIEW test strip use to test blood sugar once daily or as directed 50 each 5     Alpha-D-Galactosidase (BEANO PO) Take by mouth as needed Reported on 5/3/2017       ASPIRIN 81 MG OR TABS 1 TABLET DAILY       biotin 1000 MCG TABS tablet Take 1,000 mcg by mouth daily       blood glucose (ACCU-CHEK FASTCLIX) lancing device Device to be used with lancets. 1 each 0     blood glucose monitoring (ACCU-CHEK FASTCLIX) lancets Use to test blood sugar 1 times daily or as directed.  Ok to substitute alternative if insurance prefers. 1  Box 6     blood glucose monitoring (ACCU-CHEK FASTCLIX) lancets Use to test blood sugar 1daily or as directed. 1 Box 3     blood glucose monitoring (NO BRAND SPECIFIED) meter device kit Use to test blood sugar 1 times daily or as directed. 1 kit 0     blood glucose monitoring (NO BRAND SPECIFIED) meter device kit Use to test blood sugar daily or as directed. 1 kit 0     blood glucose monitoring (NO BRAND SPECIFIED) test strip Use to test blood sugars daily or as directed. Accu-check smartview strips 100 each 3     Calcium Carbonate-Vit D-Min (CALCIUM 1200 PO) Take by mouth daily        cholecalciferol (VITAMIN  -D) 1000 UNITS capsule Take 2 capsules (2,000 Units) by mouth daily 180 capsule 3     Coenzyme Q10 (CO Q 10 PO) Take 1 capsule by mouth daily.       Cranberry 300 MG TABS Take by mouth daily        DULoxetine (CYMBALTA) 60 MG EC capsule Take 1 capsule (60 mg) by mouth 2 times daily 60 capsule 5     econazole nitrate 1 % cream Apply topically daily 30 g 3     Flaxseed, Linseed, (FLAX SEED OIL) 1000 MG capsule Take 1 capsule by mouth daily       losartan-hydrochlorothiazide (HYZAAR) 100-25 MG per tablet Take 1 tablet by mouth daily 90 tablet 3     MAGNESIUM OR 1 capsule daily       meclizine (ANTIVERT) 25 MG tablet Take 1 tablet (25 mg) by mouth every 6 hours as needed for dizziness 30 tablet 1     metFORMIN (GLUCOPHAGE-XR) 500 MG 24 hr tablet Take 2 tablets (1,000 mg) by mouth daily (with breakfast) 180 tablet 1     methylphenidate (METADATE ER) 20 MG CR tablet Take 1 tablet (20 mg) by mouth every morning 30 tablet 0     methylphenidate (METADATE ER) 20 MG CR tablet Take 1 tablet (20 mg) by mouth every morning 30 tablet 0     [START ON 12/3/2018] methylphenidate (METADATE ER) 20 MG CR tablet Take 1 tablet (20 mg) by mouth every morning 30 tablet 0     metoprolol succinate (TOPROL-XL) 25 MG 24 hr tablet Take 1 tablet (25 mg) by mouth daily 90 tablet 3     omeprazole (PRILOSEC) 40 MG capsule Take 1 capsule (40  "mg) by mouth daily Take 30-60 minutes before a meal. 90 capsule 3     pravastatin (PRAVACHOL) 10 MG tablet Take 1 tablet (10 mg) by mouth daily 90 tablet 3     tiZANidine (ZANAFLEX) 4 MG tablet Take 1-2 tablets (4-8 mg) by mouth nightly as needed 30 tablet 1     VITAMIN B COMPLEX-C OR CAPS 1 capsule PO qd       vitamin E 400 UNIT capsule Take by mouth daily         Allergies   Allergen Reactions     Bactrim Other (See Comments)     Burning when voiding     Belladonna Alkaloids-Opium [B & O] Blisters     Bellagril     Definity [Definity] Other (See Comments)     Pain in the tailbone     Ergotamine Other (See Comments)     Patient unsure of reaction     Phenobarbital Other (See Comments)     Patient unsure of reaction     Sulfa Drugs Blisters     Tape [Adhesive Tape] Other (See Comments)     Burning feeling of the skin     Lisinopril Cough              Family History   Problem Relation Age of Onset     Diabetes Mother      Hypertension Mother      Thyroid Disease Mother      HEART DISEASE Father      Cardiovascular Father      Neurologic Disorder Father      Parkinson's     Arthritis Father      Fibromyalgia     GASTROINTESTINAL DISEASE Maternal Grandmother      gallbladder removed     HEART DISEASE Maternal Grandfather      Diabetes Brother      Hypertension Brother      Neurologic Disorder Brother      ADHD     Unknown/Adopted Son      Unknown/Adopted Daughter      Neurologic Disorder Brother      ADHD       Additional medical/Social/Surgical histories reviewed in [facility] and updated as appropriate.     REVIEW OF SYSTEMS (11/20/2018)  10 point ROS of systems including Constitutional, Eyes, Respiratory, Cardiovascular, Gastroenterology, Genitourinary, Integumentary, Musculoskeletal, Psychiatric were all negative except for pertinent positives noted in my HPI.     PHYSICAL EXAM  Vitals:    11/20/18 1020   Weight: 104.3 kg (230 lb)   Height: 1.594 m (5' 2.75\")     Vital Signs: Ht 1.594 m (5' 2.75\")  Wt 104.3 kg (230 lb) "  BMI 41.07 kg/m2 Patient declined being weighed. Body mass index is 41.07 kg/(m^2).    General  - normal appearance, in no obvious distress  CV  - normal peripheral perfusion  Pulm  - normal respiratory pattern, non-labored  Musculoskeletal - lumbar spine  - stance: normal gait without limp, no obvious leg length discrepancy, normal heel and toe walk  - inspection: normal bone and joint alignment, no obvious scoliosis  - palpation: no paravertebral or bony tenderness  - ROM: flexion exacerbates pain, normal extension, sidebending, rotation  - strength: lower extremities 5/5 in all planes  - special tests:  (+) straight leg raise  (+) slump test  Neuro  - patellar and Achilles DTRs 2+ bilaterally, lower extremity sensory deficit throughout L5 distribution, grossly normal coordination, normal muscle tone  Skin  - no ecchymosis, erythema, warmth, or induration, no obvious rash  Psych  - interactive, appropriate, normal mood and affect    ASSESSMENT & PLAN  72 yo female with low back pain due to ddd  Reviewed MRI and ordered NEETA  Cont. Medications  F/u after NEETA    Danie Garcia MD, CAQSM    Again, thank you for allowing me to participate in the care of your patient.        Sincerely,        Danie Garcia MD

## 2018-11-21 ENCOUNTER — TELEPHONE (OUTPATIENT)
Dept: ORTHOPEDICS | Facility: CLINIC | Age: 73
End: 2018-11-21

## 2018-11-21 NOTE — TELEPHONE ENCOUNTER
11/21/18 called and left voicemail. Patient can call [phone] to schedule lumbar epidural injection.     Call center ok to schedule.    Uma Hay   Procedure    Ortho/Sports Med/Ent/Eye   [facility] [place]   [phone]

## 2018-11-28 ENCOUNTER — TRANSFERRED RECORDS (OUTPATIENT)
Dept: HEALTH INFORMATION MANAGEMENT | Facility: CLINIC | Age: 73
End: 2018-11-28

## 2018-12-06 ENCOUNTER — RADIANT APPOINTMENT (OUTPATIENT)
Dept: MAMMOGRAPHY | Facility: CLINIC | Age: 73
End: 2018-12-06
Attending: INTERNAL MEDICINE
Payer: COMMERCIAL

## 2018-12-06 ENCOUNTER — OFFICE VISIT (OUTPATIENT)
Dept: PEDIATRICS | Facility: CLINIC | Age: 73
End: 2018-12-06
Payer: COMMERCIAL

## 2018-12-06 VITALS
OXYGEN SATURATION: 96 % | TEMPERATURE: 96.9 F | DIASTOLIC BLOOD PRESSURE: 75 MMHG | HEART RATE: 71 BPM | BODY MASS INDEX: 40.53 KG/M2 | SYSTOLIC BLOOD PRESSURE: 134 MMHG | WEIGHT: 227 LBS

## 2018-12-06 DIAGNOSIS — E11.9 DIET-CONTROLLED DIABETES MELLITUS (H): ICD-10-CM

## 2018-12-06 DIAGNOSIS — Z12.31 SCREENING MAMMOGRAM, ENCOUNTER FOR: ICD-10-CM

## 2018-12-06 DIAGNOSIS — F98.8 ATTENTION DEFICIT DISORDER (ADD) WITHOUT HYPERACTIVITY: Primary | ICD-10-CM

## 2018-12-06 DIAGNOSIS — E78.5 HYPERLIPIDEMIA LDL GOAL <100: ICD-10-CM

## 2018-12-06 DIAGNOSIS — I10 ESSENTIAL HYPERTENSION WITH GOAL BLOOD PRESSURE LESS THAN 140/90: ICD-10-CM

## 2018-12-06 PROCEDURE — 77063 BREAST TOMOSYNTHESIS BI: CPT | Performed by: RADIOLOGY

## 2018-12-06 PROCEDURE — 77067 SCR MAMMO BI INCL CAD: CPT | Performed by: RADIOLOGY

## 2018-12-06 PROCEDURE — 99214 OFFICE O/P EST MOD 30 MIN: CPT | Performed by: INTERNAL MEDICINE

## 2018-12-06 RX ORDER — METHYLPHENIDATE HYDROCHLORIDE EXTENDED RELEASE 20 MG/1
20 TABLET ORAL EVERY MORNING
Qty: 30 TABLET | Refills: 0 | Status: SHIPPED | OUTPATIENT
Start: 2018-12-06 | End: 2019-02-12

## 2018-12-06 RX ORDER — METHYLPHENIDATE HYDROCHLORIDE EXTENDED RELEASE 20 MG/1
20 TABLET ORAL EVERY MORNING
Qty: 30 TABLET | Refills: 0 | Status: SHIPPED | OUTPATIENT
Start: 2019-02-04 | End: 2019-02-12

## 2018-12-06 RX ORDER — METHYLPHENIDATE HYDROCHLORIDE EXTENDED RELEASE 20 MG/1
20 TABLET ORAL EVERY MORNING
Qty: 30 TABLET | Refills: 0 | Status: SHIPPED | OUTPATIENT
Start: 2019-01-05 | End: 2019-02-12

## 2018-12-06 NOTE — MR AVS SNAPSHOT
After Visit Summary   12/6/2018    Karen Donis    MRN: [id number]           Patient Information     Date Of Birth          1945        Visit Information        Provider Department      12/6/2018 11:50 AM Lakia Hunter MD PhD [facility]        Today's Diagnoses     Diet-controlled diabetes mellitus (H)    -  1    Attention deficit disorder (ADD) without hyperactivity        Hyperlipidemia LDL goal <100        Essential hypertension with goal blood pressure less than 140/90          Care Instructions    Make appointment(s) for:   -- diabetes follow up in 3 months with fasting lab.         Medication(s) prescribed today:    Orders Placed This Encounter   Medications     methylphenidate (METADATE ER) 20 MG CR tablet     Sig: Take 1 tablet (20 mg) by mouth every morning     Dispense:  30 tablet     Refill:  0     methylphenidate (METADATE ER) 20 MG CR tablet     Sig: Take 1 tablet (20 mg) by mouth every morning     Dispense:  30 tablet     Refill:  0     methylphenidate (METADATE ER) 20 MG CR tablet     Sig: Take 1 tablet (20 mg) by mouth every morning     Dispense:  30 tablet     Refill:  0                     Follow-ups after your visit        Follow-up notes from your care team     Return in about 3 months (around 3/6/2019) for Fasting lab work, Diabetic check.      Your next 10 appointments already scheduled     Dec 12, 2018  1:00 PM CST   MEDSPINE RTN with Danie Garcia MD   [facility]    [address] [place] [id number] [phone]              Future tests that were ordered for you today     Open Future Orders        Priority Expected Expires Ordered    Hemoglobin A1c Routine 3/1/2019 12/6/2019 12/6/2018    Lipid panel reflex to direct LDL Fasting Routine 3/1/2019 12/6/2019 12/6/2018    Basic metabolic panel Routine 3/1/2019 12/6/2019 12/6/2018    ALT Routine 3/1/2019 12/6/2019 12/6/2018    Albumin Random  Urine Quantitative with Creat Ratio Routine 3/1/2019 12/6/2019 12/6/2018            Who to contact     If you have questions or need follow up information about today's clinic visit or your schedule please contact [facility] directly at [phone].  Normal or non-critical lab and imaging results will be communicated to you by MyChart, letter or phone within 4 business days after the clinic has received the results. If you do not hear from us within 7 days, please contact the clinic through MyChart or phone. If you have a critical or abnormal lab result, we will notify you by phone as soon as possible.  Submit refill requests through [employer] or call your pharmacy and they will forward the refill request to us. Please allow 3 business days for your refill to be completed.          Additional Information About Your Visit        Care EveryWhere ID     This is your Care EveryWhere ID. This could be used by other organizations to access your [facility] medical records  [id number]        Your Vitals Were     Pulse Temperature Pulse Oximetry BMI (Body Mass Index)          71 96.9  F (36.1  C) (Temporal) 96% 40.53 kg/m2         Blood Pressure from Last 3 Encounters:   12/06/18 134/75   11/15/18 150/78   10/04/18 126/74    Weight from Last 3 Encounters:   12/06/18 227 lb (103 kg)   11/20/18 230 lb (104.3 kg)   11/15/18 230 lb (104.3 kg)                 Today's Medication Changes          These changes are accurate as of 12/6/18 12:16 PM.  If you have any questions, ask your nurse or doctor.               These medicines have changed or have updated prescriptions.        Dose/Directions    * methylphenidate 20 MG CR tablet   Commonly known as:  METADATE ER   This may have changed:  Another medication with the same name was changed. Make sure you understand how and when to take each.   Used for:  Attention deficit disorder (ADD) without hyperactivity   Changed by:  Lakia Hunter MD PhD        Dose:  20 mg    Take 1 tablet (20 mg) by mouth every morning   Quantity:  30 tablet   Refills:  0       * methylphenidate 20 MG CR tablet   Commonly known as:  METADATE ER   This may have changed:  These instructions start on 1/5/2019. If you are unsure what to do until then, ask your doctor or other care provider.   Used for:  Attention deficit disorder (ADD) without hyperactivity   Changed by:  Lakia Hunter MD PhD        Dose:  20 mg   Start taking on:  1/5/2019   Take 1 tablet (20 mg) by mouth every morning   Quantity:  30 tablet   Refills:  0       * methylphenidate 20 MG CR tablet   Commonly known as:  METADATE ER   This may have changed:  These instructions start on 2/4/2019. If you are unsure what to do until then, ask your doctor or other care provider.   Used for:  Attention deficit disorder (ADD) without hyperactivity   Changed by:  Lakia Hunter MD PhD        Dose:  20 mg   Start taking on:  2/4/2019   Take 1 tablet (20 mg) by mouth every morning   Quantity:  30 tablet   Refills:  0       * Notice:  This list has 3 medication(s) that are the same as other medications prescribed for you. Read the directions carefully, and ask your doctor or other care provider to review them with you.         Where to get your medicines      Some of these will need a paper prescription and others can be bought over the counter.  Ask your nurse if you have questions.     Bring a paper prescription for each of these medications     methylphenidate 20 MG CR tablet    methylphenidate 20 MG CR tablet    methylphenidate 20 MG CR tablet                Primary Care Provider Office Phone # Fax #    Lakia Hunter MD PhD [phone]       [address]  [facility] [address]        Equal Access to Services     [facility] AH: Hadhansa wasserman Somaury, waaxda luqadaha, qaybta kaalmada luiza honeycutt . [facility] [phone].    ATENCIÓN: Si habla español, tiene a ashley disposición servicios gratuitos de  asistencia lingüística. Marilee cowan [phone].    We comply with applicable federal civil rights laws and [place] laws. We do not discriminate on the basis of race, color, national origin, age, disability, sex, sexual orientation, or gender identity.            Thank you!     Thank you for choosing [facility]  for your care. Our goal is always to provide you with excellent care. Hearing back from our patients is one way we can continue to improve our services. Please take a few minutes to complete the written survey that you may receive in the mail after your visit with us. Thank you!             Your Updated Medication List - Protect others around you: Learn how to safely use, store and throw away your medicines at www.disposemymeds.org.          This list is accurate as of 12/6/18 12:16 PM.  Always use your most recent med list.                   Brand Name Dispense Instructions for use Diagnosis    * ACCU-CHEK SMARTVIEW test strip   Generic drug:  blood glucose monitoring     50 each    use to test blood sugar once daily or as directed    Diet-controlled diabetes mellitus (H)       * blood glucose monitoring test strip    NO BRAND SPECIFIED    100 each    Use to test blood sugars daily or as directed. Accu-check smartview strips    Diet-controlled diabetes mellitus (H)       aspirin 81 MG tablet    ASA     1 TABLET DAILY        BEANO PO      Take by mouth as needed Reported on 5/3/2017        biotin 1000 MCG Tabs tablet      Take 1,000 mcg by mouth daily        blood glucose lancing device     1 each    Device to be used with lancets.    Diet-controlled diabetes mellitus (H)       * blood glucose monitoring lancets     1 Box    Use to test blood sugar 1 times daily or as directed.  Ok to substitute alternative if insurance prefers.    Diet-controlled diabetes mellitus (H)       * blood glucose monitoring lancets     1 Box    Use to test blood sugar 1daily or as directed.    Diet-controlled  diabetes mellitus (H)       * blood glucose monitoring meter device kit    NO BRAND SPECIFIED    1 kit    Use to test blood sugar daily or as directed.    Controlled type 2 diabetes mellitus without complication, without long-term current use of insulin (H)       * blood glucose monitoring meter device kit    NO BRAND SPECIFIED    1 kit    Use to test blood sugar 1 times daily or as directed.    Controlled type 2 diabetes mellitus without complication, without long-term current use of insulin (H)       CALCIUM 1200 PO      Take by mouth daily        cholecalciferol 1000 units (25 mcg) capsule    VITAMIN D3    180 capsule    Take 2 capsules (2,000 Units) by mouth daily    Vitamin D deficiency       CO Q 10 PO      Take 1 capsule by mouth daily.        Cranberry 300 MG Tabs      Take by mouth daily        DULoxetine 60 MG capsule    CYMBALTA    60 capsule    Take 1 capsule (60 mg) by mouth 2 times daily    Recurrent major depressive disorder, in partial remission (H), Fibromyalgia       econazole nitrate 1 % external cream     30 g    Apply topically daily    Tinea pedis       flax seed oil 1000 MG capsule      Take 1 capsule by mouth daily        losartan-hydrochlorothiazide 100-25 MG tablet    HYZAAR    90 tablet    Take 1 tablet by mouth daily    Essential hypertension with goal blood pressure less than 140/90       MAGNESIUM PO      1 capsule daily        meclizine 25 MG tablet    ANTIVERT    30 tablet    Take 1 tablet (25 mg) by mouth every 6 hours as needed for dizziness    BPPV (benign paroxysmal positional vertigo), right       metFORMIN 500 MG 24 hr tablet    GLUCOPHAGE-XR    180 tablet    Take 2 tablets (1,000 mg) by mouth daily (with breakfast)    Type 2 diabetes mellitus without complication, without long-term current use of insulin (H)       * methylphenidate 20 MG CR tablet    METADATE ER    30 tablet    Take 1 tablet (20 mg) by mouth every morning    Attention deficit disorder (ADD) without  hyperactivity       * methylphenidate 20 MG CR tablet   Start taking on:  1/5/2019    METADATE ER    30 tablet    Take 1 tablet (20 mg) by mouth every morning    Attention deficit disorder (ADD) without hyperactivity       * methylphenidate 20 MG CR tablet   Start taking on:  2/4/2019    METADATE ER    30 tablet    Take 1 tablet (20 mg) by mouth every morning    Attention deficit disorder (ADD) without hyperactivity       metoprolol succinate ER 25 MG 24 hr tablet    TOPROL-XL    90 tablet    Take 1 tablet (25 mg) by mouth daily    Hypertension goal BP (blood pressure) < 140/90       omeprazole 40 MG DR capsule    priLOSEC    90 capsule    Take 1 capsule (40 mg) by mouth daily Take 30-60 minutes before a meal.    Gastroesophageal reflux disease without esophagitis       pravastatin 10 MG tablet    PRAVACHOL    90 tablet    Take 1 tablet (10 mg) by mouth daily    Hyperlipidemia LDL goal <100       tiZANidine 4 MG tablet    ZANAFLEX    30 tablet    Take 1-2 tablets (4-8 mg) by mouth nightly as needed    Lumbar paraspinal muscle spasm       VITAMIN B COMPLEX-C Caps      1 capsule PO qd        vitamin E 400 units (180 mg) capsule    TOCOPHEROL     Take by mouth daily        * Notice:  This list has 9 medication(s) that are the same as other medications prescribed for you. Read the directions carefully, and ask your doctor or other care provider to review them with you.

## 2018-12-06 NOTE — PATIENT INSTRUCTIONS
Make appointment(s) for:   -- diabetes follow up in 3 months with fasting lab.         Medication(s) prescribed today:    Orders Placed This Encounter   Medications     methylphenidate (METADATE ER) 20 MG CR tablet     Sig: Take 1 tablet (20 mg) by mouth every morning     Dispense:  30 tablet     Refill:  0     methylphenidate (METADATE ER) 20 MG CR tablet     Sig: Take 1 tablet (20 mg) by mouth every morning     Dispense:  30 tablet     Refill:  0     methylphenidate (METADATE ER) 20 MG CR tablet     Sig: Take 1 tablet (20 mg) by mouth every morning     Dispense:  30 tablet     Refill:  0

## 2018-12-06 NOTE — PROGRESS NOTES
SUBJECTIVE:   Karen Donis is a 73 year old female who presents to clinic today for the following health issues:      Medication Followup of Metadate    Taking Medication as prescribed: yes    Side Effects:  None    Medication Helping Symptoms:  yes     Patient with history of ADD, maintained on Methylphenilate. She is here for routine follow up on long term use. She reports this is helping and no issues. Her refill is on schedule.     ROS:  Constitutional, HEENT, cardiovascular, pulmonary, gi and gu systems are negative, except as otherwise noted.         Current Outpatient Medications on File Prior to Visit:  ASPIRIN 81 MG OR TABS 1 TABLET DAILY   biotin 1000 MCG TABS tablet Take 1,000 mcg by mouth daily   Calcium Carbonate-Vit D-Min (CALCIUM 1200 PO) Take by mouth daily    cholecalciferol (VITAMIN  -D) 1000 UNITS capsule Take 2 capsules (2,000 Units) by mouth daily   Coenzyme Q10 (CO Q 10 PO) Take 1 capsule by mouth daily.   Cranberry 300 MG TABS Take by mouth daily    DULoxetine (CYMBALTA) 60 MG EC capsule Take 1 capsule (60 mg) by mouth 2 times daily   econazole nitrate 1 % cream Apply topically daily   Flaxseed, Linseed, (FLAX SEED OIL) 1000 MG capsule Take 1 capsule by mouth daily   losartan-hydrochlorothiazide (HYZAAR) 100-25 MG per tablet Take 1 tablet by mouth daily   MAGNESIUM OR 1 capsule daily   meclizine (ANTIVERT) 25 MG tablet Take 1 tablet (25 mg) by mouth every 6 hours as needed for dizziness   metFORMIN (GLUCOPHAGE-XR) 500 MG 24 hr tablet Take 2 tablets (1,000 mg) by mouth daily (with breakfast)   metoprolol succinate (TOPROL-XL) 25 MG 24 hr tablet Take 1 tablet (25 mg) by mouth daily   omeprazole (PRILOSEC) 40 MG capsule Take 1 capsule (40 mg) by mouth daily Take 30-60 minutes before a meal.   pravastatin (PRAVACHOL) 10 MG tablet Take 1 tablet (10 mg) by mouth daily   VITAMIN B COMPLEX-C OR CAPS 1 capsule PO qd   vitamin E 400 UNIT capsule Take by mouth daily   ACCU-CHEK SMARTVIEW test strip  use to test blood sugar once daily or as directed   Alpha-D-Galactosidase (BEANO PO) Take by mouth as needed Reported on 5/3/2017   blood glucose (ACCU-CHEK FASTCLIX) lancing device Device to be used with lancets.   blood glucose monitoring (ACCU-CHEK FASTCLIX) lancets Use to test blood sugar 1 times daily or as directed.  Ok to substitute alternative if insurance prefers.   blood glucose monitoring (ACCU-CHEK FASTCLIX) lancets Use to test blood sugar 1daily or as directed.   blood glucose monitoring (NO BRAND SPECIFIED) meter device kit Use to test blood sugar 1 times daily or as directed.   blood glucose monitoring (NO BRAND SPECIFIED) meter device kit Use to test blood sugar daily or as directed.   blood glucose monitoring (NO BRAND SPECIFIED) test strip Use to test blood sugars daily or as directed. Accu-check smartview strips   tiZANidine (ZANAFLEX) 4 MG tablet Take 1-2 tablets (4-8 mg) by mouth nightly as needed     No current facility-administered medications on file prior to visit.        Patient Active Problem List   Diagnosis     Seborrheic dermatitis     Alopecia     Xerosis cutis     Vitamin D deficiency     Nodules, thyroid     Postmenopausal     Colon cancer     CARDIOVASCULAR SCREENING; LDL GOAL LESS THAN 160     Impingement syndrome of right shoulder     AC (acromioclavicular) joint arthritis     Anxiety state     Attention deficit disorder     ACP (advance care planning)     Gastroesophageal reflux disease without esophagitis     Diet-controlled diabetes mellitus (H)     Hyperlipidemia LDL goal <100     Essential hypertension with goal blood pressure less than 140/90     Fatty liver     Major depression in complete remission (H)     Chronic bilateral low back pain with bilateral sciatica     Morbid obesity due to excess calories (H)     BPPV (benign paroxysmal positional vertigo), right     Past Surgical History:   Procedure Laterality Date     COLON SURGERY  2003     THYROIDECTOMY  12/6/2011     Procedure:THYROIDECTOMY; Right Thyroid Lobectomy and bilateral removal of skin tags; Surgeon:SAJI ZAZUETA; Location:[facility]     TONSILLECTOMY      While she was in 6th grade       Social History     Tobacco Use     Smoking status: Former Smoker     Last attempt to quit: 1982     Years since quittin.0     Smokeless tobacco: Never Used   Substance Use Topics     Alcohol use: No     Family History   Problem Relation Age of Onset     Diabetes Mother      Hypertension Mother      Thyroid Disease Mother      Heart Disease Father      Cardiovascular Father      Neurologic Disorder Father         Parkinson's     Arthritis Father         Fibromyalgia     Gastrointestinal Disease Maternal Grandmother         gallbladder removed     Heart Disease Maternal Grandfather      Diabetes Brother      Hypertension Brother      Neurologic Disorder Brother         ADHD     Unknown/Adopted Son      Unknown/Adopted Daughter      Neurologic Disorder Brother         ADHD             Problem list, Medication list, Allergies, and Medical/Social/Surgical histories reviewed in [facility] and updated as appropriate.    OBJECTIVE:                                                    /75   Pulse 71   Temp 96.9  F (36.1  C) (Temporal)   Wt 103 kg (227 lb)   SpO2 96%   BMI 40.53 kg/m      GENERAL: healthy, alert and no distress          ASSESSMENT/PLAN:                                                      73 year old female with the following diagnoses and treatment plan:      ICD-10-CM    1. Attention deficit disorder (ADD) without hyperactivity F98.8 methylphenidate (METADATE ER) 20 MG CR tablet     methylphenidate (METADATE ER) 20 MG CR tablet     methylphenidate (METADATE ER) 20 MG CR tablet   2. Diet-controlled diabetes mellitus (H) E11.9 Hemoglobin A1c   3. Hyperlipidemia LDL goal <100 E78.5 Lipid panel reflex to direct LDL Fasting     ALT   4. Essential hypertension with goal blood pressure less than 140/90 I10 Basic  metabolic panel     Albumin Random Urine Quantitative with Creat Ratio       -- ADD: doing well. Refilled methylphenidate for 3 months  -- due for diabetes and chronic disease follow up in 3 months. Labs ordered for future.     Will call or return to clinic if worsening or symptoms not improving as discussed.  See Patient Instructions.      Lakia Hunter MD-PhD  [facility]    (Note: Chart documentation was done in part with Dragon Voice Recognition software. Although reviewed after completion, some word and grammatical errors may remain.)

## 2018-12-11 ENCOUNTER — TRANSFERRED RECORDS (OUTPATIENT)
Dept: HEALTH INFORMATION MANAGEMENT | Facility: CLINIC | Age: 73
End: 2018-12-11

## 2018-12-27 ENCOUNTER — TELEPHONE (OUTPATIENT)
Dept: ORTHOPEDICS | Facility: CLINIC | Age: 73
End: 2018-12-27

## 2018-12-27 NOTE — TELEPHONE ENCOUNTER
Received note from [facility]- They were unable to contact patient to schedule pool therapy from referral sent from Dr. Garcia.

## 2019-01-31 ENCOUNTER — TELEPHONE (OUTPATIENT)
Dept: PEDIATRICS | Facility: CLINIC | Age: 74
End: 2019-01-31

## 2019-01-31 NOTE — TELEPHONE ENCOUNTER
Patient calls requesting a refill of Metadate. Informed patient that 3 months worth were given at the 12/6 OV. Patient will call her pharmacy to see if the 2/4 script is on file there, or she will look for the script at home. Patient verbalized understanding.   Renuka Bhatia RN

## 2019-01-31 NOTE — TELEPHONE ENCOUNTER
[facility] Call Center    Phone Message    May a detailed message be left on voicemail: yes    Reason for Call: Patient requesting a call to discuss methylphenidate (METADATE ER) 20 MG CR tablet.  No additional information was provided.  Please advise.  Thank you.     Action Taken: Message routed to:  Primary Care p [phone]

## 2019-02-01 ENCOUNTER — TELEPHONE (OUTPATIENT)
Dept: PEDIATRICS | Facility: CLINIC | Age: 74
End: 2019-02-01

## 2019-02-01 NOTE — TELEPHONE ENCOUNTER
Left message for patient to return clinic call regarding scheduling. Patient needs a Return  appointment for diabetes check with  on or around 3/6/19 and  Fasting labs. Number to clinic and Mychart option given, please assist in scheduling once patient returns clinic call   Call Center OKAY TO SCHEDULE.    Thanks,   Annalisa Beck  Primary Care   [facility] [place]

## 2019-02-12 ENCOUNTER — OFFICE VISIT (OUTPATIENT)
Dept: PEDIATRICS | Facility: CLINIC | Age: 74
End: 2019-02-12
Payer: COMMERCIAL

## 2019-02-12 ENCOUNTER — TELEPHONE (OUTPATIENT)
Dept: PEDIATRICS | Facility: CLINIC | Age: 74
End: 2019-02-12

## 2019-02-12 VITALS
HEART RATE: 71 BPM | WEIGHT: 225 LBS | SYSTOLIC BLOOD PRESSURE: 130 MMHG | DIASTOLIC BLOOD PRESSURE: 71 MMHG | OXYGEN SATURATION: 97 % | BODY MASS INDEX: 40.18 KG/M2

## 2019-02-12 DIAGNOSIS — R41.3 MEMORY LOSS: Primary | ICD-10-CM

## 2019-02-12 DIAGNOSIS — F98.8 ATTENTION DEFICIT DISORDER (ADD) WITHOUT HYPERACTIVITY: ICD-10-CM

## 2019-02-12 DIAGNOSIS — F32.5 MAJOR DEPRESSION IN COMPLETE REMISSION (H): ICD-10-CM

## 2019-02-12 DIAGNOSIS — M79.7 FIBROMYALGIA: ICD-10-CM

## 2019-02-12 PROCEDURE — 99214 OFFICE O/P EST MOD 30 MIN: CPT | Performed by: INTERNAL MEDICINE

## 2019-02-12 RX ORDER — METHYLPHENIDATE HYDROCHLORIDE EXTENDED RELEASE 20 MG/1
20 TABLET ORAL EVERY MORNING
Qty: 30 TABLET | Refills: 0 | Status: SHIPPED | OUTPATIENT
Start: 2019-04-05 | End: 2019-10-15

## 2019-02-12 RX ORDER — METHYLPHENIDATE HYDROCHLORIDE EXTENDED RELEASE 20 MG/1
20 TABLET ORAL EVERY MORNING
Qty: 30 TABLET | Refills: 0 | Status: SHIPPED | OUTPATIENT
Start: 2019-03-06 | End: 2019-09-23

## 2019-02-12 RX ORDER — METHYLPHENIDATE HYDROCHLORIDE EXTENDED RELEASE 20 MG/1
20 TABLET ORAL EVERY MORNING
Qty: 30 TABLET | Refills: 0 | Status: SHIPPED | OUTPATIENT
Start: 2019-05-04 | End: 2019-05-28

## 2019-02-12 NOTE — PATIENT INSTRUCTIONS
Make appointment(s) for:   -- diabetes follow up in March with fasting labs.     See referral for [facility] for neuropsychologic testing.         Medication(s) prescribed today:    Orders Placed This Encounter   Medications     methylphenidate (METADATE ER) 20 MG CR tablet     Sig: Take 1 tablet (20 mg) by mouth every morning     Dispense:  30 tablet     Refill:  0     methylphenidate (METADATE ER) 20 MG CR tablet     Sig: Take 1 tablet (20 mg) by mouth every morning     Dispense:  30 tablet     Refill:  0     methylphenidate (METADATE ER) 20 MG CR tablet     Sig: Take 1 tablet (20 mg) by mouth every morning     Dispense:  30 tablet     Refill:  0

## 2019-02-12 NOTE — PROGRESS NOTES
SUBJECTIVE:   Karen Donis is a 73 year old female who presents to clinic today for the following health issues:      Depression and Anxiety Follow-Up    Status since last visit: Improved     Other associated symptoms:None    Complicating factors:     Significant life event: No     Current substance abuse: None    PHQ 3/1/2018 6/8/2018 10/4/2018   PHQ-9 Total Score 13 16 15   Q9: Suicide Ideation Not at all Several days Not at all     RAGHAVENDRA-7 SCORE 3/1/2018 6/8/2018 10/4/2018   Total Score - - -   Total Score 1 0 2     In the past two weeks have you had thoughts of suicide or self-harm?  No.    Do you have concerns about your personal safety or the safety of others?   No  PHQ-9  English  PHQ-9   Any Language  RAGHAVENDRA-7  Suicide Assessment Five-step Evaluation and Treatment (SAFE-T)    Amount of exercise or physical activity: None    Problems taking medications regularly: No    Medication side effects: Itching    Diet: regular (no restrictions)      Depression is  Controlled with Duloxetine since June 2017. It has really helped her fibromyalgia. However, has a lot of sweating and some random symptoms.     One day had 3 stabs of the mid chest. Went away now.   One day had bone pain in the left leg. Gone away.   Has dry mouth. Sometimes has trouble swallowing and then goes away  Sometimes feels confused. Sometimes memory is not as good.   Sometimes ringing in the ear. Not currently.     Has adult ADD on methylphenidate. Follow up on this. Has some palpitations from time to time.   Wonders about meds affecting her memory. In the past, had done memory testing at [facility] in [place]. She is not sure of the result.     ROS:  Constitutional, HEENT, cardiovascular, pulmonary, gi and gu systems are negative, except as otherwise noted.         Current Outpatient Medications on File Prior to Visit:  Alpha-D-Galactosidase (BEANO PO) Take by mouth as needed Reported on 5/3/2017   ASPIRIN 81 MG OR TABS 1 TABLET DAILY   biotin  1000 MCG TABS tablet Take 1,000 mcg by mouth daily   Calcium Carbonate-Vit D-Min (CALCIUM 1200 PO) Take by mouth daily    cholecalciferol (VITAMIN  -D) 1000 UNITS capsule Take 2 capsules (2,000 Units) by mouth daily   Coenzyme Q10 (CO Q 10 PO) Take 1 capsule by mouth daily.   Cranberry 300 MG TABS Take by mouth daily    DULoxetine (CYMBALTA) 60 MG EC capsule Take 1 capsule (60 mg) by mouth 2 times daily   econazole nitrate 1 % cream Apply topically daily   Flaxseed, Linseed, (FLAX SEED OIL) 1000 MG capsule Take 1 capsule by mouth daily   losartan-hydrochlorothiazide (HYZAAR) 100-25 MG per tablet Take 1 tablet by mouth daily   MAGNESIUM OR 1 capsule daily   meclizine (ANTIVERT) 25 MG tablet Take 1 tablet (25 mg) by mouth every 6 hours as needed for dizziness   metFORMIN (GLUCOPHAGE-XR) 500 MG 24 hr tablet Take 2 tablets (1,000 mg) by mouth daily (with breakfast)   metoprolol succinate (TOPROL-XL) 25 MG 24 hr tablet Take 1 tablet (25 mg) by mouth daily   omeprazole (PRILOSEC) 40 MG capsule Take 1 capsule (40 mg) by mouth daily Take 30-60 minutes before a meal.   pravastatin (PRAVACHOL) 10 MG tablet Take 1 tablet (10 mg) by mouth daily   tiZANidine (ZANAFLEX) 4 MG tablet Take 1-2 tablets (4-8 mg) by mouth nightly as needed   VITAMIN B COMPLEX-C OR CAPS 1 capsule PO qd   vitamin E 400 UNIT capsule Take by mouth daily   ACCU-CHEK SMARTVIEW test strip use to test blood sugar once daily or as directed   blood glucose (ACCU-CHEK FASTCLIX) lancing device Device to be used with lancets.   blood glucose monitoring (ACCU-CHEK FASTCLIX) lancets Use to test blood sugar 1 times daily or as directed.  Ok to substitute alternative if insurance prefers.   blood glucose monitoring (ACCU-CHEK FASTCLIX) lancets Use to test blood sugar 1daily or as directed.   blood glucose monitoring (NO BRAND SPECIFIED) meter device kit Use to test blood sugar 1 times daily or as directed.   blood glucose monitoring (NO BRAND SPECIFIED) meter device  kit Use to test blood sugar daily or as directed.   blood glucose monitoring (NO BRAND SPECIFIED) test strip Use to test blood sugars daily or as directed. Accu-check smartview strips     No current facility-administered medications on file prior to visit.        Patient Active Problem List   Diagnosis     Seborrheic dermatitis     Alopecia     Xerosis cutis     Vitamin D deficiency     Nodules, thyroid     Postmenopausal     Colon cancer     CARDIOVASCULAR SCREENING; LDL GOAL LESS THAN 160     Impingement syndrome of right shoulder     AC (acromioclavicular) joint arthritis     Anxiety state     Attention deficit disorder     ACP (advance care planning)     Gastroesophageal reflux disease without esophagitis     Diet-controlled diabetes mellitus (H)     Hyperlipidemia LDL goal <100     Essential hypertension with goal blood pressure less than 140/90     Fatty liver     Major depression in complete remission (H)     Chronic bilateral low back pain with bilateral sciatica     Morbid obesity due to excess calories (H)     BPPV (benign paroxysmal positional vertigo), right     Past Surgical History:   Procedure Laterality Date     COLON SURGERY       THYROIDECTOMY  2011    Procedure:THYROIDECTOMY; Right Thyroid Lobectomy and bilateral removal of skin tags; Surgeon:SAJI ZAZUETA; Location:[facility]     TONSILLECTOMY      While she was in 6th grade       Social History     Tobacco Use     Smoking status: Former Smoker     Last attempt to quit: 1982     Years since quittin.1     Smokeless tobacco: Never Used   Substance Use Topics     Alcohol use: No     Family History   Problem Relation Age of Onset     Diabetes Mother      Hypertension Mother      Thyroid Disease Mother      Heart Disease Father      Cardiovascular Father      Neurologic Disorder Father         Parkinson's     Arthritis Father         Fibromyalgia     Gastrointestinal Disease Maternal Grandmother         gallbladder removed      Heart Disease Maternal Grandfather      Diabetes Brother      Hypertension Brother      Neurologic Disorder Brother         ADHD     Unknown/Adopted Son      Unknown/Adopted Daughter      Neurologic Disorder Brother         ADHD             Problem list, Medication list, Allergies, and Medical/Social/Surgical histories reviewed in [facility] and updated as appropriate.    OBJECTIVE:                                                    /71   Pulse 71   Wt 102.1 kg (225 lb)   SpO2 97%   BMI 40.18 kg/m      GENERAL: healthy, alert and no distress  Psych: normal mood.     PhQ9=5, RAGHAVENDRA 7=0          ASSESSMENT/PLAN:                                                      73 year old female with the following diagnoses and treatment plan:      ICD-10-CM    1. Memory loss R41.3 NEUROPSYCHOLOGY REFERRAL   2. Attention deficit disorder (ADD) without hyperactivity F98.8 NEUROPSYCHOLOGY REFERRAL     methylphenidate (METADATE ER) 20 MG CR tablet     methylphenidate (METADATE ER) 20 MG CR tablet     methylphenidate (METADATE ER) 20 MG CR tablet   3. Major depression in complete remission (H) F32.5    4. Fibromyalgia M79.7        -- for her memory concerns, best to do neuropsychologic testing, refer back to [facility]. There will be baseline for comparison  -- depression/fibromyalgia are in remission and doing well. Sweating is a real side effect of Duloxetine. The other random symptoms are not likely from Duloxetine. Discussed options to change to other serotonin specific reuptake inhibitor, but may not help fibromyalgia. Pt elected to stay on Duolxetine.   -- ADD: continue methylphenidate.   -- return in March for diabetes follow up with fasting labs.     Will call or return to clinic if worsening or symptoms not improving as discussed.  See Patient Instructions.      Lakia Hunter MD-PhD  [facility] [place]    (Note: Chart documentation was done in part with Dragon Voice Recognition software. Although reviewed after  completion, some word and grammatical errors may remain.)

## 2019-02-12 NOTE — TELEPHONE ENCOUNTER
Patient's AVS and three printed prescription for methylphenidate (METADATE ER) 20 MG CR tablet were left in our lobby. Attempted to call patient on home phone but no answer and no voicemail available to leave message.     AVS and prescriptions placed at check in desk JOSE PRESTON CMA

## 2019-02-13 ASSESSMENT — ANXIETY QUESTIONNAIRES
6. BECOMING EASILY ANNOYED OR IRRITABLE: NOT AT ALL
2. NOT BEING ABLE TO STOP OR CONTROL WORRYING: NOT AT ALL
5. BEING SO RESTLESS THAT IT IS HARD TO SIT STILL: NOT AT ALL
7. FEELING AFRAID AS IF SOMETHING AWFUL MIGHT HAPPEN: NOT AT ALL
GAD7 TOTAL SCORE: 0
1. FEELING NERVOUS, ANXIOUS, OR ON EDGE: NOT AT ALL
3. WORRYING TOO MUCH ABOUT DIFFERENT THINGS: NOT AT ALL

## 2019-02-13 ASSESSMENT — PATIENT HEALTH QUESTIONNAIRE - PHQ9
SUM OF ALL RESPONSES TO PHQ QUESTIONS 1-9: 5
5. POOR APPETITE OR OVEREATING: NOT AT ALL

## 2019-02-14 ASSESSMENT — ANXIETY QUESTIONNAIRES: GAD7 TOTAL SCORE: 0

## 2019-03-12 DIAGNOSIS — I10 ESSENTIAL HYPERTENSION WITH GOAL BLOOD PRESSURE LESS THAN 140/90: ICD-10-CM

## 2019-03-12 DIAGNOSIS — E11.9 DIABETES MELLITUS (H): Primary | ICD-10-CM

## 2019-03-12 DIAGNOSIS — E11.9 DIET-CONTROLLED DIABETES MELLITUS (H): ICD-10-CM

## 2019-03-12 DIAGNOSIS — E78.5 HYPERLIPIDEMIA LDL GOAL <100: ICD-10-CM

## 2019-03-12 LAB
ALT SERPL W P-5'-P-CCNC: 33 U/L (ref 0–50)
ANION GAP SERPL CALCULATED.3IONS-SCNC: 5 MMOL/L (ref 3–14)
BUN SERPL-MCNC: 15 MG/DL (ref 7–30)
CALCIUM SERPL-MCNC: 9.2 MG/DL (ref 8.5–10.1)
CHLORIDE SERPL-SCNC: 106 MMOL/L (ref 94–109)
CHOLEST SERPL-MCNC: 163 MG/DL
CO2 SERPL-SCNC: 30 MMOL/L (ref 20–32)
CREAT SERPL-MCNC: 1.08 MG/DL (ref 0.52–1.04)
GFR SERPL CREATININE-BSD FRML MDRD: 50 ML/MIN/{1.73_M2}
GLUCOSE SERPL-MCNC: 179 MG/DL (ref 70–99)
HBA1C MFR BLD: 7.5 % (ref 0–5.6)
HDLC SERPL-MCNC: 58 MG/DL
LDLC SERPL CALC-MCNC: 86 MG/DL
NONHDLC SERPL-MCNC: 105 MG/DL
POTASSIUM SERPL-SCNC: 4.3 MMOL/L (ref 3.4–5.3)
SODIUM SERPL-SCNC: 141 MMOL/L (ref 133–144)
TRIGL SERPL-MCNC: 96 MG/DL

## 2019-03-12 PROCEDURE — 36415 COLL VENOUS BLD VENIPUNCTURE: CPT | Performed by: INTERNAL MEDICINE

## 2019-03-12 PROCEDURE — 85018 HEMOGLOBIN: CPT | Performed by: INTERNAL MEDICINE

## 2019-03-12 PROCEDURE — 80061 LIPID PANEL: CPT | Performed by: INTERNAL MEDICINE

## 2019-03-12 PROCEDURE — 83036 HEMOGLOBIN GLYCOSYLATED A1C: CPT | Performed by: INTERNAL MEDICINE

## 2019-03-12 PROCEDURE — 84460 ALANINE AMINO (ALT) (SGPT): CPT | Performed by: INTERNAL MEDICINE

## 2019-03-12 PROCEDURE — 80048 BASIC METABOLIC PNL TOTAL CA: CPT | Performed by: INTERNAL MEDICINE

## 2019-03-13 ENCOUNTER — OFFICE VISIT (OUTPATIENT)
Dept: PEDIATRICS | Facility: CLINIC | Age: 74
End: 2019-03-13
Payer: COMMERCIAL

## 2019-03-13 VITALS
HEART RATE: 70 BPM | WEIGHT: 227 LBS | SYSTOLIC BLOOD PRESSURE: 114 MMHG | OXYGEN SATURATION: 96 % | BODY MASS INDEX: 40.53 KG/M2 | DIASTOLIC BLOOD PRESSURE: 76 MMHG | TEMPERATURE: 96.4 F

## 2019-03-13 DIAGNOSIS — E11.9 TYPE 2 DIABETES MELLITUS WITHOUT COMPLICATION, WITHOUT LONG-TERM CURRENT USE OF INSULIN (H): Primary | ICD-10-CM

## 2019-03-13 DIAGNOSIS — M79.7 FIBROMYALGIA: ICD-10-CM

## 2019-03-13 DIAGNOSIS — N18.30 CKD (CHRONIC KIDNEY DISEASE) STAGE 3, GFR 30-59 ML/MIN (H): ICD-10-CM

## 2019-03-13 DIAGNOSIS — F33.41 RECURRENT MAJOR DEPRESSIVE DISORDER, IN PARTIAL REMISSION (H): ICD-10-CM

## 2019-03-13 DIAGNOSIS — Z51.81 ENCOUNTER FOR THERAPEUTIC DRUG MONITORING: ICD-10-CM

## 2019-03-13 DIAGNOSIS — E78.5 HYPERLIPIDEMIA LDL GOAL <100: ICD-10-CM

## 2019-03-13 LAB
CREAT UR-MCNC: 98 MG/DL
HGB BLD-MCNC: 13.9 G/DL (ref 11.7–15.7)
MICROALBUMIN UR-MCNC: 16 MG/L
MICROALBUMIN/CREAT UR: 15.98 MG/G CR (ref 0–25)

## 2019-03-13 PROCEDURE — 99214 OFFICE O/P EST MOD 30 MIN: CPT | Performed by: INTERNAL MEDICINE

## 2019-03-13 PROCEDURE — 82043 UR ALBUMIN QUANTITATIVE: CPT | Performed by: INTERNAL MEDICINE

## 2019-03-13 RX ORDER — DULOXETIN HYDROCHLORIDE 60 MG/1
60 CAPSULE, DELAYED RELEASE ORAL DAILY
Qty: 90 CAPSULE | Refills: 1 | Status: SHIPPED | OUTPATIENT
Start: 2019-03-13 | End: 2019-10-15

## 2019-03-13 RX ORDER — DULOXETIN HYDROCHLORIDE 60 MG/1
60 CAPSULE, DELAYED RELEASE ORAL DAILY
Qty: 30 CAPSULE | Refills: 5 | COMMUNITY
Start: 2019-03-13 | End: 2019-03-13

## 2019-03-13 RX ORDER — METFORMIN HCL 500 MG
1000 TABLET, EXTENDED RELEASE 24 HR ORAL
Qty: 180 TABLET | Refills: 1 | Status: SHIPPED | OUTPATIENT
Start: 2019-03-13 | End: 2019-06-12

## 2019-03-13 NOTE — PATIENT INSTRUCTIONS
Make appointment(s) for:   -- wellness visit in June with non fasting lab (A1c).     See referral for neuropsychology.       Medication(s) prescribed today:    Orders Placed This Encounter   Medications     metFORMIN (GLUCOPHAGE-XR) 500 MG 24 hr tablet     Sig: Take 2 tablets (1,000 mg) by mouth daily (with breakfast)     Dispense:  180 tablet     Refill:  1     DULoxetine (CYMBALTA) 60 MG capsule     Sig: Take 1 capsule (60 mg) by mouth daily     Dispense:  90 capsule     Refill:  1     Dose reduced. Please put on file. Do not fill until patient calls.

## 2019-03-13 NOTE — PROGRESS NOTES
SUBJECTIVE:   Karen Donis is a 74 year old female who presents to clinic today for the following health issues:    Karen has Colon cancer; Attention deficit disorder; Diet-controlled diabetes mellitus (H); Hyperlipidemia LDL goal <100; Essential hypertension with goal blood pressure less than 140/90; Major depression in complete remission (H); Chronic bilateral low back pain with bilateral sciatica; and Morbid obesity due to excess calories (H) on their pertinent problem list.     Diabetes Follow-up      Patient is checking blood sugars: 127-202    Diabetic concerns: None     Symptoms of hypoglycemia (low blood sugar): none     Paresthesias (numbness or burning in feet) or sores: Yes      Date of last diabetic eye exam: Will schedule, overdue    BP Readings from Last 2 Encounters:   03/13/19 114/76   02/12/19 130/71     Hemoglobin A1C (%)   Date Value   03/12/2019 7.5 (H)   10/04/2018 7.2 (H)     LDL Cholesterol Calculated (mg/dL)   Date Value   03/12/2019 86   06/08/2018 96       Diabetes Management Resources    Amount of exercise or physical activity: None    Problems taking medications regularly: No    Medication side effects: none    Diet: regular (no restrictions)      History of fibromyalgia on duloxetine. She is only taking 60 mg, this works for her.     She has ADD, on methylphenidate. Due for drug screen.    ROS:  Constitutional, HEENT, cardiovascular, pulmonary, gi and gu systems are negative, except as otherwise noted.         Current Outpatient Medications on File Prior to Visit:  ASPIRIN 81 MG OR TABS 1 TABLET DAILY   biotin 1000 MCG TABS tablet Take 1,000 mcg by mouth daily   Calcium Carbonate-Vit D-Min (CALCIUM 1200 PO) Take by mouth daily    cholecalciferol (VITAMIN  -D) 1000 UNITS capsule Take 2 capsules (2,000 Units) by mouth daily   Coenzyme Q10 (CO Q 10 PO) Take 1 capsule by mouth daily.   Cranberry 300 MG TABS Take by mouth daily    econazole nitrate 1 % cream Apply topically daily    Flaxseed, Linseed, (FLAX SEED OIL) 1000 MG capsule Take 1 capsule by mouth daily   losartan-hydrochlorothiazide (HYZAAR) 100-25 MG per tablet Take 1 tablet by mouth daily   MAGNESIUM OR 1 capsule daily   meclizine (ANTIVERT) 25 MG tablet Take 1 tablet (25 mg) by mouth every 6 hours as needed for dizziness   [START ON 5/4/2019] methylphenidate (METADATE ER) 20 MG CR tablet Take 1 tablet (20 mg) by mouth every morning   [START ON 4/5/2019] methylphenidate (METADATE ER) 20 MG CR tablet Take 1 tablet (20 mg) by mouth every morning   methylphenidate (METADATE ER) 20 MG CR tablet Take 1 tablet (20 mg) by mouth every morning   metoprolol succinate (TOPROL-XL) 25 MG 24 hr tablet Take 1 tablet (25 mg) by mouth daily   omeprazole (PRILOSEC) 40 MG capsule Take 1 capsule (40 mg) by mouth daily Take 30-60 minutes before a meal.   tiZANidine (ZANAFLEX) 4 MG tablet Take 1-2 tablets (4-8 mg) by mouth nightly as needed   VITAMIN B COMPLEX-C OR CAPS 1 capsule PO qd   vitamin E 400 UNIT capsule Take by mouth daily   ACCU-CHEK SMARTVIEW test strip use to test blood sugar once daily or as directed   Alpha-D-Galactosidase (BEANO PO) Take by mouth as needed Reported on 5/3/2017   blood glucose (ACCU-CHEK FASTCLIX) lancing device Device to be used with lancets.   blood glucose monitoring (ACCU-CHEK FASTCLIX) lancets Use to test blood sugar 1 times daily or as directed.  Ok to substitute alternative if insurance prefers.   blood glucose monitoring (ACCU-CHEK FASTCLIX) lancets Use to test blood sugar 1daily or as directed.   blood glucose monitoring (NO BRAND SPECIFIED) meter device kit Use to test blood sugar 1 times daily or as directed.   blood glucose monitoring (NO BRAND SPECIFIED) meter device kit Use to test blood sugar daily or as directed.   blood glucose monitoring (NO BRAND SPECIFIED) test strip Use to test blood sugars daily or as directed. Accu-check smartview strips     No current facility-administered medications on file  prior to visit.        Patient Active Problem List   Diagnosis     Seborrheic dermatitis     Alopecia     Xerosis cutis     Vitamin D deficiency     Nodules, thyroid     Postmenopausal     Colon cancer     CARDIOVASCULAR SCREENING; LDL GOAL LESS THAN 160     Impingement syndrome of right shoulder     AC (acromioclavicular) joint arthritis     Anxiety state     Attention deficit disorder     ACP (advance care planning)     Gastroesophageal reflux disease without esophagitis     Diet-controlled diabetes mellitus (H)     Hyperlipidemia LDL goal <100     Essential hypertension with goal blood pressure less than 140/90     Fatty liver     Major depression in complete remission (H)     Chronic bilateral low back pain with bilateral sciatica     Morbid obesity due to excess calories (H)     BPPV (benign paroxysmal positional vertigo), right     Past Surgical History:   Procedure Laterality Date     COLON SURGERY       THYROIDECTOMY  2011    Procedure:THYROIDECTOMY; Right Thyroid Lobectomy and bilateral removal of skin tags; Surgeon:SAJI ZAZUETA; Location:[facility]     TONSILLECTOMY      While she was in 6th grade       Social History     Tobacco Use     Smoking status: Former Smoker     Last attempt to quit: 1982     Years since quittin.3     Smokeless tobacco: Never Used   Substance Use Topics     Alcohol use: No     Family History   Problem Relation Age of Onset     Diabetes Mother      Hypertension Mother      Thyroid Disease Mother      Heart Disease Father      Cardiovascular Father      Neurologic Disorder Father         Parkinson's     Arthritis Father         Fibromyalgia     Gastrointestinal Disease Maternal Grandmother         gallbladder removed     Heart Disease Maternal Grandfather      Diabetes Brother      Hypertension Brother      Neurologic Disorder Brother         ADHD     Unknown/Adopted Son      Unknown/Adopted Daughter      Neurologic Disorder Brother         ADHD              Problem list, Medication list, Allergies, and Medical/Social/Surgical histories reviewed in [facility] and updated as appropriate.    OBJECTIVE:                                                    /76   Pulse 70   Temp 96.4  F (35.8  C) (Temporal)   Wt 103 kg (227 lb)   SpO2 96%   BMI 40.53 kg/m      GENERAL: healthy, alert and no distress  HEENT: unremarkable  Neck: no adenopathy/mass/stiffness. Thyroid normal.  Lung: clear, no wheezing/rhonchi/crackles  Heart: RRR, normal S1/2, no murmur/gallop/rup  Abd: soft, normal BS, non tender, no organomegaly   Ext: no cyanosis/clubbing/edema      Diagnostic test results:  .  Orders Only on 03/12/2019   Component Date Value Ref Range Status     ALT 03/12/2019 33  0 - 50 U/L Final     Sodium 03/12/2019 141  133 - 144 mmol/L Final     Potassium 03/12/2019 4.3  3.4 - 5.3 mmol/L Final     Chloride 03/12/2019 106  94 - 109 mmol/L Final     Carbon Dioxide 03/12/2019 30  20 - 32 mmol/L Final     Anion Gap 03/12/2019 5  3 - 14 mmol/L Final     Glucose 03/12/2019 179* 70 - 99 mg/dL Final    Fasting specimen     Urea Nitrogen 03/12/2019 15  7 - 30 mg/dL Final     Creatinine 03/12/2019 1.08* 0.52 - 1.04 mg/dL Final     GFR Estimate 03/12/2019 50* >60 mL/min/[1.73_m2] Final    Comment: Non  GFR Calc  Starting 12/18/2018, serum creatinine based estimated GFR (eGFR) will be   calculated using the Chronic Kidney Disease Epidemiology Collaboration   (CKD-EPI) equation.       GFR Estimate If Black 03/12/2019 58* >60 mL/min/[1.73_m2] Final    Comment:  GFR Calc  Starting 12/18/2018, serum creatinine based estimated GFR (eGFR) will be   calculated using the Chronic Kidney Disease Epidemiology Collaboration   (CKD-EPI) equation.       Calcium 03/12/2019 9.2  8.5 - 10.1 mg/dL Final     Cholesterol 03/12/2019 163  <200 mg/dL Final     Triglycerides 03/12/2019 96  <150 mg/dL Final    Fasting specimen     HDL Cholesterol 03/12/2019 58  >49 mg/dL Final      LDL Cholesterol Calculated 03/12/2019 86  <100 mg/dL Final    Desirable:       <100 mg/dl     Non HDL Cholesterol 03/12/2019 105  <130 mg/dL Final     Hemoglobin A1C 03/12/2019 7.5* 0 - 5.6 % Final    Comment: Normal <5.7% Prediabetes 5.7-6.4%  Diabetes 6.5% or higher - adopted from ADA   consensus guidelines.         Results for orders placed or performed in visit on 03/13/19   Albumin Random Urine Quantitative with Creat Ratio   Result Value Ref Range    Creatinine Urine 98 mg/dL    Albumin Urine mg/L 16 mg/L    Albumin Urine mg/g Cr 15.98 0 - 25 mg/g Cr   Hemoglobin   Result Value Ref Range    Hemoglobin 13.9 11.7 - 15.7 g/dL         ASSESSMENT/PLAN:                                                      74 year old female with the following diagnoses and treatment plan:      ICD-10-CM    1. Type 2 diabetes mellitus without complication, without long-term current use of insulin (H) E11.9 Albumin Random Urine Quantitative with Creat Ratio     metFORMIN (GLUCOPHAGE-XR) 500 MG 24 hr tablet     Drug  Screen Comprehensive , Urine with Reported Meds ([facility]) (Pain Care Package)   2. Recurrent major depressive disorder, in partial remission (H) F33.41 DULoxetine (CYMBALTA) 60 MG capsule     Drug  Screen Comprehensive , Urine with Reported Meds ([facility]) (Pain Care Package)     DISCONTINUED: DULoxetine (CYMBALTA) 60 MG capsule   3. Fibromyalgia M79.7 DULoxetine (CYMBALTA) 60 MG capsule     Drug  Screen Comprehensive , Urine with Reported Meds ([facility]) (Pain Care Package)     DISCONTINUED: DULoxetine (CYMBALTA) 60 MG capsule   4. Encounter for therapeutic drug monitoring Z51.81 Drug  Screen Comprehensive , Urine with Reported Meds ([facility]ox) (Pain Care Package)     CANCELED: Drug  Screen Comprehensive , Urine with Reported Meds ([facility]ox) (Pain Care Package)   5. CKD (chronic kidney disease) stage 3, GFR 30-59 ml/min (H) N18.3 Albumin Random Urine Quantitative with Creat Ratio     Hemoglobin     Drug  Screen Comprehensive ,  Urine with Reported Meds ([facility]Tox) (Pain Care Package)   6. Hyperlipidemia LDL goal <100 E78.5        -- stable chronic health issues. Medications refilled.    -- return in 3 months for annual wellness and labs.     Will call or return to clinic if worsening or symptoms not improving as discussed.  See Patient Instructions.      Lakia Hunter MD-PhD  [facility] [place]    (Note: Chart documentation was done in part with Dragon Voice Recognition software. Although reviewed after completion, some word and grammatical errors may remain.)

## 2019-03-13 NOTE — LETTER
March 18, 2019      Karen Donis  [address] [address] [place] [address]              Dear Karen,     Your recent result are within acceptable range or at baseline. Please continue with your current plan of care.       Please call or make an appointment if you have further questions.     Lakia Hunter MD-PhD     Results for orders placed or performed in visit on 03/13/19   Albumin Random Urine Quantitative with Creat Ratio   Result Value Ref Range    Creatinine Urine 98 mg/dL    Albumin Urine mg/L 16 mg/L    Albumin Urine mg/g Cr 15.98 0 - 25 mg/g Cr   Hemoglobin   Result Value Ref Range    Hemoglobin 13.9 11.7 - 15.7 g/dL

## 2019-03-18 NOTE — RESULT ENCOUNTER NOTE
Send result with the following comment:  Dear Karen,   Your recent result are within acceptable range or at baseline. Please continue with your current plan of care.       Please call or make an appointment if you have further questions.     Lakia Hunter MD-PhD

## 2019-03-18 NOTE — RESULT ENCOUNTER NOTE
Send letter:    Dear Karen,   Here is a copy of your test results that were at your recent clinic visit or by phone/Mychart. Please continue the plan of care during the visit and follow up as planned.     Please call or make an appointment if you have further questions.     Lakia Hunter MD-PhD

## 2019-03-26 DIAGNOSIS — E78.5 HYPERLIPIDEMIA LDL GOAL <100: ICD-10-CM

## 2019-03-26 RX ORDER — PRAVASTATIN SODIUM 10 MG
10 TABLET ORAL DAILY
Qty: 90 TABLET | Refills: 3 | Status: SHIPPED | OUTPATIENT
Start: 2019-03-26 | End: 2020-03-31

## 2019-04-10 ENCOUNTER — TRANSFERRED RECORDS (OUTPATIENT)
Dept: HEALTH INFORMATION MANAGEMENT | Facility: CLINIC | Age: 74
End: 2019-04-10

## 2019-04-18 ENCOUNTER — DOCUMENTATION ONLY (OUTPATIENT)
Dept: PEDIATRICS | Facility: CLINIC | Age: 74
End: 2019-04-18

## 2019-05-06 ENCOUNTER — TRANSFERRED RECORDS (OUTPATIENT)
Dept: HEALTH INFORMATION MANAGEMENT | Facility: CLINIC | Age: 74
End: 2019-05-06

## 2019-05-08 ENCOUNTER — TRANSFERRED RECORDS (OUTPATIENT)
Dept: HEALTH INFORMATION MANAGEMENT | Facility: CLINIC | Age: 74
End: 2019-05-08

## 2019-05-11 DIAGNOSIS — K21.9 GASTROESOPHAGEAL REFLUX DISEASE WITHOUT ESOPHAGITIS: ICD-10-CM

## 2019-05-13 RX ORDER — OMEPRAZOLE 40 MG/1
40 CAPSULE, DELAYED RELEASE ORAL DAILY
Qty: 90 CAPSULE | Refills: 2 | Status: SHIPPED | OUTPATIENT
Start: 2019-05-13 | End: 2020-02-21

## 2019-05-13 NOTE — TELEPHONE ENCOUNTER
Please refer to RN refill guidelines. Refilled per protocol.    Kelley Gill RN   [facility], [facility]

## 2019-05-28 ENCOUNTER — TELEPHONE (OUTPATIENT)
Dept: PEDIATRICS | Facility: CLINIC | Age: 74
End: 2019-05-28

## 2019-05-28 DIAGNOSIS — F98.8 ATTENTION DEFICIT DISORDER (ADD) WITHOUT HYPERACTIVITY: ICD-10-CM

## 2019-05-28 DIAGNOSIS — E11.9 TYPE 2 DIABETES MELLITUS WITHOUT COMPLICATION, WITHOUT LONG-TERM CURRENT USE OF INSULIN (H): ICD-10-CM

## 2019-05-28 RX ORDER — METHYLPHENIDATE HYDROCHLORIDE EXTENDED RELEASE 20 MG/1
20 TABLET ORAL EVERY MORNING
Qty: 30 TABLET | Refills: 0 | Status: SHIPPED | OUTPATIENT
Start: 2019-06-03 | End: 2019-07-16

## 2019-05-28 NOTE — TELEPHONE ENCOUNTER
Metadate      Last Written Prescription Date:  5/4/19  Last Fill Quantity: 30,   # refills: 0  Last Office Visit: 3/13  Future Office visit:    Next 5 appointments (look out 90 days)    Jun 12, 2019  1:30 PM CDT  PHYSICAL with Lakia Hunter MD PhD  [facility] [address] [place] [id number]  [phone]         Routing refill request to provider for review/approval because:  Drug not on the [facility], [facility] or [facility] refill protocol or controlled substance

## 2019-05-28 NOTE — TELEPHONE ENCOUNTER
[facility] Call Center    Phone Message    May a detailed message be left on voicemail: yes    Reason for Call: Medication Refill Request    Has the patient contacted the pharmacy for the refill? Yes   Name of medication being requested: methylphenidate (METADATE ER) 20 MG CR tablet     Provider who prescribed the medication: Dr. Hunter  Pharmacy: [place] [facility]  Date medication is needed: asap         Action Taken: Message routed to:  Primary Care p [phone]

## 2019-05-29 RX ORDER — METFORMIN HCL 500 MG
1000 TABLET, EXTENDED RELEASE 24 HR ORAL
Qty: 180 TABLET | Refills: 0 | Status: SHIPPED | OUTPATIENT
Start: 2019-05-29 | End: 2019-09-22

## 2019-06-10 ENCOUNTER — DOCUMENTATION ONLY (OUTPATIENT)
Dept: LAB | Facility: CLINIC | Age: 74
End: 2019-06-10

## 2019-06-10 DIAGNOSIS — E11.9 DIET-CONTROLLED DIABETES MELLITUS (H): Primary | ICD-10-CM

## 2019-06-10 NOTE — PROGRESS NOTES
No labs due per HM:  Hgb, A1c, lipids, BMP and ALT done on 3/12/19 (HgbA1c recommended to be done in 3 months at office visit)      HgbA1c pended and forwarded to provider for review/signature.  Please add or delete as appropriate.

## 2019-06-12 ENCOUNTER — OFFICE VISIT (OUTPATIENT)
Dept: PEDIATRICS | Facility: CLINIC | Age: 74
End: 2019-06-12
Payer: COMMERCIAL

## 2019-06-12 VITALS
TEMPERATURE: 97.7 F | DIASTOLIC BLOOD PRESSURE: 72 MMHG | HEIGHT: 63 IN | SYSTOLIC BLOOD PRESSURE: 116 MMHG | BODY MASS INDEX: 40.45 KG/M2 | OXYGEN SATURATION: 96 % | HEART RATE: 64 BPM | WEIGHT: 228.3 LBS

## 2019-06-12 DIAGNOSIS — Z12.31 ENCOUNTER FOR SCREENING MAMMOGRAM FOR BREAST CANCER: ICD-10-CM

## 2019-06-12 DIAGNOSIS — E11.9 DIET-CONTROLLED DIABETES MELLITUS (H): ICD-10-CM

## 2019-06-12 DIAGNOSIS — I10 ESSENTIAL HYPERTENSION WITH GOAL BLOOD PRESSURE LESS THAN 140/90: ICD-10-CM

## 2019-06-12 DIAGNOSIS — N18.30 CKD (CHRONIC KIDNEY DISEASE) STAGE 3, GFR 30-59 ML/MIN (H): ICD-10-CM

## 2019-06-12 DIAGNOSIS — C18.2 MALIGNANT NEOPLASM OF ASCENDING COLON (H): ICD-10-CM

## 2019-06-12 DIAGNOSIS — E78.5 HYPERLIPIDEMIA LDL GOAL <100: ICD-10-CM

## 2019-06-12 DIAGNOSIS — F33.41 RECURRENT MAJOR DEPRESSIVE DISORDER, IN PARTIAL REMISSION (H): ICD-10-CM

## 2019-06-12 DIAGNOSIS — Z00.00 ENCOUNTER FOR MEDICARE ANNUAL WELLNESS EXAM: Primary | ICD-10-CM

## 2019-06-12 DIAGNOSIS — M89.9 DISORDER OF BONE: ICD-10-CM

## 2019-06-12 DIAGNOSIS — E11.9 TYPE 2 DIABETES MELLITUS WITHOUT COMPLICATION, WITHOUT LONG-TERM CURRENT USE OF INSULIN (H): ICD-10-CM

## 2019-06-12 DIAGNOSIS — E11.22 TYPE 2 DIABETES MELLITUS WITH STAGE 3 CHRONIC KIDNEY DISEASE, WITHOUT LONG-TERM CURRENT USE OF INSULIN (H): ICD-10-CM

## 2019-06-12 DIAGNOSIS — F98.8 ATTENTION DEFICIT DISORDER (ADD) WITHOUT HYPERACTIVITY: ICD-10-CM

## 2019-06-12 DIAGNOSIS — J44.9 CHRONIC OBSTRUCTIVE PULMONARY DISEASE, UNSPECIFIED COPD TYPE (H): ICD-10-CM

## 2019-06-12 DIAGNOSIS — Z51.81 ENCOUNTER FOR THERAPEUTIC DRUG MONITORING: ICD-10-CM

## 2019-06-12 DIAGNOSIS — M85.859 OSTEOPENIA OF HIP, UNSPECIFIED LATERALITY: ICD-10-CM

## 2019-06-12 DIAGNOSIS — I10 HYPERTENSION GOAL BP (BLOOD PRESSURE) < 140/90: ICD-10-CM

## 2019-06-12 DIAGNOSIS — M79.7 FIBROMYALGIA: ICD-10-CM

## 2019-06-12 DIAGNOSIS — F32.5 MAJOR DEPRESSION IN COMPLETE REMISSION (H): ICD-10-CM

## 2019-06-12 DIAGNOSIS — N18.30 TYPE 2 DIABETES MELLITUS WITH STAGE 3 CHRONIC KIDNEY DISEASE, WITHOUT LONG-TERM CURRENT USE OF INSULIN (H): ICD-10-CM

## 2019-06-12 LAB — HBA1C MFR BLD: 7.4 % (ref 0–5.6)

## 2019-06-12 PROCEDURE — 99213 OFFICE O/P EST LOW 20 MIN: CPT | Mod: 25 | Performed by: INTERNAL MEDICINE

## 2019-06-12 PROCEDURE — 99000 SPECIMEN HANDLING OFFICE-LAB: CPT | Performed by: INTERNAL MEDICINE

## 2019-06-12 PROCEDURE — 80307 DRUG TEST PRSMV CHEM ANLYZR: CPT | Mod: 90 | Performed by: INTERNAL MEDICINE

## 2019-06-12 PROCEDURE — G0439 PPPS, SUBSEQ VISIT: HCPCS | Performed by: INTERNAL MEDICINE

## 2019-06-12 PROCEDURE — 36416 COLLJ CAPILLARY BLOOD SPEC: CPT | Performed by: INTERNAL MEDICINE

## 2019-06-12 PROCEDURE — 83036 HEMOGLOBIN GLYCOSYLATED A1C: CPT | Performed by: INTERNAL MEDICINE

## 2019-06-12 RX ORDER — LOSARTAN POTASSIUM AND HYDROCHLOROTHIAZIDE 25; 100 MG/1; MG/1
1 TABLET ORAL DAILY
Qty: 91 TABLET | Refills: 3 | Status: SHIPPED | OUTPATIENT
Start: 2019-06-12 | End: 2019-12-13

## 2019-06-12 RX ORDER — METOPROLOL SUCCINATE 25 MG/1
TABLET, EXTENDED RELEASE ORAL
Qty: 91 TABLET | Refills: 3 | Status: SHIPPED | OUTPATIENT
Start: 2019-06-12 | End: 2020-06-05

## 2019-06-12 ASSESSMENT — PAIN SCALES - GENERAL: PAINLEVEL: NO PAIN (0)

## 2019-06-12 ASSESSMENT — MIFFLIN-ST. JEOR: SCORE: 1491.43

## 2019-06-12 NOTE — RESULT ENCOUNTER NOTE
Results discussed directly with patient while patient was present. Any further details documented in the note.   Lakia Hunter MD PhD

## 2019-06-12 NOTE — PROGRESS NOTES
"  SUBJECTIVE:   Karen Donis is a 74 year old female who presents for Preventive Visit.    Are you in the first 12 months of your Medicare Part B coverage?  No    Physical Health:    In general, how would you rate your overall physical health? good    Outside of work, how many days during the week do you exercise? 1 day/week    Outside of work, approximately how many minutes a day do you exercise?15-30 minutes    If you drink alcohol do you typically have >3 drinks per day or >7 drinks per week? No    Do you usually eat at least 4 servings of fruit and vegetables a day, include whole grains & fiber and avoid regularly eating high fat or \"junk\" foods? Yes    Do you have any problems taking medications regularly?  No    Do you have any side effects from medications? none    Needs assistance for the following daily activities: preparing meals and housework    Which of the following safety concerns are present in your home?  none identified     Hearing impairment: Yes, Difficulty understanding soft or whispered speech.    In the past 6 months, have you been bothered by leaking of urine? no    Mental Health:    In general, how would you rate your overall mental or emotional health? good  PHQ-2 Score: 0    Do you feel safe in your environment? Yes    Do you have a Health Care Directive? No: Advance care planning reviewed with patient; information given to patient to review.    Additional concerns to address?  No    Fall risk:  Fallen 2 or more times in the past year?: No  Any fall with injury in the past year?: No    Cognitive Screenin) Repeat 3 items (Leader, Season, Table)    2) Clock draw: NORMAL  3) 3 item recall: Recalls 2 objects   Results: NORMAL clock, 1-2 items recalled: COGNITIVE IMPAIRMENT LESS LIKELY    Mini-CogTM Copyright ALICE Hernandez. Licensed by the author for use in [facility]; reprinted with permission (sher@.[facility]). All rights reserved.      Do you have sleep apnea, excessive snoring " or daytime drowsiness?: yes        PROBLEMS TO ADD ON...  Dx with COPD in the past. Feels more short of breath with exertion. Last PFT was in .   Attention deficit disorder: Methyphenidate is getting more expensive. Has an Rx from 6/3/19. Plans to pick it up in the next day or two.     Reviewed and updated as needed this visit by clinical staff  Tobacco  Allergies  Meds         Reviewed and updated as needed this visit by Provider        Social History     Tobacco Use     Smoking status: Former Smoker     Last attempt to quit: 1982     Years since quittin.5     Smokeless tobacco: Never Used   Substance Use Topics     Alcohol use: No                           Current providers sharing in care for this patient include:   Patient Care Team:  Lakia Hunter MD PhD as PCP - General (Internal Medicine)  Brent Bunn DPM as MD (Podiatry)  Danie Garcia MD as MD (Family Medicine - Sports Medicine)    The following health maintenance items are reviewed in Epic and correct as of today:  Health Maintenance   Topic Date Due     COPD ACTION PLAN  1945     SPIROMETRY  2007     ZOSTER IMMUNIZATION (2 of 3) 2012     MEDICARE ANNUAL WELLNESS VISIT  2019     TSH W/FREE T4 REFLEX  2019     PHQ-9  2019     DIABETIC FOOT EXAM  2019     MAMMO SCREENING  2019     A1C  2019     BMP  2020     LIPID  2020     MICROALBUMIN  2020     EYE EXAM  04/10/2020     URINE DRUG SCREEN  2020     ADVANCED DIRECTIVE PLANNING  2020     COLONOSCOPY  2023     DTAP/TDAP/TD IMMUNIZATION (5 - Td) 2028     DEXA  Completed     DEPRESSION ACTION PLAN  Completed     INFLUENZA VACCINE  Completed     IPV IMMUNIZATION  Aged Out     MENINGITIS IMMUNIZATION  Aged Out     Labs reviewed in [facility]  Mammogram Screening: Patient over age 75, has elected to continue with mammography screening.    ROS:  Constitutional, HEENT, cardiovascular, pulmonary, gi  "and gu systems are negative, except as otherwise noted.    OBJECTIVE:   /72 (BP Location: Right arm, Patient Position: Sitting, Cuff Size: Adult Large)   Pulse 64   Temp 97.7  F (36.5  C) (Oral)   Ht 1.595 m (5' 2.8\")   Wt 103.6 kg (228 lb 4.8 oz)   SpO2 96%   BMI 40.71 kg/m   Estimated body mass index is 40.71 kg/m  as calculated from the following:    Height as of this encounter: 1.595 m (5' 2.8\").    Weight as of this encounter: 103.6 kg (228 lb 4.8 oz).  EXAM:   GENERAL: healthy, alert and no distress  EYES: Eyes grossly normal to inspection, PERRL and conjunctivae and sclerae normal  HENT: ear canals and TM's normal, nose and mouth without ulcers or lesions  NECK: no adenopathy, no asymmetry, masses, or scars and thyroid normal to palpation  RESP: lungs clear to auscultation - no rales, rhonchi or wheezes  BREAST: normal without masses, tenderness or nipple discharge and no palpable axillary masses or adenopathy  CV: regular rate and rhythm, normal S1 S2, no S3 or S4, no murmur, click or rub, no peripheral edema and peripheral pulses strong  ABDOMEN: soft, nontender, no hepatosplenomegaly, no masses and bowel sounds normal  MS: no gross musculoskeletal defects noted, no edema  SKIN: no suspicious lesions or rashes  NEURO: Normal strength and tone, mentation intact and speech normal  PSYCH: mentation appears normal, affect normal/bright    Diagnostic Test Results:  Results for orders placed or performed in visit on 06/12/19 (from the past 24 hour(s))   Hemoglobin A1c   Result Value Ref Range    Hemoglobin A1C 7.4 (H) 0 - 5.6 %       ASSESSMENT / PLAN:       ICD-10-CM    1. Encounter for Medicare annual wellness exam Z00.00    2. Malignant neoplasm of ascending colon (H) C18.2    3. Attention deficit disorder (ADD) without hyperactivity F98.8    4. Hyperlipidemia LDL goal <100 E78.5 Lipid panel reflex to direct LDL Fasting     TSH   5. Essential hypertension with goal blood pressure less than 140/90 " "I10 losartan-hydrochlorothiazide (HYZAAR) 100-25 MG tablet   6. Major depression in complete remission (H) F32.5    7. Hypertension goal BP (blood pressure) < 140/90 I10 metoprolol succinate ER (TOPROL-XL) 25 MG 24 hr tablet   8. Encounter for screening mammogram for breast cancer Z12.31 MA Screening Digital Bilateral   9. CKD (chronic kidney disease) stage 3, GFR 30-59 ml/min (H) N18.3    10. Type 2 diabetes mellitus with stage 3 chronic kidney disease, without long-term current use of insulin (H) E11.22 Hemoglobin A1c    N18.3    11. Chronic obstructive pulmonary disease, unspecified COPD type (H) J44.9 General PFT Lab (Please always keep checked)     Pulmonary Function Test   12. Osteopenia of hip, unspecified laterality M85.859 DX Hip/Pelvis/Spine   13. Disorder of bone  M89.9 DX Hip/Pelvis/Spine     -- stable chronic health issues. Medications refilled.    -- reporting more shortness of breath, possibly worsening COPD. Obtain PFT.   -- osteopenia: due for dexa  -- history of colon cancer: last colonoscopy 12/2018. Recheck in 5 years.     Preventive screening/immunizations:   Mammogram: up to date  Colonoscopy: up to date  Immunizations: up to date   -- Shingrix (RZV) advised. Pt will check insurance coverage.     End of Life Planning:  Patient currently has an advanced directive: Yes.  Practitioner is supportive of decision.    COUNSELING:  Reviewed preventive health counseling, as reflected in patient instructions    Estimated body mass index is 40.71 kg/m  as calculated from the following:    Height as of this encounter: 1.595 m (5' 2.8\").    Weight as of this encounter: 103.6 kg (228 lb 4.8 oz).    Weight management plan: weight stable.      reports that she quit smoking about 36 years ago. She has never used smokeless tobacco.      Appropriate preventive services were discussed with this patient, including applicable screening as appropriate for cardiovascular disease, diabetes, osteopenia/osteoporosis, and " glaucoma.  As appropriate for age/gender, discussed screening for colorectal cancer, prostate cancer, breast cancer, and cervical cancer. Checklist reviewing preventive services available has been given to the patient.    Reviewed patients plan of care and provided an AVS. The Basic Care Plan (routine screening as documented in Health Maintenance) for Karen meets the Care Plan requirement. This Care Plan has been established and reviewed with the Patient.    Counseling Resources:  ATP IV Guidelines  Pooled Cohorts Equation Calculator  Breast Cancer Risk Calculator  FRAX Risk Assessment  ICSI Preventive Guidelines  Dietary Guidelines for Americans, 2010  USDA's MyPlate  ASA Prophylaxis  Lung CA Screening    Lakia Hunter MD PhD  [facility]

## 2019-06-12 NOTE — PATIENT INSTRUCTIONS
Make appointment(s) for:   -- get urine test today  -- diabetes follow up with fasting labs in 6 months.   -- mammogram in December.   -- PFT and bone density.         Medication(s) prescribed today:    Orders Placed This Encounter   Medications     losartan-hydrochlorothiazide (HYZAAR) 100-25 MG tablet     Sig: Take 1 tablet by mouth daily     Dispense:  91 tablet     Refill:  3     metoprolol succinate ER (TOPROL-XL) 25 MG 24 hr tablet     Sig: Take 1 tablet (25 mg) by mouth daily     Dispense:  91 tablet     Refill:  3           Patient Education   Personalized Prevention Plan  You are due for the preventive services outlined below.  Your care team is available to assist you in scheduling these services.  If you have already completed any of these items, please share that information with your care team to update in your medical record.  Health Maintenance Due   Topic Date Due     Zoster (Shingles) Vaccine (2 of 3) 07/06/2012     Annual Wellness Visit  06/08/2019     Thyroid Function Lab  06/19/2019

## 2019-06-13 ENCOUNTER — TELEPHONE (OUTPATIENT)
Dept: PEDIATRICS | Facility: CLINIC | Age: 74
End: 2019-06-13

## 2019-06-13 NOTE — TELEPHONE ENCOUNTER
Left message for patient with family member to return clinic call regarding scheduling.     Patient needs:    PFT appt (60 minutes) and dexa bone density   diabetes follow up with Dr Hunter with fasting labs and mammogram in December.    Number to clinic and Mychart option given, please assist in scheduling once patient returns clinic call.     Call Center OKAY TO SCHEDULE.    Thanks,   Annalisa Beck  Primary Care   [facility] [place]

## 2019-06-19 LAB — PAIN DRUG SCR UR W RPTD MEDS: NORMAL

## 2019-06-20 ENCOUNTER — ANCILLARY PROCEDURE (OUTPATIENT)
Dept: BONE DENSITY | Facility: CLINIC | Age: 74
End: 2019-06-20
Attending: INTERNAL MEDICINE
Payer: COMMERCIAL

## 2019-06-20 ENCOUNTER — OFFICE VISIT (OUTPATIENT)
Dept: NURSING | Facility: CLINIC | Age: 74
End: 2019-06-20
Payer: COMMERCIAL

## 2019-06-20 DIAGNOSIS — M89.9 DISORDER OF BONE: ICD-10-CM

## 2019-06-20 DIAGNOSIS — J44.9 CHRONIC OBSTRUCTIVE PULMONARY DISEASE, UNSPECIFIED COPD TYPE (H): ICD-10-CM

## 2019-06-20 DIAGNOSIS — M81.8 OTHER OSTEOPOROSIS WITHOUT CURRENT PATHOLOGICAL FRACTURE: ICD-10-CM

## 2019-06-20 DIAGNOSIS — M85.859 OSTEOPENIA OF HIP, UNSPECIFIED LATERALITY: ICD-10-CM

## 2019-06-20 PROCEDURE — 77081 DXA BONE DENSITY APPENDICULR: CPT | Performed by: RADIOLOGY

## 2019-06-20 PROCEDURE — 94375 RESPIRATORY FLOW VOLUME LOOP: CPT | Performed by: INTERNAL MEDICINE

## 2019-06-20 PROCEDURE — 77080 DXA BONE DENSITY AXIAL: CPT | Mod: 59 | Performed by: RADIOLOGY

## 2019-06-20 PROCEDURE — 94729 DIFFUSING CAPACITY: CPT | Performed by: INTERNAL MEDICINE

## 2019-06-20 PROCEDURE — 94726 PLETHYSMOGRAPHY LUNG VOLUMES: CPT | Performed by: INTERNAL MEDICINE

## 2019-06-20 NOTE — LETTER
July 1, 2019      Karen Donis  [address] [place] [address]              Dear Karen,     Your recent test results showed the following:     -- The bone density test shows osteopenia. This is an intermediate category that is in between normal and osteoporosis.  People with osteopenia should work on taking in 7976-9464 mg of calcium with vitamin D daily (including food source). They should also be getting daily weight bearing exercise (walking works)     -- lung function test is normal. There is small diffusion defect, which may suggest early COPD. We'll continue to monitor this.     Please call or make an appointment if you have further questions.     Lakia Hunter MD-PhD

## 2019-06-20 NOTE — RESULT ENCOUNTER NOTE
Send Letter with the following comment:    Dear Karen,   Your recent lab results showed the following:  -- A1c is controlled.   -- urine drug screen showed detected alcohol in addition to the medications you are taking.  -- please schedule an appointment with me next month or two to go over this result.    Please call or make an appointment if you have further questions.     Lakia Hunter MD-PhD

## 2019-06-22 LAB
DLCOUNC-%PRED-PRE: 69 %
DLCOUNC-PRE: 12.77 ML/MIN/MMHG
DLCOUNC-PRED: 18.25 ML/MIN/MMHG
ERV-%PRED-PRE: 198 %
ERV-PRE: 0.09 L
ERV-PRED: 0.05 L
EXPTIME-PRE: 6.82 SEC
FEF2575-%PRED-PRE: 102 %
FEF2575-PRE: 1.66 L/SEC
FEF2575-PRED: 1.63 L/SEC
FEFMAX-%PRED-PRE: 120 %
FEFMAX-PRE: 6 L/SEC
FEFMAX-PRED: 4.97 L/SEC
FEV1-%PRED-PRE: 91 %
FEV1-PRE: 1.79 L
FEV1FEV6-PRE: 81 %
FEV1FEV6-PRED: 78 %
FEV1FVC-PRE: 81 %
FEV1FVC-PRED: 78 %
FEV1SVC-PRE: 71 %
FEV1SVC-PRED: 70 %
FIFMAX-PRE: 4.41 L/SEC
FRCPLETH-%PRED-PRE: 80 %
FRCPLETH-PRE: 2.13 L
FRCPLETH-PRED: 2.65 L
FVC-%PRED-PRE: 87 %
FVC-PRE: 2.23 L
FVC-PRED: 2.54 L
IC-%PRED-PRE: 89 %
IC-PRE: 2.43 L
IC-PRED: 2.73 L
RVPLETH-%PRED-PRE: 97 %
RVPLETH-PRE: 2.04 L
RVPLETH-PRED: 2.1 L
TLCPLETH-%PRED-PRE: 96 %
TLCPLETH-PRE: 4.57 L
TLCPLETH-PRED: 4.73 L
VA-%PRED-PRE: 91 %
VA-PRE: 4.04 L
VC-%PRED-PRE: 90 %
VC-PRE: 2.53 L
VC-PRED: 2.78 L

## 2019-06-27 ENCOUNTER — OFFICE VISIT (OUTPATIENT)
Dept: ORTHOPEDICS | Facility: CLINIC | Age: 74
End: 2019-06-27
Payer: COMMERCIAL

## 2019-06-27 VITALS — HEIGHT: 63 IN | WEIGHT: 228 LBS | BODY MASS INDEX: 40.4 KG/M2

## 2019-06-27 DIAGNOSIS — M62.830 LUMBAR PARASPINAL MUSCLE SPASM: Primary | ICD-10-CM

## 2019-06-27 DIAGNOSIS — M51.26 LUMBAR DISC HERNIATION: ICD-10-CM

## 2019-06-27 DIAGNOSIS — M51.369 LUMBAR DEGENERATIVE DISC DISEASE: ICD-10-CM

## 2019-06-27 PROCEDURE — 99214 OFFICE O/P EST MOD 30 MIN: CPT | Performed by: PREVENTIVE MEDICINE

## 2019-06-27 RX ORDER — GABAPENTIN 100 MG/1
100 CAPSULE ORAL 3 TIMES DAILY
Qty: 90 CAPSULE | Refills: 1 | Status: SHIPPED | OUTPATIENT
Start: 2019-06-27 | End: 2019-10-15

## 2019-06-27 ASSESSMENT — PAIN SCALES - GENERAL: PAINLEVEL: MODERATE PAIN (5)

## 2019-06-27 ASSESSMENT — MIFFLIN-ST. JEOR: SCORE: 1490.15

## 2019-06-27 NOTE — PATIENT INSTRUCTIONS
AQUATIC THERAPY LOCATIONS    [facility])    - [place], [place]: [address], call [phone] for  an appointment    - [place], [place]: [address], call [phone] for an appointment    - [place], [place]: [address], call [phone] for an appointment    - [place], [place]: [address], call [phone] for an appointment    - [place], [place]: [address], call [phone] for an appointment    - [place], [place]: [address] [place] [address], call [phone] for an appointment    - [place]: [address], call [phone] for an appointment    - [place], [place]: [address], [address], call [phone] for an appointment     - [place], [place]: [address], call [phone] for an appointment    - [place], [place]: [address], call [phone] for an appointment    - [place], [place]: [address], call [phone] for an appointment    - [place], [place]: [address], call [phone] for an appointment    - [place], [place]: [address], call [phone] for an appointment    [facility] ([facility])    - [place], [place]: [address], call [phone] for an appointment    [facility]    -[place], [place]: [address], call [phone] for an appointment    -[place], [place]: [address], call [phone] for an appointment    [facility]    - [place], [place]: [address], call [phone] for an appointment    - [place], [place]: [address], call [phone] for an appointment    - [place]: [address], call [phone] for an appointment    - [place]: [address], call [phone] for an appointment    [facility]    - [place]: [address], call  [phone]

## 2019-06-27 NOTE — LETTER
"    6/27/2019         RE: Karen Donis  [address] [place] [address]        Dear Colleague,    Thank you for referring your patient, Karen Donis, to the [facility]. Please see a copy of my visit note below.    HISTORY OF PRESENT ILLNESS  Ms. Donis is a pleasant 74 year old year old female who presents to clinic today with   Violet explains that   Location:   Quality:  achy pain    [employer].:[id number]::\"1\",\"2\",\"3\",\"4\",\"5\",\"6\",\"7\",\"8\",\"9\",\"10\"}/10 at worst    Duration:   Timing: occurs intermittently  Context: occurs while exercising and lifting  Modifying factors:  resting and non-use makes it better, movement and use makes it worse  Associated signs & symptoms: none  Previous similar pain: yes  Exercise: lifting weights and cardiovascular on machine  Additional history: as documented    MEDICAL HISTORY  Patient Active Problem List   Diagnosis     Seborrheic dermatitis     Alopecia     Xerosis cutis     Vitamin D deficiency     Nodules, thyroid     Postmenopausal     Colon cancer     CARDIOVASCULAR SCREENING; LDL GOAL LESS THAN 160     Impingement syndrome of right shoulder     AC (acromioclavicular) joint arthritis     Anxiety state     Attention deficit disorder     ACP (advance care planning)     Gastroesophageal reflux disease without esophagitis     Diet-controlled diabetes mellitus (H)     Hyperlipidemia LDL goal <100     Essential hypertension with goal blood pressure less than 140/90     Fatty liver     Major depression in complete remission (H)     Chronic bilateral low back pain with bilateral sciatica     Morbid obesity due to excess calories (H)     BPPV (benign paroxysmal positional vertigo), right       Current Outpatient Medications   Medication Sig Dispense Refill     ACCU-CHEK SMARTVIEW test strip use to test blood sugar once daily or as directed 50 each 5     Alpha-D-Galactosidase (BEANO PO) Take by mouth as needed Reported on 5/3/2017       " ASPIRIN 81 MG OR TABS 1 TABLET DAILY       biotin 1000 MCG TABS tablet Take 1,000 mcg by mouth daily       blood glucose (ACCU-CHEK FASTCLIX) lancing device Device to be used with lancets. 1 each 0     blood glucose monitoring (ACCU-CHEK FASTCLIX) lancets Use to test blood sugar 1 times daily or as directed.  Ok to substitute alternative if insurance prefers. 1 Box 6     blood glucose monitoring (NO BRAND SPECIFIED) meter device kit Use to test blood sugar 1 times daily or as directed. 1 kit 0     blood glucose monitoring (NO BRAND SPECIFIED) test strip Use to test blood sugars daily or as directed. Accu-check smartview strips 100 each 3     Calcium Carbonate-Vit D-Min (CALCIUM 1200 PO) Take by mouth daily        cholecalciferol (VITAMIN  -D) 1000 UNITS capsule Take 2 capsules (2,000 Units) by mouth daily 180 capsule 3     Coenzyme Q10 (CO Q 10 PO) Take 1 capsule by mouth daily.       Cranberry 300 MG TABS Take by mouth daily        DULoxetine (CYMBALTA) 60 MG capsule Take 1 capsule (60 mg) by mouth daily 90 capsule 1     econazole nitrate 1 % cream Apply topically daily 30 g 3     Flaxseed, Linseed, (FLAX SEED OIL) 1000 MG capsule Take 1 capsule by mouth daily       losartan-hydrochlorothiazide (HYZAAR) 100-25 MG tablet Take 1 tablet by mouth daily 91 tablet 3     MAGNESIUM OR 1 capsule daily       meclizine (ANTIVERT) 25 MG tablet Take 1 tablet (25 mg) by mouth every 6 hours as needed for dizziness 30 tablet 1     metFORMIN (GLUCOPHAGE-XR) 500 MG 24 hr tablet Take 2 tablets (1,000 mg) by mouth daily (with breakfast)  180 tablet 0     methylphenidate (METADATE ER) 20 MG CR tablet Take 1 tablet (20 mg) by mouth every morning 30 tablet 0     methylphenidate (METADATE ER) 20 MG CR tablet Take 1 tablet (20 mg) by mouth every morning 30 tablet 0     methylphenidate (METADATE ER) 20 MG CR tablet Take 1 tablet (20 mg) by mouth every morning 30 tablet 0     metoprolol succinate ER (TOPROL-XL) 25 MG 24 hr tablet Take 1  tablet (25 mg) by mouth daily 91 tablet 3     omeprazole (PRILOSEC) 40 MG DR capsule Take 1 capsule (40 mg) by mouth daily. Take 30-60 minutes before a meal. 90 capsule 2     pravastatin (PRAVACHOL) 10 MG tablet Take 1 tablet (10 mg) by mouth daily 90 tablet 3     tiZANidine (ZANAFLEX) 4 MG tablet Take 1-2 tablets (4-8 mg) by mouth nightly as needed 30 tablet 1     VITAMIN B COMPLEX-C OR CAPS 1 capsule PO qd       vitamin E 400 UNIT capsule Take by mouth daily         Allergies   Allergen Reactions     Bactrim Other (See Comments)     Burning when voiding     Belladonna Alkaloids-Opium [B & O] Blisters     Bellagril     Definity [Definity] Other (See Comments)     Pain in the tailbone     Ergotamine Other (See Comments)     Patient unsure of reaction     Phenobarbital Other (See Comments)     Patient unsure of reaction     Sulfa Drugs Blisters     Tape [Adhesive Tape] Other (See Comments)     Burning feeling of the skin     Lisinopril Cough              Family History   Problem Relation Age of Onset     Diabetes Mother      Hypertension Mother      Thyroid Disease Mother      Heart Disease Father      Cardiovascular Father      Neurologic Disorder Father         Parkinson's     Arthritis Father         Fibromyalgia     Gastrointestinal Disease Maternal Grandmother         gallbladder removed     Heart Disease Maternal Grandfather      Diabetes Brother      Hypertension Brother      Neurologic Disorder Brother         ADHD     Unknown/Adopted Son      Unknown/Adopted Daughter      Neurologic Disorder Brother         ADHD       Additional medical/Social/Surgical histories reviewed in [facility] and updated as appropriate.     REVIEW OF SYSTEMS (6/27/2019)  10 point ROS of systems including Constitutional, Eyes, Respiratory, Cardiovascular, Gastroenterology, Genitourinary, Integumentary, Musculoskeletal, Psychiatric were all negative except for pertinent positives noted in my HPI.     PHYSICAL EXAM  Vitals:    06/27/19 1005  "  Weight: 103.4 kg (228 lb)   Height: 1.595 m (5' 2.8\")     Vital Signs: Ht 1.595 m (5' 2.8\")   Wt 103.4 kg (228 lb)   BMI 40.65 kg/m    Patient declined being weighed. Body mass index is 40.65 kg/m .    General  - normal appearance, in no obvious distress  CV  - normal peripheral perfusion  Pulm  - normal respiratory pattern, non-labored  Musculoskeletal - lumbar spine  - stance: normal gait without limp, no obvious leg length discrepancy, normal heel and toe walk  - inspection: normal bone and joint alignment, no obvious scoliosis  - palpation: no paravertebral or bony tenderness  - ROM: flexion exacerbates pain, normal extension, sidebending, rotation  - strength: lower extremities 5/5 in all planes  - special tests:  (+) straight leg raise  (+) slump test  Neuro  - patellar and Achilles DTRs 2+ bilaterally, lower extremity sensory deficit throughout L5 distribution, grossly normal coordination, normal muscle tone  Skin  - no ecchymosis, erythema, warmth, or induration, no obvious rash  Psych  - interactive, appropriate, normal mood and affect    ASSESSMENT & PLAN      Danie Garcia MD, CAQSM    Again, thank you for allowing me to participate in the care of your patient.        Sincerely,        Danie Garcia MD    "

## 2019-06-27 NOTE — NURSING NOTE
"Karen Donis's chief complaint for this visit includes:  Chief Complaint   Patient presents with     RECHECK     back pain follow up      PCP: Lakia Hunter    Referring Provider:  No referring provider defined for this encounter.    Ht 1.595 m (5' 2.8\")   Wt 103.4 kg (228 lb)   BMI 40.65 kg/m    Moderate Pain (5)     Do you need any medication refills at today's visit? No           "

## 2019-06-27 NOTE — PROGRESS NOTES
HISTORY OF PRESENT ILLNESS  Ms. Donis is a pleasant 74 year old year old female who presents to clinic today with lumbar pain that radiates into both legs  She had an MRI this past fall of her lumbar spine and has been seen at the [facility] clinic recently as well  She has been seen by me for these problems int he past  She has pain today in her low back and it radiates into her buttocks/hips as well  She is willing to start PT again as well    MEDICAL HISTORY  Patient Active Problem List   Diagnosis     Seborrheic dermatitis     Alopecia     Xerosis cutis     Vitamin D deficiency     Nodules, thyroid     Postmenopausal     Colon cancer     CARDIOVASCULAR SCREENING; LDL GOAL LESS THAN 160     Impingement syndrome of right shoulder     AC (acromioclavicular) joint arthritis     Anxiety state     Attention deficit disorder     ACP (advance care planning)     Gastroesophageal reflux disease without esophagitis     Diet-controlled diabetes mellitus (H)     Hyperlipidemia LDL goal <100     Essential hypertension with goal blood pressure less than 140/90     Fatty liver     Major depression in complete remission (H)     Chronic bilateral low back pain with bilateral sciatica     Morbid obesity due to excess calories (H)     BPPV (benign paroxysmal positional vertigo), right       Current Outpatient Medications   Medication Sig Dispense Refill     ACCU-CHEK SMARTVIEW test strip use to test blood sugar once daily or as directed 50 each 5     Alpha-D-Galactosidase (BEANO PO) Take by mouth as needed Reported on 5/3/2017       ASPIRIN 81 MG OR TABS 1 TABLET DAILY       biotin 1000 MCG TABS tablet Take 1,000 mcg by mouth daily       blood glucose (ACCU-CHEK FASTCLIX) lancing device Device to be used with lancets. 1 each 0     blood glucose monitoring (ACCU-CHEK FASTCLIX) lancets Use to test blood sugar 1 times daily or as directed.  Ok to substitute alternative if insurance prefers. 1 Box 6     blood glucose monitoring (NO BRAND  SPECIFIED) meter device kit Use to test blood sugar 1 times daily or as directed. 1 kit 0     blood glucose monitoring (NO BRAND SPECIFIED) test strip Use to test blood sugars daily or as directed. Accu-check smartview strips 100 each 3     Calcium Carbonate-Vit D-Min (CALCIUM 1200 PO) Take by mouth daily        cholecalciferol (VITAMIN  -D) 1000 UNITS capsule Take 2 capsules (2,000 Units) by mouth daily 180 capsule 3     Coenzyme Q10 (CO Q 10 PO) Take 1 capsule by mouth daily.       Cranberry 300 MG TABS Take by mouth daily        DULoxetine (CYMBALTA) 60 MG capsule Take 1 capsule (60 mg) by mouth daily 90 capsule 1     econazole nitrate 1 % cream Apply topically daily 30 g 3     Flaxseed, Linseed, (FLAX SEED OIL) 1000 MG capsule Take 1 capsule by mouth daily       losartan-hydrochlorothiazide (HYZAAR) 100-25 MG tablet Take 1 tablet by mouth daily 91 tablet 3     MAGNESIUM OR 1 capsule daily       meclizine (ANTIVERT) 25 MG tablet Take 1 tablet (25 mg) by mouth every 6 hours as needed for dizziness 30 tablet 1     metFORMIN (GLUCOPHAGE-XR) 500 MG 24 hr tablet Take 2 tablets (1,000 mg) by mouth daily (with breakfast)  180 tablet 0     methylphenidate (METADATE ER) 20 MG CR tablet Take 1 tablet (20 mg) by mouth every morning 30 tablet 0     methylphenidate (METADATE ER) 20 MG CR tablet Take 1 tablet (20 mg) by mouth every morning 30 tablet 0     methylphenidate (METADATE ER) 20 MG CR tablet Take 1 tablet (20 mg) by mouth every morning 30 tablet 0     metoprolol succinate ER (TOPROL-XL) 25 MG 24 hr tablet Take 1 tablet (25 mg) by mouth daily 91 tablet 3     omeprazole (PRILOSEC) 40 MG DR capsule Take 1 capsule (40 mg) by mouth daily. Take 30-60 minutes before a meal. 90 capsule 2     pravastatin (PRAVACHOL) 10 MG tablet Take 1 tablet (10 mg) by mouth daily 90 tablet 3     tiZANidine (ZANAFLEX) 4 MG tablet Take 1-2 tablets (4-8 mg) by mouth nightly as needed 30 tablet 1     VITAMIN B COMPLEX-C OR CAPS 1 capsule PO qd    "    vitamin E 400 UNIT capsule Take by mouth daily         Allergies   Allergen Reactions     Bactrim Other (See Comments)     Burning when voiding     Belladonna Alkaloids-Opium [B & O] Blisters     Bellagril     Definity [Definity] Other (See Comments)     Pain in the tailbone     Ergotamine Other (See Comments)     Patient unsure of reaction     Phenobarbital Other (See Comments)     Patient unsure of reaction     Sulfa Drugs Blisters     Tape [Adhesive Tape] Other (See Comments)     Burning feeling of the skin     Lisinopril Cough              Family History   Problem Relation Age of Onset     Diabetes Mother      Hypertension Mother      Thyroid Disease Mother      Heart Disease Father      Cardiovascular Father      Neurologic Disorder Father         Parkinson's     Arthritis Father         Fibromyalgia     Gastrointestinal Disease Maternal Grandmother         gallbladder removed     Heart Disease Maternal Grandfather      Diabetes Brother      Hypertension Brother      Neurologic Disorder Brother         ADHD     Unknown/Adopted Son      Unknown/Adopted Daughter      Neurologic Disorder Brother         ADHD       Additional medical/Social/[facility] and updated as appropriate.     REVIEW OF SYSTEMS (6/27/2019)  10 point ROS of systems including Constitutional, Eyes, Respiratory, Cardiovascular, Gastroenterology, Genitourinary, Integumentary, Musculoskeletal, Psychiatric were all negative except for pertinent positives noted in my HPI.     PHYSICAL EXAM  Vitals:    06/27/19 1005   Weight: 103.4 kg (228 lb)   Height: 1.595 m (5' 2.8\")     Vital Signs: Ht 1.595 m (5' 2.8\")   Wt 103.4 kg (228 lb)   BMI 40.65 kg/m   Patient declined being weighed. Body mass index is 40.65 kg/m .    General  - normal appearance, in no obvious distress  CV  - normal peripheral perfusion  Pulm  - normal respiratory pattern, non-labored  Musculoskeletal - lumbar spine  - stance: normal gait without limp, no " obvious leg length discrepancy, normal heel and toe walk  - inspection: normal bone and joint alignment, no obvious scoliosis  - palpation: no paravertebral or bony tenderness  - ROM: flexion exacerbates pain, normal extension, sidebending, rotation  - strength: lower extremities 5/5 in all planes  - special tests:  (+) straight leg raise  (+) slump test  Neuro  - patellar and Achilles DTRs 2+ bilaterally, bilateral lower extremity sensory deficit throughout L5 distribution, grossly normal coordination, normal muscle tone  Skin  - no ecchymosis, erythema, warmth, or induration, no obvious rash  Psych  - interactive, appropriate, normal mood and affect    ASSESSMENT & PLAN  75 yo female with lumbar ddd, radicular pain, lumbar spasms  Start gabapentin and tizanadine  Reviewed lumbar MRI and ordered NEETA  Start PT as well  F/u after NEETA    Danie Garcia MD, CAQSM

## 2019-06-30 NOTE — RESULT ENCOUNTER NOTE
Send Letter with the following comment:    Dear Karen,   Your recent lab results showed the following:  -- The bone density test shows osteopenia. This is an intermediate category that is in between normal and osteoporosis.  People with osteopenia should work on taking in 5820-9495 mg of calcium with vitamin D daily (including food source). They should also be getting daily weight bearing exercise (walking works)     Please call or make an appointment if you have further questions.     Lakia Hunter MD-PhD

## 2019-06-30 NOTE — RESULT ENCOUNTER NOTE
Send Letter with the following comment:    Dear Karen,   Your recent lab results showed the following:  -- lung function test is normal. There is small diffusion defect, which may suggest early COPD. We'll continue to monitor this.     Please call or make an appointment if you have further questions.     Lakia Hunter MD-PhD

## 2019-07-15 DIAGNOSIS — F98.8 ATTENTION DEFICIT DISORDER (ADD) WITHOUT HYPERACTIVITY: ICD-10-CM

## 2019-07-15 NOTE — TELEPHONE ENCOUNTER
[facility] Call Center    Phone Message    May a detailed message be left on voicemail: yes    Reason for Call: Medication Refill Request    Has the patient contacted the pharmacy for the refill? Yes   Name of medication being requested: methylphenidate (METADATE ER) 20 MG CR tablet     Provider who prescribed the medication: Dr. Hunter  Pharmacy: [facility]  Date medication is needed: asap         Action Taken: Message routed to:  Primary Care p [id number]

## 2019-07-15 NOTE — TELEPHONE ENCOUNTER
Metadate      Last Written Prescription Date:  6/3  Last Fill Quantity: 30,   # refills: 0  Last Office Visit: 6/12  Future Office visit:    Next 5 appointments (look out 90 days)    Aug 01, 2019 12:00 PM CDT  Return Visit with Danie Garcia MD  [facility] [address] [place] [id number] [phone]         Routing refill request to provider for review/approval because:  Drug not on the [facility], [facility] or [facility] refill protocol or controlled substance

## 2019-07-16 RX ORDER — METHYLPHENIDATE HYDROCHLORIDE EXTENDED RELEASE 20 MG/1
20 TABLET ORAL EVERY MORNING
Qty: 30 TABLET | Refills: 0 | Status: SHIPPED | OUTPATIENT
Start: 2019-07-16 | End: 2019-08-21

## 2019-07-16 NOTE — TELEPHONE ENCOUNTER
Received another faxed refill request from [facility] for patient's methylphenidate (METADATE ER) 20 MG CR tablet dated 07/15/19.  Maria Luisa Barkley CMA

## 2019-07-18 ENCOUNTER — ANCILLARY PROCEDURE (OUTPATIENT)
Dept: GENERAL RADIOLOGY | Facility: CLINIC | Age: 74
End: 2019-07-18
Attending: PREVENTIVE MEDICINE
Payer: COMMERCIAL

## 2019-07-18 VITALS — HEART RATE: 69 BPM | SYSTOLIC BLOOD PRESSURE: 121 MMHG | DIASTOLIC BLOOD PRESSURE: 66 MMHG | OXYGEN SATURATION: 97 %

## 2019-07-18 DIAGNOSIS — M51.369 LUMBAR DEGENERATIVE DISC DISEASE: ICD-10-CM

## 2019-07-18 DIAGNOSIS — M51.26 LUMBAR DISC HERNIATION: ICD-10-CM

## 2019-07-18 PROCEDURE — 62323 NJX INTERLAMINAR LMBR/SAC: CPT | Performed by: RADIOLOGY

## 2019-07-18 RX ORDER — METHYLPREDNISOLONE ACETATE 80 MG/ML
80 INJECTION, SUSPENSION INTRA-ARTICULAR; INTRALESIONAL; INTRAMUSCULAR; SOFT TISSUE ONCE
Status: COMPLETED | OUTPATIENT
Start: 2019-07-18 | End: 2019-07-18

## 2019-07-18 RX ORDER — IOPAMIDOL 408 MG/ML
10 INJECTION, SOLUTION INTRATHECAL ONCE
Status: COMPLETED | OUTPATIENT
Start: 2019-07-18 | End: 2019-07-18

## 2019-07-18 RX ORDER — BUPIVACAINE HYDROCHLORIDE 5 MG/ML
10 INJECTION, SOLUTION PERINEURAL ONCE
Status: COMPLETED | OUTPATIENT
Start: 2019-07-18 | End: 2019-07-18

## 2019-07-18 RX ORDER — BUPIVACAINE HYDROCHLORIDE 2.5 MG/ML
5 INJECTION, SOLUTION INFILTRATION; PERINEURAL ONCE
Status: DISPENSED | OUTPATIENT
Start: 2019-07-18

## 2019-07-18 RX ADMIN — BUPIVACAINE HYDROCHLORIDE 2 ML: 5 INJECTION, SOLUTION PERINEURAL at 13:33

## 2019-07-18 RX ADMIN — IOPAMIDOL 2 ML: 408 INJECTION, SOLUTION INTRATHECAL at 13:34

## 2019-07-18 RX ADMIN — METHYLPREDNISOLONE ACETATE 80 MG: 80 INJECTION, SUSPENSION INTRA-ARTICULAR; INTRALESIONAL; INTRAMUSCULAR; SOFT TISSUE at 13:36

## 2019-07-18 NOTE — DISCHARGE SUMMARY
AFTER YOU GO HOME   ü DO relax; minimize your activity for 24 hours   ü You may resume normal activity tomorrow   ü You may remove the bandage in the evening or next morning   ü You may resume bathing the next day   ü Drink at least 4 extra glasses of fluid today if not on fluid restrictions   ü DO NOT drive or operate machinery at home or at work for at least 24 hours   VISIT THE EMERGENCY ROOM OR URGENT CARE IF:   ü There is redness or swelling at the injection site   ü There is discharge from the injection site   ü You develop a temperature of 101  F or greater   ADDITIONAL INSTRUCTIONS:   ü You may resume your Coumadin or other blood thinner at your regular dose today. Follow up with your physician to have your INR rechecked if indicated.   ü If you gain no relief from the injection after two (2) weeks, follow-up with your provider for your options.   Contacts:   During business hours from 8 to 5 pm, you may call [phone] to reach a nurse advisor at [facility].   After hours, call [facility]  [phone]. Ask for the Radiologist on-call. Someone is on-call 24 hrs/day.   [facility] Toll Free Number   [phone]

## 2019-07-18 NOTE — DISCHARGE SUMMARY
Radiology Discharge Instructions Post Lumbar Puncture   AFTER YOU GO HOME   ü DO relax and take it easy for 24 hours; we suggest bed rest until the next morning, except for getting up to the bathroom   ü You may resume normal activity tomorrow   ü You may remove the bandage on your back in the evening or next morning   ü You may resume bathing the next day   ü Drink at least 4 glasses of extra fluid today if not on a fluid restriction.   ü DO NOT drive or operate machinery at home or at work for at least 24 hours.   CALL YOUR PRIMARY PHYSICIAN IF:   ü If you do start to leak a large amount of fluid from the puncture site, lie down flat on your back. Call your primary physician they will tell you if you need to return to the hospital.   ü You develop severe headache   ü You develop nausea or vomiting.   ü You develop a temperature of 101 degrees F or greater.   ADDITIONAL INSTRUCTIONS:   ü If you are taking a blood thinner, you may resume your medication at your regular dose today. Follow up with your physician to have your INR rechecked if indicated.   ü You may use over the counter pain reliever, do not use aspirin for 24 hours.   Contacts:   During business hours from 8 to 5 pm, you may call [phone] to reach a nurse advisor.   After hours call [facility] [phone]. Ask for the Radiologist on call. Someone is on call 24 hrs/day.   [facility] Toll Free Number   [phone]

## 2019-07-18 NOTE — PROGRESS NOTES
: Karen was seen in X-ray today for a lumbar epidural injection. Patient rated pain before procedure 3/10. After procedure patient rated pain 3/10. This pain level is (is/is not) acceptable to patient. Patient discharged home with ***.

## 2019-08-01 ENCOUNTER — OFFICE VISIT (OUTPATIENT)
Dept: ORTHOPEDICS | Facility: CLINIC | Age: 74
End: 2019-08-01
Payer: COMMERCIAL

## 2019-08-01 VITALS — HEIGHT: 63 IN | WEIGHT: 228 LBS | BODY MASS INDEX: 40.4 KG/M2

## 2019-08-01 DIAGNOSIS — M51.26 LUMBAR DISC HERNIATION: Primary | ICD-10-CM

## 2019-08-01 DIAGNOSIS — M51.369 DDD (DEGENERATIVE DISC DISEASE), LUMBAR: ICD-10-CM

## 2019-08-01 PROCEDURE — 99213 OFFICE O/P EST LOW 20 MIN: CPT | Performed by: PREVENTIVE MEDICINE

## 2019-08-01 ASSESSMENT — MIFFLIN-ST. JEOR: SCORE: 1490.15

## 2019-08-01 ASSESSMENT — PAIN SCALES - GENERAL: PAINLEVEL: MILD PAIN (2)

## 2019-08-01 NOTE — PROGRESS NOTES
"Karen Donis's chief complaint for this visit includes:  Chief Complaint   Patient presents with     Follow Up     Follow up after epidural injection on 7-18-19     PCP: Lakia Hunter    Referring Provider:  No referring provider defined for this encounter.    Ht 1.595 m (5' 2.8\")   Wt 103.4 kg (228 lb)   BMI 40.65 kg/m    Mild Pain (2)       " Xenograft Text: The defect edges were debeveled with a #15 scalpel blade.  Given the location of the defect, shape of the defect and the proximity to free margins a xenograft was deemed most appropriate.  The graft was then trimmed to fit the size of the defect.  The graft was then placed in the primary defect and oriented appropriately.

## 2019-08-01 NOTE — LETTER
"    8/1/2019         RE: Karen Donis  [address] [place] [address]        Dear Colleague,    Thank you for referring your patient, Karen Donis, to the [facility]. Please see a copy of my visit note below.    Karen Donis's chief complaint for this visit includes:  Chief Complaint   Patient presents with     Follow Up     Follow up after epidural injection on 7-18-19     PCP: Lakia Hunter    Referring Provider:  No referring provider defined for this encounter.    Ht 1.595 m (5' 2.8\")   Wt 103.4 kg (228 lb)   BMI 40.65 kg/m     Mild Pain (2)         HISTORY OF PRESENT ILLNESS  Ms. Donis is a pleasant 74 year old year old female who presents to clinic today with followup for lumbar NEETA  Violet explains that she had her injection  about 2 weeks ago and feels better  She still has some low back pain but it is much better    MEDICAL HISTORY  Patient Active Problem List   Diagnosis     Seborrheic dermatitis     Alopecia     Xerosis cutis     Vitamin D deficiency     Nodules, thyroid     Postmenopausal     Colon cancer     CARDIOVASCULAR SCREENING; LDL GOAL LESS THAN 160     Impingement syndrome of right shoulder     AC (acromioclavicular) joint arthritis     Anxiety state     Attention deficit disorder     ACP (advance care planning)     Gastroesophageal reflux disease without esophagitis     Diet-controlled diabetes mellitus (H)     Hyperlipidemia LDL goal <100     Essential hypertension with goal blood pressure less than 140/90     Fatty liver     Major depression in complete remission (H)     Chronic bilateral low back pain with bilateral sciatica     Morbid obesity due to excess calories (H)     BPPV (benign paroxysmal positional vertigo), right       Current Outpatient Medications   Medication Sig Dispense Refill     ACCU-CHEK SMARTVIEW test strip use to test blood sugar once daily or as directed 50 each 5     Alpha-D-Galactosidase (BEANO PO) Take by mouth as needed Reported " on 5/3/2017       ASPIRIN 81 MG OR TABS 1 TABLET DAILY       biotin 1000 MCG TABS tablet Take 1,000 mcg by mouth daily       blood glucose (ACCU-CHEK FASTCLIX) lancing device Device to be used with lancets. 1 each 0     blood glucose monitoring (ACCU-CHEK FASTCLIX) lancets Use to test blood sugar 1 times daily or as directed.  Ok to substitute alternative if insurance prefers. 1 Box 6     blood glucose monitoring (NO BRAND SPECIFIED) meter device kit Use to test blood sugar 1 times daily or as directed. 1 kit 0     blood glucose monitoring (NO BRAND SPECIFIED) test strip Use to test blood sugars daily or as directed. Accu-check smartview strips 100 each 3     Calcium Carbonate-Vit D-Min (CALCIUM 1200 PO) Take by mouth daily        cholecalciferol (VITAMIN  -D) 1000 UNITS capsule Take 2 capsules (2,000 Units) by mouth daily 180 capsule 3     Coenzyme Q10 (CO Q 10 PO) Take 1 capsule by mouth daily.       Cranberry 300 MG TABS Take by mouth daily        DULoxetine (CYMBALTA) 60 MG capsule Take 1 capsule (60 mg) by mouth daily 90 capsule 1     econazole nitrate 1 % cream Apply topically daily 30 g 3     Flaxseed, Linseed, (FLAX SEED OIL) 1000 MG capsule Take 1 capsule by mouth daily       gabapentin (NEURONTIN) 100 MG capsule Take 1 capsule (100 mg) by mouth 3 times daily 90 capsule 1     losartan-hydrochlorothiazide (HYZAAR) 100-25 MG tablet Take 1 tablet by mouth daily 91 tablet 3     MAGNESIUM OR 1 capsule daily       meclizine (ANTIVERT) 25 MG tablet Take 1 tablet (25 mg) by mouth every 6 hours as needed for dizziness 30 tablet 1     metFORMIN (GLUCOPHAGE-XR) 500 MG 24 hr tablet Take 2 tablets (1,000 mg) by mouth daily (with breakfast)  180 tablet 0     methylphenidate (METADATE ER) 20 MG CR tablet Take 1 tablet (20 mg) by mouth every morning 30 tablet 0     methylphenidate (METADATE ER) 20 MG CR tablet Take 1 tablet (20 mg) by mouth every morning 30 tablet 0     methylphenidate (METADATE ER) 20 MG CR tablet Take 1  tablet (20 mg) by mouth every morning 30 tablet 0     metoprolol succinate ER (TOPROL-XL) 25 MG 24 hr tablet Take 1 tablet (25 mg) by mouth daily 91 tablet 3     omeprazole (PRILOSEC) 40 MG DR capsule Take 1 capsule (40 mg) by mouth daily. Take 30-60 minutes before a meal. 90 capsule 2     pravastatin (PRAVACHOL) 10 MG tablet Take 1 tablet (10 mg) by mouth daily 90 tablet 3     tiZANidine (ZANAFLEX) 4 MG tablet Take 1-2 tablets (4-8 mg) by mouth nightly as needed 30 tablet 1     tiZANidine (ZANAFLEX) 4 MG tablet Take 1-2 tablets (4-8 mg) by mouth nightly as needed 30 tablet 1     VITAMIN B COMPLEX-C OR CAPS 1 capsule PO qd       vitamin E 400 UNIT capsule Take by mouth daily         Allergies   Allergen Reactions     Bactrim Other (See Comments)     Burning when voiding     Belladonna Alkaloids-Opium [B & O] Blisters     Bellagril     Definity [Definity] Other (See Comments)     Pain in the tailbone     Ergotamine Other (See Comments)     Patient unsure of reaction     Phenobarbital Other (See Comments)     Patient unsure of reaction     Sulfa Drugs Blisters     Tape [Adhesive Tape] Other (See Comments)     Burning feeling of the skin     Lisinopril Cough              Family History   Problem Relation Age of Onset     Diabetes Mother      Hypertension Mother      Thyroid Disease Mother      Heart Disease Father      Cardiovascular Father      Neurologic Disorder Father         Parkinson's     Arthritis Father         Fibromyalgia     Gastrointestinal Disease Maternal Grandmother         gallbladder removed     Heart Disease Maternal Grandfather      Diabetes Brother      Hypertension Brother      Neurologic Disorder Brother         ADHD     Unknown/Adopted Son      Unknown/Adopted Daughter      Neurologic Disorder Brother         ADHD       Additional medical/Social/Surgical histories reviewed in [facility] and updated as appropriate.     REVIEW OF SYSTEMS (8/1/2019)  10 point ROS of systems including Constitutional,  "Eyes, Respiratory, Cardiovascular, Gastroenterology, Genitourinary, Integumentary, Musculoskeletal, Psychiatric were all negative except for pertinent positives noted in my HPI.     PHYSICAL EXAM  Vitals:    08/01/19 1203   Weight: 103.4 kg (228 lb)   Height: 1.595 m (5' 2.8\")     Vital Signs: Ht 1.595 m (5' 2.8\")   Wt 103.4 kg (228 lb)   BMI 40.65 kg/m    Patient declined being weighed. Body mass index is 40.65 kg/m .    General  - normal appearance, in no obvious distress  CV  - normal peripheral perfusion  Pulm  - normal respiratory pattern, non-labored  Musculoskeletal - lumbar spine  - stance: normal gait without limp, no obvious leg length discrepancy, normal heel and toe walk  - inspection: normal bone and joint alignment, no obvious scoliosis  - palpation: no paravertebral or bony tenderness  - ROM: flexion slightly exacerbates pain, normal extension, sidebending, rotation  - strength: lower extremities 5/5 in all planes  - special tests:  (-) straight leg raise  (-) slump test  Neuro  - patellar and Achilles DTRs 2+ bilaterally, lower extremity sensory deficit throughout L5 distribution, grossly normal coordination, normal muscle tone  Skin  - no ecchymosis, erythema, warmth, or induration, no obvious rash  Psych  - interactive, appropriate, normal mood and affect    ASSESSMENT & PLAN  75 yo female with lumbar disc herniation, ddd, improved  Ordered PT  Consider repeat NEETA  Danie Garcia MD, CAQSM    Again, thank you for allowing me to participate in the care of your patient.        Sincerely,        Danie Garcia MD    "

## 2019-08-01 NOTE — PATIENT INSTRUCTIONS
Thanks for coming today.  Ortho[facility]Sports Medicine Clinic  [address] [place] [address]    To schedule future appointments in Ortho Clinic, you may call [phone].    To schedule ordered imaging by your provider:   Call Central Imaging [phone]    To schedule an injection ordered by your provider:  Call Central Imaging Injection scheduling line: [phone]  MyChart available online at:  [email or web address]    Please call if any further questions or concerns ([phone]).  Clinic hours 8 am to 5 pm.    Return to clinic (call) if symptoms worsen or fail to improve.                                                                      AQUATIC THERAPY LOCATIONS    [facility])    - [place], [place]: [address], call [phone] for  an appointment    - [place], [place]: [address] [facility], call [phone] for an appointment    - [place]: [address], call [phone] for an appointment    - [place], [place]: [address] [facility], call [phone] for an appointment    - [place]: [address] [facility], call [phone] for an appointment    - [place], [place]: [address] [facility], call [phone] for an appointment    - [place]: [address] , call [phone] for an appointment    - [place], [place]: [address], [address], call [phone] for an appointment     - [place], [place]: [address] [facility], call [phone] for an appointment    - [place]: [address] [facility] [address], call [phone] for an appointment    - [place]: [address] [facility] [address], call [phone] for an appointment    - [place], [place]: [address] [facility], call [phone] for an appointment    - [place], [place]: [address] [facility], call [phone] for an appointment    [facility] ([facility])    - [place], [place]: [address] [facility], call [phone] for an  appointment    [place]    -[place], [place]: [address] [facility], call [phone] for an appointment    -[place], [place]: [address] [facility], call [phone] for an appointment    [facility]    - [facility] [place]: [address] [address], call [phone] for an appointment    - [place], [place]: [address], call [phone] for an appointment    - [place], [place]: [address] [facility], call [phone] for an appointment    - [place], [place]: [address], call [phone] for an appointment    [facility]    - [place], [place]: [address] [facility], call [phone]

## 2019-08-01 NOTE — PROGRESS NOTES
HISTORY OF PRESENT ILLNESS  Ms. Donis is a pleasant 74 year old year old female who presents to clinic today with followup for lumbar NEETA  Violet explains that she had her injection  about 2 weeks ago and feels better  She still has some low back pain but it is much better    MEDICAL HISTORY  Patient Active Problem List   Diagnosis     Seborrheic dermatitis     Alopecia     Xerosis cutis     Vitamin D deficiency     Nodules, thyroid     Postmenopausal     Colon cancer     CARDIOVASCULAR SCREENING; LDL GOAL LESS THAN 160     Impingement syndrome of right shoulder     AC (acromioclavicular) joint arthritis     Anxiety state     Attention deficit disorder     ACP (advance care planning)     Gastroesophageal reflux disease without esophagitis     Diet-controlled diabetes mellitus (H)     Hyperlipidemia LDL goal <100     Essential hypertension with goal blood pressure less than 140/90     Fatty liver     Major depression in complete remission (H)     Chronic bilateral low back pain with bilateral sciatica     Morbid obesity due to excess calories (H)     BPPV (benign paroxysmal positional vertigo), right       Current Outpatient Medications   Medication Sig Dispense Refill     ACCU-CHEK SMARTVIEW test strip use to test blood sugar once daily or as directed 50 each 5     Alpha-D-Galactosidase (BEANO PO) Take by mouth as needed Reported on 5/3/2017       ASPIRIN 81 MG OR TABS 1 TABLET DAILY       biotin 1000 MCG TABS tablet Take 1,000 mcg by mouth daily       blood glucose (ACCU-CHEK FASTCLIX) lancing device Device to be used with lancets. 1 each 0     blood glucose monitoring (ACCU-CHEK FASTCLIX) lancets Use to test blood sugar 1 times daily or as directed.  Ok to substitute alternative if insurance prefers. 1 Box 6     blood glucose monitoring (NO BRAND SPECIFIED) meter device kit Use to test blood sugar 1 times daily or as directed. 1 kit 0     blood glucose monitoring (NO BRAND SPECIFIED) test strip Use to test blood  sugars daily or as directed. Accu-check smartview strips 100 each 3     Calcium Carbonate-Vit D-Min (CALCIUM 1200 PO) Take by mouth daily        cholecalciferol (VITAMIN  -D) 1000 UNITS capsule Take 2 capsules (2,000 Units) by mouth daily 180 capsule 3     Coenzyme Q10 (CO Q 10 PO) Take 1 capsule by mouth daily.       Cranberry 300 MG TABS Take by mouth daily        DULoxetine (CYMBALTA) 60 MG capsule Take 1 capsule (60 mg) by mouth daily 90 capsule 1     econazole nitrate 1 % cream Apply topically daily 30 g 3     Flaxseed, Linseed, (FLAX SEED OIL) 1000 MG capsule Take 1 capsule by mouth daily       gabapentin (NEURONTIN) 100 MG capsule Take 1 capsule (100 mg) by mouth 3 times daily 90 capsule 1     losartan-hydrochlorothiazide (HYZAAR) 100-25 MG tablet Take 1 tablet by mouth daily 91 tablet 3     MAGNESIUM OR 1 capsule daily       meclizine (ANTIVERT) 25 MG tablet Take 1 tablet (25 mg) by mouth every 6 hours as needed for dizziness 30 tablet 1     metFORMIN (GLUCOPHAGE-XR) 500 MG 24 hr tablet Take 2 tablets (1,000 mg) by mouth daily (with breakfast)  180 tablet 0     methylphenidate (METADATE ER) 20 MG CR tablet Take 1 tablet (20 mg) by mouth every morning 30 tablet 0     methylphenidate (METADATE ER) 20 MG CR tablet Take 1 tablet (20 mg) by mouth every morning 30 tablet 0     methylphenidate (METADATE ER) 20 MG CR tablet Take 1 tablet (20 mg) by mouth every morning 30 tablet 0     metoprolol succinate ER (TOPROL-XL) 25 MG 24 hr tablet Take 1 tablet (25 mg) by mouth daily 91 tablet 3     omeprazole (PRILOSEC) 40 MG DR capsule Take 1 capsule (40 mg) by mouth daily. Take 30-60 minutes before a meal. 90 capsule 2     pravastatin (PRAVACHOL) 10 MG tablet Take 1 tablet (10 mg) by mouth daily 90 tablet 3     tiZANidine (ZANAFLEX) 4 MG tablet Take 1-2 tablets (4-8 mg) by mouth nightly as needed 30 tablet 1     tiZANidine (ZANAFLEX) 4 MG tablet Take 1-2 tablets (4-8 mg) by mouth nightly as needed 30 tablet 1     VITAMIN B  "COMPLEX-C OR CAPS 1 capsule PO qd       vitamin E 400 UNIT capsule Take by mouth daily         Allergies   Allergen Reactions     Bactrim Other (See Comments)     Burning when voiding     Belladonna Alkaloids-Opium [B & O] Blisters     Bellagril     Definity [Definity] Other (See Comments)     Pain in the tailbone     Ergotamine Other (See Comments)     Patient unsure of reaction     Phenobarbital Other (See Comments)     Patient unsure of reaction     Sulfa Drugs Blisters     Tape [Adhesive Tape] Other (See Comments)     Burning feeling of the skin     Lisinopril Cough              Family History   Problem Relation Age of Onset     Diabetes Mother      Hypertension Mother      Thyroid Disease Mother      Heart Disease Father      Cardiovascular Father      Neurologic Disorder Father         Parkinson's     Arthritis Father         Fibromyalgia     Gastrointestinal Disease Maternal Grandmother         gallbladder removed     Heart Disease Maternal Grandfather      Diabetes Brother      Hypertension Brother      Neurologic Disorder Brother         ADHD     Unknown/Adopted Son      Unknown/Adopted Daughter      Neurologic Disorder Brother         ADHD       Additional medical/Social/Surgical histories reviewed in [facility] and updated as appropriate.     REVIEW OF SYSTEMS (8/1/2019)  10 point ROS of systems including Constitutional, Eyes, Respiratory, Cardiovascular, Gastroenterology, Genitourinary, Integumentary, Musculoskeletal, Psychiatric were all negative except for pertinent positives noted in my HPI.     PHYSICAL EXAM  Vitals:    08/01/19 1203   Weight: 103.4 kg (228 lb)   Height: 1.595 m (5' 2.8\")     Vital Signs: Ht 1.595 m (5' 2.8\")   Wt 103.4 kg (228 lb)   BMI 40.65 kg/m   Patient declined being weighed. Body mass index is 40.65 kg/m .    General  - normal appearance, in no obvious distress  CV  - normal peripheral perfusion  Pulm  - normal respiratory pattern, non-labored  Musculoskeletal - lumbar spine  - " stance: normal gait without limp, no obvious leg length discrepancy, normal heel and toe walk  - inspection: normal bone and joint alignment, no obvious scoliosis  - palpation: no paravertebral or bony tenderness  - ROM: flexion slightly exacerbates pain, normal extension, sidebending, rotation  - strength: lower extremities 5/5 in all planes  - special tests:  (-) straight leg raise  (-) slump test  Neuro  - patellar and Achilles DTRs 2+ bilaterally, lower extremity sensory deficit throughout L5 distribution, grossly normal coordination, normal muscle tone  Skin  - no ecchymosis, erythema, warmth, or induration, no obvious rash  Psych  - interactive, appropriate, normal mood and affect    ASSESSMENT & PLAN  75 yo female with lumbar disc herniation, ddd, improved  Ordered PT  Consider repeat NEETA  Danie Garcia MD, CAQSM

## 2019-08-21 DIAGNOSIS — F98.8 ATTENTION DEFICIT DISORDER (ADD) WITHOUT HYPERACTIVITY: ICD-10-CM

## 2019-08-21 RX ORDER — METHYLPHENIDATE HYDROCHLORIDE EXTENDED RELEASE 20 MG/1
20 TABLET ORAL EVERY MORNING
Qty: 30 TABLET | Refills: 0 | Status: SHIPPED | OUTPATIENT
Start: 2019-08-21 | End: 2019-10-15

## 2019-08-21 NOTE — TELEPHONE ENCOUNTER
Metadate      Last Written Prescription Date:  7/16  Last Fill Quantity: 30,   # refills: 0  Last Office Visit: 6/12  Future Office visit:       Routing refill request to provider for review/approval because:  Drug not on the [facility], [facility] or [facility] refill protocol or controlled substance

## 2019-08-21 NOTE — TELEPHONE ENCOUNTER
Rx placed at check-in C desk. Left message on home number to inform patient.  Jocelyn NEUMANN CMA

## 2019-08-21 NOTE — TELEPHONE ENCOUNTER
[facility] Call Center    Phone Message    May a detailed message be left on voicemail: yes    Reason for Call: Medication Refill Request    Has the patient contacted the pharmacy for the refill? Yes   Name of medication being requested: methylphenidate (METADATE ER) 20 MG CR tablet [[id number]] (Order [id number])   Provider who prescribed the medication: Dr. Hunter  Pharmacy: [facility] [phone]  Date medication is needed: asap    Action Taken: Message routed to:  Primary Care p [id number]

## 2019-09-22 DIAGNOSIS — F98.8 ATTENTION DEFICIT DISORDER (ADD) WITHOUT HYPERACTIVITY: ICD-10-CM

## 2019-09-22 DIAGNOSIS — E11.9 TYPE 2 DIABETES MELLITUS WITHOUT COMPLICATION, WITHOUT LONG-TERM CURRENT USE OF INSULIN (H): ICD-10-CM

## 2019-09-22 NOTE — TELEPHONE ENCOUNTER
Reason for Call:  Medication or medication refill:    Do you use a [facility]?  Name of the pharmacy and phone number for the current request:  [facility]    Name of the medication requested: methylphenidate (METADATE ER) 20    Other request: patient states she will be at the  clinic tomorrow and would like to pick the hard copy up.    Can we leave a detailed message on this number? YES    Phone number patient can be reached at: Home number on file [phone] (home)    Best Time: anytime     Call taken on 9/22/2019 at 12:23 PM by Mercedes Rick

## 2019-09-23 RX ORDER — METHYLPHENIDATE HYDROCHLORIDE EXTENDED RELEASE 20 MG/1
20 TABLET ORAL EVERY MORNING
Qty: 30 TABLET | Refills: 0 | Status: SHIPPED | OUTPATIENT
Start: 2019-09-23 | End: 2019-11-26

## 2019-09-23 RX ORDER — METFORMIN HCL 500 MG
TABLET, EXTENDED RELEASE 24 HR ORAL
Qty: 180 TABLET | Refills: 0 | Status: SHIPPED | OUTPATIENT
Start: 2019-09-23 | End: 2019-10-15

## 2019-09-23 NOTE — TELEPHONE ENCOUNTER
Patient is wondering if she could  the prescription when she is in the building for another reason around 2:00 today, or can the prescription be faxed to the pharmacy. Any questions, please call.

## 2019-09-23 NOTE — TELEPHONE ENCOUNTER
methylphenidate (METADATE ER) 20 MG CR tablet  Last Written Prescription Date:  8/21/19  Last Fill Quantity: 30,  # refills: 0   Last office visit: 6/12/2019 with prescribing provider:     Future Office Visit:      Routing refill request to provider for review/approval because:  Drug not on the [facility] refill protocol     Kelley Gill RN, BSN, PHN  [facility]

## 2019-09-23 NOTE — TELEPHONE ENCOUNTER
Message left for patient the script she requested has been placed at the , check in C.  We cannot fax the script to a pharmacy.      Methylphenidate (Metadate ER) 20 mg CR tablet placed at the  check in C for patient .    Zohra Dean CMA

## 2019-09-23 NOTE — TELEPHONE ENCOUNTER
LOV- 6/12 says to follow up in 6 months. Prescription approved per refill protocol.    Renuka Bhatia RN

## 2019-10-07 ENCOUNTER — TELEPHONE (OUTPATIENT)
Dept: ORTHOPEDICS | Facility: CLINIC | Age: 74
End: 2019-10-07

## 2019-10-07 NOTE — TELEPHONE ENCOUNTER
[facility] Call Center    Phone Message    May a detailed message be left on voicemail: yes    Reason for Call: Other: Pt is requesting a call back from care team to discuss when she is due for a follow up. Please advise.     Action Taken: Message routed to:  Adult Clinics: Sports Medicine p [phone]

## 2019-10-08 NOTE — TELEPHONE ENCOUNTER
Returned call to patient to discuss.     Per last office visit note- Dr. Garcia can consider another NEETA.     Discussed with patient- she is not currently doing PT but she does feel like she is getting better since injection. Instructed to follow up if her symptoms get worse or change. Also can call to request a repeat epidural injection.     All questions answered.

## 2019-10-15 ENCOUNTER — OFFICE VISIT (OUTPATIENT)
Dept: PEDIATRICS | Facility: CLINIC | Age: 74
End: 2019-10-15
Payer: COMMERCIAL

## 2019-10-15 VITALS
TEMPERATURE: 97.2 F | OXYGEN SATURATION: 95 % | HEART RATE: 76 BPM | WEIGHT: 228 LBS | SYSTOLIC BLOOD PRESSURE: 96 MMHG | DIASTOLIC BLOOD PRESSURE: 64 MMHG | BODY MASS INDEX: 40.65 KG/M2

## 2019-10-15 DIAGNOSIS — I10 ESSENTIAL HYPERTENSION WITH GOAL BLOOD PRESSURE LESS THAN 140/90: ICD-10-CM

## 2019-10-15 DIAGNOSIS — E11.9 TYPE 2 DIABETES MELLITUS WITHOUT COMPLICATION, WITHOUT LONG-TERM CURRENT USE OF INSULIN (H): Primary | ICD-10-CM

## 2019-10-15 DIAGNOSIS — F33.41 RECURRENT MAJOR DEPRESSIVE DISORDER, IN PARTIAL REMISSION (H): ICD-10-CM

## 2019-10-15 DIAGNOSIS — E78.5 HYPERLIPIDEMIA LDL GOAL <100: ICD-10-CM

## 2019-10-15 DIAGNOSIS — N18.30 TYPE 2 DIABETES MELLITUS WITH STAGE 3 CHRONIC KIDNEY DISEASE, WITHOUT LONG-TERM CURRENT USE OF INSULIN (H): ICD-10-CM

## 2019-10-15 DIAGNOSIS — M79.7 FIBROMYALGIA: ICD-10-CM

## 2019-10-15 DIAGNOSIS — M62.830 LUMBAR PARASPINAL MUSCLE SPASM: ICD-10-CM

## 2019-10-15 DIAGNOSIS — M51.369 LUMBAR DEGENERATIVE DISC DISEASE: ICD-10-CM

## 2019-10-15 DIAGNOSIS — E11.22 TYPE 2 DIABETES MELLITUS WITH STAGE 3 CHRONIC KIDNEY DISEASE, WITHOUT LONG-TERM CURRENT USE OF INSULIN (H): ICD-10-CM

## 2019-10-15 LAB
CHOLEST SERPL-MCNC: 151 MG/DL
HBA1C MFR BLD: 7.2 % (ref 0–5.6)
HDLC SERPL-MCNC: 55 MG/DL
LDLC SERPL CALC-MCNC: 77 MG/DL
NONHDLC SERPL-MCNC: 96 MG/DL
TRIGL SERPL-MCNC: 96 MG/DL
TSH SERPL DL<=0.005 MIU/L-ACNC: 3.32 MU/L (ref 0.4–4)

## 2019-10-15 PROCEDURE — 83036 HEMOGLOBIN GLYCOSYLATED A1C: CPT | Performed by: INTERNAL MEDICINE

## 2019-10-15 PROCEDURE — 99214 OFFICE O/P EST MOD 30 MIN: CPT | Performed by: INTERNAL MEDICINE

## 2019-10-15 PROCEDURE — 36415 COLL VENOUS BLD VENIPUNCTURE: CPT | Performed by: INTERNAL MEDICINE

## 2019-10-15 PROCEDURE — 84443 ASSAY THYROID STIM HORMONE: CPT | Performed by: INTERNAL MEDICINE

## 2019-10-15 PROCEDURE — 80061 LIPID PANEL: CPT | Performed by: INTERNAL MEDICINE

## 2019-10-15 RX ORDER — METFORMIN HCL 500 MG
TABLET, EXTENDED RELEASE 24 HR ORAL
Qty: 180 TABLET | Refills: 1 | Status: SHIPPED | OUTPATIENT
Start: 2019-10-15 | End: 2020-01-03

## 2019-10-15 RX ORDER — DULOXETIN HYDROCHLORIDE 60 MG/1
60 CAPSULE, DELAYED RELEASE ORAL DAILY
Qty: 90 CAPSULE | Refills: 1 | Status: SHIPPED | OUTPATIENT
Start: 2019-10-15 | End: 2019-10-15

## 2019-10-15 RX ORDER — GABAPENTIN 300 MG/1
300 CAPSULE ORAL 2 TIMES DAILY
Qty: 60 CAPSULE | Refills: 0 | Status: SHIPPED | OUTPATIENT
Start: 2019-10-15 | End: 2019-11-13 | Stop reason: SINTOL

## 2019-10-15 RX ORDER — DULOXETIN HYDROCHLORIDE 30 MG/1
30 CAPSULE, DELAYED RELEASE ORAL DAILY
Qty: 21 CAPSULE | Refills: 0 | Status: SHIPPED | OUTPATIENT
Start: 2019-10-15 | End: 2019-11-13 | Stop reason: DRUGHIGH

## 2019-10-15 NOTE — PATIENT INSTRUCTIONS
Make appointment(s) for:   -- wellness visit in 6 months with non fasting lab.   -- clinic follow up in 1 month.         Get Shingrix and flu vaccines at your pharmacy (check insurance coverage)      Week 1:   Duloxetine 30 mg daily in the morning with gabapentin 300 mg at bedtime.     Week 2:  Duloxetine 30 mg every other day (until gone) in the morning and Gabapentin 300 mg twice a day.         Medication(s) prescribed today:    Orders Placed This Encounter   Medications     tiZANidine (ZANAFLEX) 4 MG tablet     Sig: Take 4 mg by mouth 3 times daily     metFORMIN (GLUCOPHAGE-XR) 500 MG 24 hr tablet     Sig: Take 2 tablets (1,000 mg) by mouth daily with breakfast     Dispense:  180 tablet     Refill:  1     Please put on file. Do not fill until patient calls.     DISCONTD: DULoxetine (CYMBALTA) 60 MG capsule     Sig: Take 1 capsule (60 mg) by mouth daily     Dispense:  90 capsule     Refill:  1     DULoxetine (CYMBALTA) 30 MG capsule     Sig: Take 1 capsule (30 mg) by mouth daily 1 capsule daily for 2 weeks, then 1 Capsule every other day and then go off.     Dispense:  21 capsule     Refill:  0     Cancel the refill on Duloxetine 60 mg.     gabapentin (NEURONTIN) 300 MG capsule     Sig: Take 1 capsule (300 mg) by mouth 2 times daily     Dispense:  60 capsule     Refill:  0     Dose increase.

## 2019-10-15 NOTE — PROGRESS NOTES
Subjective     Karen Donis is a 74 year old female who presents to clinic today for the following health issues:    HPI   Diabetes Follow-up      How often are you checking your blood sugar? A few times a week    What time of day are you checking your blood sugars (select all that apply)?  After meals    Have you had any blood sugars above 200?  Yes     Have you had any blood sugars below 70?  No    What symptoms do you notice when your blood sugar is low?  None    What concerns do you have today about your diabetes? Blood sugar is often over 200     Do you have any of these symptoms? (Select all that apply)  Numbness in feet Cold feet     Have you had a diabetic eye exam in the last 12 months? Yes- Date of last eye exam: 4-1-19 [facility]    BP Readings from Last 2 Encounters:   10/15/19 96/64   07/18/19 121/66     Hemoglobin A1C (%)   Date Value   10/15/2019 7.2 (H)   06/12/2019 7.4 (H)     LDL Cholesterol Calculated (mg/dL)   Date Value   10/15/2019 77   03/12/2019 86       Diabetes Management Resources      How many servings of fruits and vegetables do you eat daily?  2    On average, how many sweetened beverages do you drink each day (soda, juice, sweet tea, etc)?   2  How many days per week do you miss taking your medication? 0-1 forgets    What makes it hard for you to take your medications?  remembering to take and schedule      BP on the low side: denies light headedness and  Dizziness. Home     Has been having profuse sweating. She thinks it is from one of the medications she is taking. She has duloxetine and gabapentin. She reported not taking gabapentin.     She went to see Jaime to evaluate for memory. Preliminary test seemed to suggest ADHD. She has a follow up appointment for getting formal testing result.     Right leg heel pain, hands and the forearm feel cold for a long time, just to mention it.     ROS:  Constitutional, HEENT, cardiovascular, pulmonary, gi and gu systems are  negative, except as otherwise noted.       Alpha-D-Galactosidase (BEANO PO), Take by mouth as needed Reported on 5/3/2017  ASPIRIN 81 MG OR TABS, 1 TABLET DAILY  biotin 1000 MCG TABS tablet, Take 1,000 mcg by mouth daily  Calcium Carbonate-Vit D-Min (CALCIUM 1200 PO), Take by mouth daily   cholecalciferol (VITAMIN  -D) 1000 UNITS capsule, Take 2 capsules (2,000 Units) by mouth daily  Coenzyme Q10 (CO Q 10 PO), Take 1 capsule by mouth daily.  Cranberry 300 MG TABS, Take by mouth daily   econazole nitrate 1 % cream, Apply topically daily  Flaxseed, Linseed, (FLAX SEED OIL) 1000 MG capsule, Take 1 capsule by mouth daily  losartan-hydrochlorothiazide (HYZAAR) 100-25 MG tablet, Take 1 tablet by mouth daily  MAGNESIUM OR, 1 capsule daily  meclizine (ANTIVERT) 25 MG tablet, Take 1 tablet (25 mg) by mouth every 6 hours as needed for dizziness  metoprolol succinate ER (TOPROL-XL) 25 MG 24 hr tablet, Take 1 tablet (25 mg) by mouth daily  omeprazole (PRILOSEC) 40 MG DR capsule, Take 1 capsule (40 mg) by mouth daily. Take 30-60 minutes before a meal.  pravastatin (PRAVACHOL) 10 MG tablet, Take 1 tablet (10 mg) by mouth daily  tiZANidine (ZANAFLEX) 4 MG tablet, Take 4 mg by mouth 3 times daily  VITAMIN B COMPLEX-C OR CAPS, 1 capsule PO qd  vitamin E 400 UNIT capsule, Take by mouth daily  ACCU-CHEK SMARTVIEW test strip, use to test blood sugar once daily or as directed  blood glucose (ACCU-CHEK FASTCLIX) lancing device, Device to be used with lancets.  blood glucose monitoring (ACCU-CHEK FASTCLIX) lancets, Use to test blood sugar 1 times daily or as directed.  Ok to substitute alternative if insurance prefers.  blood glucose monitoring (NO BRAND SPECIFIED) meter device kit, Use to test blood sugar 1 times daily or as directed.  blood glucose monitoring (NO BRAND SPECIFIED) test strip, Use to test blood sugars daily or as directed. Accu-check smartview strips  methylphenidate (METADATE ER) 20 MG CR tablet, Take 1 tablet (20 mg) by  mouth every morning    bupivacaine (MARCAINE) 0.25 % injection 12.5 mg           Patient Active Problem List   Diagnosis     Seborrheic dermatitis     Alopecia     Xerosis cutis     Vitamin D deficiency     Nodules, thyroid     Postmenopausal     Colon cancer     CARDIOVASCULAR SCREENING; LDL GOAL LESS THAN 160     Impingement syndrome of right shoulder     AC (acromioclavicular) joint arthritis     Anxiety state     Attention deficit disorder     ACP (advance care planning)     Gastroesophageal reflux disease without esophagitis     Diet-controlled diabetes mellitus (H)     Hyperlipidemia LDL goal <100     Essential hypertension with goal blood pressure less than 140/90     Fatty liver     Major depression in complete remission (H)     Chronic bilateral low back pain with bilateral sciatica     Morbid obesity due to excess calories (H)     BPPV (benign paroxysmal positional vertigo), right     Past Surgical History:   Procedure Laterality Date     COLON SURGERY       THYROIDECTOMY  2011    Procedure:THYROIDECTOMY; Right Thyroid Lobectomy and bilateral removal of skin tags; Surgeon:SAJI ZAZEUTA; Location:[facility]     TONSILLECTOMY      While she was in 6th grade       Social History     Tobacco Use     Smoking status: Former Smoker     Last attempt to quit: 1982     Years since quittin.8     Smokeless tobacco: Never Used   Substance Use Topics     Alcohol use: No     Family History   Problem Relation Age of Onset     Diabetes Mother      Hypertension Mother      Thyroid Disease Mother      Heart Disease Father      Cardiovascular Father      Neurologic Disorder Father         Parkinson's     Arthritis Father         Fibromyalgia     Gastrointestinal Disease Maternal Grandmother         gallbladder removed     Heart Disease Maternal Grandfather      Diabetes Brother      Hypertension Brother      Neurologic Disorder Brother         ADHD     Unknown/Adopted Son      Unknown/Adopted Daughter       Neurologic Disorder Brother         ADHD             Problem list, Medication list, Allergies, and Medical/Social/Surgical histories reviewed in [facility] and updated as appropriate.    OBJECTIVE:                                                    BP 96/64   Pulse 76   Temp 97.2  F (36.2  C) (Temporal)   Wt 103.4 kg (228 lb)   SpO2 95%   BMI 40.65 kg/m      GENERAL: healthy, alert and no distress        Diagnostic test results:  Results for orders placed or performed in visit on 10/15/19   Hemoglobin A1c   Result Value Ref Range    Hemoglobin A1C 7.2 (H) 0 - 5.6 %   Lipid panel reflex to direct LDL Fasting   Result Value Ref Range    Cholesterol 151 <200 mg/dL    Triglycerides 96 <150 mg/dL    HDL Cholesterol 55 >49 mg/dL    LDL Cholesterol Calculated 77 <100 mg/dL    Non HDL Cholesterol 96 <130 mg/dL   TSH   Result Value Ref Range    TSH 3.32 0.40 - 4.00 mU/L         ASSESSMENT/PLAN:                                                      74 year old female with the following diagnoses and treatment plan:      ICD-10-CM    1. Type 2 diabetes mellitus without complication, without long-term current use of insulin (H) E11.9 metFORMIN (GLUCOPHAGE-XR) 500 MG 24 hr tablet     Hemoglobin A1c   2. Lumbar paraspinal muscle spasm M62.830 gabapentin (NEURONTIN) 300 MG capsule   3. Lumbar degenerative disc disease M51.36 gabapentin (NEURONTIN) 300 MG capsule   4. Recurrent major depressive disorder, in partial remission (H) F33.41 DULoxetine (CYMBALTA) 30 MG capsule     DISCONTINUED: DULoxetine (CYMBALTA) 60 MG capsule   5. Fibromyalgia M79.7 DULoxetine (CYMBALTA) 30 MG capsule     gabapentin (NEURONTIN) 300 MG capsule     DISCONTINUED: DULoxetine (CYMBALTA) 60 MG capsule   6. Essential hypertension with goal blood pressure less than 140/90 I10 Albumin Random Urine Quantitative with Creat Ratio     Basic metabolic panel       -- diabetes: at goal. Continue metformin  -- chronic pain/depression/fibromyalgia: duloxetine can  cause sweating. Will have her taper off and increase gabapentin to 300 mg bid (she can't remember to take mid day dose). Monitor for mood.   -- other chronic health issues stable.  -- follow up in 1 month to review pain, depression, and psychologic testing results. Wellness visit in 6 months with non fasting labs (ordered.     Will call or return to clinic if worsening or symptoms not improving as discussed.  See Patient Instructions.    A total of 25 minutes was spent face to face with this patient. More than 50% of the time was spent on education for the above problems and management plans.     Lakia Hunter MD-PhD  [facility]    (Note: Chart documentation was done in part with Dragon Voice Recognition software. Although reviewed after completion, some word and grammatical errors may remain.)

## 2019-10-23 ENCOUNTER — TRANSFERRED RECORDS (OUTPATIENT)
Dept: HEALTH INFORMATION MANAGEMENT | Facility: CLINIC | Age: 74
End: 2019-10-23

## 2019-10-28 ENCOUNTER — TELEPHONE (OUTPATIENT)
Dept: PEDIATRICS | Facility: CLINIC | Age: 74
End: 2019-10-28

## 2019-10-28 DIAGNOSIS — E11.9 TYPE 2 DIABETES MELLITUS WITHOUT COMPLICATION, WITHOUT LONG-TERM CURRENT USE OF INSULIN (H): Primary | ICD-10-CM

## 2019-10-28 DIAGNOSIS — E55.9 VITAMIN D DEFICIENCY: ICD-10-CM

## 2019-10-28 NOTE — TELEPHONE ENCOUNTER
[facility] Call Center    Phone Message    May a detailed message be left on voicemail: yes    Reason for Call: Patient request call back to discuss Blood Presure. Please call back to advise.      Action Taken: Message routed to:  Primary Care p [phone]

## 2019-10-28 NOTE — TELEPHONE ENCOUNTER
Patient states AM BG yesterday was 179. She states bg about 2 hours after eating this afternoon was 250. She ate a normal breakfast. Feels well, but she is worried about the higher numbers.   She took her medications with foods.   She feels more wobbly with walking and almost missed her turn when driving since increasing Gabapentin. Fibromyalgia isn't as well controlled since decreasing the dose of Duloxetine. She hasn't been taking Tylenol and she is willing to increase her dose.  She denies taking Vitamin D and states she is tired and sleeps a lot, pended.  Renuka Bhatia RN

## 2019-10-29 NOTE — TELEPHONE ENCOUNTER
Refer pt to see endocrine or with our RN care coordinator to review her glucose reading, diet/carb, and see if we can pinpoint why her glucose is getting higher. Recent A1c is excellent.     The higher dose of gabapetin causes safety concern. Does she want to go back on the Duloxetine for the fibromyalgia? Can she put up with the sweating side effect of Duloxetine?     I put in a referral for MTM as well to review her medications.

## 2019-10-29 NOTE — TELEPHONE ENCOUNTER
Called patient, relayed message & patient verbalized understanding.   She would like to see Endocrine- transferred to scheduling for that and MTM. Patient later requested a referral, pended. She wants to take Tylenol only for now. She will call back if Tylenol isn't helpful enough for her fibromyalgia. Patient verbalized understanding.     Renuka Bhatia RN

## 2019-11-13 ENCOUNTER — OFFICE VISIT (OUTPATIENT)
Dept: PEDIATRICS | Facility: CLINIC | Age: 74
End: 2019-11-13
Payer: COMMERCIAL

## 2019-11-13 VITALS
WEIGHT: 231 LBS | OXYGEN SATURATION: 95 % | HEART RATE: 72 BPM | BODY MASS INDEX: 41.18 KG/M2 | TEMPERATURE: 97.2 F | DIASTOLIC BLOOD PRESSURE: 80 MMHG | SYSTOLIC BLOOD PRESSURE: 139 MMHG

## 2019-11-13 DIAGNOSIS — M79.7 FIBROMYALGIA: Primary | ICD-10-CM

## 2019-11-13 DIAGNOSIS — E11.9 DIET-CONTROLLED DIABETES MELLITUS (H): ICD-10-CM

## 2019-11-13 PROCEDURE — 99213 OFFICE O/P EST LOW 20 MIN: CPT | Performed by: INTERNAL MEDICINE

## 2019-11-13 RX ORDER — DULOXETIN HYDROCHLORIDE 20 MG/1
20 CAPSULE, DELAYED RELEASE ORAL DAILY
Qty: 30 CAPSULE | Refills: 0 | Status: SHIPPED | OUTPATIENT
Start: 2019-11-13 | End: 2019-12-10

## 2019-11-13 NOTE — PROGRESS NOTES
Subjective     Karen Donis is a 74 year old female who presents to clinic today for the following health issues:    HPI   Diabetes Follow-up    How often are you checking your blood sugar? A few times a week  What time of day are you checking your blood sugars (select all that apply)?  After meals  Have you had any blood sugars above 200?  Yes   Have you had any blood sugars below 70?  No    What symptoms do you notice when your blood sugar is low?  None    What concerns do you have today about your diabetes? None     Do you have any of these symptoms? (Select all that apply)  Numbness in feet Cold feet     Have you had a diabetic eye exam in the last 12 months? Yes- Date of last eye exam: 4-1-19 [facility]    BP Readings from Last 2 Encounters:   11/13/19 139/80   10/15/19 96/64     Hemoglobin A1C (%)   Date Value   10/15/2019 7.2 (H)   06/12/2019 7.4 (H)     LDL Cholesterol Calculated (mg/dL)   Date Value   10/15/2019 77   03/12/2019 86       Diabetes Management Resources      How many servings of fruits and vegetables do you eat daily?  2-3    On average, how many sweetened beverages do you drink each day (soda, juice, sweet tea, etc)?   2    How many days per week do you miss taking your medication? 0-1    PROBLEMS TO ADD ON...    Patient's diabetes been well controlled.  Today's visit is primarily for discussing side effects with her medications.    Side effects from Gabapentin, leg cramps, arm, side, both hips when walking pain. Concentration, Back pain, light headedness, neck pain, right hand pain. She wants to go off it even though she feels she gets more accomplished despite the side effects. When she had the sweats with Duloxetine, she didn't have much pain but miserable and unable to do anything.     ROS:  Constitutional, HEENT, cardiovascular, pulmonary, gi and gu systems are negative, except as otherwise noted.       ACCU-CHEK SMARTVIEW test strip, use to test blood sugar once daily or as  directed  Alpha-D-Galactosidase (BEANO PO), Take by mouth as needed Reported on 5/3/2017  ASPIRIN 81 MG OR TABS, 1 TABLET DAILY  biotin 1000 MCG TABS tablet, Take 1,000 mcg by mouth daily  blood glucose (ACCU-CHEK FASTCLIX) lancing device, Device to be used with lancets.  blood glucose monitoring (ACCU-CHEK FASTCLIX) lancets, Use to test blood sugar 1 times daily or as directed.  Ok to substitute alternative if insurance prefers.  blood glucose monitoring (NO BRAND SPECIFIED) meter device kit, Use to test blood sugar 1 times daily or as directed.  Calcium Carbonate-Vit D-Min (CALCIUM 1200 PO), Take by mouth daily   cholecalciferol (VITAMIN D3) 1000 units (25 mcg) capsule, Take 2 capsules (2,000 Units) by mouth daily  Coenzyme Q10 (CO Q 10 PO), Take 1 capsule by mouth daily.  Cranberry 300 MG TABS, Take by mouth daily   econazole nitrate 1 % cream, Apply topically daily  Flaxseed, Linseed, (FLAX SEED OIL) 1000 MG capsule, Take 1 capsule by mouth daily  losartan-hydrochlorothiazide (HYZAAR) 100-25 MG tablet, Take 1 tablet by mouth daily  MAGNESIUM OR, 1 capsule daily  meclizine (ANTIVERT) 25 MG tablet, Take 1 tablet (25 mg) by mouth every 6 hours as needed for dizziness  metFORMIN (GLUCOPHAGE-XR) 500 MG 24 hr tablet, Take 2 tablets (1,000 mg) by mouth daily with breakfast  methylphenidate (METADATE ER) 20 MG CR tablet, Take 1 tablet (20 mg) by mouth every morning  metoprolol succinate ER (TOPROL-XL) 25 MG 24 hr tablet, Take 1 tablet (25 mg) by mouth daily  omeprazole (PRILOSEC) 40 MG DR capsule, Take 1 capsule (40 mg) by mouth daily. Take 30-60 minutes before a meal.  pravastatin (PRAVACHOL) 10 MG tablet, Take 1 tablet (10 mg) by mouth daily  tiZANidine (ZANAFLEX) 4 MG tablet, Take 4 mg by mouth 3 times daily  VITAMIN B COMPLEX-C OR CAPS, 1 capsule PO qd  vitamin E 400 UNIT capsule, Take by mouth daily    bupivacaine (MARCAINE) 0.25 % injection 12.5 mg           Patient Active Problem List   Diagnosis     Seborrheic  dermatitis     Alopecia     Xerosis cutis     Vitamin D deficiency     Nodules, thyroid     Postmenopausal     Colon cancer     CARDIOVASCULAR SCREENING; LDL GOAL LESS THAN 160     Impingement syndrome of right shoulder     AC (acromioclavicular) joint arthritis     Anxiety state     Attention deficit disorder     ACP (advance care planning)     Gastroesophageal reflux disease without esophagitis     Diet-controlled diabetes mellitus (H)     Hyperlipidemia LDL goal <100     Essential hypertension with goal blood pressure less than 140/90     Fatty liver     Major depression in complete remission (H)     Chronic bilateral low back pain with bilateral sciatica     Morbid obesity due to excess calories (H)     BPPV (benign paroxysmal positional vertigo), right     Past Surgical History:   Procedure Laterality Date     COLON SURGERY       THYROIDECTOMY  2011    Procedure:THYROIDECTOMY; Right Thyroid Lobectomy and bilateral removal of skin tags; Surgeon:SAJI ZAZUETA; Location:[facility] OR     TONSILLECTOMY      While she was in 6th grade       Social History     Tobacco Use     Smoking status: Former Smoker     Last attempt to quit: 1982     Years since quittin.9     Smokeless tobacco: Never Used   Substance Use Topics     Alcohol use: No     Family History   Problem Relation Age of Onset     Diabetes Mother      Hypertension Mother      Thyroid Disease Mother      Heart Disease Father      Cardiovascular Father      Neurologic Disorder Father         Parkinson's     Arthritis Father         Fibromyalgia     Gastrointestinal Disease Maternal Grandmother         gallbladder removed     Heart Disease Maternal Grandfather      Diabetes Brother      Hypertension Brother      Neurologic Disorder Brother         ADHD     Unknown/Adopted Son      Unknown/Adopted Daughter      Neurologic Disorder Brother         ADHD             Problem list, Medication list, Allergies, and Medical/Social/Surgical histories  reviewed in [facility] and updated as appropriate.    OBJECTIVE:                                                    /80   Pulse 72   Temp 97.2  F (36.2  C) (Temporal)   Wt 104.8 kg (231 lb)   SpO2 95%   BMI 41.18 kg/m      GENERAL: healthy, alert and no distress    No results found for any visits on 11/13/19.      ASSESSMENT/PLAN:                                                      74 year old female with the following diagnoses and treatment plan:      ICD-10-CM    1. Fibromyalgia M79.7 DULoxetine (CYMBALTA) 20 MG capsule   2. Diet-controlled diabetes mellitus (H) E11.9 blood glucose (NO BRAND SPECIFIED) test strip     --For fibromyalgia treatment, we decided to have her retry duloxetine 20 mg daily, wean her off gabapentin.  In the past she had tolerated Cymbalta 60 mg twice a day for period of time despite the sweating.  We will see if the low dose would help her with body aches without giving her debilitating sweats.  --Follow-up in 1 month.    Will call or return to clinic if worsening or symptoms not improving as discussed.  See Patient Instructions.      [place]akia [place] MD-PhD  [facility] [place]    (Note: Chart documentation was done in part with Dragon Voice Recognition software. Although reviewed after completion, some word and grammatical errors may remain.)

## 2019-11-14 ASSESSMENT — ANXIETY QUESTIONNAIRES
GAD7 TOTAL SCORE: 5
5. BEING SO RESTLESS THAT IT IS HARD TO SIT STILL: NOT AT ALL
7. FEELING AFRAID AS IF SOMETHING AWFUL MIGHT HAPPEN: NOT AT ALL
2. NOT BEING ABLE TO STOP OR CONTROL WORRYING: SEVERAL DAYS
3. WORRYING TOO MUCH ABOUT DIFFERENT THINGS: SEVERAL DAYS
6. BECOMING EASILY ANNOYED OR IRRITABLE: SEVERAL DAYS
1. FEELING NERVOUS, ANXIOUS, OR ON EDGE: MORE THAN HALF THE DAYS

## 2019-11-14 ASSESSMENT — PATIENT HEALTH QUESTIONNAIRE - PHQ9
SUM OF ALL RESPONSES TO PHQ QUESTIONS 1-9: 10
5. POOR APPETITE OR OVEREATING: NOT AT ALL

## 2019-11-15 ASSESSMENT — ANXIETY QUESTIONNAIRES: GAD7 TOTAL SCORE: 5

## 2019-11-18 ENCOUNTER — OFFICE VISIT (OUTPATIENT)
Dept: ENDOCRINOLOGY | Facility: CLINIC | Age: 74
End: 2019-11-18
Attending: INTERNAL MEDICINE
Payer: COMMERCIAL

## 2019-11-18 ENCOUNTER — OFFICE VISIT (OUTPATIENT)
Dept: PHARMACY | Facility: CLINIC | Age: 74
End: 2019-11-18
Attending: INTERNAL MEDICINE
Payer: COMMERCIAL

## 2019-11-18 VITALS
SYSTOLIC BLOOD PRESSURE: 138 MMHG | HEIGHT: 64 IN | WEIGHT: 232.14 LBS | DIASTOLIC BLOOD PRESSURE: 85 MMHG | HEART RATE: 73 BPM | OXYGEN SATURATION: 96 % | BODY MASS INDEX: 39.63 KG/M2

## 2019-11-18 DIAGNOSIS — E11.9 TYPE 2 DIABETES MELLITUS WITHOUT COMPLICATION, WITHOUT LONG-TERM CURRENT USE OF INSULIN (H): ICD-10-CM

## 2019-11-18 DIAGNOSIS — Z78.9 TAKES DIETARY SUPPLEMENTS: ICD-10-CM

## 2019-11-18 DIAGNOSIS — E11.9 TYPE 2 DIABETES MELLITUS WITHOUT COMPLICATION, WITHOUT LONG-TERM CURRENT USE OF INSULIN (H): Primary | ICD-10-CM

## 2019-11-18 DIAGNOSIS — F98.8 ATTENTION DEFICIT DISORDER (ADD) WITHOUT HYPERACTIVITY: ICD-10-CM

## 2019-11-18 DIAGNOSIS — M79.7 FIBROMYALGIA: ICD-10-CM

## 2019-11-18 DIAGNOSIS — K21.9 GASTROESOPHAGEAL REFLUX DISEASE WITHOUT ESOPHAGITIS: ICD-10-CM

## 2019-11-18 DIAGNOSIS — I10 ESSENTIAL HYPERTENSION WITH GOAL BLOOD PRESSURE LESS THAN 140/90: ICD-10-CM

## 2019-11-18 DIAGNOSIS — E78.5 HYPERLIPIDEMIA LDL GOAL <100: Primary | ICD-10-CM

## 2019-11-18 PROCEDURE — 99607 MTMS BY PHARM ADDL 15 MIN: CPT | Performed by: PHARMACIST

## 2019-11-18 PROCEDURE — 99204 OFFICE O/P NEW MOD 45 MIN: CPT | Performed by: INTERNAL MEDICINE

## 2019-11-18 PROCEDURE — 99605 MTMS BY PHARM NP 15 MIN: CPT | Performed by: PHARMACIST

## 2019-11-18 RX ORDER — GABAPENTIN 100 MG/1
CAPSULE ORAL
Qty: 90 CAPSULE | Refills: 3 | Status: SHIPPED | OUTPATIENT
Start: 2019-11-18 | End: 2020-10-06

## 2019-11-18 ASSESSMENT — MIFFLIN-ST. JEOR: SCORE: 1524.03

## 2019-11-18 NOTE — PATIENT INSTRUCTIONS
Recommendations from today's MTM visit:                                                    MTM (medication therapy management) is a service provided by a clinical pharmacist designed to help you get the most of out of your medicines.   Today we reviewed what your medicines are for, how to know if they are working, that your medicines are safe and how to make your medicine regimen as easy as possible.     Try taking duloxetine 20mg every other day. If the sweating does not return increase dose to 20mg every day.  Will look into diabetes classes and call    Foods that can cause reflux  alcohol, particularly red wine black pepper, garlic, raw onions, and other spicy foods chocolate citrus fruits and products, such as emma, oranges and orange juice coffee and caffeinated drinks, including tea and soda peppermint tomatoes    It was great to speak with you today.  I value your experience and would be very thankful for your time with providing feedback on our clinic survey. You may receive a survey via email or text message in the next few days.     Next MTM visit: 12/11 before Dr. Hunter's appointment    To schedule another MTM appointment, please call the clinic directly or you may call the MTM scheduling line at [phone] or toll-free at [phone].     My Clinical Pharmacist's contact information:                                                      It was a pleasure talking with you today!  Please feel free to contact me with any questions or concerns you have.      Maritza Woodall, Pharm.D, [facility]  Medication Therapy Management Pharmacist  [phone]

## 2019-11-18 NOTE — PROGRESS NOTES
"DIABETES New Visit  Karen Donis    MRN: [id number]    1945  74 year old   female           HPI:  Karen Donis a 74 year old female is here  for the first time for evaluation of Diabetes mellitus type 2.       The patient is most concerned in regards to pain and numbness in her feet.   The patient has comorbidity of fibromyalgia and spinal OA. She has numbness and pain in both feet. Has been on gabapentin. Problems tolerating higher doses causing dizziness, however been doing ok on taking 100 mg in the morning. Has been on Duloxetine 20 mg, which helps a lot with pain however makes her \"lazy\" (not tired). Takes some tylenol with partial relief.    The patient has had diabetes since 2013. She has peripheral neuropathy.    Patient is not experiencing any polyuria or polydipsia, no blurred vision.    Current diabetic medications: Metformin  Previously used diabetes medications: none    SMBG: does not check (on orals only)    Hypoglycemia: none    The patient has seen the dietitian, is interested in diabetes classes.     Exercise: none, feels worse after exercising due to fibromyalgia, also too busy taking care of family relatives    Cardiovascular history: none    Review of Systems:    New right lower abdominal pain, no change in bowel movement, no diarrhea, no constipation, no nausea    Complete 10 point review of systems is negative except for what mentioned above.    PMH:  Patient Active Problem List   Diagnosis     Seborrheic dermatitis     Alopecia     Xerosis cutis     Vitamin D deficiency     Nodules, thyroid     Postmenopausal     Colon cancer     CARDIOVASCULAR SCREENING; LDL GOAL LESS THAN 160     Impingement syndrome of right shoulder     AC (acromioclavicular) joint arthritis     Anxiety state     Attention deficit disorder     ACP (advance care planning)     Gastroesophageal reflux disease without esophagitis     Diet-controlled diabetes mellitus (H)     Hyperlipidemia LDL goal <100     Essential " hypertension with goal blood pressure less than 140/90     Fatty liver     Major depression in complete remission (H)     Chronic bilateral low back pain with bilateral sciatica     Morbid obesity due to excess calories (H)     BPPV (benign paroxysmal positional vertigo), right       SOCIAL HISTORY:  Social History     Socioeconomic History     Marital status:      Spouse name: Anand     Number of children: 2     Years of education: 12     Highest education level: Not on file   Occupational History     Occupation: Assembly/factory     Employer: [employer]     Comment: 2005   Social Needs     Financial resource strain: Not on file     Food insecurity:     Worry: Not on file     Inability: Not on file     Transportation needs:     Medical: Not on file     Non-medical: Not on file   Tobacco Use     Smoking status: Former Smoker     Last attempt to quit: 1982     Years since quittin.9     Smokeless tobacco: Never Used   Substance and Sexual Activity     Alcohol use: No     Drug use: No     Sexual activity: Yes     Partners: Male   Lifestyle     Physical activity:     Days per week: Not on file     Minutes per session: Not on file     Stress: Not on file   Relationships     Social connections:     Talks on phone: Not on file     Gets together: Not on file     Attends Christian service: Not on file     Active member of club or organization: Not on file     Attends meetings of clubs or organizations: Not on file     Relationship status: Not on file     Intimate partner violence:     Fear of current or ex partner: Not on file     Emotionally abused: Not on file     Physically abused: Not on file     Forced sexual activity: Not on file   Other Topics Concern     Parent/sibling w/ CABG, MI or angioplasty before 65F 55M? Not Asked   Social History Narrative     Not on file       FAMILY HISTORY:  History of Diabetes mellitus in the family: yes    Medications:   Current Outpatient Medications   Medication Sig  Dispense Refill     ACCU-CHEK SMARTVIEW test strip use to test blood sugar once daily or as directed 50 each 5     Alpha-D-Galactosidase (BEANO PO) Take by mouth as needed Reported on 5/3/2017       ASPIRIN 81 MG OR TABS 1 TABLET DAILY       biotin 1000 MCG TABS tablet Take 1,000 mcg by mouth daily       blood glucose (ACCU-CHEK FASTCLIX) lancing device Device to be used with lancets. 1 each 0     blood glucose (NO BRAND SPECIFIED) test strip Use to test blood sugars daily or as directed. Accu-check smartview strips 100 each 3     blood glucose monitoring (ACCU-CHEK FASTCLIX) lancets Use to test blood sugar 1 times daily or as directed.  Ok to substitute alternative if insurance prefers. 1 Box 6     blood glucose monitoring (NO BRAND SPECIFIED) meter device kit Use to test blood sugar 1 times daily or as directed. 1 kit 0     Calcium Carbonate-Vit D-Min (CALCIUM 1200 PO) Take by mouth daily        cholecalciferol (VITAMIN D3) 1000 units (25 mcg) capsule Take 2 capsules (2,000 Units) by mouth daily 180 capsule 3     Coenzyme Q10 (CO Q 10 PO) Take 1 capsule by mouth daily.       Cranberry 300 MG TABS Take by mouth daily        DULoxetine (CYMBALTA) 20 MG capsule Take 1 capsule (20 mg) by mouth daily 30 capsule 0     econazole nitrate 1 % cream Apply topically daily 30 g 3     Flaxseed, Linseed, (FLAX SEED OIL) 1000 MG capsule Take 1 capsule by mouth daily       losartan-hydrochlorothiazide (HYZAAR) 100-25 MG tablet Take 1 tablet by mouth daily 91 tablet 3     MAGNESIUM OR 1 capsule daily       meclizine (ANTIVERT) 25 MG tablet Take 1 tablet (25 mg) by mouth every 6 hours as needed for dizziness 30 tablet 1     metFORMIN (GLUCOPHAGE-XR) 500 MG 24 hr tablet Take 2 tablets (1,000 mg) by mouth daily with breakfast 180 tablet 1     methylphenidate (METADATE ER) 20 MG CR tablet Take 1 tablet (20 mg) by mouth every morning 30 tablet 0     metoprolol succinate ER (TOPROL-XL) 25 MG 24 hr tablet Take 1 tablet (25 mg) by mouth  daily 91 tablet 3     omeprazole (PRILOSEC) 40 MG DR capsule Take 1 capsule (40 mg) by mouth daily. Take 30-60 minutes before a meal. 90 capsule 2     pravastatin (PRAVACHOL) 10 MG tablet Take 1 tablet (10 mg) by mouth daily 90 tablet 3     tiZANidine (ZANAFLEX) 4 MG tablet Take 4 mg by mouth 3 times daily       VITAMIN B COMPLEX-C OR CAPS 1 capsule PO qd       vitamin E 400 UNIT capsule Take by mouth daily         PHYSICAL EXAM:   There were no vitals taken for this visit.    Gen: NAD, comfortable, obese  HEENT: Head normal, no lid lag, retraction, no proptosis  NECK: Supple, no LAD, no carotid bruit, no thyromegaly.  CVS: S1+S2 regular, no murmurs.  LUNGS: Normal regular breathing, no wheeze, no crepts, no rhonchi  GI: Soft, non tender, no visceromegaly, BS normal  CNS: Grossly normal, no focal deficit  Extremities: Normal pulses, hair distribution, no edema  SKIN: No skin changes.  PSYCH: Normal mood, pleasant affect    Foot exam:   foot check: sensation to monofilament is decreased bilaterally, pedal pulses are palpable bilaterally, excessive callus formation both feet above metatarsals    LABS:    Lab Results   Component Value Date    HDL 55 10/15/2019     Lab Results   Component Value Date     03/12/2019    CHLORIDE 106 03/12/2019    CO2 30 03/12/2019    CR 1.08 (H) 03/12/2019    ALBUMIN 3.7 06/06/2016    ALT 33 03/12/2019    AST 17 06/06/2016       Lab Results   Component Value Date    A1C 7.2 10/15/2019    A1C 7.4 06/12/2019    A1C 7.5 03/12/2019    A1C 7.2 10/04/2018    A1C 6.9 03/01/2018     Lab Results   Component Value Date    A1C 7.2 (H) 10/15/2019    A1C 7.4 (H) 06/12/2019    A1C 7.5 (H) 03/12/2019    A1C 7.2 (H) 10/04/2018    A1C 6.9 (H) 03/01/2018    A1C 7.2 (H) 09/20/2017    A1C 6.5 (H) 06/19/2017    A1C 7.0 (H) 12/07/2016    A1C 7.1 (H) 06/06/2016    A1C 6.7 (H) 10/27/2015    A1C 7.0 (H) 06/15/2015    A1C 6.2 (H) 11/06/2014    A1C 6.5 (H) 07/01/2013    A1C 6.4 (H) 09/18/2012    A1C 6.2 (H)  04/16/2012    A1C 6.2 (H) 08/04/2011    A1C 6.5 (H) 04/08/2011    A1C 6.3 (H) 05/15/2007    A1C 5.9 10/28/2005     ASSESSMENT:     Karen Donis a 74 year old female with T2DM controlled with diet and metformin.   The pain and numbness in her feet are most likely multifactorial from diabetic neuropathy and fibromyalgia.  Reviewed treatment options of combining meds at lower doses.    PLAN:  For pain   take gabapentin 100 mg in am  Take Duloxetine 20 mg every other day  Tylenol 500 mg twice daily as needed    For cramps: take extra water, exercises    Referred to diabetes classes per pt interest    Maribel Chan MD  Endocrinology and Diabetes  [address]  [facility] [address]  Tel [phone]

## 2019-11-18 NOTE — PATIENT INSTRUCTIONS
For pain   take gabapentin 100 mg in am  Take Duloxetine 20 mg every other day  Tylenol 500 mg twice daily as needed    For cramps: take extra water, exercises

## 2019-11-18 NOTE — PROGRESS NOTES
SUBJECTIVE/OBJECTIVE:                           Karen Donis is a 74 year old female coming in for an initial visit for Medication Therapy Management.  She was referred to me from Lakia Hunter.    Chief Complaint: Medication Review. Patient had a visit before MTM visit that ran long which limited time with MTM.    Allergies/ADRs: Reviewed in Epic  Tobacco:  reports that she quit smoking about 36 years ago. She has never used smokeless tobacco.  Alcohol: not currently using  Caffeine: did not assess today,  PMH: Reviewed in Epic    Medication Adherence/Access:  Patient is hard to keep focused and is not always clear in how or what medications she is taking.    Diabetes:  Pt currently taking metformin XR 500mg 2 tablets with breakfast. Pt is not experiencing side effects.  SMBG: 3 times a week.   Ranges (patient does not have her meter with her today):   Patient is not experiencing hypoglycemia  Recent symptoms of high blood sugar? none  Eye exam: up to date  Foot exam: up to date  ACEi/ARB: Yes: losartan/HCTZ.   Urine Albumin:   Lab Results   Component Value Date    UMALCR 15.98 03/13/2019      Aspirin: Taking 81mg daily  Diet/Exercise: Not assessed today, patient saw endocrinology before our visit. Patient is wondering if there are any group diabetes education classes that she could attend.    Hyperlipidemia: Current therapy includes pravastatin 10mg once daily.  Pt reports muscle pain and leg cramps but not sure if due to fibromyalgia or medication. Patient is experiencing cramping every night in the middle of the night. This has been occurring for the last month.   The 10-year ASCVD risk score (Whitesboroklaudia LIN Jr., et al., 2013) is: 44.9%    Values used to calculate the score:      Age: 74 years      Sex: Female      Is Non- : No      Diabetic: Yes      Tobacco smoker: No      Systolic Blood Pressure: 139 mmHg      Is BP treated: Yes      HDL Cholesterol: 55 mg/dL      Total Cholesterol: 151  mg/dL    Hypertension: Current medications include losartan/HCTZ 100-25mg daily, metoprolol XL 25mg daily.  Patient does not self-monitor BP.  Patient reports the following medication side effects: lightheadedness.    Fibromyalgia: Current medications include duloxetine 20mg daily, gabapentin 100mg daily. At most recent visit with Dr. Hunter it was recommended patient taper off of gabapentin and restart duloxetine at a lower dose. It was thought that lower dose of duloxetine may not cause as much sweating but help with the pain and the gabapentin could be contributing to leg cramps and concentration. Patient had been taking duloxetine 60mg twice daily and was tolerating for some time but then began have debilitating sweating. When talking with the patient it was unclear what dose she is currently taking. At first she said she was taking 20mg daily and then later on in our conversation she said she wasn't taking it and still needed to pick it up from the pharmacy. Today she saw Dr. Chan, endocrinology,  today who recommended patient try taking duloxetine 20mg every other day.  Patient reports her pain has been terrible and had to take 2 ES tylenol today. Pain has been worse. Patient feels gabapentin gives her more ambition to do things, duloxetine helps with pain. Patient does report feeling off balance and foggy in her head.     GERD: Current medications include: Prilosec (omeprazole) 40mg once daily. Pt c/o heartburn.  Patient feels that current regimen is effective. Patient eats breakfast at noon, 5pm, and then will snack on oranges, salsa with corn chips until she goes to bed around 1am. Patient is wondering if she still needs this medication.    ADD: Current medications include methylphenidate ER 20mg every morning. Patient is unsure if this is helping. Patient reports she has also been picking her face for the last year and is wondering what this might be.    Supplements: Currently taking none. Patient has many  supplements on her medication list but is currently holding them until she makes other medication changes.    Today's Vitals: There were no vitals taken for this visit.-See office visit from today.    ASSESSMENT:                            Medication Adherence: fair    Diabetes: Stable. Patient is meeting A1c goal <8%. Will look for diabetes classes for patient.    Hyperlipidemia: Stable. Patient may benefit from a trial off of pravastatin in the future to see if contributing to leg cramps. Patient had 2 medication changes made today and recommend patient implement these first before making any further adjustments.    Hypertension: Stable. Patient is meeting BP goal of < 140/90mmHg.     Fibromyalgia: Needs improvement. Patient appears confused about her doses of medication. Would recommend patient follow Dr. Chan's recommendations from today as to not add to confusion.    GERD: Needs improvement. Patient may benefit from lifestyle changes. Patient was given a list of foods that can contribute to heartburn. Could consider omeprazole taper in the future. Patient would benefit from making only a few changes at a time.    ADD: Needs improvement. Unable to assess fully at visit today. Patient would benefit from follow-up with Dr. Hunter regarding picking.    Supplements: Stable; currently patient is holding all of her supplements.    PLAN:                            Take duloxetine 20mg every other day per Dr. Chan's instructions.  Take gabapentin 100mg daily per Dr. Chan's instructions  Recommend lifestyle changes to minimize symptoms of GERD  Follow up with Dr. Hunter to discuss picking of face.     I spent 30 minutes with this patient today. All changes were made via collaborative practice agreement with Lakia Hunter. A copy of the visit note was provided to the patient's primary care provider.    Will follow up in 2 weeks.    The patient was given a summary of these recommendations as an after visit summary.     Maritza ALONSO  Talisha Pharm.D, BCACP  Medication Therapy Management Pharmacist

## 2019-11-18 NOTE — LETTER
"    11/18/2019         RE: Karen Donis  [address] [facility]  [place] [address]        Dear Colleague,    Thank you for referring your patient, Karen Donis, to the [facility]. Please see a copy of my visit note below.    DIABETES New Visit  Karen Donis    MRN: [id number]    1945  74 year old   female           HPI:  Karen Donis a 74 year old female is here  for the first time for evaluation of Diabetes mellitus type 2.       The patient is most concerned in regards to pain and numbness in her feet.   The patient has comorbidity of fibromyalgia and spinal OA. She has numbness and pain in both feet. Has been on gabapentin. Problems tolerating higher doses causing dizziness, however been doing ok on taking 100 mg in the morning. Has been on Duloxetine 20 mg, which helps a lot with pain however makes her \"lazy\" (not tired). Takes some tylenol with partial relief.    The patient has had diabetes since 2013. She has peripheral neuropathy.    Patient is not experiencing any polyuria or polydipsia, no blurred vision.    Current diabetic medications: Metformin  Previously used diabetes medications: none    SMBG: does not check (on orals only)    Hypoglycemia: none    The patient has seen the dietitian, is interested in diabetes classes.     Exercise: none, feels worse after exercising due to fibromyalgia, also too busy taking care of family relatives    Cardiovascular history: none    Review of Systems:    New right lower abdominal pain, no change in bowel movement, no diarrhea, no constipation, no nausea    Complete 10 point review of systems is negative except for what mentioned above.    PMH:  Patient Active Problem List   Diagnosis     Seborrheic dermatitis     Alopecia     Xerosis cutis     Vitamin D deficiency     Nodules, thyroid     Postmenopausal     Colon cancer     CARDIOVASCULAR SCREENING; LDL GOAL LESS THAN 160     Impingement syndrome of right shoulder     AC " (acromioclavicular) joint arthritis     Anxiety state     Attention deficit disorder     ACP (advance care planning)     Gastroesophageal reflux disease without esophagitis     Diet-controlled diabetes mellitus (H)     Hyperlipidemia LDL goal <100     Essential hypertension with goal blood pressure less than 140/90     Fatty liver     Major depression in complete remission (H)     Chronic bilateral low back pain with bilateral sciatica     Morbid obesity due to excess calories (H)     BPPV (benign paroxysmal positional vertigo), right       SOCIAL HISTORY:  Social History     Socioeconomic History     Marital status:      Spouse name: Anand     Number of children: 2     Years of education: 12     Highest education level: Not on file   Occupational History     Occupation: Assembly/factory     Employer: [employer]     Comment: 2005   Social Needs     Financial resource strain: Not on file     Food insecurity:     Worry: Not on file     Inability: Not on file     Transportation needs:     Medical: Not on file     Non-medical: Not on file   Tobacco Use     Smoking status: Former Smoker     Last attempt to quit: 1982     Years since quittin.9     Smokeless tobacco: Never Used   Substance and Sexual Activity     Alcohol use: No     Drug use: No     Sexual activity: Yes     Partners: Male   Lifestyle     Physical activity:     Days per week: Not on file     Minutes per session: Not on file     Stress: Not on file   Relationships     Social connections:     Talks on phone: Not on file     Gets together: Not on file     Attends Congregation service: Not on file     Active member of club or organization: Not on file     Attends meetings of clubs or organizations: Not on file     Relationship status: Not on file     Intimate partner violence:     Fear of current or ex partner: Not on file     Emotionally abused: Not on file     Physically abused: Not on file     Forced sexual activity: Not on file   Other Topics  Concern     Parent/sibling w/ CABG, MI or angioplasty before 65F 55M? Not Asked   Social History Narrative     Not on file       FAMILY HISTORY:  History of Diabetes mellitus in the family: yes    Medications:   Current Outpatient Medications   Medication Sig Dispense Refill     ACCU-CHEK SMARTVIEW test strip use to test blood sugar once daily or as directed 50 each 5     Alpha-D-Galactosidase (BEANO PO) Take by mouth as needed Reported on 5/3/2017       ASPIRIN 81 MG OR TABS 1 TABLET DAILY       biotin 1000 MCG TABS tablet Take 1,000 mcg by mouth daily       blood glucose (ACCU-CHEK FASTCLIX) lancing device Device to be used with lancets. 1 each 0     blood glucose (NO BRAND SPECIFIED) test strip Use to test blood sugars daily or as directed. Accu-check smartview strips 100 each 3     blood glucose monitoring (ACCU-CHEK FASTCLIX) lancets Use to test blood sugar 1 times daily or as directed.  Ok to substitute alternative if insurance prefers. 1 Box 6     blood glucose monitoring (NO BRAND SPECIFIED) meter device kit Use to test blood sugar 1 times daily or as directed. 1 kit 0     Calcium Carbonate-Vit D-Min (CALCIUM 1200 PO) Take by mouth daily        cholecalciferol (VITAMIN D3) 1000 units (25 mcg) capsule Take 2 capsules (2,000 Units) by mouth daily 180 capsule 3     Coenzyme Q10 (CO Q 10 PO) Take 1 capsule by mouth daily.       Cranberry 300 MG TABS Take by mouth daily        DULoxetine (CYMBALTA) 20 MG capsule Take 1 capsule (20 mg) by mouth daily 30 capsule 0     econazole nitrate 1 % cream Apply topically daily 30 g 3     Flaxseed, Linseed, (FLAX SEED OIL) 1000 MG capsule Take 1 capsule by mouth daily       losartan-hydrochlorothiazide (HYZAAR) 100-25 MG tablet Take 1 tablet by mouth daily 91 tablet 3     MAGNESIUM OR 1 capsule daily       meclizine (ANTIVERT) 25 MG tablet Take 1 tablet (25 mg) by mouth every 6 hours as needed for dizziness 30 tablet 1     metFORMIN (GLUCOPHAGE-XR) 500 MG 24 hr tablet Take 2  tablets (1,000 mg) by mouth daily with breakfast 180 tablet 1     methylphenidate (METADATE ER) 20 MG CR tablet Take 1 tablet (20 mg) by mouth every morning 30 tablet 0     metoprolol succinate ER (TOPROL-XL) 25 MG 24 hr tablet Take 1 tablet (25 mg) by mouth daily 91 tablet 3     omeprazole (PRILOSEC) 40 MG DR capsule Take 1 capsule (40 mg) by mouth daily. Take 30-60 minutes before a meal. 90 capsule 2     pravastatin (PRAVACHOL) 10 MG tablet Take 1 tablet (10 mg) by mouth daily 90 tablet 3     tiZANidine (ZANAFLEX) 4 MG tablet Take 4 mg by mouth 3 times daily       VITAMIN B COMPLEX-C OR CAPS 1 capsule PO qd       vitamin E 400 UNIT capsule Take by mouth daily         PHYSICAL EXAM:   There were no vitals taken for this visit.    Gen: NAD, comfortable, obese  HEENT: Head normal, no lid lag, retraction, no proptosis  NECK: Supple, no LAD, no carotid bruit, no thyromegaly.  CVS: S1+S2 regular, no murmurs.  LUNGS: Normal regular breathing, no wheeze, no crepts, no rhonchi  GI: Soft, non tender, no visceromegaly, BS normal  CNS: Grossly normal, no focal deficit  Extremities: Normal pulses, hair distribution, no edema  SKIN: No skin changes.  PSYCH: Normal mood, pleasant affect    Foot exam:   foot check: sensation to monofilament is decreased bilaterally, pedal pulses are palpable bilaterally, excessive callus formation both feet above metatarsals    LABS:    Lab Results   Component Value Date    HDL 55 10/15/2019     Lab Results   Component Value Date     03/12/2019    CHLORIDE 106 03/12/2019    CO2 30 03/12/2019    CR 1.08 (H) 03/12/2019    ALBUMIN 3.7 06/06/2016    ALT 33 03/12/2019    AST 17 06/06/2016       Lab Results   Component Value Date    A1C 7.2 10/15/2019    A1C 7.4 06/12/2019    A1C 7.5 03/12/2019    A1C 7.2 10/04/2018    A1C 6.9 03/01/2018     Lab Results   Component Value Date    A1C 7.2 (H) 10/15/2019    A1C 7.4 (H) 06/12/2019    A1C 7.5 (H) 03/12/2019    A1C 7.2 (H) 10/04/2018    A1C 6.9 (H)  03/01/2018    A1C 7.2 (H) 09/20/2017    A1C 6.5 (H) 06/19/2017    A1C 7.0 (H) 12/07/2016    A1C 7.1 (H) 06/06/2016    A1C 6.7 (H) 10/27/2015    A1C 7.0 (H) 06/15/2015    A1C 6.2 (H) 11/06/2014    A1C 6.5 (H) 07/01/2013    A1C 6.4 (H) 09/18/2012    A1C 6.2 (H) 04/16/2012    A1C 6.2 (H) 08/04/2011    A1C 6.5 (H) 04/08/2011    A1C 6.3 (H) 05/15/2007    A1C 5.9 10/28/2005     ASSESSMENT:     Karen Donis a 74 year old female with T2DM controlled with diet and metformin.   The pain and numbness in her feet are most likely multifactorial from diabetic neuropathy and fibromyalgia.  Reviewed treatment options of combining meds at lower doses.    PLAN:  For pain   take gabapentin 100 mg in am  Take Duloxetine 20 mg every other day  Tylenol 500 mg twice daily as needed    For cramps: take extra water, exercises    Referred to diabetes classes per pt interest    Maribel Chan MD  Endocrinology and Diabetes  [address]  [facility] [address]  Tel [phone]    Again, thank you for allowing me to participate in the care of your patient.        Sincerely,        Maribel Chan MD

## 2019-11-18 NOTE — NURSING NOTE
"Karen Donis's goals for this visit include: Establish Care Diabetes  She requests these members of her care team be copied on today's visit information: YES    PCP: Lakia Hunter    Referring Provider:  Lakia Hunter MD PhD  [address] [place] [address]    Ht 1.619 m (5' 3.75\")   Wt 105.3 kg (232 lb 2.3 oz)   BMI 40.16 kg/m      Do you need any medication refills at today's visit? NO    "

## 2019-11-19 ENCOUNTER — TELEPHONE (OUTPATIENT)
Dept: ENDOCRINOLOGY | Facility: CLINIC | Age: 74
End: 2019-11-19

## 2019-11-19 NOTE — TELEPHONE ENCOUNTER
Diabetes Education Scheduling Outreach #1:    Call to patient to schedule. Left message with phone number to call to schedule.    Plan for 2nd outreach attempt within 1 week.    Apoorva Jc  [facility]  Diabetes and Nutrition Scheduling

## 2019-11-21 NOTE — PROGRESS NOTES
Called patient to let her know that there are group diabetes education classes available.    Patients need to have an Ambulatory Adult Diabetes Educator Referral in place within the last year. The scheduling phone number is: [phone]      [facility] classes - 1st Thursday of each month from 10 am - 12 pm - classes rotate, so Class 1 in January, Class 2 in February, Class 3 in March, etc. They can be taken in any order.     [facility] classes - 2nd Monday of each month from 3 - 5 pm - classes rotate, as above.       LM for patient to return call.    Maritza Woodall, Pharm.D, BCACP  Medication Therapy Management Pharmacist

## 2019-11-25 ENCOUNTER — TRANSFERRED RECORDS (OUTPATIENT)
Dept: HEALTH INFORMATION MANAGEMENT | Facility: CLINIC | Age: 74
End: 2019-11-25

## 2019-11-25 DIAGNOSIS — F98.8 ATTENTION DEFICIT DISORDER (ADD) WITHOUT HYPERACTIVITY: ICD-10-CM

## 2019-11-25 NOTE — TELEPHONE ENCOUNTER
[facility] Call Center    Phone Message    May a detailed message be left on voicemail: yes    Reason for Call: Medication Refill Request    Has the patient contacted the pharmacy for the refill? No  Name of medication being requested:methylphenidate (METADATE ER) 20 MG CR  Provider who prescribed the medication: Dr. Hunter  Pharmacy: [facility]  Date medication is needed: 11/26/2019       Action Taken: Message routed to:  Primary Care p [id number]

## 2019-11-25 NOTE — TELEPHONE ENCOUNTER
methylphenidate (METADATE ER) 20 MG CR tablet  Last Written Prescription Date:  9/13/19  Last Fill Quantity: 30,  # refills: 0   Last office visit: 11/13/2019 with prescribing provider:     Future Office Visit:   Next 5 appointments (look out 90 days)    Dec 11, 2019 12:00 PM CST  Office Visit with Maritza Woodall [facility] ([facility] [address] [place] [address] N  [place] [id number]  [facility] [id number] [phone]   Dec 11, 2019  1:10 PM CST  Return Visit with Lakia Hunter MD PhD  [facility] ([facility]) [address] [place] [id number] [phone]         Kelley Gill RN, BSN, PHN  [facility]

## 2019-11-26 RX ORDER — METHYLPHENIDATE HYDROCHLORIDE EXTENDED RELEASE 20 MG/1
20 TABLET ORAL EVERY MORNING
Qty: 30 TABLET | Refills: 0 | Status: SHIPPED | OUTPATIENT
Start: 2019-11-26 | End: 2020-01-03

## 2019-11-26 NOTE — TELEPHONE ENCOUNTER
Diabetes Education Scheduling Outreach #2:    Call to patient to schedule. Patient declined to schedule and wants to think about it. Informed her we offer one on one visits with our diabetic educators and that she may want to check with insurance on visit coverage.    Apoorva [facility] [employer]  Diabetes and Nutrition Scheduling

## 2019-12-10 DIAGNOSIS — M79.7 FIBROMYALGIA: ICD-10-CM

## 2019-12-10 RX ORDER — DULOXETIN HYDROCHLORIDE 20 MG/1
20 CAPSULE, DELAYED RELEASE ORAL DAILY
Qty: 30 CAPSULE | Refills: 0 | Status: SHIPPED | OUTPATIENT
Start: 2019-12-10 | End: 2019-12-11

## 2019-12-10 NOTE — TELEPHONE ENCOUNTER
[facility] Call Center    Phone Message    May a detailed message be left on voicemail: yes    Reason for Call: Medication Refill Request    Patient called and is requesting refill for DULoxetine (CYMBALTA) 20 MG capsule. Please send to [facility] in [place]      Action Taken: Message routed to:  Primary Care p [id number]

## 2019-12-11 ENCOUNTER — OFFICE VISIT (OUTPATIENT)
Dept: PHARMACY | Facility: CLINIC | Age: 74
End: 2019-12-11
Payer: COMMERCIAL

## 2019-12-11 ENCOUNTER — OFFICE VISIT (OUTPATIENT)
Dept: PEDIATRICS | Facility: CLINIC | Age: 74
End: 2019-12-11
Payer: COMMERCIAL

## 2019-12-11 VITALS — BODY MASS INDEX: 39.7 KG/M2 | WEIGHT: 229.5 LBS

## 2019-12-11 VITALS
HEART RATE: 65 BPM | BODY MASS INDEX: 39.62 KG/M2 | WEIGHT: 229 LBS | DIASTOLIC BLOOD PRESSURE: 70 MMHG | SYSTOLIC BLOOD PRESSURE: 126 MMHG | OXYGEN SATURATION: 95 % | TEMPERATURE: 97.4 F

## 2019-12-11 DIAGNOSIS — F98.8 ATTENTION DEFICIT DISORDER (ADD) WITHOUT HYPERACTIVITY: ICD-10-CM

## 2019-12-11 DIAGNOSIS — B35.3 TINEA PEDIS OF BOTH FEET: ICD-10-CM

## 2019-12-11 DIAGNOSIS — G31.84 MILD COGNITIVE IMPAIRMENT: ICD-10-CM

## 2019-12-11 DIAGNOSIS — E78.5 HYPERLIPIDEMIA LDL GOAL <100: Primary | ICD-10-CM

## 2019-12-11 DIAGNOSIS — M79.7 FIBROMYALGIA: Primary | ICD-10-CM

## 2019-12-11 DIAGNOSIS — M79.7 FIBROMYALGIA: ICD-10-CM

## 2019-12-11 DIAGNOSIS — L85.3 DRY SKIN: ICD-10-CM

## 2019-12-11 DIAGNOSIS — K21.9 GASTROESOPHAGEAL REFLUX DISEASE WITHOUT ESOPHAGITIS: ICD-10-CM

## 2019-12-11 PROCEDURE — 99606 MTMS BY PHARM EST 15 MIN: CPT | Performed by: PHARMACIST

## 2019-12-11 PROCEDURE — 99214 OFFICE O/P EST MOD 30 MIN: CPT | Performed by: INTERNAL MEDICINE

## 2019-12-11 PROCEDURE — 99607 MTMS BY PHARM ADDL 15 MIN: CPT | Performed by: PHARMACIST

## 2019-12-11 RX ORDER — ECONAZOLE NITRATE 10 MG/G
CREAM TOPICAL DAILY
Qty: 30 G | Refills: 3 | Status: SHIPPED | OUTPATIENT
Start: 2019-12-11 | End: 2020-03-11

## 2019-12-11 RX ORDER — DULOXETIN HYDROCHLORIDE 20 MG/1
20 CAPSULE, DELAYED RELEASE ORAL DAILY
Qty: 90 CAPSULE | Refills: 1 | Status: SHIPPED | OUTPATIENT
Start: 2019-12-11 | End: 2020-02-11

## 2019-12-11 ASSESSMENT — PATIENT HEALTH QUESTIONNAIRE - PHQ9: SUM OF ALL RESPONSES TO PHQ QUESTIONS 1-9: 9

## 2019-12-11 NOTE — PATIENT INSTRUCTIONS
Make appointment(s) for:   -- chronic disease review in March.       Complete health care directive.     Use moisturizing cream (not lotion) 1-2 times a day: Eucerin, Cetaphil, Aveeno, CeraVe, Aquaphor, or Vaseline.       Medication(s) prescribed today:    Orders Placed This Encounter   Medications     econazole nitrate 1 % external cream     Sig: Apply topically daily     Dispense:  30 g     Refill:  3     DULoxetine (CYMBALTA) 20 MG capsule     Sig: Take 1 capsule (20 mg) by mouth daily     Dispense:  90 capsule     Refill:  1

## 2019-12-11 NOTE — PROGRESS NOTES
SUBJECTIVE/OBJECTIVE:                           Karen Donis is a 74 year old female coming in for a follow-up visit for Medication Therapy Management.  She was referred to me from Lakia Hunter.    Chief Complaint: Medication Review. Patient is concerned that her medications could be affecting her liver.  [facility] called patient and asked if patient would like to have a NP from [facility] come out. Patient had NP come out to her house.    Allergies/ADRs: Reviewed in Epic  Tobacco:  reports that she quit smoking about 37 years ago. She has never used smokeless tobacco.  Alcohol: not currently using  Caffeine: did not assess today,  PMH: Reviewed in Epic    Medication Adherence/Access:  Patient is hard to keep focused and is not always clear in how or what medications she is taking.    Hyperlipidemia: Current therapy includes pravastatin 10mg once daily. Patient feels the cramping has been better. She has been trying to drink more water. The cramping usually occurs more in the left calf than the right.   The 10-year ASCVD risk score (Coramklaudia LIN Jr., et al., 2013) is: 44.5%    Values used to calculate the score:      Age: 74 years      Sex: Female      Is Non- : No      Diabetic: Yes      Tobacco smoker: No      Systolic Blood Pressure: 138 mmHg      Is BP treated: Yes      HDL Cholesterol: 55 mg/dL      Total Cholesterol: 151 mg/dL    Fibromyalgia: Current medications include duloxetine 20mg daily, gabapentin 100mg daily (around noon; this is when patient gets up). Sweating still comes but this much improved. Patient finds the pain is still present but patient is learning to take breaks and pace herself.  Patient feels gabapentin gives her more ambition to do things, duloxetine helps with pain. Patient is a little leary of taking gabapentin because of the increased risk of suicide. Patient denies side effects.     GERD: Current medications include: Prilosec (omeprazole) 40mg once daily. Pt c/o  heartburn.  Patient feels that current regimen is effective. Patient eats breakfast at noon, 5pm, and then will snack on oranges, salsa with corn chips until she goes to bed around 1am. When patient lays down that's when her symptoms come. Patient does raise her head when sleeping. Patient's  does much of the cooking which includes spaghetti sauce and pizza.    ADD: Current medications include methylphenidate ER 20mg every morning. Patient feels like she able to focus better. Dr. Hunter has been prescribing this for patient. Patient reports she has also been picking her face for the last year and is wondering what this might be.    Today's Vitals: There were no vitals taken for this visit.-See office visit from today.    ASSESSMENT:                            Medication Adherence: fair    Hyperlipidemia: Stable.     Fibromyalgia: Stable.    GERD: Stable; patient will continue to work on food choices.    ADD: Stable; patient has visit with Dr. Hunter today and will follow up on symptoms.    PLAN:                          No medication changes made today.    I spent 45 minutes with this patient today. All changes were made via collaborative practice agreement with Lakia Hunter. A copy of the visit note was provided to the patient's primary care provider.    Will follow up in 2 months.    The patient was given a summary of these recommendations as an after visit summary.     Maritza Woodall, Pharm.D, [facility]  Medication Therapy Management Pharmacist

## 2019-12-11 NOTE — PATIENT INSTRUCTIONS
Recommendations from today's MTM visit:                                                      No medication changes recommended today    It was great to speak with you today.  I value your experience and would be very thankful for your time with providing feedback on our clinic survey. You may receive a survey via email or text message in the next few days.     Next MTM visit: March 11th at 1130am.    To schedule another MTM appointment, please call the clinic directly or you may call the MTM scheduling line at [phone] or toll-free at [phone].     My Clinical Pharmacist's contact information:                                                      It was a pleasure talking with you today!  Please feel free to contact me with any questions or concerns you have.      Maritza Woodall, Pharm.D, [facility]  Medication Therapy Management Pharmacist

## 2019-12-13 ENCOUNTER — TELEPHONE (OUTPATIENT)
Dept: PEDIATRICS | Facility: CLINIC | Age: 74
End: 2019-12-13

## 2019-12-13 DIAGNOSIS — I10 ESSENTIAL HYPERTENSION WITH GOAL BLOOD PRESSURE LESS THAN 140/90: Primary | ICD-10-CM

## 2019-12-13 DIAGNOSIS — B35.3 TINEA PEDIS OF BOTH FEET: Primary | ICD-10-CM

## 2019-12-13 RX ORDER — HYDROCHLOROTHIAZIDE 25 MG/1
25 TABLET ORAL DAILY
Qty: 90 TABLET | Refills: 3 | Status: SHIPPED | OUTPATIENT
Start: 2019-12-13 | End: 2020-10-21

## 2019-12-13 RX ORDER — LOSARTAN POTASSIUM 100 MG/1
100 TABLET ORAL DAILY
Qty: 90 TABLET | Refills: 3 | Status: SHIPPED | OUTPATIENT
Start: 2019-12-13 | End: 2020-12-29

## 2019-12-13 NOTE — TELEPHONE ENCOUNTER
Pharm request Lorsartan 100mg  and hydrochlorothiazide 25mg to be sent separately. On back order.

## 2019-12-14 RX ORDER — CLOTRIMAZOLE 1 %
CREAM (GRAM) TOPICAL 2 TIMES DAILY
Qty: 30 G | Refills: 1 | Status: SHIPPED | OUTPATIENT
Start: 2019-12-14 | End: 2020-11-01

## 2019-12-20 ENCOUNTER — ANCILLARY PROCEDURE (OUTPATIENT)
Dept: ULTRASOUND IMAGING | Facility: CLINIC | Age: 74
End: 2019-12-20
Attending: INTERNAL MEDICINE
Payer: COMMERCIAL

## 2019-12-20 ENCOUNTER — ANCILLARY PROCEDURE (OUTPATIENT)
Dept: MAMMOGRAPHY | Facility: CLINIC | Age: 74
End: 2019-12-20
Attending: INTERNAL MEDICINE
Payer: COMMERCIAL

## 2019-12-20 DIAGNOSIS — N64.4 BREAST PAIN: ICD-10-CM

## 2019-12-20 PROCEDURE — 76642 ULTRASOUND BREAST LIMITED: CPT | Mod: RT | Performed by: RADIOLOGY

## 2019-12-20 PROCEDURE — G0279 TOMOSYNTHESIS, MAMMO: HCPCS | Performed by: RADIOLOGY

## 2019-12-20 PROCEDURE — 77066 DX MAMMO INCL CAD BI: CPT | Performed by: RADIOLOGY

## 2020-01-03 ENCOUNTER — TELEPHONE (OUTPATIENT)
Dept: PEDIATRICS | Facility: CLINIC | Age: 75
End: 2020-01-03

## 2020-01-03 DIAGNOSIS — E11.9 TYPE 2 DIABETES MELLITUS WITHOUT COMPLICATION, WITHOUT LONG-TERM CURRENT USE OF INSULIN (H): ICD-10-CM

## 2020-01-03 DIAGNOSIS — F98.8 ATTENTION DEFICIT DISORDER (ADD) WITHOUT HYPERACTIVITY: ICD-10-CM

## 2020-01-03 RX ORDER — METHYLPHENIDATE HYDROCHLORIDE EXTENDED RELEASE 20 MG/1
20 TABLET ORAL EVERY MORNING
Qty: 30 TABLET | Refills: 0 | Status: SHIPPED | OUTPATIENT
Start: 2020-01-03 | End: 2020-01-07

## 2020-01-03 RX ORDER — METFORMIN HCL 500 MG
TABLET, EXTENDED RELEASE 24 HR ORAL
Qty: 180 TABLET | Refills: 0 | Status: SHIPPED | OUTPATIENT
Start: 2020-01-03 | End: 2020-04-06

## 2020-01-03 NOTE — TELEPHONE ENCOUNTER
[facility] Call Center    Phone Message    May a detailed message be left on voicemail: yes    Reason for Call: Medication Refill Request    Has the patient contacted the pharmacy for the refill? Yes   Name of medication being requested: methylphenidate (METADATE ER) 20 MG CR tablet [20409] (Order [id number])     Provider who prescribed the medication: Dr Hunter  Pharmacy: [facility] [place]  Date medication is needed: ASAP patient is out   Action Taken: Message routed to:  Primary Care p [id number]

## 2020-01-06 ENCOUNTER — TELEPHONE (OUTPATIENT)
Dept: PEDIATRICS | Facility: CLINIC | Age: 75
End: 2020-01-06

## 2020-01-06 DIAGNOSIS — F98.8 ATTENTION DEFICIT DISORDER (ADD) WITHOUT HYPERACTIVITY: ICD-10-CM

## 2020-01-06 NOTE — TELEPHONE ENCOUNTER
Clarence Appiah did not want the med here, please send the methylphenidate to [facility] [place]. (I cannot transfer it)    Mathew Mike. D.  [facility] Pharmacy Manager  [phone]  1/6/2020

## 2020-01-07 RX ORDER — METHYLPHENIDATE HYDROCHLORIDE EXTENDED RELEASE 20 MG/1
20 TABLET ORAL EVERY MORNING
Qty: 30 TABLET | Refills: 0 | Status: SHIPPED | OUTPATIENT
Start: 2020-01-07 | End: 2020-02-11

## 2020-01-27 DIAGNOSIS — E78.5 HYPERLIPIDEMIA LDL GOAL <100: ICD-10-CM

## 2020-01-27 RX ORDER — PRAVASTATIN SODIUM 10 MG
10 TABLET ORAL DAILY
Qty: 90 TABLET | Refills: 3 | OUTPATIENT
Start: 2020-01-27

## 2020-01-27 NOTE — TELEPHONE ENCOUNTER
Pravastatin    Called patient, she has enough pravastatin. It is too soon to refill.    Informed patient that she should contact her new pharmacy to transfer the rest of her Rx from [facility] pharmacy.    She agrees to plan.    Maritza Wisdom RN, [facility]

## 2020-01-27 NOTE — TELEPHONE ENCOUNTER
New pharmacy.   Pravastatin    Last Written Prescription Date:  3-26-19  Last Fill Quantity: 90,  # refills: 3   Last office visit: 12/11/2019 with prescribing provider:  martha   Future Office Visit:   Next 5 appointments (look out 90 days)    Mar 11, 2020 11:30 AM CDT  Office Visit with Maritza Woodall [facility] ([facility]) [address] [place] [address] N  [facility] [address]  [facility] [phone]   Mar 11, 2020  1:10 PM CDT  Return Visit with Lakia Hunter MD PhD  [facility] ([facility]) [address] [place] [address] N  [facility] [phone]

## 2020-02-08 ENCOUNTER — NURSE TRIAGE (OUTPATIENT)
Dept: NURSING | Facility: CLINIC | Age: 75
End: 2020-02-08

## 2020-02-08 DIAGNOSIS — F98.8 ATTENTION DEFICIT DISORDER (ADD) WITHOUT HYPERACTIVITY: ICD-10-CM

## 2020-02-08 DIAGNOSIS — M79.7 FIBROMYALGIA: ICD-10-CM

## 2020-02-08 NOTE — TELEPHONE ENCOUNTER
"Karen will be out of methylphenidate and DULoxetine on Monday (has only 2 pills left) and would like refills sent to [facility] in [place].   FNA sent message to PCP and RN pool.   Patient voiced understand and will follow disposition.     Radha Dumont RN  [facility] Nurse Advisor          Reason for Disposition    Caller requesting a NON-URGENT new prescription or refill and triager unable to refill per unit policy    Additional Information    Negative: Drug overdose and nurse unable to answer question    Negative: Caller requesting information not related to medicine    Negative: Caller requesting a prescription for Strep throat and has a positive culture result    Negative: Rash while taking a medication or within 3 days of stopping it    Negative: Immunization reaction suspected    Negative: [1] Asthma and [2] having symptoms of asthma (cough, wheezing, etc)    Negative: MORE THAN A DOUBLE DOSE of a prescription or over-the-counter (OTC) drug    Negative: [1] DOUBLE DOSE (an extra dose or lesser amount) of over-the-counter (OTC) drug AND [2] any symptoms (e.g., dizziness, nausea, pain, sleepiness)    Negative: [1] DOUBLE DOSE (an extra dose or lesser amount) of prescription drug AND [2] any symptoms (e.g., dizziness, nausea, pain, sleepiness)    Negative: Took another person's prescription drug    Negative: [1] DOUBLE DOSE (an extra dose or lesser amount) of prescription drug AND [2] NO symptoms (Exception: a double dose of antibiotics)    Negative: Diabetes drug error or overdose (e.g., insulin or extra dose)    Negative: [1] Request for URGENT new prescription or refill of \"essential\" medication (i.e., likelihood of harm to patient if not taken) AND [2] triager unable to fill per unit policy    Negative: [1] Prescription not at pharmacy AND [2] was prescribed today by PCP    Negative: Pharmacy calling with prescription questions and triager unable to answer question    Negative: Caller has URGENT medication " question about med that PCP prescribed and triager unable to answer question    Negative: Caller has NON-URGENT medication question about med that PCP prescribed and triager unable to answer question    Protocols used: MEDICATION QUESTION CALL-A-AH

## 2020-02-08 NOTE — TELEPHONE ENCOUNTER
Karen will be out of methylphenidate and DULoxetine on Monday (has only 2 pills left) and would like refills sent to [facility] pharmacy in [place].   Please call patient back once complete.   Thank you!  Radha Dumont RN  [facility] Nurse Advisor

## 2020-02-10 NOTE — TELEPHONE ENCOUNTER
Routing to provider for review and approval.    Kelley Gill, RN, BSN, PHN  [facility]

## 2020-02-11 RX ORDER — METHYLPHENIDATE HYDROCHLORIDE EXTENDED RELEASE 20 MG/1
20 TABLET ORAL EVERY MORNING
Qty: 30 TABLET | Refills: 0 | Status: SHIPPED | OUTPATIENT
Start: 2020-02-11 | End: 2020-03-11

## 2020-02-11 RX ORDER — DULOXETIN HYDROCHLORIDE 20 MG/1
20 CAPSULE, DELAYED RELEASE ORAL DAILY
Qty: 90 CAPSULE | Refills: 1 | Status: SHIPPED | OUTPATIENT
Start: 2020-02-11 | End: 2020-09-01 | Stop reason: SINTOL

## 2020-02-13 NOTE — TELEPHONE ENCOUNTER
Due for 6 months follow up in March. Rx approved for 1 month. Schedule follow up appointment.    MSW was just notified that the discharge was cancelled. Daughter aware.     Zeno Goodpasture, [facility]

## 2020-02-21 DIAGNOSIS — K21.9 GASTROESOPHAGEAL REFLUX DISEASE WITHOUT ESOPHAGITIS: ICD-10-CM

## 2020-02-21 RX ORDER — OMEPRAZOLE 40 MG/1
40 CAPSULE, DELAYED RELEASE ORAL DAILY
Qty: 90 CAPSULE | Refills: 0 | Status: SHIPPED | OUTPATIENT
Start: 2020-02-21 | End: 2020-06-08

## 2020-02-21 NOTE — TELEPHONE ENCOUNTER
Omeprazole  Last Written Prescription Date:  5-13-19  Last Fill Quantity: 90,  # refills: 2   Last office visit: 12/11/2019 with prescribing provider:  martha   Future Office Visit:   Next 5 appointments (look out 90 days)    Mar 11, 2020 11:30 AM CDT  Office Visit with Maritza Woodall [facility] ([facility]) [address] [place] [address] N  [facility] [address]  [facility] [phone]   Mar 11, 2020  1:10 PM CDT  Return Visit with Lakia Hunter MD PhD  [facility] ([facility]) [address] [place] [address] N  [facility] [phone]

## 2020-03-11 ENCOUNTER — OFFICE VISIT (OUTPATIENT)
Dept: PHARMACY | Facility: CLINIC | Age: 75
End: 2020-03-11
Payer: COMMERCIAL

## 2020-03-11 ENCOUNTER — OFFICE VISIT (OUTPATIENT)
Dept: PEDIATRICS | Facility: CLINIC | Age: 75
End: 2020-03-11
Payer: COMMERCIAL

## 2020-03-11 VITALS
RESPIRATION RATE: 20 BRPM | SYSTOLIC BLOOD PRESSURE: 124 MMHG | WEIGHT: 226.19 LBS | TEMPERATURE: 97.3 F | DIASTOLIC BLOOD PRESSURE: 68 MMHG | HEART RATE: 62 BPM | OXYGEN SATURATION: 93 % | BODY MASS INDEX: 39.13 KG/M2

## 2020-03-11 VITALS — BODY MASS INDEX: 39.13 KG/M2 | WEIGHT: 226.2 LBS

## 2020-03-11 DIAGNOSIS — F98.8 ATTENTION DEFICIT DISORDER (ADD) WITHOUT HYPERACTIVITY: ICD-10-CM

## 2020-03-11 DIAGNOSIS — I10 ESSENTIAL HYPERTENSION WITH GOAL BLOOD PRESSURE LESS THAN 140/90: ICD-10-CM

## 2020-03-11 DIAGNOSIS — M79.7 FIBROMYALGIA: ICD-10-CM

## 2020-03-11 DIAGNOSIS — B35.3 TINEA PEDIS, UNSPECIFIED LATERALITY: ICD-10-CM

## 2020-03-11 DIAGNOSIS — E78.5 HYPERLIPIDEMIA LDL GOAL <100: ICD-10-CM

## 2020-03-11 DIAGNOSIS — G47.33 OSA (OBSTRUCTIVE SLEEP APNEA): ICD-10-CM

## 2020-03-11 DIAGNOSIS — M85.80 OSTEOPENIA, UNSPECIFIED LOCATION: ICD-10-CM

## 2020-03-11 DIAGNOSIS — Z78.9 TAKES DIETARY SUPPLEMENTS: Primary | ICD-10-CM

## 2020-03-11 DIAGNOSIS — K21.9 GASTROESOPHAGEAL REFLUX DISEASE WITHOUT ESOPHAGITIS: ICD-10-CM

## 2020-03-11 DIAGNOSIS — R53.83 FATIGUE, UNSPECIFIED TYPE: ICD-10-CM

## 2020-03-11 DIAGNOSIS — E11.9 TYPE 2 DIABETES MELLITUS WITHOUT COMPLICATION, WITHOUT LONG-TERM CURRENT USE OF INSULIN (H): ICD-10-CM

## 2020-03-11 DIAGNOSIS — E55.9 VITAMIN D DEFICIENCY: ICD-10-CM

## 2020-03-11 DIAGNOSIS — E11.9 TYPE 2 DIABETES MELLITUS WITHOUT COMPLICATION, WITHOUT LONG-TERM CURRENT USE OF INSULIN (H): Primary | ICD-10-CM

## 2020-03-11 LAB
ANION GAP SERPL CALCULATED.3IONS-SCNC: 4 MMOL/L (ref 3–14)
BUN SERPL-MCNC: 20 MG/DL (ref 7–30)
CALCIUM SERPL-MCNC: 9.3 MG/DL (ref 8.5–10.1)
CHLORIDE SERPL-SCNC: 107 MMOL/L (ref 94–109)
CO2 SERPL-SCNC: 29 MMOL/L (ref 20–32)
CREAT SERPL-MCNC: 1.15 MG/DL (ref 0.52–1.04)
CREAT UR-MCNC: 80 MG/DL
ERYTHROCYTE [DISTWIDTH] IN BLOOD BY AUTOMATED COUNT: 12.7 % (ref 10–15)
GFR SERPL CREATININE-BSD FRML MDRD: 46 ML/MIN/{1.73_M2}
GLUCOSE SERPL-MCNC: 167 MG/DL (ref 70–99)
HBA1C MFR BLD: 7.3 % (ref 0–5.6)
HCT VFR BLD AUTO: 41.1 % (ref 35–47)
HGB BLD-MCNC: 13.2 G/DL (ref 11.7–15.7)
MCH RBC QN AUTO: 29.6 PG (ref 26.5–33)
MCHC RBC AUTO-ENTMCNC: 32.1 G/DL (ref 31.5–36.5)
MCV RBC AUTO: 92 FL (ref 78–100)
MICROALBUMIN UR-MCNC: 5 MG/L
MICROALBUMIN/CREAT UR: 6.58 MG/G CR (ref 0–25)
PLATELET # BLD AUTO: 267 10E9/L (ref 150–450)
POTASSIUM SERPL-SCNC: 4.4 MMOL/L (ref 3.4–5.3)
RBC # BLD AUTO: 4.46 10E12/L (ref 3.8–5.2)
SODIUM SERPL-SCNC: 140 MMOL/L (ref 133–144)
VIT B12 SERPL-MCNC: 213 PG/ML (ref 193–986)
WBC # BLD AUTO: 7.2 10E9/L (ref 4–11)

## 2020-03-11 PROCEDURE — 85027 COMPLETE CBC AUTOMATED: CPT | Performed by: INTERNAL MEDICINE

## 2020-03-11 PROCEDURE — 99605 MTMS BY PHARM NP 15 MIN: CPT | Performed by: PHARMACIST

## 2020-03-11 PROCEDURE — 82607 VITAMIN B-12: CPT | Performed by: INTERNAL MEDICINE

## 2020-03-11 PROCEDURE — 83036 HEMOGLOBIN GLYCOSYLATED A1C: CPT | Performed by: INTERNAL MEDICINE

## 2020-03-11 PROCEDURE — 99607 MTMS BY PHARM ADDL 15 MIN: CPT | Performed by: PHARMACIST

## 2020-03-11 PROCEDURE — 82306 VITAMIN D 25 HYDROXY: CPT | Performed by: INTERNAL MEDICINE

## 2020-03-11 PROCEDURE — 36415 COLL VENOUS BLD VENIPUNCTURE: CPT | Performed by: INTERNAL MEDICINE

## 2020-03-11 PROCEDURE — 99214 OFFICE O/P EST MOD 30 MIN: CPT | Performed by: INTERNAL MEDICINE

## 2020-03-11 PROCEDURE — 82043 UR ALBUMIN QUANTITATIVE: CPT | Performed by: INTERNAL MEDICINE

## 2020-03-11 PROCEDURE — 80048 BASIC METABOLIC PNL TOTAL CA: CPT | Performed by: INTERNAL MEDICINE

## 2020-03-11 RX ORDER — METHYLPHENIDATE HYDROCHLORIDE EXTENDED RELEASE 20 MG/1
20 TABLET ORAL EVERY MORNING
Qty: 30 TABLET | Refills: 0 | Status: SHIPPED | OUTPATIENT
Start: 2020-03-11 | End: 2020-04-06

## 2020-03-11 ASSESSMENT — PAIN SCALES - GENERAL: PAINLEVEL: NO PAIN (0)

## 2020-03-11 NOTE — PATIENT INSTRUCTIONS
Recommendations from today's MTM visit:                                                      Increase metformin to 3 tablets daily  Bring all of your supplements and medications with you to your visit on March 31st    It was great to speak with you today.  I value your experience and would be very thankful for your time with providing feedback on our clinic survey. You may receive a survey via email or text message in the next few days.     Next MTM visit: March 31st at 130pm    To schedule another MTM appointment, please call the clinic directly or you may call the MTM scheduling line at [phone] or toll-free at [phone].     My Clinical Pharmacist's contact information:                                                      It was a pleasure talking with you today!  Please feel free to contact me with any questions or concerns you have.      Maritza Woodall, Pharm.D, [facility]  Medication Therapy Management Pharmacist

## 2020-03-11 NOTE — PROGRESS NOTES
Subjective     Karen Donis is a 75 year old female who presents to clinic today for the following health issues:    HPI   Diabetes Follow-up    How often are you checking your blood sugar? A few times a week  What time of day are you checking your blood sugars (select all that apply)?  Before meals around 10:00a.m  Have you had any blood sugars above 200?  Yes once after taking a cappuccino   Have you had any blood sugars below 70?  No    What symptoms do you notice when your blood sugar is low?  None and Not applicable    What concerns do you have today about your diabetes? None and Other: Sleepiness all the time     Do you have any of these symptoms? (Select all that apply)  Burning in feet and Blurry vision    Have you had a diabetic eye exam in the last 12 months? No        BP Readings from Last 2 Encounters:   03/11/20 124/68   12/11/19 126/70     Hemoglobin A1C (%)   Date Value   03/11/2020 7.3 (H)   10/15/2019 7.2 (H)     LDL Cholesterol Calculated (mg/dL)   Date Value   10/15/2019 77   03/12/2019 86         How many servings of fruits and vegetables do you eat daily?  2-3    On average, how many sweetened beverages do you drink each day (Examples: soda, juice, sweet tea, etc.  Do NOT count diet or artificially sweetened beverages)?   2    How many days per week do you exercise enough to make your heart beat faster? 3 or less    How many minutes a day do you exercise enough to make your heart beat faster? 9 or less  How many days per week do you miss taking your medication? 1    What makes it hard for you to take your medications?  remembering to take      Complained of fatigue to [facility]. Patient reported chronic fatigue. Some days sleeps 12 hours a day. She was diagnosed with sleep apnea years ago. Has CPAP but has not used for more than 10 years. Reports fatigue started at the beginning of the year. Shortly after losartan/HCTZ was changed to two separate prescription. Around the beginning of year, she also  started picking up Methylphenidate at [facility] instead of [facility].     Lately her brother has not been doing well. He is in ICU in [place]. Pt has had sleepless night recently but this is rare.     ROS:  Constitutional, HEENT, cardiovascular, pulmonary, gi and gu systems are negative, except as otherwise noted.       ACCU-CHEK SMARTVIEW test strip, use to test blood sugar once daily or as directed  Alpha-D-Galactosidase (BEANO PO), Take by mouth as needed Reported on 5/3/2017  ASPIRIN 81 MG OR TABS, Take 81 mg by mouth daily   biotin 1000 MCG TABS tablet, Take 1,000 mcg by mouth daily  blood glucose (ACCU-CHEK FASTCLIX) lancing device, Device to be used with lancets.  blood glucose (NO BRAND SPECIFIED) test strip, Use to test blood sugars daily or as directed. Accu-check smartview strips  blood glucose monitoring (ACCU-CHEK FASTCLIX) lancets, Use to test blood sugar 1 times daily or as directed.  Ok to substitute alternative if insurance prefers.  blood glucose monitoring (NO BRAND SPECIFIED) meter device kit, Use to test blood sugar 1 times daily or as directed.  Calcium Carbonate-Vit D-Min (CALCIUM 1200 PO), Take by mouth daily   cholecalciferol (VITAMIN D3) 1000 units (25 mcg) capsule, Take 2 capsules (2,000 Units) by mouth daily (Patient taking differently: Take 2 capsules by mouth daily )  clotrimazole (LOTRIMIN) 1 % external cream, Apply topically 2 times daily  Coenzyme Q10 (CO Q 10 PO), Take 1 capsule by mouth daily.  Cranberry 300 MG TABS, Take by mouth daily   DULoxetine (CYMBALTA) 20 MG capsule, Take 1 capsule (20 mg) by mouth daily  Flaxseed, Linseed, (FLAX SEED OIL) 1000 MG capsule, Take 1 capsule by mouth daily  gabapentin (NEURONTIN) 100 MG capsule, Sig 1 tab in am  hydrochlorothiazide (HYDRODIURIL) 25 MG tablet, Take 1 tablet (25 mg) by mouth daily  losartan (COZAAR) 100 MG tablet, Take 1 tablet (100 mg) by mouth daily  MAGNESIUM OR, 1 capsule daily  metFORMIN (GLUCOPHAGE-XR) 500 MG 24 hr  tablet, Take 2 tablets (1,000 mg) by mouth daily with breakfast  metoprolol succinate ER (TOPROL-XL) 25 MG 24 hr tablet, Take 1 tablet (25 mg) by mouth daily  omeprazole 40 MG PO DR capsule, Take 1 capsule (40 mg) by mouth daily Take 30-60 minutes before a meal.  pravastatin (PRAVACHOL) 10 MG tablet, Take 1 tablet (10 mg) by mouth daily  VITAMIN B COMPLEX-C OR CAPS, 1 capsule PO qd    bupivacaine (MARCAINE) 0.25 % injection 12.5 mg           Patient Active Problem List   Diagnosis     Seborrheic dermatitis     Alopecia     Xerosis cutis     Vitamin D deficiency     Nodules, thyroid     Postmenopausal     Colon cancer     CARDIOVASCULAR SCREENING; LDL GOAL LESS THAN 160     Impingement syndrome of right shoulder     AC (acromioclavicular) joint arthritis     Anxiety state     Attention deficit disorder     ACP (advance care planning)     Gastroesophageal reflux disease without esophagitis     Diet-controlled diabetes mellitus (H)     Hyperlipidemia LDL goal <100     Essential hypertension with goal blood pressure less than 140/90     Fatty liver     Major depression in complete remission (H)     Chronic bilateral low back pain with bilateral sciatica     Morbid obesity due to excess calories (H)     BPPV (benign paroxysmal positional vertigo), right     Past Surgical History:   Procedure Laterality Date     COLON SURGERY       THYROIDECTOMY  2011    Procedure:THYROIDECTOMY; Right Thyroid Lobectomy and bilateral removal of skin tags; Surgeon:SAJI ZAZUETA; Location:[facility] OR     TONSILLECTOMY      While she was in 6th grade       Social History     Tobacco Use     Smoking status: Former Smoker     Last attempt to quit: 1982     Years since quittin.2     Smokeless tobacco: Never Used   Substance Use Topics     Alcohol use: No     Family History   Problem Relation Age of Onset     Diabetes Mother      Hypertension Mother      Thyroid Disease Mother      Heart Disease Father      Cardiovascular  Father      Neurologic Disorder Father         Parkinson's     Arthritis Father         Fibromyalgia     Gastrointestinal Disease Maternal Grandmother         gallbladder removed     Heart Disease Maternal Grandfather      Diabetes Brother      Hypertension Brother      Neurologic Disorder Brother         ADHD     Unknown/Adopted Son      Unknown/Adopted Daughter      Neurologic Disorder Brother         ADHD             Problem list, Medication list, Allergies, and Medical/Social/Surgical histories reviewed in [facility] and updated as appropriate.    OBJECTIVE:                                                    /68 (BP Location: Right arm, Patient Position: Sitting, Cuff Size: Adult Large)   Pulse 62   Temp 97.3  F (36.3  C) (Oral)   Resp 20   Wt 102.6 kg (226 lb 3.1 oz)   SpO2 93%   BMI 39.13 kg/m      GENERAL: 75 year old female, alert and no distress        Diagnostic test results:  Results for orders placed or performed in visit on 03/11/20   Hemoglobin A1c     Status: Abnormal   Result Value Ref Range    Hemoglobin A1C 7.3 (H) 0 - 5.6 %   Basic metabolic panel     Status: Abnormal   Result Value Ref Range    Sodium 140 133 - 144 mmol/L    Potassium 4.4 3.4 - 5.3 mmol/L    Chloride 107 94 - 109 mmol/L    Carbon Dioxide 29 20 - 32 mmol/L    Anion Gap 4 3 - 14 mmol/L    Glucose 167 (H) 70 - 99 mg/dL    Urea Nitrogen 20 7 - 30 mg/dL    Creatinine 1.15 (H) 0.52 - 1.04 mg/dL    GFR Estimate 46 (L) >60 mL/min/[1.73_m2]    GFR Estimate If Black 53 (L) >60 mL/min/[1.73_m2]    Calcium 9.3 8.5 - 10.1 mg/dL         ASSESSMENT/PLAN:                                                      75 year old female with the following diagnoses and treatment plan:      ICD-10-CM    1. Type 2 diabetes mellitus without complication, without long-term current use of insulin (H)  E11.9 Vitamin B12     Albumin Random Urine Quantitative with Creat Ratio   2. Vitamin D deficiency  E55.9 Vitamin D Deficiency   3. SO (obstructive sleep  apnea)  G47.33 SLEEP EVALUATION & MANAGEMENT REFERRAL - ADULT -[facility] - Kary Marcelino  [phone] (Age 15 and up)   4. Fatigue, unspecified type  R53.83    5. Attention deficit disorder (ADD) without hyperactivity  F98.8 methylphenidate (METADATE ER) 20 MG CR tablet       -- diabetes is well controlled, diet controlled.  -- HTN/CKD 2: stble.   -- Fatigue: maybe accumulative effect of uncontrolled sleep apnea, different manufacture of the methylphenidate, or stress over her brother's health. Previously hemoglobin has been normal. But will check to be sure no development of anemia. Check vitamin D/B12 as well.   -- return in June for wellness visit.    Will call or return to clinic if worsening or symptoms not improving as discussed.  See Patient Instructions.      Lakia Hunter MD-PhD  [facility]    (Note: Chart documentation was done in part with Dragon Voice Recognition software. Although reviewed after completion, some word and grammatical errors may remain.)

## 2020-03-11 NOTE — LETTER
March 18, 2020      Karen Donis  [address] [place] [address]        Dear ,    We are writing to inform you of your test results.    Your labs were stable or at baseline. No change in your current medication.     Results for orders placed or performed in visit on 03/11/20   Albumin Random Urine Quantitative with Creat Ratio     Status: None   Result Value Ref Range    Creatinine Urine 80 mg/dL    Albumin Urine mg/L 5 mg/L    Albumin Urine mg/g Cr 6.58 0 - 25 mg/g Cr   Results for orders placed or performed in visit on 03/11/20   Vitamin D Deficiency     Status: None   Result Value Ref Range    Vitamin D Deficiency screening 27 20 - 75 ug/L   Vitamin B12     Status: None   Result Value Ref Range    Vitamin B12 213 193 - 986 pg/mL   CBC with platelets     Status: None   Result Value Ref Range    WBC 7.2 4.0 - 11.0 10e9/L    RBC Count 4.46 3.8 - 5.2 10e12/L    Hemoglobin 13.2 11.7 - 15.7 g/dL    Hematocrit 41.1 35.0 - 47.0 %    MCV 92 78 - 100 fl    MCH 29.6 26.5 - 33.0 pg    MCHC 32.1 31.5 - 36.5 g/dL    RDW 12.7 10.0 - 15.0 %    Platelet Count 267 150 - 450 10e9/L   Results for orders placed or performed in visit on 03/11/20   Hemoglobin A1c     Status: Abnormal   Result Value Ref Range    Hemoglobin A1C 7.3 (H) 0 - 5.6 %   Basic metabolic panel     Status: Abnormal   Result Value Ref Range    Sodium 140 133 - 144 mmol/L    Potassium 4.4 3.4 - 5.3 mmol/L    Chloride 107 94 - 109 mmol/L    Carbon Dioxide 29 20 - 32 mmol/L    Anion Gap 4 3 - 14 mmol/L    Glucose 167 (H) 70 - 99 mg/dL    Urea Nitrogen 20 7 - 30 mg/dL    Creatinine 1.15 (H) 0.52 - 1.04 mg/dL    GFR Estimate 46 (L) >60 mL/min/[1.73_m2]    GFR Estimate If Black 53 (L) >60 mL/min/[1.73_m2]    Calcium 9.3 8.5 - 10.1 mg/dL            If you have any questions or concerns, please call the clinic at the number listed above.       Sincerely,        Lakia Hunter MD PhD

## 2020-03-11 NOTE — PROGRESS NOTES
SUBJECTIVE/OBJECTIVE:                           Karen Donis is a 74 year old female coming in for an initial  visit for Medication Therapy Management, 2020.  She was referred to me from Lakia Hunter.    Chief Complaint: Medication Review. Last visit was on 12/11/19. Increased fatigue, sleeping from midnight until about 10:30am-noon, then takes a 1-2 hour nap.   [facility] called patient and asked if patient would like to have a NP from [facility] come out. Patient had NP come out to her house.    Allergies/ADRs: Reviewed in Epic  Tobacco:  reports that she quit smoking about 37 years ago. She has never used smokeless tobacco.  Alcohol: not currently using  Caffeine: cappuccino 6 travel mugs per week, (sugar free) with splenda  PMH: Reviewed in Epic    Medication Adherence/Access:   Patient reports all prescription (I.e. omeprazole, metformin, etc.) and supplements are taken in the morning when she wakes up. Patient admits not taking the complex B-vitamin C or biotin supplements at this time, but does not want it taken off her list.     Hypertension: Current medications include losartan 100mg daily and hydrochlorothiazide 25mg daily. Previously she was taking the combination tablet of losartan/hctz, but due to the shortage she was switched to 2 separate tablets for the medications. She believes that the fatigue increase occurred at this time. Patient does self-monitor BP. Home BP monitoring in range of 130-148's systolic.  Patient reports the following medication side effects: fatigue.     BP Readings from Last 3 Encounters:   12/11/19 126/70   11/18/19 138/85   11/13/19 139/80     Diabetes:  Pt currently taking metformin 1000mg (2 x 500mg). She has not had a higher doses of the metformin before. She denies side effects.  SMBG Ranges (patient reported- intermittently): typically 145-168, patient reports one reading in the 300s (after cappuccino)  Symptoms of low blood sugar? none.   Symptoms of high blood sugar?  "none  Eye exam: due on 4/10/20, patient aware  Foot exam: up to date  Diet/Exercise: 16 ounces of water, half-banana, cheerios, blueberries (noon), supper (5-6pm)  Aspirin: Taking 81mg daily and denies side effects, ~2005  Statin: Yes: pravastatin 10mg tablet and denies side effects  ACEi/ARB: Yes: losartan. Urine albumin is   Lab Results   Component Value Date    UMALCR 15.98 03/13/2019       Lab Results   Component Value Date    A1C 7.3 03/11/2020    A1C 7.2 10/15/2019    A1C 7.4 06/12/2019    A1C 7.5 03/12/2019    A1C 7.2 10/04/2018      Hyperlipidemia: Current therapy includes pravastatin 10mg once daily. She denies side effects. Karen does endorse shoulder pain when asked about muscle aches, but she believes it is after dreams where she is \"working\" and not along her long bones.   The 10-year ASCVD risk score (Kristalklaudia LIN Jr., et al., 2013) is: 42.6%    Values used to calculate the score:      Age: 75 years      Sex: Female      Is Non- : No      Diabetic: Yes      Tobacco smoker: No      Systolic Blood Pressure: 126 mmHg      Is BP treated: Yes      HDL Cholesterol: 55 mg/dL      Total Cholesterol: 151 mg/dL    Lab Test 10/15/19  0844 03/12/19  1259 10/28/14  1026 04/22/13  0907   CHOL 151 163 175 187   HDL 55 58 53 48*   LDL 77 86 94 118   TRIG 96 96 138 103   CHOLHDLRATIO  --   --  3.3 3.9     Fibromyalgia: Current medications include duloxetine 20mg daily, gabapentin 100mg daily (around noon; this is when patient gets up). Patient denies side effects.     GERD: Current medications include: Prilosec (omeprazole) 40mg once daily. Pt c/o heartburn.  Patient feels that current regimen is effective. Patient eats breakfast at noon, 5pm, and then will snack on oranges, salsa with corn chips until she goes to bed around 1am. When patient lays down that's when her symptoms come. Patient does raise her head when sleeping. Food trigger include: spaghetti sauce, acidic foods    ADD: Current " medications include methylphenidate ER 20mg every morning. Patient feels like she able to focus better. Dr. Hunter has been prescribing this for patient.     Osteopenia: Current therapy includes: calcium/Vitamin D (dose unknown) and Vitamin D supplements: 2000 international unit(s) daily. Pt is not experiencing side effects.  Pt is getting the following sources of dietary calcium: milk, but did not address specifically  Last vitamin D level: last in 2013, due for repeat  DEXA History: 6/20/2019  Risk factors: post-menopausal  chronic PPI use    Supplements: Currently taking vitamin B complex-c 1 capsule daily, biotin 1000mcg once daily, coenzyme Q10 1 capsule daily, cranberry 300mg daily, flaxseed 1000mg, magnesium (unknown dose) daily,  alpha-D-galactosidase as needed. Patient is interested in stopping/changing supplements. Her sister passed away about 2 years ago from breast cancer; her sister took a multivitamin so she is hesitate to switch the individual supplements to one multivitamin tablet. She knows that there is an unlikely association, but she did bring up that concern.     Tinea pedis: Current medication includes clotrimazole 1% apply topically 2 times daily. Econazole was never started due to copay ($100).     Today's Vitals: Wt 226 lb 3.2 oz (102.6 kg)   BMI 39.13 kg/m  -See office visit from today, unable to get blood pressure on her (error on machine)    ASSESSMENT:                            Medication Adherence: fair    Hypertension: Stable. Was unable to review blood pressure at this visit. Unlikely that  out the losartain and hydrochlorothiazide is causing increased fatigue.    Diabetes: Needs improvement. Patient is not meeting A1c goal of <7%. She would benefit from an increased dose of metformin. Patient would benefit from updated Vitamin B12 level due to metformin use and fatigue.    Hyperlipidemia: Stable.     Fibromyalgia: Stable.    GERD: Stable. In the future discuss alternative  timing of omeprazole to ensure adequate absorption of calcium supplement.     ADD: Stable.     Osteopenia: Needs improvement. Last vitamin D level was in 2013. Patient would benefit from an updated level to determine how much supplemented vitamin D is required and if it is contributing to her increased fatigue.     Supplements: Needs improvement. Patient is taking numerous supplements that are available in a multivitamin. Educated patient that many supplements do not have efficacy or safety information available. To decrease the pill burden and extra cost of these vitamins, the patient would benefit from replacing some of the supplements with a multivitamin. Will assess further at follow-up when patient brings in all of her supplements.    Tinea pedis: Stable. Patient would benefit from trying the clotrimazole before paying for the econazole.     PLAN:                            1. Increase metformin to 3 tablets daily    2. Bring all of your supplements and medications with you to your visit on March 31st    3. Add on Vitamin D and B12 to labs today.    I spent 60 minutes with this patient today. All changes were made via collaborative practice agreement with Lakia Hunter. A copy of the visit note was provided to the patient's primary care provider.    Will follow up in 3 weeks.    The patient was given a summary of these recommendations as an after visit summary.     Rand Doan, Pharmacy IV Student  Maritza Woodall, Pharm.D, BCACP  Medication Therapy Management Pharmacist

## 2020-03-11 NOTE — PATIENT INSTRUCTIONS
Make appointment(s) for:   -- wellness visit in June with fasting labs.       Sleep Medicine at [facility] [phone].      Medication(s) prescribed today:    Orders Placed This Encounter   Medications     methylphenidate (METADATE ER) 20 MG CR tablet     Sig: Take 1 tablet (20 mg) by mouth every morning     Dispense:  30 tablet     Refill:  0

## 2020-03-12 LAB — DEPRECATED CALCIDIOL+CALCIFEROL SERPL-MC: 27 UG/L (ref 20–75)

## 2020-03-12 NOTE — TELEPHONE ENCOUNTER
LOV- 3/13 says to return in 3 months for a physical with non-fasting labs. She is scheduled in June. Prescription approved per refill protocol.    Renuka Bhatia RN        12-Mar-2020

## 2020-03-30 NOTE — PROGRESS NOTES
SUBJECTIVE/OBJECTIVE:                           Karen Donis is a 74 year old female called for a follow up visit for Medication Therapy Management.  She was referred to me from Lakia Hunter.    Chief Complaint: Medication Review. Last visit was on 3/11/2020. Wondering why losartan and stimulants shouldn't be taken together. Wondering if she should take her blood pressure medications at night.    Allergies/ADRs: Reviewed in Epic  Tobacco:  reports that she quit smoking about 37 years ago. She has never used smokeless tobacco.  Alcohol: not currently using  Caffeine: cappuccino 6 travel mugs per week, (sugar free) with splenda  PMH: Reviewed in Epic    Medication Adherence/Access:   Patient reports all prescription (I.e. omeprazole, metformin, etc.) and supplements are taken in the morning when she wakes up.     Hypertension: Current medications include losartan 100mg daily and hydrochlorothiazide 25mg daily. Previously she was taking the combination tablet of losartan/hctz, but due to the shortage she was switched to 2 separate tablets for the medications. She believes that the fatigue increase occurred at this time. Patient does self-monitor BP. Home BP monitoring in range of 130-148's systolic.  Patient reports the following medication side effects: fatigue.     BP Readings from Last 3 Encounters:   03/11/20 124/68   12/11/19 126/70   11/18/19 138/85     Diabetes:  Pt currently taking metformin XR 1000mg daily. She has not had a higher doses of the metformin before. Patient did not increase her metformin to 1500mg daily. She denies side effects.  SMBG Ranges (patient reported- intermittently): typically greater than 150's in the morning. patient reports one reading in the 300s (after cappuccino)  Symptoms of low blood sugar? none.   Symptoms of high blood sugar? none  Eye exam: due on 4/10/20, patient aware  Foot exam: up to date  Diet/Exercise: 16 ounces of water, half-banana, cheerios, blueberries (noon), supper  (5-6pm)  Aspirin: Taking 81mg daily and denies side effects, ~2005  Statin: Yes: pravastatin 10mg tablet and denies side effects  ACEi/ARB: Yes: losartan. Urine albumin is   Lab Results   Component Value Date    UMALCR 6.58 03/11/2020       Lab Results   Component Value Date    A1C 7.3 03/11/2020    A1C 7.2 10/15/2019    A1C 7.4 06/12/2019    A1C 7.5 03/12/2019    A1C 7.2 10/04/2018      Osteopenia: Current therapy includes: calcium/magnesium/zinc/Vitamin D (1000mg Calcium, 400mg magnesium, 15mg zinc, Vitamin D 600 units in 3 tablets) 3 tablets daily and Vitamin D supplements: 2000 international unit(s) daily. Pt is not experiencing side effects.   Pt is getting the following sources of dietary calcium: milk, but did not address specifically  Last vitamin D level: 27 3/11/2020. Patient reports prior to having this level drawn she was not taking her Vitamin D supplements consistently.   DEXA History: 6/20/2019  Risk factors: post-menopausal  chronic PPI use    Supplements: Currently taking lutein/zeaxanthin 1 tablet daily, Black cohosh 40mg daily.    Today's Vitals: There were no vitals taken for this visit.-phone visit    ASSESSMENT:                            Medication Adherence: fair    Diabetes: Needs improvement. Patient is not meeting A1c goal of <7%. She would benefit from an increased dose of metformin as previously recommended.    Hypertension: Stable; patient could take losartan at night and hydrochlorothiazide in the am. Discussed stimulants can increase blood pressure that is why she saw the warning on the prescription print outs.    Osteopenia: Needs improvement. Patient's Vitamin D level was<30. Patient would benefit from consistent use of Vitamin D.    Supplements: Stable.    PLAN:                            1. Increase metformin to 3 tablets daily    2. Take Vitamin D supplement consistently.    I spent 30 minutes with this patient today. All changes were made via collaborative practice agreement  with Lakia Hunter. A copy of the visit note was provided to the patient's primary care provider.    Will follow up in 2 weeks.    The patient was given a summary of these recommendations as an after visit summary via [employer].     Maritza Woodall, Pharm.D, [facility]  Medication Therapy Management Pharmacist

## 2020-03-31 ENCOUNTER — ALLIED HEALTH/NURSE VISIT (OUTPATIENT)
Dept: PHARMACY | Facility: CLINIC | Age: 75
End: 2020-03-31
Payer: COMMERCIAL

## 2020-03-31 DIAGNOSIS — E11.9 TYPE 2 DIABETES MELLITUS WITHOUT COMPLICATION, WITHOUT LONG-TERM CURRENT USE OF INSULIN (H): Primary | ICD-10-CM

## 2020-03-31 DIAGNOSIS — I10 ESSENTIAL HYPERTENSION WITH GOAL BLOOD PRESSURE LESS THAN 140/90: ICD-10-CM

## 2020-03-31 DIAGNOSIS — E78.5 HYPERLIPIDEMIA LDL GOAL <100: ICD-10-CM

## 2020-03-31 DIAGNOSIS — Z78.9 TAKES DIETARY SUPPLEMENTS: ICD-10-CM

## 2020-03-31 DIAGNOSIS — M85.80 OSTEOPENIA, UNSPECIFIED LOCATION: ICD-10-CM

## 2020-03-31 PROCEDURE — 99607 MTMS BY PHARM ADDL 15 MIN: CPT | Performed by: PHARMACIST

## 2020-03-31 PROCEDURE — 99606 MTMS BY PHARM EST 15 MIN: CPT | Performed by: PHARMACIST

## 2020-03-31 RX ORDER — PERPHENAZINE 2 MG
1 TABLET ORAL DAILY
COMMUNITY

## 2020-03-31 RX ORDER — MULTIVIT,IRON,MINERALS/LUTEIN
3 TABLET ORAL DAILY
COMMUNITY
End: 2021-08-06

## 2020-03-31 RX ORDER — GARLIC 180 MG
1 TABLET, DELAYED RELEASE (ENTERIC COATED) ORAL DAILY
COMMUNITY
End: 2020-09-29

## 2020-03-31 RX ORDER — PRAVASTATIN SODIUM 10 MG
10 TABLET ORAL DAILY
Qty: 90 TABLET | Refills: 1 | Status: SHIPPED | OUTPATIENT
Start: 2020-03-31 | End: 2020-08-22

## 2020-03-31 NOTE — PATIENT INSTRUCTIONS
Recommendations from today's MTM visit:                                                      1. Increase metformin to 3 tablets daily    2. Take Vitamin D supplement consistently.    It was great to speak with you today.  I value your experience and would be very thankful for your time with providing feedback on our clinic survey. You may receive a survey via email or text message in the next few days.     Next MTM visit: 2 weeks    To schedule another MTM appointment, please call the clinic directly or you may call the MTM scheduling line at [phone] or toll-free at [phone].     My Clinical Pharmacist's contact information:                                                      It was a pleasure talking with you today!  Please feel free to contact me with any questions or concerns you have.      Maritza Woodall, Pharm.D, [facility]  Medication Therapy Management Pharmacist

## 2020-03-31 NOTE — TELEPHONE ENCOUNTER
Pravastatin  Last Written Prescription Date:  3-26-19  Last Fill Quantity: 90,  # refills: 3   Last office visit: 3/11/2020 with prescribing provider:  martha   Future Office Visit:

## 2020-04-05 ENCOUNTER — TELEPHONE (OUTPATIENT)
Dept: PEDIATRICS | Facility: CLINIC | Age: 75
End: 2020-04-05

## 2020-04-05 DIAGNOSIS — E11.9 TYPE 2 DIABETES MELLITUS WITHOUT COMPLICATION, WITHOUT LONG-TERM CURRENT USE OF INSULIN (H): ICD-10-CM

## 2020-04-05 DIAGNOSIS — F98.8 ATTENTION DEFICIT DISORDER (ADD) WITHOUT HYPERACTIVITY: ICD-10-CM

## 2020-04-05 NOTE — TELEPHONE ENCOUNTER
Health Call Center    Phone Message    May a detailed message be left on voicemail: yes     Reason for Call: Medication Refill Request    Has the patient contacted the pharmacy for the refill? Yes   Name of medication being requested: metFORMIN (GLUCOPHAGE-XR) 500 MG 24 hr tablet [38570] (Order [id number])     Provider who prescribed the medication: Dr. Hunter  Pharmacy: [facility]  Date medication is needed: asap    Since Rx instructions were increased to taking 3 tablets a day from 2 tablets patient will be out of Rx by tomorrow. Please send the metFORMIN Rx to [id number] - [place], [place] - [address]       Patient also only has a 1 week left on her methylphenidate (METADATE ER) 20 MG CR tablet [[id number]] (Order [id number]) - patient is requesting a refill supply sent to her [facility] pharmacy -[id number] - [place], [place] - [address]     Please contract patient once request is complete    Action Taken: Message routed to:  Primary Care p [phone]    Travel Screening: Not Applicable

## 2020-04-06 RX ORDER — METFORMIN HCL 500 MG
1500 TABLET, EXTENDED RELEASE 24 HR ORAL
Qty: 270 TABLET | Refills: 0 | Status: SHIPPED | OUTPATIENT
Start: 2020-04-06 | End: 2020-04-28

## 2020-04-06 RX ORDER — METHYLPHENIDATE HYDROCHLORIDE EXTENDED RELEASE 20 MG/1
20 TABLET ORAL EVERY MORNING
Qty: 30 TABLET | Refills: 0 | Status: SHIPPED | OUTPATIENT
Start: 2020-04-10 | End: 2020-05-11

## 2020-04-14 ENCOUNTER — ALLIED HEALTH/NURSE VISIT (OUTPATIENT)
Dept: PHARMACY | Facility: CLINIC | Age: 75
End: 2020-04-14
Payer: COMMERCIAL

## 2020-04-14 ENCOUNTER — TELEPHONE (OUTPATIENT)
Dept: PHARMACY | Facility: CLINIC | Age: 75
End: 2020-04-14

## 2020-04-14 DIAGNOSIS — G62.9 NEUROPATHY: ICD-10-CM

## 2020-04-14 DIAGNOSIS — I10 ESSENTIAL HYPERTENSION WITH GOAL BLOOD PRESSURE LESS THAN 140/90: ICD-10-CM

## 2020-04-14 DIAGNOSIS — E11.9 TYPE 2 DIABETES MELLITUS WITHOUT COMPLICATION, WITHOUT LONG-TERM CURRENT USE OF INSULIN (H): Primary | ICD-10-CM

## 2020-04-14 DIAGNOSIS — M85.80 OSTEOPENIA, UNSPECIFIED LOCATION: ICD-10-CM

## 2020-04-14 DIAGNOSIS — G47.9 SLEEP DISORDER: ICD-10-CM

## 2020-04-14 PROCEDURE — 99607 MTMS BY PHARM ADDL 15 MIN: CPT | Performed by: PHARMACIST

## 2020-04-14 PROCEDURE — 99606 MTMS BY PHARM EST 15 MIN: CPT | Performed by: PHARMACIST

## 2020-04-14 NOTE — TELEPHONE ENCOUNTER
Returned patient's call. She was confused on what her gabapentin should look like. She had it confused with her duloxetine. It turns out patient has not been taking her gabapentin, Patient will fill her med box with gabapentin.    Maritza Woodall, Pharm.D, [facility]  Medication Therapy Management Pharmacist

## 2020-04-14 NOTE — PATIENT INSTRUCTIONS
Recommendations from today's MTM visit:                                                      1. Increase metformin to 4 tablets daily  2. Take hydrochlorothiazide in the am  3. Take gabapentin at night  4. Recommend scheduling sleep study    It was great to speak with you today.  I value your experience and would be very thankful for your time with providing feedback on our clinic survey. You may receive a survey via email or text message in the next few days.     Next MTM visit: 2 weeks    To schedule another MTM appointment, please call the clinic directly or you may call the MTM scheduling line at [phone] or toll-free at [phone].     My Clinical Pharmacist's contact information:                                                      It was a pleasure talking with you today!  Please feel free to contact me with any questions or concerns you have.      Maritza Woodall, Pharm.D, BCACP  Medication Therapy Management Pharmacist

## 2020-04-14 NOTE — PROGRESS NOTES
SUBJECTIVE/OBJECTIVE:                           Karen Donis is a 75 year old female called for a follow up visit for Medication Therapy Management.  She was referred to me from Lakia Hunter.    Chief Complaint: Medication Review. Last visit was on 3/31/2020. Metformin increase, Vitamin D    Allergies/ADRs: Reviewed in Epic  Tobacco:  reports that she quit smoking about 37 years ago. She has never used smokeless tobacco.  Alcohol: not currently using  Caffeine: cappuccino 6 travel mugs per week, (sugar free) with splenda  PMH: Reviewed in Epic    Medication Adherence/Access:   Patient reports all prescription (I.e. omeprazole, metformin, etc.) and supplements are taken in the morning when she wakes up.   Patient goes to bed between midnight-2:30 am then waking up 1030-12pm.    Hypertension: Current medications include losartan 100mg daily and hydrochlorothiazide 25mg daily. Patient has been taking hydrochlorothiazide at bedtime and feels this is interrupting her sleep. Patient was previously she was taking the combination tablet of losartan/hctz, but due to the shortage she was switched to 2 separate tablets for the medications. She believes that the fatigue increase occurred at this time. Patient does self-monitor BP. Home BP monitoring in range of 130-148's systolic.  Patient reports the following medication side effects: fatigue.     BP Readings from Last 3 Encounters:   03/11/20 124/68   12/11/19 126/70   11/18/19 138/85     Diabetes:  Pt currently taking metformin XR 1500mg daily.   She denies side effects.  SMBG Ranges (patient reported- intermittently): typically greater than 144 mg/dL this in the morning. patient reports one reading in the 300s (after cappuccino)  Symptoms of low blood sugar? none.   Symptoms of high blood sugar? none  Eye exam: due on 4/10/20, patient aware  Foot exam: up to date  Diet/Exercise: 16 ounces of water, half-banana, cheerios, blueberries (noon), supper (5-6pm)  Aspirin: Taking  81mg daily and denies side effects, ~2005  Statin: Yes: pravastatin 10mg tablet and denies side effects  ACEi/ARB: Yes: losartan. Urine albumin is   Lab Results   Component Value Date    UMALCR 6.58 03/11/2020       Lab Results   Component Value Date    A1C 7.3 03/11/2020    A1C 7.2 10/15/2019    A1C 7.4 06/12/2019    A1C 7.5 03/12/2019    A1C 7.2 10/04/2018      Osteopenia: Current therapy includes: calcium/magnesium/zinc/Vitamin D (1000mg Calcium, 400mg magnesium, 15mg zinc, Vitamin D 600 units in 3 tablets) 3 tablets daily and Vitamin D supplements: 2000 international unit(s) daily. Pt is not experiencing side effects.   Pt is getting the following sources of dietary calcium: milk, but did not address specifically  Last vitamin D level: 27 3/11/2020. Patient has been taking her Vitamin D more regularly.  DEXA History: 6/20/2019  Risk factors: post-menopausal  chronic PPI use    Sleep disturbance: patient will go to bed but it will take her 2 hours to fall asleep. It was recommended patient have a sleep study and patient has not yet done this.     Neuropathy: current therapy includes gabapentin 100mg daily in the morning.. Patient reports her feet are really bothering her in the am.     Today's Vitals: There were no vitals taken for this visit.-phone visit    ASSESSMENT:                            Medication Adherence: fair    Diabetes: Needs improvement. Patient is not meeting A1c goal of <7%. She would benefit from an increased dose of metformin.    Hypertension: Needs improvement. Patient could try taking hydrochlorothiazide in the am.    Osteopenia: Stable.    Sleep disturbance: Needs improvement. Patient would benefit from scheduling sleep study.    Neuropathy: Needs improvement. Patient may benefit from taking gabapentin at bedtime to see if this helps her feet upon awakening This may also help patient with sleep.    PLAN:                            1. Increase metformin to 4 tablets daily  2. Take  hydrochlorothiazide in the am  3. Take gabapentin at night  4. Recommend scheduling sleep study    I spent 30 minutes with this patient today. All changes were made via collaborative practice agreement with Lakia Hunter. A copy of the visit note was provided to the patient's primary care provider.    Will follow up in 2 weeks.    The patient was given a summary of these recommendations as an after visit summary via [employer].     Maritza Woodall, Pharm.D, BCACP  Medication Therapy Management Pharmacist

## 2020-04-14 NOTE — TELEPHONE ENCOUNTER
[facility] Call Center    Phone Message    May a detailed message be left on voicemail: yes     Reason for Call: Other: patient would like to speak to provider regarding medication gabapentin (NEURONTIN) 100 MG capsule [[id number]] (Order [id number]. please advise     Action Taken: Maritza Woodall [facility]

## 2020-04-28 ENCOUNTER — ALLIED HEALTH/NURSE VISIT (OUTPATIENT)
Dept: PHARMACY | Facility: CLINIC | Age: 75
End: 2020-04-28
Payer: COMMERCIAL

## 2020-04-28 DIAGNOSIS — I10 ESSENTIAL HYPERTENSION WITH GOAL BLOOD PRESSURE LESS THAN 140/90: Primary | ICD-10-CM

## 2020-04-28 DIAGNOSIS — G62.9 NEUROPATHY: ICD-10-CM

## 2020-04-28 DIAGNOSIS — E11.9 TYPE 2 DIABETES MELLITUS WITHOUT COMPLICATION, WITHOUT LONG-TERM CURRENT USE OF INSULIN (H): ICD-10-CM

## 2020-04-28 DIAGNOSIS — G47.9 SLEEP DISORDER: ICD-10-CM

## 2020-04-28 PROCEDURE — 99607 MTMS BY PHARM ADDL 15 MIN: CPT | Performed by: PHARMACIST

## 2020-04-28 PROCEDURE — 99606 MTMS BY PHARM EST 15 MIN: CPT | Performed by: PHARMACIST

## 2020-04-28 RX ORDER — METFORMIN HCL 500 MG
2000 TABLET, EXTENDED RELEASE 24 HR ORAL
Qty: 270 TABLET | Refills: 0
Start: 2020-04-28 | End: 2020-05-26

## 2020-04-28 NOTE — PROGRESS NOTES
MTM ENCOUNTER  SUBJECTIVE/OBJECTIVE:                           Karen Donis is a 75 year old female called for a follow-up visit. She was referred to me from Lakia Hunter.  Today's visit is a follow-up MTM visit from 4/14/2020.     Patient consented to a telehealth visit: yes  Telemedicine Visit Details  Type of service:  Telephone visit  Start Time: 3:02 PM  End Time: 3:26 PM  Originating Location (pt. Location): Home  Distant Location (provider location):  [facility] [place]  Mode of Communication:  Telephone    Allergies/ADRs: Reviewed in Epic  Tobacco:  reports that she quit smoking about 37 years ago. She has never used smokeless tobacco.  Alcohol: not currently using  Caffeine: cappuccino 6 travel mugs per week, (sugar free) with splenda  PMH: Reviewed in Epic    Medication Adherence/Access:   Patient reports all prescription (I.e. omeprazole, metformin, etc.) and supplements are taken in the morning when she wakes up.   Patient goes to bed between midnight-2:30 am then waking up 1030-12pm.    Hypertension: Current medications include losartan 100mg daily and hydrochlorothiazide 25mg daily. Patient started taking her hydrochlorothiazide in the am after our last visit.  Patient was previously she was taking the combination tablet of losartan/hctz, but due to the shortage she was switched to 2 separate tablets for the medications. She believes that the fatigue increase occurred at this time. Patient does self-monitor BP. Home BP monitoring in range of 130-148's systolic.  Patient reports the following medication side effects: fatigue.     BP Readings from Last 3 Encounters:   03/11/20 124/68   12/11/19 126/70   11/18/19 138/85     Diabetes:  Pt currently taking metformin XR 2000mg daily.   She denies side effects.  SMBG Ranges (patient reported- intermittently): typically greater than 138 mg/dL this morning patient reports one reading in the 300s (after cappuccino)  Symptoms of low blood sugar? none.    Symptoms of high blood sugar? none  Eye exam: due on 4/10/20, patient aware  Foot exam: up to date  Diet/Exercise: 16 ounces of water, half-banana, cheerios, blueberries (noon), supper (5-6pm)  Aspirin: Taking 81mg daily and denies side effects, ~2005  Statin: Yes: pravastatin 10mg tablet and denies side effects  ACEi/ARB: Yes: losartan. Urine albumin is   Lab Results   Component Value Date    UMALCR 6.58 03/11/2020       Lab Results   Component Value Date    A1C 7.3 03/11/2020    A1C 7.2 10/15/2019    A1C 7.4 06/12/2019    A1C 7.5 03/12/2019    A1C 7.2 10/04/2018      Sleep disturbance: patient will go to bed but it will take her 2 hours to fall asleep. It was recommended patient have a sleep study and patient has not yet done this.     Neuropathy: current therapy includes gabapentin 100mg daily at night. Patient reports her feet are better with taking it at nighttime.     Today's Vitals: There were no vitals taken for this visit.-phone visit    ASSESSMENT:                            Medication Adherence: fair    Diabetes: Stable.    Hypertension: Stable.    Sleep disturbance: Needs improvement. Patient would benefit from scheduling sleep study.    Neuropathy: Stable    PLAN:                            No medication changes recommended today.    I spent 30 minutes with this patient today. All changes were made via collaborative practice agreement with Lakia Hunter. A copy of the visit note was provided to the patient's primary care provider.    Will follow up in 4 weeks.    The patient was given a summary of these recommendations as an after visit summary via [employer].     Maritza Woodall, Pharm.D, BCACP  Medication Therapy Management Pharmacist

## 2020-04-28 NOTE — PATIENT INSTRUCTIONS
Recommendations from today's MTM visit:                                                      No medication changes recommended today.    It was great to speak with you today.  I value your experience and would be very thankful for your time with providing feedback on our clinic survey. You may receive a survey via email or text message in the next few days.     Next MTM visit: 1 month    To schedule another MTM appointment, please call the clinic directly or you may call the MTM scheduling line at [phone] or toll-free at [phone].     My Clinical Pharmacist's contact information:                                                      It was a pleasure talking with you today!  Please feel free to contact me with any questions or concerns you have.      Maritza Woodall, Pharm.D, BCACP  Medication Therapy Management Pharmacist

## 2020-05-11 DIAGNOSIS — F98.8 ATTENTION DEFICIT DISORDER (ADD) WITHOUT HYPERACTIVITY: ICD-10-CM

## 2020-05-11 NOTE — TELEPHONE ENCOUNTER
[facility] Call Center    Phone Message    May a detailed message be left on voicemail: yes     Reason for Call: Medication Refill Request    Has the patient contacted the pharmacy for the refill? Yes   Name of medication being requested:methylphenidate (METADATE ER) 20 MG CR tablet [[id number]] (Order [id number]     Provider who prescribed the medication: Dr. Hunter  Pharmacy: [facility] [address] [place] [address] [address]   Date medication is needed: 2 days         Action Taken: Message routed to:  Primary Care p [phone]

## 2020-05-11 NOTE — TELEPHONE ENCOUNTER
methylphenidate (METADATE ER) 20 MG CR tablet       Last Written Prescription Date:  4-  Last Fill Quantity: 30,   # refills: 0  Last Office Visit : 3-  Future Office visit:  none    Routing refill request to provider for review/approval because:  Controlled medication.        Kathleen M Doege RN

## 2020-05-12 RX ORDER — METHYLPHENIDATE HYDROCHLORIDE EXTENDED RELEASE 20 MG/1
20 TABLET ORAL EVERY MORNING
Qty: 30 TABLET | Refills: 0 | Status: SHIPPED | OUTPATIENT
Start: 2020-05-12 | End: 2020-06-16

## 2020-05-22 NOTE — PROGRESS NOTES
MTM ENCOUNTER  SUBJECTIVE/OBJECTIVE:                           Karen Donis is a 75 year old female called for a follow-up visit. She was referred to me from Lakia Hunter.  Today's visit is a follow-up MTM visit from 4/28/2020.     Patient consented to a telehealth visit: yes  Telemedicine Visit Details  Type of service:  Telephone visit  Start Time: 3:34 PM  End Time: 3:26 PM  Originating Location (pt. Location): Home  Distant Location (provider location):  [facility] MTM  Mode of Communication:  Telephone    Allergies/ADRs: Reviewed in Epic  Tobacco:  reports that she quit smoking about 37 years ago. She has never used smokeless tobacco.  Alcohol: not currently using  Caffeine: cappuccino 6 travel mugs per week, (sugar free) with splenda  PMH: Reviewed in Epic    Medication Adherence/Access:   Patient reports all prescription (I.e. omeprazole, metformin, etc.) and supplements are taken in the morning when she wakes up.   Patient goes to bed between midnight-2:30 am then waking up 1030-12pm.    Diabetes:  Pt currently taking metformin XR 2000mg daily. Patient needs a refill.   She denies side effects.  SMBG Ranges (patient reported- intermittently): today her blood sugar was 112mg/dL. Patient has not seen any readings over 200mg/dL.   Symptoms of low blood sugar? none.   Symptoms of high blood sugar? None  Eye exam: due on 4/10/20, patient aware  Foot exam: up to date  Diet/Exercise: 16 ounces of water, half-banana, cheerios, blueberries (noon), supper (5-6pm)  Aspirin: Taking 81mg daily and denies side effects, ~2005  Statin: Yes: pravastatin 10mg tablet and denies side effects  ACEi/ARB: Yes: losartan. Urine albumin is   Lab Results   Component Value Date    UMALCR 6.58 03/11/2020       Lab Results   Component Value Date    A1C 7.3 03/11/2020    A1C 7.2 10/15/2019    A1C 7.4 06/12/2019    A1C 7.5 03/12/2019    A1C 7.2 10/04/2018      Sleep disturbance: patient will go to bed but it will take her 2  hours to fall asleep. Patient reports that some nights she does not fall asleep until 3-5am. It was recommended patient have a sleep study and patient has not been to able to schedule her sleep study yet. She is wondering if methylphenidate could be contributing.     Neuropathy: current therapy includes gabapentin 100mg daily at night. Patient reports the symptoms come and go.     ADD: current therapy includes methylphenidate CR 20mg daily. Patient was filling this as [facility] and was having a lot sleepiness. Patient then got her prescription filled at [facility] and she didn't not feel as sleepy. She then switched back to [facility] and she is feeling sleepy again. Patient did notice that they were different manufacturers. Patient is taking methylphenidate 12:30-2pm and feels like this may be keeping her awake.  Patient did wake up earlier today so took it about 10:30am.     Today's Vitals: There were no vitals taken for this visit.-phone visit    ASSESSMENT:                            Medication Adherence: fair    Diabetes: Stable. Refill sent to [facility] for metformin.     Sleep disturbance: Needs improvement. Patient would benefit from scheduling sleep study. Metadate ER has a duration of action of about 8 hours. Patient will see if she sleeps any better tonight with having taken the methylphenidate at 10:30am.    ADD: Needs improvement. Patient may benefit from filling methylphenidate at [facility] next month to see if a different  makes a difference with her sleepiness.    Neuropathy: Stable    PLAN:                            No medication changes recommended today.  Refill for metformin sent to [facility].  Call to see if you are able to schedule your sleep study  Fill methylphenidate at [facility] when due for your next fill.    I spent 30 minutes with this patient today. All changes were made via collaborative practice agreement with Lakia Hunter. A copy of the visit note was provided to the patient's primary  care provider.    Will follow up in 4 weeks.    The patient was given a summary of these recommendations as an after visit summary via [employer].     Maritza Woodall, Pharm.D, BCACP  Medication Therapy Management Pharmacist

## 2020-05-26 ENCOUNTER — ALLIED HEALTH/NURSE VISIT (OUTPATIENT)
Dept: PHARMACY | Facility: CLINIC | Age: 75
End: 2020-05-26
Payer: COMMERCIAL

## 2020-05-26 DIAGNOSIS — G62.9 NEUROPATHY: Primary | ICD-10-CM

## 2020-05-26 DIAGNOSIS — F98.8 ATTENTION DEFICIT DISORDER (ADD) WITHOUT HYPERACTIVITY: ICD-10-CM

## 2020-05-26 DIAGNOSIS — E11.9 TYPE 2 DIABETES MELLITUS WITHOUT COMPLICATION, WITHOUT LONG-TERM CURRENT USE OF INSULIN (H): ICD-10-CM

## 2020-05-26 DIAGNOSIS — G47.9 SLEEP DISORDER: ICD-10-CM

## 2020-05-26 PROCEDURE — 99607 MTMS BY PHARM ADDL 15 MIN: CPT | Performed by: PHARMACIST

## 2020-05-26 PROCEDURE — 99606 MTMS BY PHARM EST 15 MIN: CPT | Performed by: PHARMACIST

## 2020-05-26 RX ORDER — METFORMIN HCL 500 MG
2000 TABLET, EXTENDED RELEASE 24 HR ORAL
Qty: 360 TABLET | Refills: 1 | Status: SHIPPED | OUTPATIENT
Start: 2020-05-26 | End: 2020-12-02

## 2020-05-26 NOTE — PATIENT INSTRUCTIONS
Recommendations from today's MTM visit:                                                      No medication changes recommended today.  Refill for metformin sent to [facility].  Call to see if you are able to schedule your sleep study  Fill methylphenidate at [facility] when due for your next fill.    It was great to speak with you today.  I value your experience and would be very thankful for your time with providing feedback on our clinic survey. You may receive a survey via email or text message in the next few days.     Next MTM visit: 1 month    To schedule another MTM appointment, please call the clinic directly or you may call the MTM scheduling line at [phone] or toll-free at [phone].     My Clinical Pharmacist's contact information:                                                      It was a pleasure talking with you today!  Please feel free to contact me with any questions or concerns you have.      Maritza Woodall, Pharm.D, [facility]  Medication Therapy Management Pharmacist

## 2020-06-04 DIAGNOSIS — K21.9 GASTROESOPHAGEAL REFLUX DISEASE WITHOUT ESOPHAGITIS: ICD-10-CM

## 2020-06-08 RX ORDER — OMEPRAZOLE 40 MG/1
40 CAPSULE, DELAYED RELEASE ORAL DAILY
Qty: 90 CAPSULE | Refills: 2 | Status: SHIPPED | OUTPATIENT
Start: 2020-06-08 | End: 2021-03-08

## 2020-06-10 VITALS — WEIGHT: 224 LBS | BODY MASS INDEX: 39.69 KG/M2 | HEIGHT: 63 IN

## 2020-06-10 ASSESSMENT — MIFFLIN-ST. JEOR: SCORE: 1466.22

## 2020-06-10 NOTE — PATIENT INSTRUCTIONS
Your BMI is Body mass index is 40 kg/m .  Weight management is a personal decision.  If you are interested in exploring weight loss strategies, the following discussion covers the approaches that may be successful. Body mass index (BMI) is one way to tell whether you are at a healthy weight, overweight, or obese. It measures your weight in relation to your height.  A BMI of 18.5 to 24.9 is in the healthy range. A person with a BMI of 25 to 29.9 is considered overweight, and someone with a BMI of 30 or greater is considered obese. More than two-thirds of American adults are considered overweight or obese.  Being overweight or obese increases the risk for further weight gain. Excess weight may lead to heart disease and diabetes.  Creating and following plans for healthy eating and physical activity may help you improve your health.  Weight control is part of healthy lifestyle and includes exercise, emotional health, and healthy eating habits. Careful eating habits lifelong are the mainstay of weight control. Though there are significant health benefits from weight loss, long-term weight loss with diet alone may be very difficult to achieve- studies show long-term success with dietary management in less than 10% of people. Attaining a healthy weight may be especially difficult to achieve in those with severe obesity. In some cases, medications, devices and surgical management might be considered.  What can you do?  If you are overweight or obese and are interested in methods for weight loss, you should discuss this with your provider.     Consider reducing daily calorie intake by 500 calories.     Keep a food journal.     Avoiding skipping meals, consider cutting portions instead.    Diet combined with exercise helps maintain muscle while optimizing fat loss. Strength training is particularly important for building and maintaining muscle mass. Exercise helps reduce stress, increase energy, and improves fitness.  Increasing exercise without diet control, however, may not burn enough calories to loose weight.       Start walking three days a week 10-20 minutes at a time    Work towards walking thirty minutes five days a week     Eventually, increase the speed of your walking for 1-2 minutes at time    In addition, we recommend that you review healthy lifestyles and methods for weight loss available through the National Institutes of Health patient information sites:  http://win.niddk.nih.gov/publications/index.htm    And look into health and wellness programs that may be available through your health insurance provider, employer, local community center, or romain club.    Weight management plan: Patient was referred to their PCP to discuss a diet and exercise plan.

## 2020-06-10 NOTE — PROGRESS NOTES
"Karen Donis is a 75 year old female who is being evaluated via a billable telephone visit.      The patient has been notified of following:     \"This telephone visit will be conducted via a call between you and your physician/provider. We have found that certain health care needs can be provided without the need for a physical exam.  This service lets us provide the care you need with a short phone conversation.  If a prescription is necessary we can send it directly to your pharmacy.  If lab work is needed we can place an order for that and you can then stop by our lab to have the test done at a later time.    Telephone visits are billed at different rates depending on your insurance coverage. During this emergency period, for some insurers they may be billed the same as an in-person visit.  Please reach out to your insurance provider with any questions.    If during the course of the call the physician/provider feels a telephone visit is not appropriate, you will not be charged for this service.\"    Patient has given verbal consent for Telephone visit?  Yes    What phone number would you like to be contacted at? [phone]    How would you like to obtain your AVS? Mail a copy     Ann Marie Martinez MA on 6/10/2020 at 3:20 PM      Phone call duration: 26 minutes      Sleep Consultation:    Date on this visit: 6/11/2020      Primary Physician: Lakia Hunter     Chief Complaint   Patient presents with     Sleep Problem     Was put on cpap early 2000's wants to discuss re-starting.        Karen Donis is a 75 year old female with medical history remarkable for hypertension, DM type 2, ADD, hyperlipidemia, depression, anxiety, obesity and SO. Karen is sent by Lakia Hunter for a sleep consultation regarding fatigue possibly related to untreated obstructive sleep apnea    Sleep study interpretation found in Epic:  In 2006, she had an all-night diagnostic study performed to evaluate for sleep disordered breathing.  The " study was remarkable for a total sleep time of 416.5 minutes with a delayed sleep latency of 28 minutes and near normal REM onset at 82 minutes.  The sleep efficiency was fair at 86%.  All the studies of sleep were observed.  The sleep architecture was  disrupted from arousals, apneas, hypopneas, and desaturations.  An apnea hypopnea index of 17 with REM RDI of 33.8 and lowest  oxygen saturation of 85% indicative of moderate obstructive sleep apnea was observed.    She was prescribed CPAP back in 2006. She stopped in 2011 after having thyroid surgery.     She thinks she weighed 217 lbs at the time of testing. Current weight 224 lbs.      Karen goes to sleep at 3:00 AM during every night.  She wakes up at 11:00 AM without an alarm. She falls asleep in 5 minutes.  Karen denies difficulty falling asleep.  She wakes up 1-2 times a night for 5 minutes before falling back to sleep. Karen wakes up to go to the bathroom.  Patient gets an average of 8 hours of sleep per night. She does not feel refreshed. She goes back to bed at 1 PM and naps until 3 PM daily.     Patient does not watch TV in bed.     Karen does not do shift work.      Karen does snore every night and snoring is loud. Patient does have a regular bed partner. There is report of gasping.  She does have witnessed apneas. They never sleep separately.  Patient sleeps on her back and side. She has frequent morning dry mouth. Karen denies any bruxism, sleep walking, sleep talking, dream enactment, sleep paralysis, cataplexy and hypnogogic/hypnopompic hallucinations.    Karen denies difficulty breathing through her nose, claustrophobia and reflux at night.      Patient describes themself as neither a morning or night person.  She would prefer to go to sleep at 11:00 PM and wake up at 7:00 AM.  Patient's [place] Sleepiness score 11/24 consistent with mild daytime sleepiness.      She takes no inadvertant naps.  She denies falling asleep while driving. Patient  was counseled on the importance of driving while alert, to pull over if drowsy, or nap before getting into the vehicle if sleepy.  She uses 2 cups/day of coffee. Last caffeine intake is usually before noon.    Allergies:    Allergies   Allergen Reactions     Bactrim Other (See Comments)     Burning when voiding     Belladonna Alkaloids-Opium [B & O] Blisters     Bellagril     Definity Other (See Comments)     Pain in the tailbone     Duloxetine      Profuse sweating     Ergotamine Other (See Comments)     Patient unsure of reaction     Gabapentin      leg creams, foot pain, hip pain, poor concentration, lightheadedness     Lisinopril Cough            Phenobarbital Other (See Comments)     Patient unsure of reaction     Sulfa Drugs Blisters     Tape [Adhesive Tape] Other (See Comments)     Burning feeling of the skin       Medications:    Current Outpatient Medications   Medication Sig Dispense Refill     Alpha-D-Galactosidase (BEANO PO) Take by mouth as needed Reported on 5/3/2017       ASPIRIN 81 MG OR TABS Take 81 mg by mouth daily        Black Cohosh 40 MG CAPS Take 1 capsule by mouth daily       blood glucose (ACCU-CHEK FASTCLIX) lancing device Device to be used with lancets. 1 each 0     blood glucose (NO BRAND SPECIFIED) test strip Use to test blood sugars daily or as directed. Accu-check smartview strips 100 each 3     cholecalciferol (VITAMIN D3) 1000 units (25 mcg) capsule Take 2 capsules (2,000 Units) by mouth daily (Patient taking differently: Take 2 capsules by mouth daily ) 180 capsule 3     clotrimazole (LOTRIMIN) 1 % external cream Apply topically 2 times daily 30 g 1     DULoxetine (CYMBALTA) 20 MG capsule Take 1 capsule (20 mg) by mouth daily 90 capsule 1     Flaxseed, Linseed, (FLAX SEED OIL) 1000 MG capsule Take 1 capsule by mouth daily       gabapentin (NEURONTIN) 100 MG capsule Sig 1 tab in am 90 capsule 3     hydrochlorothiazide (HYDRODIURIL) 25 MG tablet Take 1 tablet (25 mg) by mouth daily 90  tablet 3     losartan (COZAAR) 100 MG tablet Take 1 tablet (100 mg) by mouth daily 90 tablet 3     metFORMIN (GLUCOPHAGE-XR) 500 MG 24 hr tablet Take 4 tablets (2,000 mg) by mouth daily (with dinner) 360 tablet 1     methylphenidate (METADATE ER) 20 MG CR tablet Take 1 tablet (20 mg) by mouth every morning 30 tablet 0     metoprolol succinate ER (TOPROL-XL) 25 MG 24 hr tablet Take 1 tablet (25 mg) by mouth daily 91 tablet 3     Multiple Minerals-Vitamins (CALCIUM-MAGNESIUM-ZINC-D3) TABS Take 3 tablets by mouth daily       Multiple Vitamins-Minerals (LUTEIN-ZEAXANTHIN) TABS Take 1 tablet by mouth daily       omeprazole (PRILOSEC) 40 MG DR capsule Take 1 capsule (40 mg) by mouth daily Take 30-60 minutes before a meal. 90 capsule 2     pravastatin (PRAVACHOL) 10 MG tablet Take 1 tablet (10 mg) by mouth daily 90 tablet 1       Problem List:  Patient Active Problem List    Diagnosis Date Noted     BPPV (benign paroxysmal positional vertigo), right 09/20/2017     Priority: Medium     Type 2 diabetes mellitus (H) 06/14/2016     Priority: Medium     Dx 2007       Hyperlipidemia LDL goal <100 06/14/2016     Priority: Medium     Essential hypertension with goal blood pressure less than 140/90 06/14/2016     Priority: Medium     Fatty liver 06/14/2016     Priority: Medium     Major depression in complete remission (H) 06/14/2016     Priority: Medium     Chronic bilateral low back pain with bilateral sciatica 06/14/2016     Priority: Medium     Morbid obesity due to excess calories (H) 06/14/2016     Priority: Medium     Gastroesophageal reflux disease without esophagitis 10/20/2015     Priority: Medium     ACP (advance care planning) 08/19/2015     Priority: Medium     Advance Care Planning 8/19/2015: ACP Review and Resources Provided:  Reviewed chart for advance care plan.  aKren Donis has no plan or code status on file. Discussed available resources and provided with information. Confirmed/documented legally designated  decision maker(s). Added by Sasha Chilel RN Advance Care Planning Liaison with [facility]           Attention deficit disorder 06/19/2015     Priority: Medium     Patient is followed by TARA CELESTIN for ongoing prescription of stimulants.  All refills should be approved by this provider, or covering partner.    Medication(s): methylphenidate CP 24, 20 mg  Maximum quantity per month: 30  Clinic visit frequency required: Q 6  months     Controlled substance agreement on file: No  Neuropsych evaluation for ADD completed:  Yes, completed back in 1980s, not on file    Last [facility] website verification:  done on 6/19/15   [email or web address]         Anxiety state 04/08/2015     Priority: Medium     Impingement syndrome of right shoulder 07/21/2014     Priority: Medium     AC (acromioclavicular) joint arthritis 07/21/2014     Priority: Medium     CARDIOVASCULAR SCREENING; LDL GOAL LESS THAN 160 07/02/2013     Priority: Medium     Colon cancer      Priority: Medium     Dx in 2003, s/p resection of 6 feet of colon, chemo. On q3 year colonoscopy schedule.        Nodules, thyroid 08/04/2011     Priority: Medium     Problem list name updated by automated process. Provider to review and confirm       Postmenopausal 08/04/2011     Priority: Medium     Vitamin D deficiency 08/01/2011     Priority: Medium     Xerosis cutis 11/25/2008     Priority: Medium     Seborrheic dermatitis 06/03/2008     Priority: Medium     Alopecia 06/03/2008     Priority: Medium     Problem list name updated by automated process. Provider to review          Past Medical/Surgical History:  Past Medical History:   Diagnosis Date     Colon cancer (H)      Hypertension      Shortness of breath      Sleep apnea      Thyroid disease      Past Surgical History:   Procedure Laterality Date     COLON SURGERY  2003     THYROIDECTOMY  12/6/2011    Procedure:THYROIDECTOMY; Right Thyroid Lobectomy and bilateral removal of skin tags; Surgeon:MARBIN  SAJI GOMES; Location:[facility] OR     TONSILLECTOMY      While she was in 6th grade       Social History:  Social History     Socioeconomic History     Marital status:      Spouse name: Anand     Number of children: 2     Years of education: 12     Highest education level: Not on file   Occupational History     Occupation: Assembly/factory     Employer: [employer]     Comment:    Social Needs     Financial resource strain: Not on file     Food insecurity     Worry: Not on file     Inability: Not on file     Transportation needs     Medical: Not on file     Non-medical: Not on file   Tobacco Use     Smoking status: Former Smoker     Last attempt to quit: 1982     Years since quittin.5     Smokeless tobacco: Never Used   Substance and Sexual Activity     Alcohol use: No     Drug use: No     Sexual activity: Yes     Partners: Male   Lifestyle     Physical activity     Days per week: Not on file     Minutes per session: Not on file     Stress: Not on file   Relationships     Social connections     Talks on phone: Not on file     Gets together: Not on file     Attends Orthodox service: Not on file     Active member of club or organization: Not on file     Attends meetings of clubs or organizations: Not on file     Relationship status: Not on file     Intimate partner violence     Fear of current or ex partner: Not on file     Emotionally abused: Not on file     Physically abused: Not on file     Forced sexual activity: Not on file   Other Topics Concern     Parent/sibling w/ CABG, MI or angioplasty before 65F 55M? Not Asked   Social History Narrative     Not on file       Family History:  Family History   Problem Relation Age of Onset     Diabetes Mother      Hypertension Mother      Thyroid Disease Mother      Heart Disease Father      Cardiovascular Father      Neurologic Disorder Father         Parkinson's     Arthritis Father         Fibromyalgia     Gastrointestinal Disease Maternal Grandmother          "gallbladder removed     Heart Disease Maternal Grandfather      Diabetes Brother      Hypertension Brother      Neurologic Disorder Brother         ADHD     Unknown/Adopted Son      Unknown/Adopted Daughter      Neurologic Disorder Brother         ADHD       Review of Systems:  A complete review of systems reviewed by me is negative with the exeption of what has been mentioned in the history of present illness.  CONSTITUTIONAL: NEGATIVE for weight gain/loss, fever, chills, sweats or night sweats, drug allergies.  EYES: NEGATIVE for changes in vision, blind spots, double vision.  ENT: NEGATIVE for ear pain, sore throat, sinus pain, post-nasal drip, runny nose, bloody nose  CARDIAC: NEGATIVE for fast heartbeats or fluttering in chest, chest pain or pressure, breathlessness when lying flat, swollen legs or swollen feet.  NEUROLOGIC: NEGATIVE headaches, weakness or numbness in the arms or legs.  DERMATOLOGIC: NEGATIVE for rashes, new moles or change in mole(s)  PULMONARY: NEGATIVE SOB at rest, SOB with activity, dry cough, productive cough, coughing up blood, wheezing or whistling when breathing.    GASTROINTESTINAL: NEGATIVE for nausea or vomitting, loose or watery stools, fat or grease in stools, constipation, abdominal pain, bowel movements black in color or blood noted.  GENITOURINARY: NEGATIVE for pain during urination, blood in urine, urinating more frequently than usual, irregular menstrual periods.  MUSCULOSKELETAL: NEGATIVE for muscle pain, bone or joint pain, swollen joints.  ENDOCRINE: NEGATIVE for increased thirst or urination, diabetes.  LYMPHATIC: NEGATIVE for swollen lymph nodes, lumps or bumps in the breasts or nipple discharge.    Physical Examination:  Vitals: Ht 1.594 m (5' 2.75\")   Wt 101.6 kg (224 lb)   BMI 40.00 kg/m    BMI= Body mass index is 40 kg/m .         [place] Total Score 6/10/2020   Total score - [place] 11       DAISY Total Score: 17 (06/10/20 1500)      Impression/Plan:  1. Moderate " obstructive sleep apnea, currently untreated-  She presents with daytime sleepiness, snoring and witnessed apnea.  We reviweed options. She is on Medicare. Repeat sleep study is needed since she stopped CPAP treatment in 2011.  Plan: HST-Home Sleep Apnea Test. CPAP via non [facility] access hospital.    Reviewed sleep hygiene.     Home Sleep Apnea Testing reviewed.  Obstructive sleep apnea reviewed.  Complications of untreated sleep apnea were reviewed.    Edi Reyes PA-C    CC: Lakia Hunter

## 2020-06-11 ENCOUNTER — VIRTUAL VISIT (OUTPATIENT)
Dept: SLEEP MEDICINE | Facility: CLINIC | Age: 75
End: 2020-06-11
Attending: INTERNAL MEDICINE
Payer: COMMERCIAL

## 2020-06-11 DIAGNOSIS — I10 ESSENTIAL HYPERTENSION: ICD-10-CM

## 2020-06-11 DIAGNOSIS — R06.00 DYSPNEA AND RESPIRATORY ABNORMALITY: ICD-10-CM

## 2020-06-11 DIAGNOSIS — G47.30 SLEEP APNEA, UNSPECIFIED TYPE: ICD-10-CM

## 2020-06-11 DIAGNOSIS — R06.89 DYSPNEA AND RESPIRATORY ABNORMALITY: ICD-10-CM

## 2020-06-11 DIAGNOSIS — Z72.820 LACK OF ADEQUATE SLEEP: ICD-10-CM

## 2020-06-11 DIAGNOSIS — R53.83 MALAISE AND FATIGUE: ICD-10-CM

## 2020-06-11 DIAGNOSIS — G47.33 OSA (OBSTRUCTIVE SLEEP APNEA): Primary | ICD-10-CM

## 2020-06-11 DIAGNOSIS — R53.81 MALAISE AND FATIGUE: ICD-10-CM

## 2020-06-11 PROCEDURE — 99204 OFFICE O/P NEW MOD 45 MIN: CPT | Mod: TEL | Performed by: PHYSICIAN ASSISTANT

## 2020-06-16 ENCOUNTER — TELEPHONE (OUTPATIENT)
Dept: SLEEP MEDICINE | Facility: CLINIC | Age: 75
End: 2020-06-16

## 2020-06-16 DIAGNOSIS — F98.8 ATTENTION DEFICIT DISORDER (ADD) WITHOUT HYPERACTIVITY: ICD-10-CM

## 2020-06-16 RX ORDER — METHYLPHENIDATE HYDROCHLORIDE EXTENDED RELEASE 20 MG/1
20 TABLET ORAL EVERY MORNING
Qty: 30 TABLET | Refills: 0 | Status: SHIPPED | OUTPATIENT
Start: 2020-06-16 | End: 2020-07-17

## 2020-06-16 NOTE — TELEPHONE ENCOUNTER
Per checkout report Eric, pt needs HST. I clld pt to schedule, LM with spouse to call me back. I will mail AVS to pt.

## 2020-06-16 NOTE — TELEPHONE ENCOUNTER
[facility] Call Center    Phone Message    May a detailed message be left on voicemail: yes     Reason for Call: Medication Refill Request    Has the patient contacted the pharmacy for the refill? Yes   Name of medication being requested: methylphenidate (METADATE ER) 20 MG CR tablet   Provider who prescribed the medication: Dr. Hunter  Pharmacy: [facility] [address]   Date medication is needed: ASAP Patient called stating that she requested this on 6/7. Please advise. Thank you.      Action Taken: Message routed to:  Primary Care p [phone]    Travel Screening: Not Applicable

## 2020-06-16 NOTE — TELEPHONE ENCOUNTER
Routing to covering providers to please advise.   Do not see this was requested.    Last office visit with Dr. Hunter 3/11/2020       -- return in June for wellness visit.    Methylphenidate 20 mg CR Tabs  Last prescribed on 5/12/2020 for 30 tabs and 0 refills.    Maritza Wisdom RN, [facility]

## 2020-07-01 ENCOUNTER — TRANSFERRED RECORDS (OUTPATIENT)
Dept: HEALTH INFORMATION MANAGEMENT | Facility: CLINIC | Age: 75
End: 2020-07-01

## 2020-07-01 LAB — RETINOPATHY: NORMAL

## 2020-07-09 ENCOUNTER — OFFICE VISIT (OUTPATIENT)
Dept: SLEEP MEDICINE | Facility: CLINIC | Age: 75
End: 2020-07-09
Payer: COMMERCIAL

## 2020-07-09 DIAGNOSIS — Z72.820 LACK OF ADEQUATE SLEEP: ICD-10-CM

## 2020-07-09 DIAGNOSIS — G47.30 SLEEP APNEA, UNSPECIFIED TYPE: ICD-10-CM

## 2020-07-09 DIAGNOSIS — R06.89 DYSPNEA AND RESPIRATORY ABNORMALITY: ICD-10-CM

## 2020-07-09 DIAGNOSIS — R53.81 MALAISE AND FATIGUE: ICD-10-CM

## 2020-07-09 DIAGNOSIS — R53.83 MALAISE AND FATIGUE: ICD-10-CM

## 2020-07-09 DIAGNOSIS — I10 ESSENTIAL HYPERTENSION: ICD-10-CM

## 2020-07-09 DIAGNOSIS — R06.00 DYSPNEA AND RESPIRATORY ABNORMALITY: ICD-10-CM

## 2020-07-09 PROCEDURE — G0399 HOME SLEEP TEST/TYPE 3 PORTA: HCPCS | Performed by: INTERNAL MEDICINE

## 2020-07-10 ENCOUNTER — DOCUMENTATION ONLY (OUTPATIENT)
Dept: SLEEP MEDICINE | Facility: CLINIC | Age: 75
End: 2020-07-10
Payer: COMMERCIAL

## 2020-07-10 NOTE — PROGRESS NOTES
HST POST-STUDY QUESTIONNAIRE    1. What time did you go to bed?  11:30 pm  2. How long do you think it took to fall asleep?  1 hour  3. What time did you wake up to start the day?  7:30 am  4. Did you get up during the night at all?  yes  5. If you woke up, do you remember approximately what time(s)? no  6. Did you have any difficulty with the equipment?  Yes stick nose tape  7. Did you us any type of treatment with this study?  None  8. Was the head of the bed elevated? Yes  9. Did you sleep in a recliner?  No  10. Did you stop using CPAP at least 3 days before this test?  Yes  11. Any other information you'd like us to know? n/a

## 2020-07-10 NOTE — PROGRESS NOTES
This HSAT was performed using a Noxturnal T3 device which recorded snore, sound, movement activity, body position, nasal pressure, oronasal thermal airflow, pulse, oximetry and both chest and abdominal respiratory effort. HSAT data was restricted to the time patient states they were in bed.     HSAT was scored using 1B 4% hypopnea rule.     HST AHI (Non-PAT): 2.9  Snoring was reported as mild and intermittent.  Time with SpO2 below 89% was 4.6 minutes.   Overall signal quality was good     Pt will follow up with sleep provider to determine appropriate therapy.

## 2020-07-17 DIAGNOSIS — F98.8 ATTENTION DEFICIT DISORDER (ADD) WITHOUT HYPERACTIVITY: ICD-10-CM

## 2020-07-17 RX ORDER — METHYLPHENIDATE HYDROCHLORIDE EXTENDED RELEASE 20 MG/1
20 TABLET ORAL EVERY MORNING
Qty: 30 TABLET | Refills: 0 | Status: SHIPPED | OUTPATIENT
Start: 2020-07-17 | End: 2020-08-18

## 2020-07-17 NOTE — TELEPHONE ENCOUNTER
Routing to Dr. Hunter to please advise.   Not in RN protocol.     Last office visit with Dr. Hunter 3/11/2020       -- return in June for wellness visit.     Methylphenidate 20 mg CR Tabs  Last prescribed on 6/16/2020 for 30 tabs and 0 refills.     Maritza Wisdom RN, [facility]

## 2020-07-17 NOTE — TELEPHONE ENCOUNTER
Patient called requesting a refill of her methylphenidate. Patient uses [facility] on [address] [phone] (phone)    Please call patient with any issues.

## 2020-07-24 ENCOUNTER — VIRTUAL VISIT (OUTPATIENT)
Dept: SLEEP MEDICINE | Facility: CLINIC | Age: 75
End: 2020-07-24
Payer: COMMERCIAL

## 2020-07-24 DIAGNOSIS — I10 ESSENTIAL HYPERTENSION: ICD-10-CM

## 2020-07-24 DIAGNOSIS — E66.813 CLASS 3 SEVERE OBESITY DUE TO EXCESS CALORIES WITH BODY MASS INDEX (BMI) OF 40.0 TO 44.9 IN ADULT, UNSPECIFIED WHETHER SERIOUS COMORBIDITY PRESENT (H): ICD-10-CM

## 2020-07-24 DIAGNOSIS — Z72.820 LACK OF ADEQUATE SLEEP: ICD-10-CM

## 2020-07-24 DIAGNOSIS — R06.89 DYSPNEA AND RESPIRATORY ABNORMALITY: Primary | ICD-10-CM

## 2020-07-24 DIAGNOSIS — R53.81 MALAISE AND FATIGUE: ICD-10-CM

## 2020-07-24 DIAGNOSIS — R06.00 DYSPNEA AND RESPIRATORY ABNORMALITY: Primary | ICD-10-CM

## 2020-07-24 DIAGNOSIS — R53.83 MALAISE AND FATIGUE: ICD-10-CM

## 2020-07-24 DIAGNOSIS — E66.01 CLASS 3 SEVERE OBESITY DUE TO EXCESS CALORIES WITH BODY MASS INDEX (BMI) OF 40.0 TO 44.9 IN ADULT, UNSPECIFIED WHETHER SERIOUS COMORBIDITY PRESENT (H): ICD-10-CM

## 2020-07-24 PROCEDURE — 99213 OFFICE O/P EST LOW 20 MIN: CPT | Mod: TEL | Performed by: PHYSICIAN ASSISTANT

## 2020-07-24 NOTE — PROCEDURES
"HOME SLEEP STUDY INTERPRETATION    Patient: Karen Donis  MRN: [id number]  YOB: 1945  Study Date: 2020  Referring Provider: Flash Hunter MD  Ordering Provider: CHARLEEN Hatfield     Indications for Home Study: Karen Donis is a 75 year old female with a history of Moderate SO (2006 AHI 17), hypertension, diabetes, colon cancer, ADD, depression/anxiety,  who presents with symptoms suggestive of obstructive sleep apnea.    Study done with head of bed elevated (degree undisclosed)    Estimated body mass index is 40 kg/m  as calculated from the following:    Height as of 6/10/20: 1.594 m (5' 2.75\").    Weight as of 6/10/20: 101.6 kg (224 lb).  Total score - [place]: 11 (2020 11:00 AM)  STOP-BAN/8    Data: A full night home sleep study was performed recording the standard physiologic parameters including body position, movement, sound, nasal pressure, thermal oral airflow, chest and abdominal movements with respiratory inductance plethysmography, and oxygen saturation by pulse oximetry. Pulse rate was estimated by oximetry recording. This study was considered adequate based on > 4 hours of quality oximetry and respiratory recording. As specified by the AASM Manual for the Scoring of Sleep and Associated events, version 2.3, Rule VIII.D 1B, 4% oxygen desaturation scoring for hypopneas is used as a standard of care on all home sleep apnea testing.    Analysis Time:  416 minutes    Respiration:   Sleep Associated Hypoxemia: sustained hypoxemia was not present. Baseline oxygen saturation was 92.4%.  Time with saturation less than or equal to 88% was 4.6 minutes. The lowest oxygen saturation was 85%.   Snoring: Snoring was absent.  Respiratory events: The home study revealed a presence of 4 obstructive apneas and 5 mixed and central apneas. There were 11 hypopneas resulting in a combined apnea/hypopnea index [AHI] of 2.9 events per hour.  AHI was 14.3 per hour supine, 1.6 per hour prone, 2.2 per " hour on left side, and NA per hour on right side.   Pattern: Excluding events noted above, respiratory rate and pattern was Normal.    Position: Percent of time spent: supine - 7%, prone - 65%, on left - 26%, on right - 0%.    Heart Rate: By pulse oximetry normal rate was noted.     Assessment:   With head of the bed elevated no obstructive sleep apnea.  Cannot rule out significant SO with bed flat and supine position.  Sleep associated hypoxemia was not present.    Recommendations:  Continue positional therapy (head of bed elevation and avoidance of supine sleep position.)  Suggest optimizing sleep hygiene and avoiding sleep deprivation.  Weight management.    Diagnosis Code(s): Obstructive Sleep Apnea G47.33     Peace Rizzo MD, July 24, 2020   Diplomate, American Board of Internal Medicine, Sleep Medicine

## 2020-07-24 NOTE — PROGRESS NOTES
"Karen Donis is a 75 year old female who is being evaluated via a billable telephone visit.      The patient has been notified of following:     \"This telephone visit will be conducted via a call between you and your physician/provider. We have found that certain health care needs can be provided without the need for a physical exam.  This service lets us provide the care you need with a short phone conversation.  If a prescription is necessary we can send it directly to your pharmacy.  If lab work is needed we can place an order for that and you can then stop by our lab to have the test done at a later time.    Telephone visits are billed at different rates depending on your insurance coverage. During this emergency period, for some insurers they may be billed the same as an in-person visit.  Please reach out to your insurance provider with any questions.    If during the course of the call the physician/provider feels a telephone visit is not appropriate, you will not be charged for this service.\"    Patient has given verbal consent for Telephone visit?  Yes    What phone number would you like to be contacted at?     How would you like to obtain your AVS? [facility]    Phone call duration: 7 minutes        Home Sleep Apnea Testing Results Visit:    Chief Complaint   Patient presents with     RECHECK     HST RESULTS       Karen Donis is a 75 year old female who is contacted after having had Home Sleep Apnea Testing. She presented with a history of Moderate SO (2006 AHI 17), hypertension, diabetes, colon cancer, ADD, depression/anxiety,  who presents with symptoms suggestive of obstructive sleep apnea.    Home Sleep Apnea Testing - 7/9/2020: 224 lbs 0 oz: AHI 2.9/hr. Supine AHI 14.3/hr.   Oxygen Fabiano of 85%.  Baseline 92.4%.  Sp02 =< 88% for 4.6 minutes  She slept on her back (7%), prone (65%), left (26) and right (0%) sides.     Analysis time: 416 minutes.     Karen Donis reports that she slept Poor . "     Home Sleep Apnea Testing was reviewed in detail today with Karen and a copy given to her for her records.    Past medical/surgical history, family history, social history, medications and allergies were reviewed.      There were no vitals taken for this visit.    Impression/Plan:  With head of the bed elevated, the sleep study is not suggesitve of severe obstructive sleep apnea.  Cannot rule out significant SO with bed flat and supine position.  Sleep associated hypoxemia was not present.     Options reviewed. Recommend inl-ab Polysomnogram with improved sensitivity to evaluate for obstructive sleep apnea.    Follow up once testing is completed.     Edi Reyes PA-C

## 2020-07-30 NOTE — NURSING NOTE
HST results mailed to home. Pt called to schedule PSG and states she would like to wait until January or February to see if other locations will be open at that time.      JACKIE Smith

## 2020-08-07 ENCOUNTER — TELEPHONE (OUTPATIENT)
Dept: SLEEP MEDICINE | Facility: CLINIC | Age: 75
End: 2020-08-07

## 2020-08-07 NOTE — TELEPHONE ENCOUNTER
Pt called to schedule PSG and states she would like to wait until January or February to see if other locations will be open at that time.      JACKIE Smith

## 2020-08-18 ENCOUNTER — TELEPHONE (OUTPATIENT)
Dept: PEDIATRICS | Facility: CLINIC | Age: 75
End: 2020-08-18

## 2020-08-18 DIAGNOSIS — E78.5 HYPERLIPIDEMIA LDL GOAL <100: ICD-10-CM

## 2020-08-18 DIAGNOSIS — F98.8 ATTENTION DEFICIT DISORDER (ADD) WITHOUT HYPERACTIVITY: ICD-10-CM

## 2020-08-18 RX ORDER — METHYLPHENIDATE HYDROCHLORIDE EXTENDED RELEASE 20 MG/1
20 TABLET ORAL EVERY MORNING
Qty: 30 TABLET | Refills: 0 | Status: SHIPPED | OUTPATIENT
Start: 2020-08-18 | End: 2020-10-21

## 2020-08-18 NOTE — TELEPHONE ENCOUNTER
[facility] Call Center    Phone Message    May a detailed message be left on voicemail: yes     Reason for Call: Medication Refill Request    Has the patient contacted the pharmacy for the refill? No - Patient states she needs to call us directly to her her medication refilled.    Methylphenidate should be called into her [facility]      Action Taken: Message routed to:  Primary Care p [phone]    Travel Screening: Not Applicable

## 2020-08-18 NOTE — TELEPHONE ENCOUNTER
methylphenidate (METADATE ER) 20 MG CR tablet   Last Written Prescription Date:  7/17/2020  Last Fill Quantity: 30,  # refills: 0   Last office visit: 3/11/2020 with prescribing provider   Future Office Visit:      Routing refill request to provider for review/approval because:  Drug not on the [facility] refill protocol     Chetna Thompson RN

## 2020-08-22 RX ORDER — PRAVASTATIN SODIUM 10 MG
10 TABLET ORAL DAILY
Qty: 90 TABLET | Refills: 1 | Status: SHIPPED | OUTPATIENT
Start: 2020-08-22 | End: 2021-05-17

## 2020-08-24 NOTE — TELEPHONE ENCOUNTER
Spoke with patient.  States she needs metoprolol, not the prevastatin.  Doesn't know why that was ordered.  Patient is scheduled for labs at [place], please place orders.      metoprolol succinate ER (TOPROL-XL) 25 MG 24 hr tablet  91 tablet  3  6/5/2020   No    Sig: Take 1 tablet (25 mg) by mouth daily    Sent to pharmacy as: Metoprolol Succinate ER 25 MG Oral Tablet Extended Release 24 Hour (TOPROL-XL)    Class: E-Prescribe    Order: [id number]    E-Prescribing Status: Receipt confirmed by pharmacy (6/5/2020  5:12 PM CDT     Routing to [facility] [place] to review.    Annalisa Beck  Primary Care   jules [place]

## 2020-08-25 ENCOUNTER — TELEPHONE (OUTPATIENT)
Dept: LAB | Facility: CLINIC | Age: 75
End: 2020-08-25

## 2020-08-25 DIAGNOSIS — F98.8 ATTENTION DEFICIT DISORDER (ADD) WITHOUT HYPERACTIVITY: Chronic | ICD-10-CM

## 2020-08-25 DIAGNOSIS — Z51.81 ENCOUNTER FOR THERAPEUTIC DRUG MONITORING: ICD-10-CM

## 2020-08-25 DIAGNOSIS — I10 ESSENTIAL HYPERTENSION WITH GOAL BLOOD PRESSURE LESS THAN 140/90: ICD-10-CM

## 2020-08-25 DIAGNOSIS — N18.30 CKD (CHRONIC KIDNEY DISEASE) STAGE 3, GFR 30-59 ML/MIN (H): ICD-10-CM

## 2020-08-25 DIAGNOSIS — E11.9 TYPE 2 DIABETES MELLITUS WITHOUT COMPLICATION, WITHOUT LONG-TERM CURRENT USE OF INSULIN (H): Primary | ICD-10-CM

## 2020-08-25 NOTE — TELEPHONE ENCOUNTER
Hi! Please place future laboratory orders if needed for upcoming appointment on 8/28/20. If labs are not due, please have your care team contact the patient to cancel lab only appointment.     Thank you!  [facility]

## 2020-08-28 DIAGNOSIS — N18.30 CKD (CHRONIC KIDNEY DISEASE) STAGE 3, GFR 30-59 ML/MIN (H): ICD-10-CM

## 2020-08-28 DIAGNOSIS — I10 ESSENTIAL HYPERTENSION WITH GOAL BLOOD PRESSURE LESS THAN 140/90: ICD-10-CM

## 2020-08-28 DIAGNOSIS — E11.9 TYPE 2 DIABETES MELLITUS WITHOUT COMPLICATION, WITHOUT LONG-TERM CURRENT USE OF INSULIN (H): ICD-10-CM

## 2020-08-28 LAB
ANION GAP SERPL CALCULATED.3IONS-SCNC: 7 MMOL/L (ref 3–14)
BUN SERPL-MCNC: 20 MG/DL (ref 7–30)
CALCIUM SERPL-MCNC: 9 MG/DL (ref 8.5–10.1)
CHLORIDE SERPL-SCNC: 106 MMOL/L (ref 94–109)
CO2 SERPL-SCNC: 28 MMOL/L (ref 20–32)
CREAT SERPL-MCNC: 1.16 MG/DL (ref 0.52–1.04)
GFR SERPL CREATININE-BSD FRML MDRD: 45 ML/MIN/{1.73_M2}
GLUCOSE SERPL-MCNC: 140 MG/DL (ref 70–99)
HBA1C MFR BLD: 6.7 % (ref 0–5.6)
POTASSIUM SERPL-SCNC: 4.6 MMOL/L (ref 3.4–5.3)
SODIUM SERPL-SCNC: 141 MMOL/L (ref 133–144)

## 2020-08-28 PROCEDURE — 36415 COLL VENOUS BLD VENIPUNCTURE: CPT | Performed by: INTERNAL MEDICINE

## 2020-08-28 PROCEDURE — 80061 LIPID PANEL: CPT | Performed by: INTERNAL MEDICINE

## 2020-08-28 PROCEDURE — 83036 HEMOGLOBIN GLYCOSYLATED A1C: CPT | Performed by: INTERNAL MEDICINE

## 2020-08-28 PROCEDURE — 80048 BASIC METABOLIC PNL TOTAL CA: CPT | Performed by: INTERNAL MEDICINE

## 2020-08-30 DIAGNOSIS — I10 HYPERTENSION GOAL BP (BLOOD PRESSURE) < 140/90: ICD-10-CM

## 2020-08-30 RX ORDER — METOPROLOL SUCCINATE 25 MG/1
TABLET, EXTENDED RELEASE ORAL
Qty: 91 TABLET | Refills: 3 | Status: CANCELLED | OUTPATIENT
Start: 2020-08-30

## 2020-08-31 DIAGNOSIS — Z51.81 ENCOUNTER FOR THERAPEUTIC DRUG MONITORING: ICD-10-CM

## 2020-08-31 DIAGNOSIS — I10 ESSENTIAL HYPERTENSION WITH GOAL BLOOD PRESSURE LESS THAN 140/90: ICD-10-CM

## 2020-08-31 PROCEDURE — 80307 DRUG TEST PRSMV CHEM ANLYZR: CPT | Mod: 90 | Performed by: INTERNAL MEDICINE

## 2020-08-31 PROCEDURE — 99000 SPECIMEN HANDLING OFFICE-LAB: CPT | Performed by: INTERNAL MEDICINE

## 2020-09-01 ENCOUNTER — TELEPHONE (OUTPATIENT)
Dept: PHARMACY | Facility: CLINIC | Age: 75
End: 2020-09-01

## 2020-09-01 ENCOUNTER — VIRTUAL VISIT (OUTPATIENT)
Dept: PEDIATRICS | Facility: CLINIC | Age: 75
End: 2020-09-01
Payer: COMMERCIAL

## 2020-09-01 DIAGNOSIS — E78.5 HYPERLIPIDEMIA LDL GOAL <100: ICD-10-CM

## 2020-09-01 DIAGNOSIS — Z00.00 MEDICARE ANNUAL WELLNESS VISIT, SUBSEQUENT: Primary | ICD-10-CM

## 2020-09-01 DIAGNOSIS — I10 ESSENTIAL HYPERTENSION WITH GOAL BLOOD PRESSURE LESS THAN 140/90: ICD-10-CM

## 2020-09-01 DIAGNOSIS — I10 HYPERTENSION GOAL BP (BLOOD PRESSURE) < 140/90: ICD-10-CM

## 2020-09-01 DIAGNOSIS — F33.41 RECURRENT MAJOR DEPRESSIVE DISORDER, IN PARTIAL REMISSION (H): ICD-10-CM

## 2020-09-01 DIAGNOSIS — E11.9 TYPE 2 DIABETES MELLITUS WITHOUT COMPLICATION, WITHOUT LONG-TERM CURRENT USE OF INSULIN (H): Chronic | ICD-10-CM

## 2020-09-01 LAB
CHOLEST SERPL-MCNC: 174 MG/DL
HDLC SERPL-MCNC: 54 MG/DL
LDLC SERPL CALC-MCNC: 100 MG/DL
NONHDLC SERPL-MCNC: 120 MG/DL
TRIGL SERPL-MCNC: 101 MG/DL

## 2020-09-01 PROCEDURE — 96127 BRIEF EMOTIONAL/BEHAV ASSMT: CPT | Performed by: INTERNAL MEDICINE

## 2020-09-01 PROCEDURE — G0439 PPPS, SUBSEQ VISIT: HCPCS | Performed by: INTERNAL MEDICINE

## 2020-09-01 PROCEDURE — 99213 OFFICE O/P EST LOW 20 MIN: CPT | Mod: 25 | Performed by: INTERNAL MEDICINE

## 2020-09-01 RX ORDER — METOPROLOL SUCCINATE 25 MG/1
TABLET, EXTENDED RELEASE ORAL
Qty: 90 TABLET | Refills: 3 | Status: SHIPPED | OUTPATIENT
Start: 2020-09-01 | End: 2020-11-04

## 2020-09-01 RX ORDER — BUPROPION HYDROCHLORIDE 150 MG/1
150 TABLET ORAL EVERY MORNING
Qty: 30 TABLET | Refills: 0 | Status: SHIPPED | OUTPATIENT
Start: 2020-09-01 | End: 2020-09-29

## 2020-09-01 ASSESSMENT — PATIENT HEALTH QUESTIONNAIRE - PHQ9: SUM OF ALL RESPONSES TO PHQ QUESTIONS 1-9: 7

## 2020-09-01 NOTE — TELEPHONE ENCOUNTER
Called patient to schedule a follow up MTM visit. Patient is scheduled for 9/15.  Maritza Woodall, Pharm.D, [facility]  Medication Therapy Management Pharmacist

## 2020-09-01 NOTE — PROGRESS NOTES
"Karen Donis is a 75 year old female who is being evaluated via a billable telephone visit.      The patient has been notified of following:     \"This telephone visit will be conducted via a call between you and your physician/provider. We have found that certain health care needs can be provided without the need for a physical exam.  This service lets us provide the care you need with a short phone conversation.  If a prescription is necessary we can send it directly to your pharmacy.  If lab work is needed we can place an order for that and you can then stop by our lab to have the test done at a later time.    Telephone visits are billed at different rates depending on your insurance coverage. During this emergency period, for some insurers they may be billed the same as an in-person visit.  Please reach out to your insurance provider with any questions.    If during the course of the call the physician/provider feels a telephone visit is not appropriate, you will not be charged for this service.\"    Patient has given verbal consent for Telephone visit?  Yes    What phone number would you like to be contacted at? [phone]    How would you like to obtain your AVS? Mail a copy    Subjective     Karen Donis is a 75 year old female who presents via phone visit today for the following health issues:    HPI    Annual Wellness Visit    Are you in the first 12 months of your Medicare Part B coverage?  No    HPI:  Diabetes: she started taking supplement for \"sugar support\", active ingredient include mulberry abstracts and a variety of vegetable extracts.     Continues to struggle with sleepiness. Sees sleep medicine    Emotionally feels sad because of her brother in nursing post stroke, not very responsive. She doesn't feel too depressed. She takes duloxetine 20 mg. Higher dose caused sweating.     Got a bottle of tylenol arthritis. Takes it when she has back, hip pain, ankle.     PHQ-9 SCORE 11/14/2019 12/11/2019 " "9/1/2020   PHQ-9 Total Score - - -   PHQ-9 Total Score 10 9 7     RAGHAVENDRA-7 SCORE 10/4/2018 2/13/2019 11/14/2019   Total Score - - -   Total Score 2 0 5         Physical Health:    In general, how would you rate your overall physical health? poor    Outside of work, how many days during the week do you exercise?none    Outside of work, approximately how many minutes a day do you exercise?not applicable    If you drink alcohol do you typically have >3 drinks per day or >7 drinks per week? No    Do you usually eat at least 4 servings of fruit and vegetables a day, include whole grains & fiber and avoid regularly eating high fat or \"junk\" foods? Yes    Do you have any problems taking medications regularly? YES    Do you have any side effects from medications? fatigue, lethargic    Needs assistance for the following daily activities: housework    Which of the following safety concerns are present in your home?  lack of grab bars in the bathroom     Hearing impairment: Yes, uses hearing aids    In the past 6 months, have you been bothered by leaking of urine? yes    There were no vitals taken for this visit.  Weight: Unable to obtain due to video visit  Height: Unable to obtain due to video visit  BMI: Unable to obtain due to video visit  Blood Pressure: Unable to obtain due to video visit    Mental Health:    In general, how would you rate your overall mental or emotional health? good  PHQ-2 Score:      Do you feel safe in your environment? Yes    Have you ever done Advance Care Planning? (For example, a Health Directive, POLST, or a discussion with a medical provider or your loved ones about your wishes)? No, advance care planning information given to patient to review.  Patient plans to discuss their wishes with loved ones or provider.      Fall risk:  Unable to complete due to virtual visit; need for additional assessment in future face-to-face visit    Cognitive Screening: Unable to complete due to virtual visit; need " for additional assessment in future face-to-face visit    Do you have sleep apnea, excessive snoring or daytime drowsiness?: yes    Current providers sharing in care for this patient include:   Patient Care Team:  Lakia Hunter MD PhD as PCP - General (Internal Medicine)  Brent Bunn DPM as MD (Podiatry)  Danie Garcia MD as MD (Family Medicine - Sports Medicine)  Maritza Woodall [facility] as Pharmacist (Pharmacist)  Lakia Hunter MD PhD as Assigned PCP        Review of Systems   Constitutional, HEENT, cardiovascular, pulmonary, gi and gu systems are negative, except as otherwise noted.       Objective          Vitals:  No vitals were obtained today due to virtual visit.    Frequent yawning during the conversation, and no distress  PSYCH: Alert and oriented times 3; coherent speech, normal   rate and volume, able to articulate logical thoughts, able   to abstract reason, no tangential thoughts, no hallucinations   or delusions  Her affect is normal  RESP: No cough, no audible wheezing, able to talk in full sentences  Remainder of exam unable to be completed due to telephone visits    Orders Only on 08/28/2020   Component Date Value Ref Range Status     Sodium 08/28/2020 141  133 - 144 mmol/L Final     Potassium 08/28/2020 4.6  3.4 - 5.3 mmol/L Final     Chloride 08/28/2020 106  94 - 109 mmol/L Final     Carbon Dioxide 08/28/2020 28  20 - 32 mmol/L Final     Anion Gap 08/28/2020 7  3 - 14 mmol/L Final     Glucose 08/28/2020 140* 70 - 99 mg/dL Final     Urea Nitrogen 08/28/2020 20  7 - 30 mg/dL Final     Creatinine 08/28/2020 1.16* 0.52 - 1.04 mg/dL Final     GFR Estimate 08/28/2020 45* >60 mL/min/[1.73_m2] Final    Comment: Non  GFR Calc  Starting 12/18/2018, serum creatinine based estimated GFR (eGFR) will be   calculated using the Chronic Kidney Disease Epidemiology Collaboration   (CKD-EPI) equation.       GFR Estimate If Black 08/28/2020 52* >60 mL/min/[1.73_m2] Final    Comment:   GFR Calc  Starting 12/18/2018, serum creatinine based estimated GFR (eGFR) will be   calculated using the Chronic Kidney Disease Epidemiology Collaboration   (CKD-EPI) equation.       Calcium 08/28/2020 9.0  8.5 - 10.1 mg/dL Final     Hemoglobin A1C 08/28/2020 6.7* 0 - 5.6 % Final    Comment: Normal <5.7% Prediabetes 5.7-6.4%  Diabetes 6.5% or higher - adopted from ADA   consensus guidelines.            Assessment/Plan:    Assessment & Plan     Medicare annual wellness visit, subsequent      Type 2 diabetes mellitus without complication, without long-term current use of insulin (H)      Essential hypertension with goal blood pressure less than 140/90  - Albumin Random Urine Quantitative with Creat Ratio  - metoprolol refilled.     Hyperlipidemia LDL goal <100    - Lipid panel reflex to direct LDL Fasting    Recurrent major depressive disorder, in partial remission (H)  - change duloxetine to wellbutrin.   - buPROPion (WELLBUTRIN XL) 150 MG 24 hr tablet  Dispense: 30 tablet; Refill: 0       Phone call duration: 30 minutes, of which 15 minutes were spent addressing diabetes, hyperlipidemia and depression.     See Patient Instructions    Return in about 4 weeks (around 9/29/2020) for Virtual visit.    Lakia Hunter MD PhD  [facility]

## 2020-09-01 NOTE — PATIENT INSTRUCTIONS
Make appointment(s) for:   -- virtual visit follow up in 4 weeks.    Complete health care directives and send a copy back.         Medication(s) prescribed today:    Orders Placed This Encounter   Medications     buPROPion (WELLBUTRIN XL) 150 MG 24 hr tablet     Sig: Take 1 tablet (150 mg) by mouth every morning     Dispense:  30 tablet     Refill:  0     metoprolol succinate ER (TOPROL-XL) 25 MG 24 hr tablet     Sig: Take 1 tablet (25 mg) by mouth daily     Dispense:  90 tablet     Refill:  3

## 2020-09-04 LAB — PAIN DRUG SCR UR W RPTD MEDS: NORMAL

## 2020-09-15 ENCOUNTER — VIRTUAL VISIT (OUTPATIENT)
Dept: PHARMACY | Facility: CLINIC | Age: 75
End: 2020-09-15
Payer: COMMERCIAL

## 2020-09-15 DIAGNOSIS — R53.83 FATIGUE, UNSPECIFIED TYPE: ICD-10-CM

## 2020-09-15 DIAGNOSIS — F32.5 MAJOR DEPRESSION IN COMPLETE REMISSION (H): ICD-10-CM

## 2020-09-15 DIAGNOSIS — E11.9 TYPE 2 DIABETES MELLITUS WITHOUT COMPLICATION, WITHOUT LONG-TERM CURRENT USE OF INSULIN (H): Primary | ICD-10-CM

## 2020-09-15 DIAGNOSIS — G47.9 SLEEP DISORDER: ICD-10-CM

## 2020-09-15 PROCEDURE — 99606 MTMS BY PHARM EST 15 MIN: CPT | Mod: TEL | Performed by: PHARMACIST

## 2020-09-15 PROCEDURE — 99607 MTMS BY PHARM ADDL 15 MIN: CPT | Mod: TEL | Performed by: PHARMACIST

## 2020-09-15 NOTE — PATIENT INSTRUCTIONS
Recommendations from today's MTM visit:                                                      Discontinue duloxetine as was recommended by Dr. Hunter.  Continue to hold methylphenidate for now.  Recommend Shingrix, influenza vaccine    It was great to speak with you today.  I value your experience and would be very thankful for your time with providing feedback on our clinic survey. You may receive a survey via email or text message in the next few days.     Next MTM visit: 3 weeks      To schedule another MTM appointment, please call the clinic directly or you may call the MTM scheduling line at [phone] or toll-free at [phone].     My Clinical Pharmacist's contact information:                                                      It was a pleasure talking with you today!  Please feel free to contact me with any questions or concerns you have.      Maritza Woodall, Pharm.D, BCACP  Medication Therapy Management Pharmacist          
Alert and oriented to person, place, time/situation. normal mood and affect. no apparent risk to self or others.

## 2020-09-15 NOTE — PROGRESS NOTES
MTM ENCOUNTER  SUBJECTIVE/OBJECTIVE:                           Karen Donis is a 75 year old female called for a follow-up visit. She was referred to me from Lakia Hunter.  Today's visit is a follow-up MTM visit from 6/23/2020.     Patient consented to a telehealth visit: yes  Telemedicine Visit Details  Type of service:  Telephone visit  Start Time: 3:32PM  End Time: 4:04PM  Originating Location (ptloation): Home  Distant Location (provider location):  [facility] [place]  Mode of Communication:  Telephone    Allergies/ADRs: Reviewed in Epic  Tobacco:  reports that she quit smoking about 37 years ago. She has never used smokeless tobacco.  Alcohol: not currently using  Caffeine: cappuccino 6 travel mugs per week, (sugar free) with splenda  PMH: Reviewed in Epic    Chief Complaint: Wellbutrin start, needs flu, shingrix, eye exam    Medication Adherence/Access:   Patient reports all prescription (I.e. omeprazole, metformin, etc.) and supplements are taken in the morning when she wakes up.   Patient goes to bed between midnight-2:30 am then waking up 1030-12pm.  Patient reports she did miss a day of her medications and she felt wide awake.    Fatigue: patient complains of being tired. Patient does not feel this has gotten any worse.    Diabetes:  Pt currently taking metformin XR 2000mg daily.   She denies side effects.  SMBG Ranges (patient reported- intermittently): 158mg/dL while on phone today.    Symptoms of low blood sugar? none.   Symptoms of high blood sugar? None  Eye exam: up to date; [facility] around July 2020  Foot exam: up to date  Diet/Exercise: 16 ounces of water, half-banana, cheerios, blueberries (noon), supper (5-6pm)  Aspirin: Taking 81mg daily and denies side effects, ~2005  Statin: Yes: pravastatin 10mg tablet and denies side effects  ACEi/ARB: Yes: losartan. Urine albumin is   Lab Results   Component Value Date    UMALCR 6.58 03/11/2020       Lab Results   Component Value  Date    A1C 6.7 08/28/2020    A1C 7.3 03/11/2020    A1C 7.2 10/15/2019    A1C 7.4 06/12/2019    A1C 7.5 03/12/2019     Patient will get influenza shot beginning October. Patient has not had Shingrix vaccination.    Sleep disturbance: patient will go to bed but it will take her 30 minutes to fall asleep. Patient will sleep through the night until 11:30pm. Patient will get up to go to the bathroom but is able to fall back to sleep pretty easily. Patient has not been taking methylphenidate and this could be helping.     Depression:  Current medications include: Bupropion 150mg XL once daily (patient has been taking this at noon). Patient was supposed to stop the duloxetine but patient did not understand that she was supposed to stop this. Wellbutrin XL was started on 9/1/2020.    PHQ-9 SCORE 11/14/2019 12/11/2019 9/1/2020   PHQ-9 Total Score - - -   PHQ-9 Total Score 10 9 7     ADD: current therapy includes methylphenidate CR 20mg daily. Patient has not been taking the methylphenidate because she didn't want to take this with the bupropion.    Today's Vitals: There were no vitals taken for this visit.-phone visit    ASSESSMENT:                            Medication Adherence: fair    Fatigue: Needs improvement. Patient reported she had much more energy when she didn't take her medications. Medications could be contributing. Patient is to discontinue duloxetine which may help her fatigue.    Diabetes: Stable.    Sleep disturbance: Stable; patient is sleeping better with holding methylphenidate.    Depression: Needs improvement. Patient would benefit from discontinuing duloxetine.     ADD: Stable. Patient may benefit from continuing to hold her methylphenidate as she has been sleeping better.    PLAN:                          Discontinue duloxetine as was recommended by Dr. Hunter.  Continue to hold methylphenidate for now.  Recommend Shingrix, influenza vaccine    I spent 32 minutes with this patient today. All changes were  made via collaborative practice agreement with Lakia Hunter. A copy of the visit note was provided to the patient's primary care provider.    Will follow up in 3 weeks.    The patient was given a summary of these recommendations as an after visit summary via [employer].     Maritza Woodall, Pharm.D, [facility]  Medication Therapy Management Pharmacist

## 2020-09-15 NOTE — Clinical Note
Please abstract the following data from this visit with this patient into the appropriate field in Epic:    Tests that can be patient reported without a hard copy:    Eye exam with ophthalmology on this date: 7/2020    Other Tests found in the patient's chart through Chart Review/Care Everywhere:    Eye exam with ophthalmology on this date: [facility]    Note to [employer]st[employer]: If this section is blank, no results were found via Chart Review/Care Everywhere.

## 2020-09-25 DIAGNOSIS — M79.7 FIBROMYALGIA: ICD-10-CM

## 2020-09-28 ENCOUNTER — TELEPHONE (OUTPATIENT)
Dept: PHARMACY | Facility: CLINIC | Age: 75
End: 2020-09-28

## 2020-09-28 NOTE — TELEPHONE ENCOUNTER
Patient returned call. Patient answered her own question about her medications and figured it was the losartan that she was looking for. Patient also had a question about her gabapentin and if this could be causing her legs to feel stiff. Patient does not report any edema. Patient has a follow up with Dr. Hunter tomorrow and will have Dr. Hunter evaluate her legs tomorrow.    Maritza Woodall, Pharm.D, [facility]  Medication Therapy Management Pharmacist

## 2020-09-28 NOTE — TELEPHONE ENCOUNTER
Patient called and left message on voicemail requesting a call back as she had a question on medication she was unable to find.  Returned patient's call and LM for patient to call back.    Maritza Woodall, Pharm.D, BCACP  Medication Therapy Management Pharmacist

## 2020-09-29 ENCOUNTER — VIRTUAL VISIT (OUTPATIENT)
Dept: PEDIATRICS | Facility: CLINIC | Age: 75
End: 2020-09-29
Payer: COMMERCIAL

## 2020-09-29 DIAGNOSIS — N18.30 CKD (CHRONIC KIDNEY DISEASE) STAGE 3, GFR 30-59 ML/MIN (H): ICD-10-CM

## 2020-09-29 DIAGNOSIS — F98.8 ATTENTION DEFICIT DISORDER (ADD) WITHOUT HYPERACTIVITY: Chronic | ICD-10-CM

## 2020-09-29 DIAGNOSIS — F41.1 GAD (GENERALIZED ANXIETY DISORDER): ICD-10-CM

## 2020-09-29 DIAGNOSIS — E11.42 DIABETIC POLYNEUROPATHY ASSOCIATED WITH TYPE 2 DIABETES MELLITUS (H): ICD-10-CM

## 2020-09-29 DIAGNOSIS — F33.41 RECURRENT MAJOR DEPRESSIVE DISORDER, IN PARTIAL REMISSION (H): Primary | ICD-10-CM

## 2020-09-29 PROCEDURE — 99214 OFFICE O/P EST MOD 30 MIN: CPT | Mod: TEL | Performed by: INTERNAL MEDICINE

## 2020-09-29 PROCEDURE — 96127 BRIEF EMOTIONAL/BEHAV ASSMT: CPT | Performed by: INTERNAL MEDICINE

## 2020-09-29 RX ORDER — BUPROPION HYDROCHLORIDE 300 MG/1
300 TABLET ORAL EVERY MORNING
Qty: 30 TABLET | Refills: 0 | Status: SHIPPED | OUTPATIENT
Start: 2020-09-29 | End: 2020-11-01

## 2020-09-29 RX ORDER — DULOXETIN HYDROCHLORIDE 20 MG/1
CAPSULE, DELAYED RELEASE ORAL
Qty: 90 CAPSULE | Refills: 0 | OUTPATIENT
Start: 2020-09-29

## 2020-09-29 ASSESSMENT — ANXIETY QUESTIONNAIRES
6. BECOMING EASILY ANNOYED OR IRRITABLE: NEARLY EVERY DAY
1. FEELING NERVOUS, ANXIOUS, OR ON EDGE: NOT AT ALL
5. BEING SO RESTLESS THAT IT IS HARD TO SIT STILL: NOT AT ALL
IF YOU CHECKED OFF ANY PROBLEMS ON THIS QUESTIONNAIRE, HOW DIFFICULT HAVE THESE PROBLEMS MADE IT FOR YOU TO DO YOUR WORK, TAKE CARE OF THINGS AT HOME, OR GET ALONG WITH OTHER PEOPLE: VERY DIFFICULT
7. FEELING AFRAID AS IF SOMETHING AWFUL MIGHT HAPPEN: MORE THAN HALF THE DAYS
3. WORRYING TOO MUCH ABOUT DIFFERENT THINGS: MORE THAN HALF THE DAYS
2. NOT BEING ABLE TO STOP OR CONTROL WORRYING: MORE THAN HALF THE DAYS
GAD7 TOTAL SCORE: 11

## 2020-09-29 ASSESSMENT — PATIENT HEALTH QUESTIONNAIRE - PHQ9
SUM OF ALL RESPONSES TO PHQ QUESTIONS 1-9: 12
5. POOR APPETITE OR OVEREATING: MORE THAN HALF THE DAYS

## 2020-09-29 NOTE — Clinical Note
I increased Wellbutrin to 300 mg. Diclofenac gel for neuropathy. May add Citalopram if irritablility is worse or not better. Not sure if she will talk with you before she has phone visit with me or not, but here is the plan in case she calls you. Lakia

## 2020-09-29 NOTE — TELEPHONE ENCOUNTER
Recurrent major depressive disorder, in partial remission (H)  - change duloxetine to wellbutrin.   - buPROPion (WELLBUTRIN XL) 150 MG 24 hr tablet  Dispense: 30 tablet; Refill: 0

## 2020-09-29 NOTE — PROGRESS NOTES
"Karen Donis is a 75 year old female who is being evaluated via a billable telephone visit.      The patient has been notified of following:     \"This telephone visit will be conducted via a call between you and your physician/provider. We have found that certain health care needs can be provided without the need for a physical exam.  This service lets us provide the care you need with a short phone conversation.  If a prescription is necessary we can send it directly to your pharmacy.  If lab work is needed we can place an order for that and you can then stop by our lab to have the test done at a later time.    Telephone visits are billed at different rates depending on your insurance coverage. During this emergency period, for some insurers they may be billed the same as an in-person visit.  Please reach out to your insurance provider with any questions.    If during the course of the call the physician/provider feels a telephone visit is not appropriate, you will not be charged for this service.\"    Patient has given verbal consent for Telephone visit?  Yes    What phone number would you like to be contacted at? [phone]    How would you like to obtain your AVS? Mail a copy    Subjective     Karen Donis is a 75 year old female who presents via phone visit today for the following health issues:    HPI    Depression and Anxiety Follow-Up    How are you doing with your depression since your last visit? No change    How are you doing with your anxiety since your last visit?  No change    Are you having other symptoms that might be associated with depression or anxiety? No    Have you had a significant life event? OTHER: brother is in the nursing home     Do you have any concerns with your use of alcohol or other drugs? No    Social History     Tobacco Use     Smoking status: Former Smoker     Last attempt to quit: 1982     Years since quittin.8     Smokeless tobacco: Never Used   Substance Use " Topics     Alcohol use: No     Drug use: No     PHQ 12/11/2019 9/1/2020 9/29/2020   PHQ-9 Total Score 9 7 12   Q9: Thoughts of better off dead/self-harm past 2 weeks Not at all Not at all Not at all     RAGHAVENDRA-7 SCORE 2/13/2019 11/14/2019 9/29/2020   Total Score - - -   Total Score 0 5 11         Suicide Assessment Five-step Evaluation and Treatment (SAFE-T)      How many servings of fruits and vegetables do you eat daily?  2-3    On average, how many sweetened beverages do you drink each day (Examples: soda, juice, sweet tea, etc.  Do NOT count diet or artificially sweetened beverages)?   0    How many days per week do you exercise enough to make your heart beat faster? 3 or less    How many minutes a day do you exercise enough to make your heart beat faster? 9 or less    How many days per week do you miss taking your medication? 0      Currently feeling sick in her stomach. refrigerator was down. Had meat that was not frozen well. Had one time diarrhea late Sunday after eating the meat. Since then has been queezy and gassy. No fever.     Under a lot of stress. Brother Cedric in nursing hoe. Younger brother yelled at her. Feels she is irritable, withdraw from people.     Leg swelling from gabapentin. Stopped taking it now since she was sick on Sunday. She feels the swelling worse is worse than the neuropathy pain. Do not want to go back to gabapentin.            Objective          Vitals:  No vitals were obtained today due to virtual visit.    healthy, alert and no distress  PSYCH: Alert and oriented times 3; coherent speech, normal   rate and volume, able to articulate logical thoughts, able   to abstract reason, no tangential thoughts, no hallucinations   or delusions  Her affect is normal  RESP: No cough, no audible wheezing, able to talk in full sentences  Remainder of exam unable to be completed due to telephone visits              Assessment & Plan     Recurrent major depressive disorder, in partial remission  (H)/RAGHAVENDRA  Increase wellbutrin. May consider adding citalopram if not helping enough.   - buPROPion (WELLBUTRIN XL) 300 MG 24 hr tablet  Dispense: 30 tablet; Refill: 0    Attention deficit disorder (ADD) without hyperactivity  Increase WEllbutrin  - buPROPion (WELLBUTRIN XL) 300 MG 24 hr tablet  Dispense: 30 tablet; Refill: 0    Diabetic polyneuropathy associated with type 2 diabetes mellitus (H)  Unable to tolerate Duloxetine and gabapentin. Discussed TCA or topamax but opted to try topical diclofenac first, less side effects.   - diclofenac (VOLTAREN) 1 % topical gel  Dispense: 350 g; Refill: 0    CKD (chronic kidney disease) stage 3, GFR 30-59 ml/min  Topical diclofenac will not affect CKD    See Patient Instructions    Return in about 2 weeks (around 10/13/2020) for Virtual visit follow up on depression.    Lakia Hunter MD PhD  [facility]    Phone call duration:  26 minutes

## 2020-09-30 ASSESSMENT — ANXIETY QUESTIONNAIRES: GAD7 TOTAL SCORE: 11

## 2020-10-06 ENCOUNTER — VIRTUAL VISIT (OUTPATIENT)
Dept: PHARMACY | Facility: CLINIC | Age: 75
End: 2020-10-06
Payer: COMMERCIAL

## 2020-10-06 ENCOUNTER — TELEPHONE (OUTPATIENT)
Dept: PEDIATRICS | Facility: CLINIC | Age: 75
End: 2020-10-06

## 2020-10-06 DIAGNOSIS — R11.2 NAUSEA WITH VOMITING: ICD-10-CM

## 2020-10-06 DIAGNOSIS — R11.2 NAUSEA AND VOMITING, INTRACTABILITY OF VOMITING NOT SPECIFIED, UNSPECIFIED VOMITING TYPE: ICD-10-CM

## 2020-10-06 DIAGNOSIS — G62.9 NEUROPATHY: ICD-10-CM

## 2020-10-06 DIAGNOSIS — F32.5 MAJOR DEPRESSION IN COMPLETE REMISSION (H): Primary | ICD-10-CM

## 2020-10-06 PROCEDURE — 99606 MTMS BY PHARM EST 15 MIN: CPT | Mod: TEL | Performed by: PHARMACIST

## 2020-10-06 PROCEDURE — 99607 MTMS BY PHARM ADDL 15 MIN: CPT | Mod: TEL | Performed by: PHARMACIST

## 2020-10-06 NOTE — TELEPHONE ENCOUNTER
I spoke with Karen and was able to schedule her for an in clinic appointment with Dr Hunter for October 8, 2020 at 11:10 am.     Jc Cespedes  Procedure , [place]  P[facility] Special[facility] and Adult Endocrinology

## 2020-10-06 NOTE — TELEPHONE ENCOUNTER
----- Message from Maritza Woodall [facility] sent at 10/6/2020  3:33 PM CDT -----  Regarding: RE: Nausea  She has been off of the gabapentin for over a week. Doesn't sound like she has been vomiting lately or any diarrhea but is so nauseated.  Mercedes  ----- Message -----  From: Lakia Hunter MD PhD  Sent: 10/6/2020   3:28 PM CDT  To: Maritza Woodall [facility]  Subject: RE: Nausea                                       Have her stop gabapentin.   If nausea doesn't get better and she cannot keep food/fluids down, get evaluated in urgent care or in clinic (if you see any opennings in the next few days).    Lakia  ----- Message -----  From: Maritza Woodall [facility]  Sent: 10/6/2020   3:15 PM CDT  To: Lakia Hunter MD PhD  Subject: Nausea                                           Hi Dr. Hunter,  I spoke with Karen today and she is still feeling miserable. She is nauseated and feels like there is some abdominal pressure. She thinks this was caused by taking the gabapentin but I'm not convinced that it is. What do you think for next steps?    Mercedes

## 2020-10-06 NOTE — PROGRESS NOTES
MTM ENCOUNTER  SUBJECTIVE/OBJECTIVE:                           Karen Donis is a 75 year old female called for a follow-up visit. She was referred to me from Lakia Hunter.  Today's visit is a follow-up MTM visit from 9/15/2020.     Patient consented to a telehealth visit: yes  Telemedicine Visit Details  Type of service:  Telephone visit  Start Time: 2:02PM  End Time: 2:42PM  Originating Location (ptloation): Home  Distant Location (provider location):  [facility]  Mode of Communication:  Telephone    Allergies/ADRs: Reviewed in Epic  Tobacco:  reports that she quit smoking about 37 years ago. She has never used smokeless tobacco.  Alcohol: not currently using  Caffeine: cappuccino 6 travel mugs per week, (sugar free) with splenda  PMH: Reviewed in Epic    Chief Complaint: wellbutrin dose increase; gabapentin discontinue & edema    Medication Adherence/Access:   Patient reports all prescription (I.e. omeprazole, metformin, etc.) and supplements are taken in the morning when she wakes up.   Patient goes to bed between midnight-2:30 am then waking up 1030-12pm.  Patient reports she did miss a day of her medications and she felt wide awake.    Nausea: patient has not been feeling well for over a week. Patient's symptoms initially started with vomiting and diarrhea. Then patient had some constipation. Patient is now feeling pressure in her abdomen. Patient did have a bowel movement 2 days ago and patient report she had to strain, bowel looked to be soft. Patient has been feeling nauseated and is afraid to eat or drink anything. Patient did eat breakfast but continued to feel nauseated. Patient reports the nausea comes and goes. Patient is wondering if this could be due to abrupt discontinuation of gabapentin. Patient does report that Gas-X does help a little.     Neuropathy: current therapy includes diclofenac gel but patient hasn't used yet. She is not sure she wants to use this because of the  ingredients listed. Patient reports her edema got better when she stopped taking the gabapentin. Patient reports the neuropathy pain is unchanged from discontinuing the gabapentin.    Depression:  Current medications include: Bupropion 300mg XL once daily (dose increased 9/29). Patient still feels snappy.  PHQ-9 SCORE 12/11/2019 9/1/2020 9/29/2020   PHQ-9 Total Score - - -   PHQ-9 Total Score 9 7 12     Today's Vitals: There were no vitals taken for this visit.-phone visit    ASSESSMENT:                            Medication Adherence: fair    Nausea: Needs further evaluation. Patient reports she thinks this may be due to gabapentin causing a viral infection. Further evaluation may be needed. Will discuss with Dr. Hunter.    Neuropathy: Stable. Will discuss diclofenac use at follow up.    Depression: Dose of bupropion recently increased. Continue to monitor for effectiveness.    PLAN:                            I spent 40 minutes with this patient today. All changes were made via collaborative practice agreement with Lakia Hunter. A copy of the visit note was provided to the patient's primary care provider.    Will follow up in 3 weeks.    The patient was given a summary of these recommendations as an after visit summary via [employer].     Maritza Woodall, Pharm.D, BCACP  Medication Therapy Management Pharmacist

## 2020-10-06 NOTE — PATIENT INSTRUCTIONS
Recommendations from today's MTM visit:                                                      No medication changes made today    It was great to speak with you today.  I value your experience and would be very thankful for your time with providing feedback on our clinic survey. You may receive a survey via email or text message in the next few days.     Next MTM visit: 1 month    To schedule another MTM appointment, please call the clinic directly or you may call the MTM scheduling line at [phone] or toll-free at [phone].     My Clinical Pharmacist's contact information:                                                      It was a pleasure talking with you today!  Please feel free to contact me with any questions or concerns you have.      Maritza Woodall, Pharm.D, BCACP  Medication Therapy Management Pharmacist

## 2020-10-12 ENCOUNTER — OFFICE VISIT (OUTPATIENT)
Dept: PEDIATRICS | Facility: CLINIC | Age: 75
End: 2020-10-12
Payer: COMMERCIAL

## 2020-10-12 VITALS
OXYGEN SATURATION: 96 % | TEMPERATURE: 97.7 F | HEART RATE: 62 BPM | SYSTOLIC BLOOD PRESSURE: 116 MMHG | BODY MASS INDEX: 38.87 KG/M2 | WEIGHT: 217.7 LBS | DIASTOLIC BLOOD PRESSURE: 72 MMHG

## 2020-10-12 DIAGNOSIS — R10.13 ABDOMINAL PAIN, EPIGASTRIC: Primary | ICD-10-CM

## 2020-10-12 DIAGNOSIS — I10 ESSENTIAL HYPERTENSION WITH GOAL BLOOD PRESSURE LESS THAN 140/90: Chronic | ICD-10-CM

## 2020-10-12 DIAGNOSIS — E11.22 TYPE 2 DIABETES MELLITUS WITH STAGE 5 CHRONIC KIDNEY DISEASE NOT ON CHRONIC DIALYSIS, WITHOUT LONG-TERM CURRENT USE OF INSULIN (H): ICD-10-CM

## 2020-10-12 DIAGNOSIS — R63.4 WEIGHT LOSS: ICD-10-CM

## 2020-10-12 DIAGNOSIS — N18.5 TYPE 2 DIABETES MELLITUS WITH STAGE 5 CHRONIC KIDNEY DISEASE NOT ON CHRONIC DIALYSIS, WITHOUT LONG-TERM CURRENT USE OF INSULIN (H): ICD-10-CM

## 2020-10-12 DIAGNOSIS — N18.30 STAGE 3 CHRONIC KIDNEY DISEASE, UNSPECIFIED WHETHER STAGE 3A OR 3B CKD (H): ICD-10-CM

## 2020-10-12 DIAGNOSIS — C18.9 MALIGNANT NEOPLASM OF COLON, UNSPECIFIED PART OF COLON (H): Chronic | ICD-10-CM

## 2020-10-12 LAB
ALBUMIN SERPL-MCNC: 3.8 G/DL (ref 3.4–5)
ALBUMIN UR-MCNC: NEGATIVE MG/DL
ALP SERPL-CCNC: 88 U/L (ref 40–150)
ALT SERPL W P-5'-P-CCNC: 41 U/L (ref 0–50)
ANION GAP SERPL CALCULATED.3IONS-SCNC: 3 MMOL/L (ref 3–14)
APPEARANCE UR: ABNORMAL
AST SERPL W P-5'-P-CCNC: 13 U/L (ref 0–45)
BACTERIA #/AREA URNS HPF: ABNORMAL /HPF
BASOPHILS # BLD AUTO: 0.2 10E9/L (ref 0–0.2)
BASOPHILS NFR BLD AUTO: 2 %
BILIRUB SERPL-MCNC: 0.3 MG/DL (ref 0.2–1.3)
BILIRUB UR QL STRIP: NEGATIVE
BUN SERPL-MCNC: 20 MG/DL (ref 7–30)
CALCIUM SERPL-MCNC: 9.6 MG/DL (ref 8.5–10.1)
CHLORIDE SERPL-SCNC: 107 MMOL/L (ref 94–109)
CO2 SERPL-SCNC: 30 MMOL/L (ref 20–32)
COLOR UR AUTO: YELLOW
CREAT SERPL-MCNC: 1.22 MG/DL (ref 0.52–1.04)
DIFFERENTIAL METHOD BLD: NORMAL
EOSINOPHIL # BLD AUTO: 0.3 10E9/L (ref 0–0.7)
EOSINOPHIL NFR BLD AUTO: 3 %
ERYTHROCYTE [DISTWIDTH] IN BLOOD BY AUTOMATED COUNT: 12.6 % (ref 10–15)
ERYTHROCYTE [SEDIMENTATION RATE] IN BLOOD BY WESTERGREN METHOD: 15 MM/H (ref 0–30)
GFR SERPL CREATININE-BSD FRML MDRD: 43 ML/MIN/{1.73_M2}
GLUCOSE SERPL-MCNC: 138 MG/DL (ref 70–99)
GLUCOSE UR STRIP-MCNC: NEGATIVE MG/DL
HCT VFR BLD AUTO: 40.9 % (ref 35–47)
HGB BLD-MCNC: 13.1 G/DL (ref 11.7–15.7)
HGB UR QL STRIP: NEGATIVE
KETONES UR STRIP-MCNC: NEGATIVE MG/DL
LEUKOCYTE ESTERASE UR QL STRIP: ABNORMAL
LYMPHOCYTES # BLD AUTO: 2.1 10E9/L (ref 0.8–5.3)
LYMPHOCYTES NFR BLD AUTO: 24 %
MCH RBC QN AUTO: 29.8 PG (ref 26.5–33)
MCHC RBC AUTO-ENTMCNC: 32 G/DL (ref 31.5–36.5)
MCV RBC AUTO: 93 FL (ref 78–100)
MONOCYTES # BLD AUTO: 0.8 10E9/L (ref 0–1.3)
MONOCYTES NFR BLD AUTO: 9 %
NEUTROPHILS # BLD AUTO: 5.5 10E9/L (ref 1.6–8.3)
NEUTROPHILS NFR BLD AUTO: 62 %
NITRATE UR QL: NEGATIVE
NON-SQ EPI CELLS #/AREA URNS LPF: ABNORMAL /LPF
PH UR STRIP: 6.5 PH (ref 5–7)
PLATELET # BLD AUTO: 282 10E9/L (ref 150–450)
PLATELET # BLD EST: NORMAL 10*3/UL
POTASSIUM SERPL-SCNC: 4.8 MMOL/L (ref 3.4–5.3)
PROT SERPL-MCNC: 7.6 G/DL (ref 6.8–8.8)
RBC # BLD AUTO: 4.4 10E12/L (ref 3.8–5.2)
RBC #/AREA URNS AUTO: ABNORMAL /HPF
RBC MORPH BLD: NORMAL
SODIUM SERPL-SCNC: 140 MMOL/L (ref 133–144)
SOURCE: ABNORMAL
SP GR UR STRIP: 1.02 (ref 1–1.03)
UROBILINOGEN UR STRIP-MCNC: NORMAL MG/DL (ref 0–2)
WBC # BLD AUTO: 8.9 10E9/L (ref 4–11)
WBC #/AREA URNS AUTO: ABNORMAL /HPF

## 2020-10-12 PROCEDURE — 85652 RBC SED RATE AUTOMATED: CPT | Performed by: INTERNAL MEDICINE

## 2020-10-12 PROCEDURE — 36415 COLL VENOUS BLD VENIPUNCTURE: CPT | Performed by: INTERNAL MEDICINE

## 2020-10-12 PROCEDURE — 99214 OFFICE O/P EST MOD 30 MIN: CPT | Performed by: INTERNAL MEDICINE

## 2020-10-12 PROCEDURE — 87086 URINE CULTURE/COLONY COUNT: CPT | Performed by: INTERNAL MEDICINE

## 2020-10-12 PROCEDURE — 81001 URINALYSIS AUTO W/SCOPE: CPT | Performed by: INTERNAL MEDICINE

## 2020-10-12 PROCEDURE — 80053 COMPREHEN METABOLIC PANEL: CPT | Performed by: INTERNAL MEDICINE

## 2020-10-12 PROCEDURE — 85025 COMPLETE CBC W/AUTO DIFF WBC: CPT | Performed by: INTERNAL MEDICINE

## 2020-10-12 RX ORDER — SUCRALFATE 1 G/1
1 TABLET ORAL 4 TIMES DAILY
Qty: 120 TABLET | Refills: 0 | Status: SHIPPED | OUTPATIENT
Start: 2020-10-12 | End: 2020-11-04

## 2020-10-12 NOTE — PROGRESS NOTES
Subjective     Karen Donis is a 75 year old female who presents to clinic today for the following health issues:    HPI       75-year-old lady with history of stage III chronic kidney disease, type 2 diabetes mellitus, essential hypertension and hyperlipidemia comes in stating that between 2 to 3 weeks she has had abdominal mostly burning pain pointing to the epigastric and mid abdomen.  She has not been eating eating well.  Mostly soups and some Cheerios with coconut milk.  She has lost weight.  She belches a lot and feels gassier distended at times.  No fever or chills.  She did vomit once last weekend and had green bile.  No change in her bowel habits.  No rectal bleeding no melanotic stools.  No chest pain or shortness of breath.  She has a history of colon cancer for which she had colon resection in 2003.  No evidence of recurrence since that episode.  She currently is taking omeprazole 40 mg daily.     Abdominal/Flank Pain  Onset/Duration:  Last month for 2 days the symptoms started then everyday this month  Description:   Character: Sharp, Burning and pressure  Location: epigastric region left lower quadrant  Radiation: Sternum down to lower pelvic region  Intensity: off/on severe  Progression of Symptoms:  worsening and intermittent  Accompanying Signs & Symptoms:  Fever/Chills: no  Gas/Bloating: YES, along with increased belching  Nausea: YES, constant  Vomitting: YES  Diarrhea: No but longer stools than normal, brown color  Constipation: no  Dysuria or Hematuria: no, but urine color is a little more orange  History:   Trauma: no  Previous similar pain: no  Previous tests done: none  Precipitating factors:   Does the pain change with:     Food: Sometimes her stomach felt better after eating but not all the time    Bowel Movement: YES but the abdominal pressure is still there    Urination: no   Other factors:  no  Therapies tried and outcome: Tums, gas-X  No LMP recorded. Patient is  premenopausal.          Review of Systems   Constitutional, HEENT, cardiovascular, pulmonary, GI, , musculoskeletal, neuro, skin, endocrine and psych systems are negative, except as otherwise noted.      Objective    There were no vitals taken for this visit.  There is no height or weight on file to calculate BMI.  Physical Exam   GENERAL: healthy, alert and no distress  NECK: no adenopathy, no asymmetry, masses, or scars and thyroid normal to palpation  RESP: lungs clear to auscultation - no rales, rhonchi or wheezes  CV: regular rate and rhythm, normal S1 S2, no S3 or S4, no murmur, click or rub, no peripheral edema and peripheral pulses strong  ABDOMEN: soft, nontender, without hepatosplenomegaly or masses, tenderness epigastric, RLQ and right flank, no organomegaly or masses, bowel sounds normal and no palpable or pulsatile masses  MS: no gross musculoskeletal defects noted, no edema    Results for orders placed or performed in visit on 10/12/20 (from the past 24 hour(s))   CBC with platelets differential   Result Value Ref Range    WBC 8.9 4.0 - 11.0 10e9/L    RBC Count 4.40 3.8 - 5.2 10e12/L    Hemoglobin 13.1 11.7 - 15.7 g/dL    Hematocrit 40.9 35.0 - 47.0 %    MCV 93 78 - 100 fl    MCH 29.8 26.5 - 33.0 pg    MCHC 32.0 31.5 - 36.5 g/dL    RDW 12.6 10.0 - 15.0 %    Platelet Count 282 150 - 450 10e9/L    % Neutrophils 62.0 %    % Lymphocytes 24.0 %    % Monocytes 9.0 %    % Eosinophils 3.0 %    % Basophils 2.0 %    Absolute Neutrophil 5.5 1.6 - 8.3 10e9/L    Absolute Lymphocytes 2.1 0.8 - 5.3 10e9/L    Absolute Monocytes 0.8 0.0 - 1.3 10e9/L    Absolute Eosinophils 0.3 0.0 - 0.7 10e9/L    Absolute Basophils 0.2 0.0 - 0.2 10e9/L    RBC Morphology Normal     Platelet Estimate       Automated count confirmed.  Platelet morphology is normal.    Diff Method Manual Differential    Comprehensive metabolic panel   Result Value Ref Range    Sodium 140 133 - 144 mmol/L    Potassium 4.8 3.4 - 5.3 mmol/L    Chloride 107  94 - 109 mmol/L    Carbon Dioxide 30 20 - 32 mmol/L    Anion Gap 3 3 - 14 mmol/L    Glucose 138 (H) 70 - 99 mg/dL    Urea Nitrogen 20 7 - 30 mg/dL    Creatinine 1.22 (H) 0.52 - 1.04 mg/dL    GFR Estimate 43 (L) >60 mL/min/[1.73_m2]    GFR Estimate If Black 49 (L) >60 mL/min/[1.73_m2]    Calcium 9.6 8.5 - 10.1 mg/dL    Bilirubin Total 0.3 0.2 - 1.3 mg/dL    Albumin 3.8 3.4 - 5.0 g/dL    Protein Total 7.6 6.8 - 8.8 g/dL    Alkaline Phosphatase 88 40 - 150 U/L    ALT 41 0 - 50 U/L    AST 13 0 - 45 U/L   Erythrocyte sedimentation rate auto   Result Value Ref Range    Sed Rate 15 0 - 30 mm/h   UA with Microscopic reflex to Culture (Luz Pederson; [facility])    Specimen: Midstream Urine   Result Value Ref Range    Color Urine Yellow     Appearance Urine Slightly Cloudy     Glucose Urine Negative NEG^Negative mg/dL    Bilirubin Urine Negative NEG^Negative    Ketones Urine Negative NEG^Negative mg/dL    Specific Gravity Urine 1.018 1.003 - 1.035    Blood Urine Negative NEG^Negative    pH Urine 6.5 5.0 - 7.0 pH    Protein Albumin Urine Negative NEG^Negative mg/dL    Urobilinogen mg/dL Normal 0.0 - 2.0 mg/dL    Nitrite Urine Negative NEG^Negative    Leukocyte Esterase Urine Large (A) NEG^Negative    Source Midstream Urine     WBC Urine 0 - 5 OTO5^0 - 5 /HPF    RBC Urine O - 2 OTO2^O - 2 /HPF    Bacteria Urine Moderate (A) NEG^Negative /HPF    Squamous Epithelial /LPF Urine Moderate (A) FEW^Few /LPF           Assessment & Plan     1.  Abdominal pain for the past 2 to 3 weeks mostly involving the epigastric and midabdomen but on exam she had tenderness in the right flank and right lower quadrant.  Lab performed today including CMP CBC UA sed rate were overall good except for mildly elevated creatinine which could be due to poor oral intake.  To further investigate this symptom recommend ultrasound of the abdomen and will get back to her with the report in the meantime advised to continue with omeprazole 40 mg a day and  added Carafate 1 g half an hour before meals and at bedtime.  2.  Abnormal weight loss.  According to our scale she is down 9 pounds since in March 2020.  Could be related to #1.  She thinks it is.  3.  Chronic kidney disease stage III with some evidence of prerenal azotemia with both creatinine and albumin elevated from baseline perhaps because of poor oral intake so I recommend stopping hydrochlorothiazide.  4.  Malignant neoplasm of the colon treated with colon resection in 2003 no evidence of recurrence.  5.  Type 2 diabetes mellitus.  Advised to cut back metformin from 1500 mg once a day to 1000 mg once a day due to poor oral intake and recent weight loss.  Continue monitoring blood sugar.  Blood sugar today afternoon was not bad as stated above.  6.  Essential hypertension with blood pressure normal.  As mentioned above I told her to hold hydrochlorothiazide but continue with losartan.          Terry Nielson MD  [facility]

## 2020-10-13 LAB
BACTERIA SPEC CULT: NORMAL
BACTERIA SPEC CULT: NORMAL
SPECIMEN SOURCE: NORMAL

## 2020-10-20 ENCOUNTER — ANCILLARY PROCEDURE (OUTPATIENT)
Dept: ULTRASOUND IMAGING | Facility: CLINIC | Age: 75
End: 2020-10-20
Attending: INTERNAL MEDICINE
Payer: COMMERCIAL

## 2020-10-20 DIAGNOSIS — R10.13 ABDOMINAL PAIN, EPIGASTRIC: ICD-10-CM

## 2020-10-20 DIAGNOSIS — R63.4 WEIGHT LOSS: ICD-10-CM

## 2020-10-20 PROCEDURE — 76700 US EXAM ABDOM COMPLETE: CPT | Mod: TC

## 2020-10-21 ENCOUNTER — VIRTUAL VISIT (OUTPATIENT)
Dept: PEDIATRICS | Facility: CLINIC | Age: 75
End: 2020-10-21
Payer: COMMERCIAL

## 2020-10-21 DIAGNOSIS — C78.7 SECONDARY MALIGNANT NEOPLASM OF LIVER (H): Primary | ICD-10-CM

## 2020-10-21 DIAGNOSIS — F33.41 RECURRENT MAJOR DEPRESSIVE DISORDER, IN PARTIAL REMISSION (H): Primary | ICD-10-CM

## 2020-10-21 DIAGNOSIS — E11.42 DIABETIC POLYNEUROPATHY ASSOCIATED WITH TYPE 2 DIABETES MELLITUS (H): ICD-10-CM

## 2020-10-21 DIAGNOSIS — I10 ESSENTIAL HYPERTENSION WITH GOAL BLOOD PRESSURE LESS THAN 140/90: Chronic | ICD-10-CM

## 2020-10-21 DIAGNOSIS — F41.1 GAD (GENERALIZED ANXIETY DISORDER): ICD-10-CM

## 2020-10-21 DIAGNOSIS — E66.01 MORBID OBESITY DUE TO EXCESS CALORIES (H): Chronic | ICD-10-CM

## 2020-10-21 DIAGNOSIS — K76.0 NAFL (NONALCOHOLIC FATTY LIVER): ICD-10-CM

## 2020-10-21 DIAGNOSIS — F98.8 ATTENTION DEFICIT DISORDER (ADD) WITHOUT HYPERACTIVITY: Chronic | ICD-10-CM

## 2020-10-21 PROCEDURE — 99214 OFFICE O/P EST MOD 30 MIN: CPT | Mod: 95 | Performed by: INTERNAL MEDICINE

## 2020-10-21 PROCEDURE — 96127 BRIEF EMOTIONAL/BEHAV ASSMT: CPT | Performed by: INTERNAL MEDICINE

## 2020-10-21 RX ORDER — CITALOPRAM HYDROBROMIDE 10 MG/1
10 TABLET ORAL DAILY
Qty: 30 TABLET | Refills: 0 | Status: SHIPPED | OUTPATIENT
Start: 2020-10-21 | End: 2020-11-04

## 2020-10-21 ASSESSMENT — PATIENT HEALTH QUESTIONNAIRE - PHQ9: SUM OF ALL RESPONSES TO PHQ QUESTIONS 1-9: 16

## 2020-10-21 NOTE — PROGRESS NOTES
"Karen Donis is a 75 year old female who is being evaluated via a billable telephone visit.      The patient has been notified of following:     \"This telephone visit will be conducted via a call between you and your physician/provider. We have found that certain health care needs can be provided without the need for a physical exam.  This service lets us provide the care you need with a short phone conversation.  If a prescription is necessary we can send it directly to your pharmacy.  If lab work is needed we can place an order for that and you can then stop by our lab to have the test done at a later time.    Telephone visits are billed at different rates depending on your insurance coverage. During this emergency period, for some insurers they may be billed the same as an in-person visit.  Please reach out to your insurance provider with any questions.    If during the course of the call the physician/provider feels a telephone visit is not appropriate, you will not be charged for this service.\"    Patient has given verbal consent for Telephone visit?  Yes    What phone number would you like to be contacted at?[phone]     How would you like to obtain your AVS? Mail a copy    Subjective     Karen Donis is a 75 year old female who presents via phone visit today for the following health issues:    HPI     Depression Followup    How are you doing with your depression since your last visit? No change, still no motivation    Are you having other symptoms that might be associated with depression? Yes.     Have you had a significant life event?  OTHER: brother just out of ICU from another stroke. his doctor didn't think he was recuberable.      Are you feeling anxious or having panic attacks?   Yes:  worries about her brother.     Do you have any concerns with your use of alcohol or other drugs? No    Social History     Tobacco Use     Smoking status: Former Smoker     Quit date: 12/12/1982     Years since " quittin.9     Smokeless tobacco: Never Used   Substance Use Topics     Alcohol use: No     Drug use: No     PHQ 2020 2020 10/21/2020   PHQ-9 Total Score 7 12 16   Q9: Thoughts of better off dead/self-harm past 2 weeks Not at all Not at all Not at all     RAGHAVENDRA-7 SCORE 2019   Total Score - - -   Total Score 0 5 11     Last PHQ-9 10/21/2020   1.  Little interest or pleasure in doing things 3   2.  Feeling down, depressed, or hopeless 3   3.  Trouble falling or staying asleep, or sleeping too much 1   4.  Feeling tired or having little energy 3   5.  Poor appetite or overeating 2   6.  Feeling bad about yourself 1   7.  Trouble concentrating 3   8.  Moving slowly or restless 0   Q9: Thoughts of better off dead/self-harm past 2 weeks 0   PHQ-9 Total Score 16   Difficulty at work, home, or with people Very difficult     In the past two weeks have you had thoughts of suicide or self-harm?  No.    Do you have concerns about your personal safety or the safety of others?   No    Suicide Assessment Five-step Evaluation and Treatment (SAFE-T)      How many servings of fruits and vegetables do you eat daily?  0-1    On average, how many sweetened beverages do you drink each day (Examples: soda, juice, sweet tea, etc.  Do NOT count diet or artificially sweetened beverages)?   0    How many days per week do you exercise enough to make your heart beat faster? 3 or less    How many minutes a day do you exercise enough to make your heart beat faster? 9 or less  How many days per week do you miss taking your medication? 2    What makes it hard for you to take your medications?  side effects        Medication Followup of Wellbutrin    Taking Medication as prescribed: yes    Side Effects:  None    Medication Helping Symptoms:  NO-still feeling down, not wanting to do much around the house.        HTN: 157/90. She quit hydrochlorothiazide due to dry skin and skin peeling.     Yesterday slipped on  the bathroom floor. Back is sore. Took tylenol for back pain.     Diabetes neuropathy: I gave her diclofenac gel. She decided not to try it. Blood sugar under 150. Has been down to the 130s     Review of Systems   Constitutional, HEENT, cardiovascular, pulmonary, gi and gu systems are negative, except as otherwise noted.       Objective          Vitals:  No vitals were obtained today due to virtual visit.    healthy, alert and no distress  PSYCH: Alert and oriented times 3; coherent speech, normal   rate and volume, able to articulate logical thoughts, able   to abstract reason, no tangential thoughts, no hallucinations   or delusions  Her affect is down and sad.   RESP: No cough, no audible wheezing, able to talk in full sentences  Remainder of exam unable to be completed due to telephone visits        Assessment/Plan:    Assessment & Plan     Karen was seen today for recheck medication.    Diagnoses and all orders for this visit:    Recurrent major depressive disorder, in partial remission (H)  -     Change duloxetine to Citalopram. Continue with Wellbutrin  - citalopram (CELEXA) 10 MG tablet; Take 1 tablet (10 mg) by mouth daily  -     MENTAL HEALTH REFERRAL  - Adult; Outpatient Treatment; Individual/Couples/Family/Group Therapy/Health Psychology[place] [facility] [place] [facility] [phone]; We will contact you to schedule the appointment or please call with any questions    RAGHAVENDRA (generalized anxiety disorder)  -     citalopram (CELEXA) 10 MG tablet; Take 1 tablet (10 mg) by mouth daily  -     MENTAL HEALTH REFERRAL  - Adult; Outpatient Treatment; Individual/Couples/Family/Group Therapy/Health Psychology[place] [facility] [place] [facility] [phone]; We will contact you to schedule the appointment or please call with any questions    Essential hypertension with goal blood pressure less than 140/90  - BP not at goal. Pt hesitated with more medication. Currently only on Losartan and Metoprolol.   - stress  may cause increase in BP. Will treat anxiety.  - recheck BP at the next visit.    Morbid obesity due to excess calories (H)  - discussed importance of weight loss. Pt was informed of NAFL diagnosis but thought I told her she didn't need to lose weight. We spent some time discussed BMI, importance of weight loss.     NAFL (nonalcoholic fatty liver)    Diabetic polyneuropathy associated with type 2 diabetes mellitus (H)  - encouraged pt to try diclofenac gel. It is topical, should have minimal systemic effects.           See Patient Instructions    Return in about 2 weeks (around 11/4/2020) for Virtual visit.    Lakia Hunter MD PhD  [facility]    Phone call duration: 24 minutes

## 2020-11-01 PROBLEM — F33.41 RECURRENT MAJOR DEPRESSIVE DISORDER, IN PARTIAL REMISSION (H): Status: ACTIVE | Noted: 2020-11-01

## 2020-11-01 PROBLEM — J44.9 COPD (CHRONIC OBSTRUCTIVE PULMONARY DISEASE) (H): Status: ACTIVE | Noted: 2020-11-01

## 2020-11-01 RX ORDER — BUPROPION HYDROCHLORIDE 300 MG/1
300 TABLET ORAL EVERY MORNING
Qty: 30 TABLET | Refills: 0 | Status: SHIPPED | OUTPATIENT
Start: 2020-11-01 | End: 2020-11-04

## 2020-11-04 ENCOUNTER — VIRTUAL VISIT (OUTPATIENT)
Dept: PEDIATRICS | Facility: CLINIC | Age: 75
End: 2020-11-04
Payer: COMMERCIAL

## 2020-11-04 DIAGNOSIS — F98.8 ATTENTION DEFICIT DISORDER (ADD) WITHOUT HYPERACTIVITY: ICD-10-CM

## 2020-11-04 DIAGNOSIS — M79.671 RIGHT FOOT PAIN: ICD-10-CM

## 2020-11-04 DIAGNOSIS — I10 HYPERTENSION GOAL BP (BLOOD PRESSURE) < 140/90: Primary | ICD-10-CM

## 2020-11-04 PROCEDURE — 99213 OFFICE O/P EST LOW 20 MIN: CPT | Mod: 95 | Performed by: INTERNAL MEDICINE

## 2020-11-04 RX ORDER — METHYLPHENIDATE HYDROCHLORIDE EXTENDED RELEASE 20 MG/1
20 TABLET ORAL EVERY MORNING
Qty: 30 TABLET | Refills: 0 | COMMUNITY
Start: 2020-11-04 | End: 2020-11-24

## 2020-11-04 RX ORDER — METOPROLOL SUCCINATE 50 MG/1
50 TABLET, EXTENDED RELEASE ORAL DAILY
Qty: 90 TABLET | Refills: 1 | Status: SHIPPED | OUTPATIENT
Start: 2020-11-04 | End: 2021-05-17

## 2020-11-04 NOTE — PROGRESS NOTES
"Karen Donis is a 75 year old female who is being evaluated via a billable telephone visit.      The patient has been notified of following:     \"This telephone visit will be conducted via a call between you and your physician/provider. We have found that certain health care needs can be provided without the need for a physical exam.  This service lets us provide the care you need with a short phone conversation.  If a prescription is necessary we can send it directly to your pharmacy.  If lab work is needed we can place an order for that and you can then stop by our lab to have the test done at a later time.    Telephone visits are billed at different rates depending on your insurance coverage. During this emergency period, for some insurers they may be billed the same as an in-person visit.  Please reach out to your insurance provider with any questions.    If during the course of the call the physician/provider feels a telephone visit is not appropriate, you will not be charged for this service.\"    Patient has given verbal consent for Telephone visit?  Yes    What phone number would you like to be contacted at? [phone]    How would you like to obtain your AVS? Mail a copy    Subjective     Karen Donis is a 75 year old female who presents via phone visit today for the following health issues:    HPI     Depression and Anxiety Follow-Up    How are you doing with your depression since your last visit? Improved     How are you doing with your anxiety since your last visit?  Improved     Are you having other symptoms that might be associated with depression or anxiety? No    Have you had a significant life event? OTHER: Brother was in ICU now better     Do you have any concerns with your use of alcohol or other drugs? No    Social History     Tobacco Use     Smoking status: Former Smoker     Quit date: 1982     Years since quittin.9     Smokeless tobacco: Never Used   Substance Use Topics     " Alcohol use: No     Drug use: No     PHQ 9/1/2020 9/29/2020 10/21/2020   PHQ-9 Total Score 7 12 16   Q9: Thoughts of better off dead/self-harm past 2 weeks Not at all Not at all Not at all     RAGHAVENDRA-7 SCORE 2/13/2019 11/14/2019 9/29/2020   Total Score - - -   Total Score 0 5 11       Suicide Assessment Five-step Evaluation and Treatment (SAFE-T)      How many servings of fruits and vegetables do you eat daily?  2-3    On average, how many sweetened beverages do you drink each day (Examples: soda, juice, sweet tea, etc.  Do NOT count diet or artificially sweetened beverages)?   0    How many days per week do you exercise enough to make your heart beat faster? 3 or less    How many minutes a day do you exercise enough to make your heart beat faster? 9 or less  How many days per week do you miss taking your medication? 2    What makes it hard for you to take your medications?  remembering to take      reported sucralfate helped her epigastric pain. She felt much better and stopped taking it.     She is feeling much better from depression standpoint. Her brother Vinay talked with her other brother Charli who decided that he wanted a trach put in and a feeding tube as needed. This has lift a huge weight from her.     She quit taking wellubtrin when it ran out a few days ago. She has had some shakiness for sometime. She wanted lay off some meds and see. She got the prescription of Citalopram on 10/30/2020. She decided not to take it.     She doesn't think wellbutrin is helping her ADHD. She wants to go back on Methylphenidate. She has more difficulty to focus, frustrating.     She thinks she may be allergic to hydrochlorothiazide. She quit taking it. She is still on Losartan 100 mg, Metoprolol 25 mg. Today, just now during the phone visit, she took BP: 144/87, HR 64    She takes a blood sugar support dietary support. Glucose.     Her right foot feels a bone is broken. Had it xrayed long time away, told that she had an extra  "bone on the side of the foot. The pain is on the \"right side\" of the right foot in the mid foot area. Bothers her when she walks for 2 months. Would like to get it xrayed again.         Objective          Vitals:  No vitals were obtained today due to virtual visit.    healthy, alert and no distress, changing subjects frequently. Disorganized thoughts.   PSYCH: Alert and oriented times 3; coherent speech, normal   rate and volume, able to articulate logical thoughts, able   to abstract reason, no tangential thoughts, no hallucinations   or delusions  Her affect is normal  RESP: No cough, no audible wheezing, able to talk in full sentences  Remainder of exam unable to be completed due to telephone visits            Assessment/Plan:    Assessment & Plan     Karen was seen today for depression and anxiety.    Diagnoses and all orders for this visit:    Right foot pain  -     XR Foot Right G/E 3 Views; Future      Hypertension goal BP (blood pressure) < 140/90  -     metoprolol succinate ER (TOPROL-XL) 50 MG 24 hr tablet; Take 1 tablet (50 mg) by mouth daily    Attention deficit disorder (ADD) without hyperactivity   - restart methyphenidate. She still has a bottle of this. She will use her supply.           See Patient Instructions    Return in about 2 weeks (around 11/18/2020) for Virtual visit.    Lakia Hunter MD PhD  [facility]    Phone call duration:  23 minutes              "

## 2020-11-10 ENCOUNTER — VIRTUAL VISIT (OUTPATIENT)
Dept: BEHAVIORAL HEALTH | Facility: CLINIC | Age: 75
End: 2020-11-10
Attending: INTERNAL MEDICINE
Payer: COMMERCIAL

## 2020-11-10 DIAGNOSIS — F43.23 ADJUSTMENT DISORDER WITH MIXED ANXIETY AND DEPRESSED MOOD: Primary | ICD-10-CM

## 2020-11-10 PROCEDURE — 90791 PSYCH DIAGNOSTIC EVALUATION: CPT | Mod: 95

## 2020-11-10 ASSESSMENT — COLUMBIA-SUICIDE SEVERITY RATING SCALE - C-SSRS
ATTEMPT PAST THREE MONTHS: NO
ATTEMPT LIFETIME: NO
2. HAVE YOU ACTUALLY HAD ANY THOUGHTS OF KILLING YOURSELF?: NO
1. IN THE PAST MONTH, HAVE YOU WISHED YOU WERE DEAD OR WISHED YOU COULD GO TO SLEEP AND NOT WAKE UP?: NO
1. IN THE PAST MONTH, HAVE YOU WISHED YOU WERE DEAD OR WISHED YOU COULD GO TO SLEEP AND NOT WAKE UP?: NO
2. HAVE YOU ACTUALLY HAD ANY THOUGHTS OF KILLING YOURSELF LIFETIME?: NO

## 2020-11-10 ASSESSMENT — ANXIETY QUESTIONNAIRES
7. FEELING AFRAID AS IF SOMETHING AWFUL MIGHT HAPPEN: SEVERAL DAYS
1. FEELING NERVOUS, ANXIOUS, OR ON EDGE: SEVERAL DAYS
4. TROUBLE RELAXING: NOT AT ALL
2. NOT BEING ABLE TO STOP OR CONTROL WORRYING: MORE THAN HALF THE DAYS
3. WORRYING TOO MUCH ABOUT DIFFERENT THINGS: MORE THAN HALF THE DAYS
IF YOU CHECKED OFF ANY PROBLEMS ON THIS QUESTIONNAIRE, HOW DIFFICULT HAVE THESE PROBLEMS MADE IT FOR YOU TO DO YOUR WORK, TAKE CARE OF THINGS AT HOME, OR GET ALONG WITH OTHER PEOPLE: SOMEWHAT DIFFICULT
6. BECOMING EASILY ANNOYED OR IRRITABLE: SEVERAL DAYS
GAD7 TOTAL SCORE: 7
5. BEING SO RESTLESS THAT IT IS HARD TO SIT STILL: NOT AT ALL

## 2020-11-10 ASSESSMENT — PATIENT HEALTH QUESTIONNAIRE - PHQ9: SUM OF ALL RESPONSES TO PHQ QUESTIONS 1-9: 12

## 2020-11-10 NOTE — PROGRESS NOTES
"[facility]  Evaluator Name:  Powers Michaeljose     Credentials:  BSW, MSW Intern    PATIENT'S NAME: Karen Donis  PREFERRED NAME: \"Vi\"  PRONOUNS:     she/her/hers  MRN:   [id number]  :   1945   ACCT. NUMBER: [id number]  DATE OF SERVICE: 20  START TIME: 1pm  END TIME: 1:45pm  PREFERRED PHONE: [phone]  May we leave a program related message: Yes  SERVICE MODALITY:  Phone Visit:    The patient has been notified of the following:      \"We have found that certain health care needs can be provided without the need for a face to face visit.  This service lets us provide the care you need with a phone conversation.       I will have full access to your [place] medical record during this entire phone call.   I will be taking notes for your medical record.      Since this is like an office visit, we will bill your insurance company for this service.       There are potential benefits and risks of telephone visits (e.g. limits to patient confidentiality) that differ from in-person visits.?  Confidentiality still applies for telephone services, and nobody will record the visit.  It is important to be in a quiet, private space that is free of distractions (including cell phone or other devices) during the visit.??      If during the course of the call I believe a telephone visit is not appropriate, you will not be charged for this service\"     Consent has been obtained for this service by care team member: Yes     UNIVERSAL ADULT DIAGNOSTIC ASSESSMENT      Identifying Information:  Patient is a 75 year old, .  The pronoun use throughout this assessment reflects the patient's chosen pronoun.  Patient was referred for an assessment by self.  Patient attended the session alone.     Chief Complaint:   The reason for seeking services at this time is: to process the stressors of medical concerns in the family.  Client has been in communication with many doctors regarding her brother and the " "decision of whether or not to place him in hospice care.  The client also has recently experienced physical distress related to negative impacts of medications.  The problem(s) began about a month ago. Patient has not attempted to resolve these concerns in the past.    Social/Family History:  Patient reported they grew up in [place], [place].  They were raised by biological parents.  Parents stayed .   Patient reported that their childhood was \"pretty good.\"  [place].  Client has 2 living brothers.  Patient described their current relationships with family of origin as \"good.\"      The patient describes their cultural background as White ([place], Urdu, and Iranian).  Cultural influences and impact on patient's life structure, values, norms, and healthcare: Spiritual Beliefs: (Client feels connected to her spirituality saying that she has \"a gift of roel\".  Contextual influences on patient's health include: Individual Factors (past diagnosis of ADD, recent negative side effects of medication) and Family Factors (her two adopted children live far away--[place] and [place], her brother's current health condition).    These factors will be addressed in the Preliminary Treatment plan.  Patient identified their preferred language to be English. Patient reported they does not need the assistance of an  or other support involved in therapy.     Patient reported symptoms of ADD interfered with school activities.   Patient's highest education level was high school graduate. Patient identified the following learning problems: none reported.  Modifications will not be used to assist communication in therapy.   Patient reports they are  able to understand written materials.  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         Patient's current relationship status is  for 30 years to her " "second .   Patient identified their sexual orientation as heterosexual.  Patient reported having two child(rena). Patient identified partner and adult child as part of their support system.  Patient identified the quality of these relationships as good.      Patient's current living/housing situation involves staying in own home/apartment.  They live with her  and they report that housing is stable.     Patient is currently retired.  Patient does not identify finances as a current stressor.      Patient reported that they have not been personally involved with the legal system, with the exception of being the legal guardian for her niece.        Patient's Strengths and Limitations:  Patient identified the following strengths or resources that will help them succeed in treatment: roel / spirituality.  \"God has given me power to overcome things and fix things.\"  Things that may interfere with the patient's success in treatment include: physical health concerns, organization.      Personal and Family Medical History:   Patient does not report a family history of mental health concerns.  Patient reports family history includes Arthritis in her father; Cardiovascular in her father; Diabetes in her brother and mother; Gastrointestinal Disease in her maternal grandmother; Heart Disease in her father and maternal grandfather; Hypertension in her brother and mother; Neurologic Disorder in her brother, brother, and father; Thyroid Disease in her mother; Unknown/Adopted in her daughter and son..     Patient does report Mental Health Diagnosis and/or Treatment.  Patient Patient reported the following previous diagnoses which include(s): Depression and ADD.  Patient reported she can't remember exactly when symptoms began but was diagnosed in the 1980s.  Patient has received mental health services in the past: counseling for adjustment and relational difficulties (after her divorce).  Psychiatric Hospitalizations: " None.  Patient denies a history of civil commitment.  Currently, patient is not receiving other mental health services.  These include none.       Patient has had a physical exam to rule out medical causes for current symptoms.  Date of last physical exam was within the past year. Client was encouraged to follow up with PCP if symptoms were to develop. The patient has a [facility] Provider, who is named Lakia Hunter..  Patient reports the following current medical concerns: (recent negative side effects of medication that caused client to believe she had food poisoning for 3 weeks--these symptoms caused her to lose at least 10 lbs in a matter of weeks).  There are significant appetite / nutritional concerns / weight changes.   Patient does not report a history of head injury / trauma / cognitive impairment.      Patient reports current meds as:   No outpatient medications have been marked as taking for the 11/18/20 encounter (Appointment) with Gio Gee.       Medication Adherence:  Patient reports taking prescribed medications as prescribed.    Patient Allergies:    Allergies   Allergen Reactions     Bactrim Other (See Comments)     Burning when voiding     Belladonna Alkaloids-Opium [B & O] Blisters     Bellagril     Definity Other (See Comments)     Pain in the tailbone     Duloxetine      Profuse sweating     Ergotamine Other (See Comments)     Patient unsure of reaction     Gabapentin      leg creams, foot pain, hip pain, poor concentration, lightheadedness     Hydrochlorothiazide      Dry skin with skin peeling     Lisinopril Cough            Phenobarbital Other (See Comments)     Patient unsure of reaction     Sulfa Drugs Blisters     Tape [Adhesive Tape] Other (See Comments)     Burning feeling of the skin       Medical History:    Past Medical History:   Diagnosis Date     Colon cancer (H)      Hypertension      Shortness of breath      Sleep apnea      Thyroid disease          Current Mental  Status Exam:   Appearance:  NA    Eye Contact:  NA   Psychomotor:  NA       Gait / station:  NA  Attitude / Demeanor: Cooperative  Guarded   Speech      Rate / Production: Normal/ Responsive      Volume:  Normal  volume      Language:  intact  Mood:   Ambivalence  Affect:   Appropriate    Thought Content: Clear   Thought Process: Tangential       Associations: No loosening of associations  Insight:   Fair   Judgment:  Intact   Orientation:  All  Attention/concentration: Limited and Needs Redirection    Rating Scales:    PHQ9:    PHQ-9 SCORE 9/29/2020 10/21/2020 11/10/2020   PHQ-9 Total Score - - -   PHQ-9 Total Score 12 16 12   ;    GAD7:    RAGHAVENDRA-7 SCORE 11/14/2019 9/29/2020 11/10/2020   Total Score - - -   Total Score 5 11 7     CGI:     First:Considering your total clinical experience with this particular patient population, how severe are the patient's symptoms at this time?: 5 (11/10/2020  1:57 PM)  ;    Most recentCompared to the patient's condition at the START of treatment, this patient's condition is: 4 (11/10/2020  1:57 PM)      Substance Use:  Patient did report a family history of substance use concerns; see medical history section for details.  Patient has not received chemical dependency treatment in the past.  Patient has not ever been to detox.      Patient is not currently receiving any chemical dependency treatment. Patient reported the following problems as a result of their substance use: none.    Patient denies using alcohol.  Patient denies using tobacco.  Patient denies using marijuana.  Patient reports using caffeine.  Amount of caffeine varies depending on the day.  Patient reports using/abusing the following substance(s). Patient reported no other substance use.     CAGE- AID:  No flowsheet data found.    Substance Use: No symptoms    Based on the negative CAGE score and clinical interview there  are not indications of drug or alcohol abuse.       Significant Losses / Trauma / Abuse / Neglect  Issues:   Patient did not serve in the .  There are indications or report of significant loss, trauma, abuse or neglect issues related to: loss of people close to her and the trauma of experiencing her divorce.  Concerns for possible neglect are not present.    Safety Assessment:   Current Safety Concerns:  [place] Suicide Severity Rating Scale (Lifetime/Recent)  [place] Suicide Severity Rating (Lifetime/Recent) 11/10/2020   1. Wish to be Dead (Lifetime) No   1. Wish to be Dead (Recent) No   2. Non-Specific Active Suicidal Thoughts (Lifetime) No   2. Non-Specific Active Suicidal Thoughts (Recent) No   Actual Attempt (Lifetime) No   Actual Attempt (Past 3 Months) No   Has subject engaged in non-suicidal self-injurious behavior? (Lifetime) No   Has subject engaged in non-suicidal self-injurious behavior? (Past 3 Months) No     Patient denies current homicidal ideation and behaviors.  Patient denies current self-injurious ideation and behaviors.    Patient denied risk behaviors associated with substance use.  Patient denies any high risk behaviors associated with mental health symptoms.  Patient reports the following current concerns for their personal safety: None.    History of Safety Concerns:  Patient denied a history of homicidal ideation.     Patient denied a history of personal safety concerns.    Patient denied a history of assaultive behaviors.    Patient denied a history of sexual assault behaviors.     Patient denied a history of risk behaviors associated with substance use.  Patient denies any history of high risk behaviors associated with mental health symptoms.  Patient reports the following protective factors: spirituality, living with other people and positive relationships    Risk Plan:  See Recommendations for Safety and Risk Management Plan    Review of Symptoms per patient report:  Depression: Change in sleep, Lack of interest, Change in energy level, Difficulties concentrating, Change in  appetite, Feelings of hopelessness, Irritability and Feeling sad, down, or depressed  Ashlyn:  No Symptoms  Psychosis: No Symptoms  Anxiety: Excessive worry, Nervousness, Sleep disturbance, Poor concentration and Irritability  Panic:  No symptoms  Post Traumatic Stress Disorder:  No Symptoms   Eating Disorder: No Symptoms  ADD / ADHD:  Distractibility  Conduct Disorder: No symptoms  Autism Spectrum Disorder: No symptoms  Obsessive Compulsive Disorder: No Symptoms      Diagnostic Criteria:   A. The development of emotional or behavioral symptoms in response to an identifiable stressor(s) occurring within 3 months of the onset of the stressor(s)  B. These symptoms or behaviors are clinically significant, as evidenced by one or both of the following:       - Marked distress that is out of proportion to the severity/intensity of the stressor (with consideration for external context & culture)       - Significant impairment in social, occupational, or other important areas of functioning  C. The stress-related disturbance does not meet criteria for another disorder & is not not an exacerbation of another mental disorder  D. The symptoms do not represent normal bereavement  E. Once the stressor or its consequences have terminated, the symptoms do not persist for more than an additional 6 months       * Adjustment Disorder with Mixed Anxiety and Depressed Mood: The predominant manifestation is a combination of depression and anxiety    Functional Status:  Patient reports the following functional impairments: health maintenance, management of the household and or completion of tasks, self-care and social interactions.     WHODAS:   WHODAS 2.0 Total Score 11/10/2020   Total Score 49       Clinical Summary:  1. Reason for assessment: to process family medical stressors.  2. Psychosocial, Cultural and Contextual Factors: client lives with , client has two adopted children that live in [place] and [place], client has  been in communication with doctor's about her brother possibly being moved to hospice care, client identifies roel as important.  3. Principal DSM5 Diagnoses  (Sustained by DSM5 Criteria Listed Above):   Adjustment Disorders  309.28 (F43.23) With mixed anxiety and depressed mood.  4. Other Diagnoses that is relevant to services:   ADD.  5. Provisional Diagnosis:  Adjustment Disorders  309.28 (F43.23) With mixed anxiety and depressed mood   6. Prognosis: Expect Improvement.  7. Likely consequences of symptoms if not treated: increased anxious symptoms and feelings of distress/worry.  8. Client strengths include:  has a previous history of therapy and good support from her  .     Recommendations:     1. Plan for Safety and Risk Management:   Recommended that patient call 911 or go to the local ED should there be a change in any of these risk factors..          Report to child / adult protection services was NA.     2. Patient's identified roel as being important.  Therapist and client will continue to have conversations surrounding how roel might be incorporated into sessions.     3. Initial Treatment will focus on:    Adjustment Difficulties related to: family concerns.     4. Resources/Service Plan:    services are not indicated.   Modifications to assist communication are not indicated.   Additional disability accommodations are not indicated.      5. Collaboration:   Collaboration / coordination of treatment will be initiated with the following  support professionals: none.      6.  Referrals:   The following referral(s) will be initiated: none.    7. FIDELINA:    FIDELINA:  Discussed the general effects of drugs and alcohol on health and well-being. Provider gave patient printed information about the effects of chemical use on their health and well being.     8. Records:    were reviewed at time of assessment.   Information in this assessment was obtained from the medical record and  provided by  patient who is a fair historian.    Patient will have open access to their mental health medical record.      Eval type:  Mental Health       Provider Name/ Credentials:  SHERRY Hernandez, MSW Intern  November 10, 2020     This note has been reviewed and I agree with the plan of care. This note is co-signed by ANNE Tena, [facility], Supervisor, on: 8/9/2021

## 2020-11-11 ASSESSMENT — ANXIETY QUESTIONNAIRES: GAD7 TOTAL SCORE: 7

## 2020-11-17 ENCOUNTER — VIRTUAL VISIT (OUTPATIENT)
Dept: PHARMACY | Facility: CLINIC | Age: 75
End: 2020-11-17

## 2020-11-17 DIAGNOSIS — F32.5 MAJOR DEPRESSION IN COMPLETE REMISSION (H): Primary | ICD-10-CM

## 2020-11-17 DIAGNOSIS — R11.2 NAUSEA AND VOMITING, INTRACTABILITY OF VOMITING NOT SPECIFIED, UNSPECIFIED VOMITING TYPE: ICD-10-CM

## 2020-11-17 DIAGNOSIS — F98.8 ATTENTION DEFICIT DISORDER (ADD) WITHOUT HYPERACTIVITY: ICD-10-CM

## 2020-11-17 DIAGNOSIS — I10 ESSENTIAL HYPERTENSION WITH GOAL BLOOD PRESSURE LESS THAN 140/90: ICD-10-CM

## 2020-11-17 PROCEDURE — 99606 MTMS BY PHARM EST 15 MIN: CPT | Mod: TEL | Performed by: PHARMACIST

## 2020-11-17 PROCEDURE — 99607 MTMS BY PHARM ADDL 15 MIN: CPT | Mod: TEL | Performed by: PHARMACIST

## 2020-11-17 NOTE — PATIENT INSTRUCTIONS
Recommendations from today's MTM visit:                                                      No medication changes recommended today.     It was great to speak with you today.  I value your experience and would be very thankful for your time with providing feedback on our clinic survey. You may receive a survey via email or text message in the next few days.     Next MTM visit: 1 month    To schedule another MTM appointment, please call the clinic directly or you may call the MTM scheduling line at [phone] or toll-free at [phone].     My Clinical Pharmacist's contact information:                                                      It was a pleasure talking with you today!  Please feel free to contact me with any questions or concerns you have.      Maritza Woodall, Pharm.D, BCACP  Medication Therapy Management Pharmacist

## 2020-11-17 NOTE — PROGRESS NOTES
"MTM ENCOUNTER  SUBJECTIVE/OBJECTIVE:                           Karen Donis is a 75 year old female called for a follow-up visit. She was referred to me from Lakia Hunter.  Today's visit is a follow-up MTM visit from 10/6/2020.     Reason for visit: Blood pressure medication.    Allergies/ADRs: Reviewed in chart  Tobacco: She reports that she quit smoking about 37 years ago. She has never used smokeless tobacco.  Alcohol: none  Caffeine: [employer] sugar free cappuccino once daily-16 ounces  Activity: None  Past Medical History: Reviewed in chart    Medication Adherence/Access: no issues reported    Nausea/Abdominal Pain: current therapy includes omeprazole 40mg daily, Patient reports her nausea has been better with the addition of sucralfate. Patient has now stopped taking the sucralfate and does not report any symptoms.  Patient believes her nausea was caused by the hydrochlorothiazide she was taking. Patient is now having some pain on left side of her abdomen. Patient did take some Gas-X which helped for a little bit but then returned. Patient is wondering if this could be her fatty liver.     Neuropathy: current therapy includes diclofenac gel but patient hasn't used yet because she \"hasn't gotten around to it\". It is difficult for her to reach to her feet and is too prideful to have her  do it.  Patient reports the neuropathy hasn't been quite as bad. Patient feels when she was on bupropion, the neuropathy was worse.    Depression:  Currently not on any medications. Patient was prescribed citalopram but has not started yet. Patient isn't sure that she needs to start taking this. She feels that she is doing well. Patient has started seeing a counselor.  Patient's shakiness has resolved which may have been a side effect from the bupropion.  PHQ 9/29/2020 10/21/2020 11/10/2020   PHQ-9 Total Score 12 16 12   Q9: Thoughts of better off dead/self-harm past 2 weeks Not at all Not at all Not at all " "      Hypertension: Current medications include metoprolol XL 50mg daily (dose increased on 11/4), losartan 100mg daily.  Patient does self-monitor blood pressure. Home BP monitoring in range of 126's systolic over 70's diastolic.  Patient reports no current medication side effects.  BP Readings from Last 3 Encounters:   10/12/20 116/72   03/11/20 124/68   12/11/19 126/70     ADHD: current medications include methylphenidate er 20mg daily. Patient has restarted the methylphenidate and feels she is closer to \"normal\". Patient is getting closer to planning to do things, prior to taking she wasn't motivated to do anything. Patient feels her focus has improved. Patient reports this has not had an impact on her sleep.    Today's Vitals: There were no vitals taken for this visit.    ASSESSMENT:                            Medication Adherence: No issues identified    Nausea/Abdominal Pain: Stable. Pain likely due to gas not related to fatty liver.    Depression: Stable    Hypertension: Stable. Patient is meeting blood pressure of <140/90mmHg.    ADHD: Stable.    Neuropathy: Stable. Patient could use a lotion brush to help apply the gel. Recommend patient use the measuring card that accompanies the gel.    PLAN:                            No medication changes recommended today.    I spent 38 minutes with this patient today. All changes were made via collaborative practice agreement with Lakia Hunter. A copy of the visit note was provided to the patient's primary care provider.    Will follow up in 1 month.    The patient declined a summary of these recommendations.     Maritza Woodall, Pharm.D, [facility]  Medication Therapy Management Pharmacist      Patient consented to a telehealth visit: yes  Telemedicine Visit Details  Type of service:  Telephone visit  Start Time: 3:03 PM  End Time: 3:41 PM  Originating Location (patient location): Home  Distant Location (provider location):  [facility] [place]  Mode of " Communication:  Telephone

## 2020-11-18 ENCOUNTER — VIRTUAL VISIT (OUTPATIENT)
Dept: BEHAVIORAL HEALTH | Facility: CLINIC | Age: 75
End: 2020-11-18
Payer: COMMERCIAL

## 2020-11-18 ENCOUNTER — VIRTUAL VISIT (OUTPATIENT)
Dept: PEDIATRICS | Facility: CLINIC | Age: 75
End: 2020-11-18
Payer: COMMERCIAL

## 2020-11-18 DIAGNOSIS — F98.8 ATTENTION DEFICIT DISORDER (ADD) WITHOUT HYPERACTIVITY: ICD-10-CM

## 2020-11-18 DIAGNOSIS — F43.23 ADJUSTMENT DISORDER WITH MIXED ANXIETY AND DEPRESSED MOOD: Primary | ICD-10-CM

## 2020-11-18 DIAGNOSIS — M79.671 RIGHT FOOT PAIN: Primary | ICD-10-CM

## 2020-11-18 DIAGNOSIS — M79.674 PAIN OF TOE OF RIGHT FOOT: ICD-10-CM

## 2020-11-18 PROCEDURE — 90834 PSYTX W PT 45 MINUTES: CPT | Mod: 95

## 2020-11-18 PROCEDURE — 99213 OFFICE O/P EST LOW 20 MIN: CPT | Mod: 95 | Performed by: INTERNAL MEDICINE

## 2020-11-18 ASSESSMENT — ANXIETY QUESTIONNAIRES
1. FEELING NERVOUS, ANXIOUS, OR ON EDGE: NOT AT ALL
6. BECOMING EASILY ANNOYED OR IRRITABLE: NOT AT ALL
2. NOT BEING ABLE TO STOP OR CONTROL WORRYING: NOT AT ALL
5. BEING SO RESTLESS THAT IT IS HARD TO SIT STILL: NOT AT ALL
7. FEELING AFRAID AS IF SOMETHING AWFUL MIGHT HAPPEN: NOT AT ALL
3. WORRYING TOO MUCH ABOUT DIFFERENT THINGS: NOT AT ALL
GAD7 TOTAL SCORE: 0

## 2020-11-18 ASSESSMENT — PATIENT HEALTH QUESTIONNAIRE - PHQ9
SUM OF ALL RESPONSES TO PHQ QUESTIONS 1-9: 2
5. POOR APPETITE OR OVEREATING: NOT AT ALL

## 2020-11-18 NOTE — PROGRESS NOTES
"[facility]  Evaluator Name:  Powers Michaeljose     Credentials:  BSW, MSW Intern    PATIENT'S NAME: Karen Donis  PREFERRED NAME: \"Vi\"  PRONOUNS:     she/her/hers  MRN:   [id number]  :   1945   ACCT. NUMBER: [id number]  DATE OF SERVICE: 20  START TIME: 1pm  END TIME: 1:45pm  PREFERRED PHONE: [phone]  May we leave a program related message: Yes  SERVICE MODALITY:  Phone Visit:    The patient has been notified of the following:      \"We have found that certain health care needs can be provided without the need for a face to face visit.  This service lets us provide the care you need with a phone conversation.       I will have full access to your [facility] medical record during this entire phone call.   I will be taking notes for your medical record.      Since this is like an office visit, we will bill your insurance company for this service.       There are potential benefits and risks of telephone visits (e.g. limits to patient confidentiality) that differ from in-person visits.?  Confidentiality still applies for telephone services, and nobody will record the visit.  It is important to be in a quiet, private space that is free of distractions (including cell phone or other devices) during the visit.??      If during the course of the call I believe a telephone visit is not appropriate, you will not be charged for this service\"     Consent has been obtained for this service by care team member: Yes     UNIVERSAL ADULT DIAGNOSTIC ASSESSMENT      Identifying Information:  Patient is a 75 year old, .  The pronoun use throughout this assessment reflects the patient's chosen pronoun.  Patient was referred for an assessment by self.  Patient attended the session alone.     Chief Complaint:   The reason for seeking services at this time is: to process the stressors of medical concerns in the family.  Client has been in communication with many doctors regarding her brother and the " "decision of whether or not to place him in hospice care.  The client also has recently experienced physical distress related to negative impacts of medications.  The problem(s) began about a month ago. Patient has not attempted to resolve these concerns in the past.    Social/Family History:  Patient reported they grew up in [place], [place].  They were raised by biological parents.  Parents stayed .   Patient reported that their childhood was \"pretty good.\"  [place].  Client has 2 living brothers.  Patient described their current relationships with family of origin as \"good.\"      The patient describes their cultural background as White ([place], [place], and [place]).  Cultural influences and impact on patient's life structure, values, norms, and healthcare: Spiritual Beliefs: (Client feels connected to her spirituality saying that she has \"a gift of roel\".  Contextual influences on patient's health include: Individual Factors (past diagnosis of ADD, recent negative side effects of medication) and Family Factors (her two adopted children live far away--[place] and [place], her brother's current health condition).    These factors will be addressed in the Preliminary Treatment plan.  Patient identified their preferred language to be English. Patient reported they does not need the assistance of an  or other support involved in therapy.     Patient reported symptoms of ADD interfered with school activities.   Patient's highest education level was high school graduate. Patient identified the following learning problems: none reported.  Modifications will not be used to assist communication in therapy.   Patient reports they are  able to understand written materials.  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         Patient's current relationship status is  for 30 years to her " "second .   Patient identified their sexual orientation as heterosexual.  Patient reported having two child(rena). Patient identified partner and adult child as part of their support system.  Patient identified the quality of these relationships as good.      Patient's current living/housing situation involves staying in own home/apartment.  They live with her  and they report that housing is stable.     Patient is currently retired.  Patient does not identify finances as a current stressor.      Patient reported that they have not been personally involved with the legal system, with the exception of being the legal guardian for her niece.        Patient's Strengths and Limitations:  Patient identified the following strengths or resources that will help them succeed in treatment: roel / spirituality.  \"God has given me power to overcome things and fix things.\"  Things that may interfere with the patient's success in treatment include: physical health concerns, organization.      Personal and Family Medical History:   Patient does not report a family history of mental health concerns.  Patient reports family history includes Arthritis in her father; Cardiovascular in her father; Diabetes in her brother and mother; Gastrointestinal Disease in her maternal grandmother; Heart Disease in her father and maternal grandfather; Hypertension in her brother and mother; Neurologic Disorder in her brother, brother, and father; Thyroid Disease in her mother; Unknown/Adopted in her daughter and son..     Patient does report Mental Health Diagnosis and/or Treatment.  Patient Patient reported the following previous diagnoses which include(s): Depression and ADD.  Patient reported she can't remember exactly when symptoms began but was diagnosed in the 1980s.  Patient has received mental health services in the past: counseling for adjustment and relational difficulties (after her divorce).  Psychiatric Hospitalizations: " None.  Patient denies a history of civil commitment.  Currently, patient is not receiving other mental health services.  These include none.       Patient has had a physical exam to rule out medical causes for current symptoms.  Date of last physical exam was within the past year. Client was encouraged to follow up with PCP if symptoms were to develop. The patient has a [facility] Primary Care Provider, who is named Lakia Hunter..  Patient reports the following current medical concerns: (recent negative side effects of medication that caused client to believe she had food poisoning for 3 weeks--these symptoms caused her to lose at least 10 lbs in a matter of weeks).  There are significant appetite / nutritional concerns / weight changes.   Patient does not report a history of head injury / trauma / cognitive impairment.      Patient reports current meds as:   No outpatient medications have been marked as taking for the 11/18/20 encounter (Appointment) with Gio Gee.       Medication Adherence:  Patient reports taking prescribed medications as prescribed.    Patient Allergies:    Allergies   Allergen Reactions     Bactrim Other (See Comments)     Burning when voiding     Belladonna Alkaloids-Opium [B & O] Blisters     Bellagril     Definity Other (See Comments)     Pain in the tailbone     Duloxetine      Profuse sweating     Ergotamine Other (See Comments)     Patient unsure of reaction     Gabapentin      leg creams, foot pain, hip pain, poor concentration, lightheadedness     Hydrochlorothiazide      Dry skin with skin peeling     Lisinopril Cough            Phenobarbital Other (See Comments)     Patient unsure of reaction     Sulfa Drugs Blisters     Tape [Adhesive Tape] Other (See Comments)     Burning feeling of the skin       Medical History:    Past Medical History:   Diagnosis Date     Colon cancer (H)      Hypertension      Shortness of breath      Sleep apnea      Thyroid disease          Current Mental  Status Exam:   Appearance:  NA    Eye Contact:  NA   Psychomotor:  NA       Gait / station:  NA  Attitude / Demeanor: Cooperative  Guarded   Speech      Rate / Production: Normal/ Responsive      Volume:  Normal  volume      Language:  intact  Mood:   Ambivalence  Affect:   Appropriate    Thought Content: Clear   Thought Process: Tangential       Associations: No loosening of associations  Insight:   Fair   Judgment:  Intact   Orientation:  All  Attention/concentration: Limited and Needs Redirection    Rating Scales:    PHQ9:    PHQ-9 SCORE 9/29/2020 10/21/2020 11/10/2020   PHQ-9 Total Score - - -   PHQ-9 Total Score 12 16 12   ;    GAD7:    RAGHAVENDRA-7 SCORE 11/14/2019 9/29/2020 11/10/2020   Total Score - - -   Total Score 5 11 7     CGI:     First:Considering your total clinical experience with this particular patient population, how severe are the patient's symptoms at this time?: 5 (11/10/2020  1:57 PM)  ;    Most recentCompared to the patient's condition at the START of treatment, this patient's condition is: 4 (11/10/2020  1:57 PM)      Substance Use:  Patient did report a family history of substance use concerns; see medical history section for details.  Patient has not received chemical dependency treatment in the past.  Patient has not ever been to detox.      Patient is not currently receiving any chemical dependency treatment. Patient reported the following problems as a result of their substance use: none.    Patient denies using alcohol.  Patient denies using tobacco.  Patient denies using marijuana.  Patient reports using caffeine.  Amount of caffeine varies depending on the day.  Patient reports using/abusing the following substance(s). Patient reported no other substance use.     CAGE- AID:  No flowsheet data found.    Substance Use: No symptoms    Based on the negative CAGE score and clinical interview there  are not indications of drug or alcohol abuse.       Significant Losses / Trauma / Abuse / Neglect  Issues:   Patient did not serve in the .  There are indications or report of significant loss, trauma, abuse or neglect issues related to: loss of people close to her and the trauma of experiencing her divorce.  Concerns for possible neglect are not present.    Safety Assessment:   Current Safety Concerns:  [place] Suicide Severity Rating Scale (Lifetime/Recent)  [place] Suicide Severity Rating (Lifetime/Recent) 11/10/2020   1. Wish to be Dead (Lifetime) No   1. Wish to be Dead (Recent) No   2. Non-Specific Active Suicidal Thoughts (Lifetime) No   2. Non-Specific Active Suicidal Thoughts (Recent) No   Actual Attempt (Lifetime) No   Actual Attempt (Past 3 Months) No   Has subject engaged in non-suicidal self-injurious behavior? (Lifetime) No   Has subject engaged in non-suicidal self-injurious behavior? (Past 3 Months) No     Patient denies current homicidal ideation and behaviors.  Patient denies current self-injurious ideation and behaviors.    Patient denied risk behaviors associated with substance use.  Patient denies any high risk behaviors associated with mental health symptoms.  Patient reports the following current concerns for their personal safety: None.    History of Safety Concerns:  Patient denied a history of homicidal ideation.     Patient denied a history of personal safety concerns.    Patient denied a history of assaultive behaviors.    Patient denied a history of sexual assault behaviors.     Patient denied a history of risk behaviors associated with substance use.  Patient denies any history of high risk behaviors associated with mental health symptoms.  Patient reports the following protective factors: spirituality, living with other people and positive relationships    Risk Plan:  See Recommendations for Safety and Risk Management Plan    Review of Symptoms per patient report:  Depression: Change in sleep, Lack of interest, Change in energy level, Difficulties concentrating, Change in  appetite, Feelings of hopelessness, Irritability and Feeling sad, down, or depressed  Ashlyn:  No Symptoms  Psychosis: No Symptoms  Anxiety: Excessive worry, Nervousness, Sleep disturbance, Poor concentration and Irritability  Panic:  No symptoms  Post Traumatic Stress Disorder:  No Symptoms   Eating Disorder: No Symptoms  ADD / ADHD:  Distractibility  Conduct Disorder: No symptoms  Autism Spectrum Disorder: No symptoms  Obsessive Compulsive Disorder: No Symptoms      Diagnostic Criteria:   A. The development of emotional or behavioral symptoms in response to an identifiable stressor(s) occurring within 3 months of the onset of the stressor(s)  B. These symptoms or behaviors are clinically significant, as evidenced by one or both of the following:       - Marked distress that is out of proportion to the severity/intensity of the stressor (with consideration for external context & culture)       - Significant impairment in social, occupational, or other important areas of functioning  C. The stress-related disturbance does not meet criteria for another disorder & is not not an exacerbation of another mental disorder  D. The symptoms do not represent normal bereavement  E. Once the stressor or its consequences have terminated, the symptoms do not persist for more than an additional 6 months       * Adjustment Disorder with Mixed Anxiety and Depressed Mood: The predominant manifestation is a combination of depression and anxiety    Functional Status:  Patient reports the following functional impairments: health maintenance, management of the household and or completion of tasks, self-care and social interactions.     WHODAS:   WHODAS 2.0 Total Score 11/10/2020   Total Score 49       Clinical Summary:  1. Reason for assessment: to process family medical stressors.  2. Psychosocial, Cultural and Contextual Factors: client lives with , client has two adopted children that live in [place] and [place], client has  been in communication with doctor's about her brother possibly being moved to hospice care, client identifies roel as important.  3. Principal DSM5 Diagnoses  (Sustained by DSM5 Criteria Listed Above):   Adjustment Disorders  309.28 (F43.23) With mixed anxiety and depressed mood.  4. Other Diagnoses that is relevant to services:   ADD.  5. Provisional Diagnosis:  Adjustment Disorders  309.28 (F43.23) With mixed anxiety and depressed mood   6. Prognosis: Expect Improvement.  7. Likely consequences of symptoms if not treated: increased anxious symptoms and feelings of distress/worry.  8. Client strengths include:  has a previous history of therapy and good support from her  .     Recommendations:     1. Plan for Safety and Risk Management:   Recommended that patient call 911 or go to the local ED should there be a change in any of these risk factors..          Report to child / adult protection services was NA.     2. Patient's identified roel as being important.  Therapist and client will continue to have conversations surrounding how roel might be incorporated into sessions.     3. Initial Treatment will focus on:    Adjustment Difficulties related to: family concerns.     4. Resources/Service Plan:    services are not indicated.   Modifications to assist communication are not indicated.   Additional disability accommodations are not indicated.      5. Collaboration:   Collaboration / coordination of treatment will be initiated with the following  support professionals: none.      6.  Referrals:   The following referral(s) will be initiated: none.    7. FIDELINA:    FIDELINA:  Discussed the general effects of drugs and alcohol on health and well-being. Provider gave patient printed information about the effects of chemical use on their health and well being.     8. Records:    were reviewed at time of assessment.   Information in this assessment was obtained from the medical record and  provided by  "patient who is a fair historian.    Patient will have open access to their mental health medical record.      Eval type:  Mental Health    Provider Name/ Credentials:  SHERRY Hernandez, MSW Intern  2020                       Progress Note - Initial Visit    Client Name:  Karen Donis Date: 2020         Service Type: Individual     Visit Start Time: 1pm  Visit End Time: 1:50pm    Visit #: 2    Attendees: Client attended alone    Service Modality:  Phone Visit:      Provider verified identity through the following two step process.  Patient provided:  Patient     The patient has been notified of the following:      \"We have found that certain health care needs can be provided without the need for a face to face visit.  This service lets us provide the care you need with a phone conversation.       I will have full access to your [facility] medical record during this entire phone call.   I will be taking notes for your medical record.      Since this is like an office visit, we will bill your insurance company for this service.       There are potential benefits and risks of telephone visits (e.g. limits to patient confidentiality) that differ from in-person visits.?  Confidentiality still applies for telephone services, and nobody will record the visit.  It is important to be in a quiet, private space that is free of distractions (including cell phone or other devices) during the visit.??      If during the course of the call I believe a telephone visit is not appropriate, you will not be charged for this service\"     Consent has been obtained for this service by care team member: Yes        DATA:   Interactive Complexity: No   Crisis: No     Presenting Concerns/  Current Stressors:   Today client and therapist completed the diagnostic assessment.  DA was unable to be completed in the first session due to time constraints.        ASSESSMENT:  Mental Status Assessment:  Appearance:   NA   Eye " Contact:   NA   Psychomotor Behavior: NA   Attitude:   Friendly Suspicious   Orientation:   All  Speech   Rate / Production: Normal/ Responsive   Volume:  Normal   Mood:    Normal  Affect:    Appropriate   Thought Content:  Clear   Thought Form:  Coherent   Insight:    Good  and Spiritual insight      Safety Issues and Plan for Safety and Risk Management:     [place] Suicide Severity Rating Scale (Lifetime/Recent)  [place] Suicide Severity Rating (Lifetime/Recent) 11/10/2020   1. Wish to be Dead (Lifetime) No   1. Wish to be Dead (Recent) No   2. Non-Specific Active Suicidal Thoughts (Lifetime) No   2. Non-Specific Active Suicidal Thoughts (Recent) No   Actual Attempt (Lifetime) No   Actual Attempt (Past 3 Months) No   Has subject engaged in non-suicidal self-injurious behavior? (Lifetime) No   Has subject engaged in non-suicidal self-injurious behavior? (Past 3 Months) No     Patient denies current fears or concerns for personal safety.  Patient denies current or recent suicidal ideation or behaviors.  Patient denies current or recent homicidal ideation or behaviors.  Patient denies current or recent self injurious behavior or ideation.  Patient denies other safety concerns.  Recommended that patient call 911 or go to the local ED should there be a change in any of these risk factors.     Diagnostic Criteria:  A. The development of emotional or behavioral symptoms in response to an identifiable stressor(s) occurring within 3 months of the onset of the stressor(s)  B. These symptoms or behaviors are clinically significant, as evidenced by one or both of the following:  C. The stress-related disturbance does not meet criteria for another disorder & is not not an exacerbation of another mental disorder  D. The symptoms do not represent normal bereavement  E. Once the stressor or its consequences have terminated, the symptoms do not persist for more than an additional 6 months       * Adjustment Disorder with Mixed  Anxiety and Depressed Mood: The predominant manifestation is a combination of depression and anxiety      DSM5 Diagnoses: (Sustained by DSM5 Criteria Listed Above)  Diagnoses: Adjustment Disorders  309.28 (F43.23) With mixed anxiety and depressed mood  Psychosocial & Contextual Factors: Client is very connected to her roel and spirituality; client's brother is receiving end-of-life care; client is dealing with stressors related the global pandemic  WHODAS 2.0 (12 item):   WHODAS 2.0 Total Score 11/10/2020   Total Score 49     Intervention:   Patient and therapist completed the DA and discussed agenda for next session to be treatment planning  Collateral Reports Completed:  Not Applicable      PLAN: (Homework, other):  1. Provider will continue Diagnostic Assessment.  Patient was given the following to do until next session:  Client should think about and identify possible goals of therapy.    2. Provider recommended the following referrals: none.      3.  Safety plan created.       SHERRY Hernandez, MSW Intern November 18, 2020

## 2020-11-18 NOTE — PROGRESS NOTES
"Karen Donis is a 75 year old female who is being evaluated via a billable telephone visit.      The patient has been notified of following:     \"This telephone visit will be conducted via a call between you and your physician/provider. We have found that certain health care needs can be provided without the need for a physical exam.  This service lets us provide the care you need with a short phone conversation.  If a prescription is necessary we can send it directly to your pharmacy.  If lab work is needed we can place an order for that and you can then stop by our lab to have the test done at a later time.    Telephone visits are billed at different rates depending on your insurance coverage. During this emergency period, for some insurers they may be billed the same as an in-person visit.  Please reach out to your insurance provider with any questions.    If during the course of the call the physician/provider feels a telephone visit is not appropriate, you will not be charged for this service.\"    Patient has given verbal consent for Telephone visit?  Yes    What phone number would you like to be contacted at? [phone]    How would you like to obtain your AVS? Mail a copy    Subjective     Karen Donis is a 75 year old female who presents via phone visit today for the following health issues:  -  HPI     Depression and Anxiety Follow-Up    How are you doing with your depression since your last visit? Improved     How are you doing with your anxiety since your last visit?  Improved     Are you having other symptoms that might be associated with depression or anxiety? No    Have you had a significant life event? No     Do you have any concerns with your use of alcohol or other drugs? No    Social History     Tobacco Use     Smoking status: Former Smoker     Quit date: 1982     Years since quittin.9     Smokeless tobacco: Never Used   Substance Use Topics     Alcohol use: No     Drug use: No "     PHQ 10/21/2020 11/10/2020 11/18/2020   PHQ-9 Total Score 16 12 2   Q9: Thoughts of better off dead/self-harm past 2 weeks Not at all Not at all Not at all     RAGHAVENDRA-7 SCORE 9/29/2020 11/10/2020 11/18/2020   Total Score - - -   Total Score 11 7 0     Last PHQ-9 11/18/2020   1.  Little interest or pleasure in doing things 0   2.  Feeling down, depressed, or hopeless 0   3.  Trouble falling or staying asleep, or sleeping too much 0   4.  Feeling tired or having little energy 0   5.  Poor appetite or overeating 0   6.  Feeling bad about yourself 1   7.  Trouble concentrating 1   8.  Moving slowly or restless 0   Q9: Thoughts of better off dead/self-harm past 2 weeks 0   PHQ-9 Total Score 2   Difficulty at work, home, or with people -     RAGHAVENDRA-7  11/18/2020   1. Feeling nervous, anxious, or on edge 0   2. Not being able to stop or control worrying 0   3. Worrying too much about different things 0   4. Trouble relaxing 0   5. Being so restless that it is hard to sit still 0   6. Becoming easily annoyed or irritable 0   7. Feeling afraid, as if something awful might happen 0   RAGHAVENDRA-7 Total Score 0   If you checked any problems, how difficult have they made it for you to do your work, take care of things at home, or get along with other people? -       Suicide Assessment Five-step Evaluation and Treatment (SAFE-T)      How many servings of fruits and vegetables do you eat daily?  2-3    On average, how many sweetened beverages do you drink each day (Examples: soda, juice, sweet tea, etc.  Do NOT count diet or artificially sweetened beverages)?   0    How many days per week do you exercise enough to make your heart beat faster? 3 or less    How many minutes a day do you exercise enough to make your heart beat faster? 9 or less    How many days per week do you miss taking your medication? 0       She thinks the methylphenidate is what made the difference. She feels she is not depressed or anxious anymore. No longer on  wellbutrin and duloxetine.     Her energy level is best as it can be expected under the circumstances. Not falling asleep during the day. She is now waking up earlier in the morning.     She thinks she has an alignment problem with her foot or hip. When she is relaxed in her recliner, her right foot, the side hits the ground first.     Right big toe is painful and black over the end of the toenail area. About 2-3 months it has been bothering her. History of losing right foot toenail over a year ago.            Objective           Vitals:  No vitals were obtained today due to virtual visit.    healthy, alert and no distress-  P-SYCH: Alert and oriented times 3; coherent speech, normal   rate and volume, able to articulate logical thoughts, able   to abstract reason, no tangential thoughts, no hallucinations   or delusions  Her affect is normal  RESP: No cough, no audible wheezing, able to talk in full sentences  Remainder of exam unable to be completed due to telephone visits            Assessment/Plan:    Assessment & Plan     Karen was seen today for depression and anxiety.    Diagnoses and all orders for this visit:    Right foot pain  -     Orthopedic & Spine  Referral; Future    Pain of toe of right foot    Attention deficit disorder (ADD) without hyperactivity              Return in about 3 months (around 2/18/2021) for diabetes follow up, Lab appointment.    Lakia Hunter MD PhD  [facility]    Phone call duration:  14 minutes

## 2020-11-18 NOTE — PATIENT INSTRUCTIONS
Make appointment(s) for:   --        Medication(s) prescribed today:    No orders of the defined types were placed in this encounter.

## 2020-11-19 ASSESSMENT — ANXIETY QUESTIONNAIRES: GAD7 TOTAL SCORE: 0

## 2020-11-20 NOTE — PROGRESS NOTES
"                                             Progress Note    Patient Name: Karen Donis  Date: 2020         Service Type: Individual      Session Start Time: 1pm  Session End Time: 1:50pm     Session Length: 50 min    Session #: 2    Attendees: Client attended alone    Service Modality:  Phone Visit:      Provider verified identity through the following two step process.  Patient provided:  Patient     The patient has been notified of the following:      \"We have found that certain health care needs can be provided without the need for a face to face visit.  This service lets us provide the care you need with a phone conversation.       I will have full access to your [place] medical record during this entire phone call.   I will be taking notes for your medical record.      Since this is like an office visit, we will bill your insurance company for this service.       There are potential benefits and risks of telephone visits (e.g. limits to patient confidentiality) that differ from in-person visits.?  Confidentiality still applies for telephone services, and nobody will record the visit.  It is important to be in a quiet, private space that is free of distractions (including cell phone or other devices) during the visit.??      If during the course of the call I believe a telephone visit is not appropriate, you will not be charged for this service\"     Consent has been obtained for this service by care team member: Yes        DATA  Interactive Complexity: No  Crisis: No       Progress Since Last Session (Related to Symptoms / Goals / Homework):   Symptoms: No change feeling anxious symptoms related to stressors     Current / Ongoing Stressors and Concerns:   Today, therapist finished diagnostic assessment with client.  DA was unable to be completed during the first session due to time constraints.     Treatment Objective(s) Addressed in This Session:   Completed diagnostic " assessment     Intervention:   Diagnostic Assessment        ASSESSMENT: Current Emotional / Mental Status (status of significant symptoms):   Risk status (Self / Other harm or suicidal ideation)   Patient denies current fears or concerns for personal safety.   Patient denies current or recent suicidal ideation or behaviors.   Patient denies current or recent homicidal ideation or behaviors.   Patient denies current or recent self injurious behavior or ideation.   Patient denies other safety concerns.   Patient reports there has been no change in risk factors since their last session.     Patient reports there has been no change in protective factors since their last session.     Recommended that patient call 911 or go to the local ED should there be a change in any of these risk factors.     Appearance:   Appropriate  NA    Eye Contact:   NA    Psychomotor Behavior: Normal  NA    Attitude:   Friendly Suspicious    Orientation:   All   Speech    Rate / Production: Normal/ Responsive Normal     Volume:  Normal    Mood:    Normal   Affect:    Appropriate    Thought Content:  Clear    Thought Form:  Coherent    Insight:    Good  and Spiritual insight     Medication Review:   No current psychiatric medications prescribed     Medication Compliance:   NA     Changes in Health Issues:   None reported     Chemical Use Review:   Substance Use: Chemical use reviewed, no active concerns identified      Tobacco Use: No current tobacco use.      Diagnosis:  1. Adjustment disorder with mixed anxiety and depressed mood        Collateral Reports Completed:   Not Applicable        SHERRY Hernandez, MSW Intern   November 20, 2020    This note has been reviewed and I agree with the plan of care. This note is co-signed by ANNE Tena, [facility], Supervisor, on: 8/9/2021

## 2020-11-23 DIAGNOSIS — F98.8 ATTENTION DEFICIT DISORDER (ADD) WITHOUT HYPERACTIVITY: ICD-10-CM

## 2020-11-23 NOTE — TELEPHONE ENCOUNTER
[facility] Call Center    Phone Message    May a detailed message be left on voicemail: yes     Reason for Call: Medication Refill Request    Has the patient contacted the pharmacy for the refill? Yes   Name of medication being requested: methylphenidate (METADATE ER) 20 MG CR tablet [[id number]] (Order [id number])    Provider who prescribed the medication: Dr Hunter  Pharmacy: [facility]  Date medication is needed: ASAP       Action Taken: Message routed to:  Primary Care p [id number]    Travel Screening: Not Applicable

## 2020-11-24 ENCOUNTER — VIRTUAL VISIT (OUTPATIENT)
Dept: BEHAVIORAL HEALTH | Facility: CLINIC | Age: 75
End: 2020-11-24
Payer: COMMERCIAL

## 2020-11-24 DIAGNOSIS — F43.23 ADJUSTMENT DISORDER WITH MIXED ANXIETY AND DEPRESSED MOOD: Primary | ICD-10-CM

## 2020-11-24 PROCEDURE — 90834 PSYTX W PT 45 MINUTES: CPT | Mod: 95

## 2020-11-24 RX ORDER — METHYLPHENIDATE HYDROCHLORIDE EXTENDED RELEASE 20 MG/1
20 TABLET ORAL EVERY MORNING
Qty: 30 TABLET | Refills: 0 | Status: SHIPPED | OUTPATIENT
Start: 2020-11-24 | End: 2020-12-22

## 2020-11-24 NOTE — PROGRESS NOTES
"                                             Progress Note    Patient Name: Karen Donis  Date: 2020         Service Type: Individual      Session Start Time: 2pm  Session End Time: 2:50pm     Session Length: 50 min    Session #: 3    Attendees: Client attended alone    Service Modality:  Phone Visit:      Provider verified identity through the following two step process.  Patient provided:  Patient     The patient has been notified of the following:      \"We have found that certain health care needs can be provided without the need for a face to face visit.  This service lets us provide the care you need with a phone conversation.       I will have full access to your [place] medical record during this entire phone call.   I will be taking notes for your medical record.      Since this is like an office visit, we will bill your insurance company for this service.       There are potential benefits and risks of telephone visits (e.g. limits to patient confidentiality) that differ from in-person visits.?  Confidentiality still applies for telephone services, and nobody will record the visit.  It is important to be in a quiet, private space that is free of distractions (including cell phone or other devices) during the visit.??      If during the course of the call I believe a telephone visit is not appropriate, you will not be charged for this service\"     Consent has been obtained for this service by care team member: Yes        DATA  Interactive Complexity: No  Crisis: No       Progress Since Last Session (Related to Symptoms / Goals / Homework):   Symptoms: Improving :anxious symptoms have largely subsided; ADD symptoms identified (feeling unable to focus on one task/communicate effectively with  and son)      Current / Ongoing Stressors and Concerns:   Today, client reports feeling much better and that her anxious symptoms have largely subsided.  External stressors related family medical " "decisions are no longer the primary concern.  Client processed interpersonal relationships and concerns she has about the impact of her ADD on her  and on her son.  Client expressed that she does not like making goals because \"it's hard to follow through.\"  Keeping that in mind, therapist and client talked very generally about what the client wants to get out of her therapy experience.       Treatment Objective(s) Addressed in This Session:   Contracted treatment plan; processed general goals of therapy     Intervention:   Treatment Planning        ASSESSMENT: Current Emotional / Mental Status (status of significant symptoms):   Risk status (Self / Other harm or suicidal ideation)   Patient denies current fears or concerns for personal safety.   Patient denies current or recent suicidal ideation or behaviors.   Patient denies current or recent homicidal ideation or behaviors.   Patient denies current or recent self injurious behavior or ideation.   Patient denies other safety concerns.   Patient reports there has been no change in risk factors since their last session.     Patient reports there has been no change in protective factors since their last session.     Recommended that patient call 911 or go to the local ED should there be a change in any of these risk factors.     Appearance:   Appropriate  NA    Eye Contact:   NA    Psychomotor Behavior: Normal  NA    Attitude:   Friendly Suspicious    Orientation:   All   Speech    Rate / Production: Normal/ Responsive Normal     Volume:  Normal    Mood:    Normal   Affect:    Appropriate    Thought Content:  Clear    Thought Form:  Coherent    Insight:    Good  and Spiritual insight     Medication Review:   No current psychiatric medications prescribed     Medication Compliance:   NA     Changes in Health Issues:   None reported     Chemical Use Review:   Substance Use: Chemical use reviewed, no active concerns identified      Tobacco Use: No current tobacco " use.      Diagnosis:  Attention deficit disorder    Collateral Reports Completed:   Not Applicable     PLAN: (Patient tasks / therapist tasks / other)  Patient will be mindful of wearing her hearing aids in the upcoming week and make a more conscious effort to listen to her .        SHERRY Hernandez, ANNE Intern     11/24/2020    Supervised and signed by: ANNE Tena, [facility]  12/1/2020                                                       Treatment Plan    Client's Name: Karen Donis  YOB: 1945    Date: 11/24/2020    DSM-V Diagnoses: Attention deficit disorder  Psychosocial / Contextual Factors: living with adult son; difficulties coping with COVID-19 (feeling bored at home)    Referral / Collaboration:  Referral to another professional/service is not indicated at this time.    Anticipated number of session or this episode of care: TBD      MeasurableTreatment Goal(s) related to diagnosis / functional impairment(s)  Goal 1: Client will be more effective in her communication with her  and her son.    I will know I've met my goal when...      Objective #A (Client Action)    Client will use her hearing aids when her  is talking to her.      Objective #B  Client will learn skills to help her cope with ADD symptoms and improve her interpersonal effectiveness.    Intervention(s)  Therapist will utilize and teach DBT Interpersonal Effectiveness skills.      Goal 2: Client will feel more confident when making decisions and will engage in positive self-talk.      I will know I've met my goal when...      Objective #A (Client Action)    Client will learn how to use positive self-talk and self compassion skills.    Intervention(s)  Therapist will teach and utilize DBT and CBT interventions surrounding mindfulness and positive self-talk.      Patient has reviewed and agreed to the above plan.          SHERRY Hernandez, ANNE Intern   November 24, 2020    Supervised and signed by:  ANNE Tena, LICSW  12/1/2020

## 2020-12-02 ENCOUNTER — VIRTUAL VISIT (OUTPATIENT)
Dept: BEHAVIORAL HEALTH | Facility: CLINIC | Age: 75
End: 2020-12-02
Payer: COMMERCIAL

## 2020-12-02 DIAGNOSIS — F43.23 ADJUSTMENT DISORDER WITH MIXED ANXIETY AND DEPRESSED MOOD: Primary | ICD-10-CM

## 2020-12-02 DIAGNOSIS — E11.9 TYPE 2 DIABETES MELLITUS WITHOUT COMPLICATION, WITHOUT LONG-TERM CURRENT USE OF INSULIN (H): ICD-10-CM

## 2020-12-02 PROCEDURE — 90834 PSYTX W PT 45 MINUTES: CPT | Mod: 95

## 2020-12-02 NOTE — PROGRESS NOTES
"                                             Progress Note    Patient Name: Karen Donis  Date: 2020         Service Type: Individual      Session Start Time: 2pm  Session End Time: 2:45pm     Session Length: 45 min    Session #: 4    Attendees: Client attended alone    Service Modality:  Phone Visit:      Provider verified identity through the following two step process.  Patient provided:  Patient     The patient has been notified of the following:      \"We have found that certain health care needs can be provided without the need for a face to face visit.  This service lets us provide the care you need with a phone conversation.       I will have full access to your [place] medical record during this entire phone call.   I will be taking notes for your medical record.      Since this is like an office visit, we will bill your insurance company for this service.       There are potential benefits and risks of telephone visits (e.g. limits to patient confidentiality) that differ from in-person visits.?  Confidentiality still applies for telephone services, and nobody will record the visit.  It is important to be in a quiet, private space that is free of distractions (including cell phone or other devices) during the visit.??      If during the course of the call I believe a telephone visit is not appropriate, you will not be charged for this service\"     Consent has been obtained for this service by care team member: Yes        DATA  Interactive Complexity: No  Crisis: No       Progress Since Last Session (Related to Symptoms / Goals / Homework):   Symptoms: Improving :anxious symptoms have largely subsided; ADD symptoms identified (feeling unable to focus on one task/communicate effectively with  and son)      Current / Ongoing Stressors and Concerns:   Today, client reports that the past week has been good.  Client does not feel anxious.  Client has been working on better communication with " .  Client and therapist processed past relationship with the client's ex- and talked about additional family dynamics.  Client reflected on her role in the family and how it has impacted her.  Client and therapist talked about self-compassion.       Treatment Objective(s) Addressed in This Session:   Client processed past relationships as a way to identify her current behaviors.       Intervention:   DBT: therapist validated the client's experience  CBT: checking the facts, challenging cognitive distortions        ASSESSMENT: Current Emotional / Mental Status (status of significant symptoms):   Risk status (Self / Other harm or suicidal ideation)   Patient denies current fears or concerns for personal safety.   Patient denies current or recent suicidal ideation or behaviors.   Patient denies current or recent homicidal ideation or behaviors.   Patient denies current or recent self injurious behavior or ideation.   Patient denies other safety concerns.   Patient reports there has been no change in risk factors since their last session.     Patient reports there has been no change in protective factors since their last session.     Recommended that patient call 911 or go to the local ED should there be a change in any of these risk factors.     Appearance:   NA    Eye Contact:   NA    Psychomotor Behavior: Normal  NA    Attitude:   Friendly Suspicious    Orientation:   All   Speech    Rate / Production: Normal/ Responsive Normal     Volume:  Normal    Mood:    Normal   Affect:    Appropriate    Thought Content:  Clear    Thought Form:  Coherent    Insight:    Good  and Spiritual insight     Medication Review:   No current psychiatric medications prescribed     Medication Compliance:   NA     Changes in Health Issues:   None reported     Chemical Use Review:   Substance Use: Chemical use reviewed, no active concerns identified      Tobacco Use: No current tobacco use.      Diagnosis:  Attention deficit  disorder    Collateral Reports Completed:   Not Applicable     PLAN: (Patient tasks / therapist tasks / other)  Patient will continue to engage in more productive communication with her .  Patient will use positive self-talk/self-compassion skills.          SHERRY Hernandez, NANE Intern     12/2/2020    Supervised and signed by:  ANNE Tena LICSW  12/8/2020                                                     Treatment Plan    Client's Name: Karen Donis  YOB: 1945    Date: 11/24/2020    DSM-V Diagnoses: Attention deficit disorder  Psychosocial / Contextual Factors: living with adult son; difficulties coping with COVID-19 (feeling bored at home)    Referral / Collaboration:  Referral to another professional/service is not indicated at this time.    Anticipated number of session or this episode of care: TBD      MeasurableTreatment Goal(s) related to diagnosis / functional impairment(s)  Goal 1: Client will be more effective in her communication with her  and her son.    I will know I've met my goal when...      Objective #A (Client Action)    Client will use her hearing aids when her  is talking to her.      Objective #B  Client will learn skills to help her cope with ADD symptoms and improve her interpersonal effectiveness.    Intervention(s)  Therapist will utilize and teach DBT Interpersonal Effectiveness skills.      Goal 2: Client will feel more confident when making decisions and will engage in positive self-talk.      I will know I've met my goal when...      Objective #A (Client Action)    Client will learn how to use positive self-talk and self compassion skills.    Intervention(s)  Therapist will teach and utilize DBT and CBT interventions surrounding mindfulness and positive self-talk.      Patient has reviewed and agreed to the above plan.          SHERRY Hernandez, ANNE Intern   November 24, 2020    Supervised and signed by: ANNE Tena, [facility]   12/1/2020

## 2020-12-04 RX ORDER — METFORMIN HCL 500 MG
2000 TABLET, EXTENDED RELEASE 24 HR ORAL
Qty: 360 TABLET | Refills: 0 | Status: SHIPPED | OUTPATIENT
Start: 2020-12-04 | End: 2021-02-02

## 2020-12-08 ENCOUNTER — TELEPHONE (OUTPATIENT)
Dept: PODIATRY | Facility: CLINIC | Age: 75
End: 2020-12-08

## 2020-12-08 ENCOUNTER — OFFICE VISIT (OUTPATIENT)
Dept: PODIATRY | Facility: CLINIC | Age: 75
End: 2020-12-08
Payer: COMMERCIAL

## 2020-12-08 ENCOUNTER — ANCILLARY PROCEDURE (OUTPATIENT)
Dept: GENERAL RADIOLOGY | Facility: CLINIC | Age: 75
End: 2020-12-08
Attending: PODIATRIST
Payer: COMMERCIAL

## 2020-12-08 DIAGNOSIS — M79.671 RIGHT FOOT PAIN: Primary | ICD-10-CM

## 2020-12-08 DIAGNOSIS — B35.3 TINEA PEDIS OF BOTH FEET: ICD-10-CM

## 2020-12-08 DIAGNOSIS — L84 TYLOMA: ICD-10-CM

## 2020-12-08 DIAGNOSIS — E11.49 TYPE II OR UNSPECIFIED TYPE DIABETES MELLITUS WITH NEUROLOGICAL MANIFESTATIONS, NOT STATED AS UNCONTROLLED(250.60) (H): ICD-10-CM

## 2020-12-08 DIAGNOSIS — M76.71 PERONEAL TENDINITIS OF RIGHT LOWER EXTREMITY: ICD-10-CM

## 2020-12-08 DIAGNOSIS — B35.1 ONYCHOMYCOSIS: ICD-10-CM

## 2020-12-08 PROCEDURE — 73630 X-RAY EXAM OF FOOT: CPT | Mod: RT | Performed by: RADIOLOGY

## 2020-12-08 PROCEDURE — 99214 OFFICE O/P EST MOD 30 MIN: CPT | Performed by: PODIATRIST

## 2020-12-08 RX ORDER — CICLOPIROX OLAMINE 7.7 MG/G
CREAM TOPICAL 2 TIMES DAILY
Qty: 90 G | Refills: 5 | Status: SHIPPED | OUTPATIENT
Start: 2020-12-08

## 2020-12-08 NOTE — PATIENT INSTRUCTIONS
Thanks for coming today.  [facility]  [address] [place] [address]    To schedule future appointments in Ortho Clinic, you may call [phone].    To schedule ordered imaging by your provider:   Call Central Imaging [phone]    To schedule an injection ordered by your provider:  Call Central Imaging Injection scheduling line: [phone]  [employer]hart available online at:  [email or web address]    Please call if any further questions or concerns ([phone]).  Clinic hours 8 am to 5 pm.    Return to clinic (call) if symptoms worsen or fail to improve.

## 2020-12-08 NOTE — LETTER
12/8/2020         RE: Karen Donis  [id number] [facility] [place] [address]        Dear Colleague,    Thank you for referring your patient, Karen Donis, to the [facility]. Please see a copy of my visit note below.    Past Medical History:   Diagnosis Date     Colon cancer (H)      Hypertension      Shortness of breath      Sleep apnea      Thyroid disease      Patient Active Problem List   Diagnosis     Seborrheic dermatitis     Alopecia     Xerosis cutis     Vitamin D deficiency     Nodules, thyroid     Postmenopausal     Colon cancer     CARDIOVASCULAR SCREENING; LDL GOAL LESS THAN 160     Impingement syndrome of right shoulder     AC (acromioclavicular) joint arthritis     RAGHAVENDRA (generalized anxiety disorder)     Attention deficit disorder     ACP (advance care planning)     Gastroesophageal reflux disease without esophagitis     Diabetic polyneuropathy associated with type 2 diabetes mellitus (H)     Hyperlipidemia LDL goal <100     Hypertension goal BP (blood pressure) < 140/90     NAFL (nonalcoholic fatty liver)     Major depression in complete remission (H)     Chronic bilateral low back pain with bilateral sciatica     Morbid obesity due to excess calories (H)     BPPV (benign paroxysmal positional vertigo), right     SO (obstructive sleep apnea)     CKD (chronic kidney disease) stage 3, GFR 30-59 ml/min     Type 2 diabetes mellitus with stage 5 chronic kidney disease not on chronic dialysis, without long-term current use of insulin (H)     Recurrent major depressive disorder, in partial remission (H)     COPD (chronic obstructive pulmonary disease) (H)     Past Surgical History:   Procedure Laterality Date     COLON SURGERY  2003     THYROIDECTOMY  12/6/2011    Procedure:THYROIDECTOMY; Right Thyroid Lobectomy and bilateral removal of skin tags; Surgeon:SAJI ZAZUETA; Location:[facility]     TONSILLECTOMY      While she was in 6th grade     Social History      Socioeconomic History     Marital status:      Spouse name: Anand     Number of children: 2     Years of education: 12     Highest education level: Not on file   Occupational History     Occupation: Assembly/factory     Employer: [employer]     Comment: 2005   Social Needs     Financial resource strain: Not on file     Food insecurity     Worry: Not on file     Inability: Not on file     Transportation needs     Medical: Not on file     Non-medical: Not on file   Tobacco Use     Smoking status: Former Smoker     Quit date: 1982     Years since quittin.0     Smokeless tobacco: Never Used   Substance and Sexual Activity     Alcohol use: No     Drug use: No     Sexual activity: Yes     Partners: Male   Lifestyle     Physical activity     Days per week: Not on file     Minutes per session: Not on file     Stress: Not on file   Relationships     Social connections     Talks on phone: Not on file     Gets together: Not on file     Attends Jewish service: Not on file     Active member of club or organization: Not on file     Attends meetings of clubs or organizations: Not on file     Relationship status: Not on file     Intimate partner violence     Fear of current or ex partner: Not on file     Emotionally abused: Not on file     Physically abused: Not on file     Forced sexual activity: Not on file   Other Topics Concern     Parent/sibling w/ CABG, MI or angioplasty before 65F 55M? Not Asked   Social History Narrative     Not on file     Family History   Problem Relation Age of Onset     Diabetes Mother      Hypertension Mother      Thyroid Disease Mother      Heart Disease Father      Cardiovascular Father      Neurologic Disorder Father         Parkinson's     Arthritis Father         Fibromyalgia     Gastrointestinal Disease Maternal Grandmother         gallbladder removed     Heart Disease Maternal Grandfather      Diabetes Brother      Hypertension Brother      Neurologic Disorder Brother          ADHD     Unknown/Adopted Son      Unknown/Adopted Daughter      Neurologic Disorder Brother         ADHD     Lab Results   Component Value Date    A1C 6.7 08/28/2020    A1C 7.3 03/11/2020    A1C 7.2 10/15/2019    A1C 7.4 06/12/2019    A1C 7.5 03/12/2019     Lab Results   Component Value Date    WBC 8.9 10/12/2020     Lab Results   Component Value Date    RBC 4.40 10/12/2020     Lab Results   Component Value Date    HGB 13.1 10/12/2020     Lab Results   Component Value Date    HCT 40.9 10/12/2020     No components found for: MCT  Lab Results   Component Value Date    MCV 93 10/12/2020     Lab Results   Component Value Date    MCH 29.8 10/12/2020     Lab Results   Component Value Date    MCHC 32.0 10/12/2020     Lab Results   Component Value Date    RDW 12.6 10/12/2020     Lab Results   Component Value Date     10/12/2020     Last Comprehensive Metabolic Panel:  Sodium   Date Value Ref Range Status   10/12/2020 140 133 - 144 mmol/L Final     Potassium   Date Value Ref Range Status   10/12/2020 4.8 3.4 - 5.3 mmol/L Final     Chloride   Date Value Ref Range Status   10/12/2020 107 94 - 109 mmol/L Final     Carbon Dioxide   Date Value Ref Range Status   10/12/2020 30 20 - 32 mmol/L Final     Anion Gap   Date Value Ref Range Status   10/12/2020 3 3 - 14 mmol/L Final     Glucose   Date Value Ref Range Status   10/12/2020 138 (H) 70 - 99 mg/dL Final     Urea Nitrogen   Date Value Ref Range Status   10/12/2020 20 7 - 30 mg/dL Final     Creatinine   Date Value Ref Range Status   10/12/2020 1.22 (H) 0.52 - 1.04 mg/dL Final     GFR Estimate   Date Value Ref Range Status   10/12/2020 43 (L) >60 mL/min/[1.73_m2] Final     Comment:     Non  GFR Calc  Starting 12/18/2018, serum creatinine based estimated GFR (eGFR) will be   calculated using the Chronic Kidney Disease Epidemiology Collaboration   (CKD-EPI) equation.       Calcium   Date Value Ref Range Status   10/12/2020 9.6 8.5 - 10.1 mg/dL Final      SUBJECTIVE FINDINGS:  A 75-year-old female presents for foot pain, right, and foot check.  She relates that it hurts on the lateral right foot.  She feels her foot is rolling in and that is what is causing it.  It makes it hard to walk.  She is wearing her diabetic shoes and insoles.  She relates the newer insoles she does not like as much as her previous ones, which are worn.  She relates she has been using suntan lotion on her left foot.  She did not put any on her right because she got a phone call.  She relates she has numbness and tingling in her feet.  No ulcers or sores since we have seen her last.  Left big toenail got some discoloration underneath it that she is concerned with.  She relates no specific injury.  No specific relieving or aggravating factors.      OBJECTIVE FINDINGS:  DP and PT are 2/4 bilaterally.  She has hyperkeratotic tissue buildup with cracking, fifth MPJ bilaterally, plantar first MPJ right, plantar medial hallux mildly bilaterally, plantar heels bilaterally.  She has dry skin on the right, none on the left.  Sharp/dull is decreased with 5.07 [place]-Bal[place]ir monofilament bilaterally.  Deep tendon reflexes are intact bilaterally.  She has incurvated, thickened, dystrophic, brittle nails with subungual debris, dystrophy and discoloration to differing degrees bilaterally, mostly on the hallux nails.  The left hallux nail with dried subungual blood and debris.  There are no open lesions bilaterally.  No erythema, no drainage, no odor, no calor bilaterally. There is no pain on palpation, no tendon voids, although she relates the pain is along the fifth ray when it occurs.  Sharp/dull is decreased with 5.07 [place]-Bal[place]ir monofilament bilaterally.  She has mild dorsally contracted digits bilaterally.      ASSESSMENT AND PLAN:  Diabetes with peripheral neuropathy.  Onycholysis, left hallux.  Onychomycosis bilaterally.  Right foot pain with peroneal tendinopathy.  She also has some  inversion in her right foot and ankle versus the left.  Tylomas bilaterally.  Tinea pedis bilaterally.  Diagnosis and treatment options discussed with the patient.  All the nails were debrided or reduced bilaterally upon consent; six plus nails were debrided.  Prescription for Loprox cream given and use discussed with her.  Tylomas bilaterally were sharp debrided with a # 15 blade upon consent.  Prescription for diabetic shoes and insoles given and a Perkins brace on the right.  She is given the phone number and [facility] to [facility] these up.  X-rays ordered and use discussed with her.  Return to clinic and see me in 2-3 months.   X-rays with no fracture, joint and cortical margins intact.  She has plantar and posterior calcaneal spurring, some mid-foot spurring and joint space narrowing, subchondral sclerosis, fifth metatarsal base and lateral cuboid spurring noted.  No fractures.               Again, thank you for allowing me to participate in the care of your patient.        Sincerely,        Brent Bunn DPM

## 2020-12-08 NOTE — NURSING NOTE
Karen Donis's chief complaint for this visit includes:  Chief Complaint   Patient presents with     RECHECK     right foot pain and left hallux pain     PCP: Lakia Hunter    Referring Provider:  No referring provider defined for this encounter.    There were no vitals taken for this visit.  Data Unavailable     Do you need any medication refills at today's visit? No    Tisha Barnes CMA

## 2020-12-08 NOTE — TELEPHONE ENCOUNTER
12/8 Provided phone number [phone] to schedule follow up in 2-3 months around 2/8-3/8/21.     Uma Hay   Procedure    Ortho/Sports Med/Pod/Ent/Eye  [facility]   [phone]

## 2020-12-08 NOTE — PROGRESS NOTES
Past Medical History:   Diagnosis Date     Colon cancer (H)      Hypertension      Shortness of breath      Sleep apnea      Thyroid disease      Patient Active Problem List   Diagnosis     Seborrheic dermatitis     Alopecia     Xerosis cutis     Vitamin D deficiency     Nodules, thyroid     Postmenopausal     Colon cancer     CARDIOVASCULAR SCREENING; LDL GOAL LESS THAN 160     Impingement syndrome of right shoulder     AC (acromioclavicular) joint arthritis     RAGHAVENDRA (generalized anxiety disorder)     Attention deficit disorder     ACP (advance care planning)     Gastroesophageal reflux disease without esophagitis     Diabetic polyneuropathy associated with type 2 diabetes mellitus (H)     Hyperlipidemia LDL goal <100     Hypertension goal BP (blood pressure) < 140/90     NAFL (nonalcoholic fatty liver)     Major depression in complete remission (H)     Chronic bilateral low back pain with bilateral sciatica     Morbid obesity due to excess calories (H)     BPPV (benign paroxysmal positional vertigo), right     SO (obstructive sleep apnea)     CKD (chronic kidney disease) stage 3, GFR 30-59 ml/min     Type 2 diabetes mellitus with stage 5 chronic kidney disease not on chronic dialysis, without long-term current use of insulin (H)     Recurrent major depressive disorder, in partial remission (H)     COPD (chronic obstructive pulmonary disease) (H)     Past Surgical History:   Procedure Laterality Date     COLON SURGERY  2003     THYROIDECTOMY  12/6/2011    Procedure:THYROIDECTOMY; Right Thyroid Lobectomy and bilateral removal of skin tags; Surgeon:SAJI ZAZUETA; Location:[facility]     TONSILLECTOMY      While she was in 6th grade     Social History     Socioeconomic History     Marital status:      Spouse name: Anand     Number of children: 2     Years of education: 12     Highest education level: Not on file   Occupational History     Occupation: Assembly/factory     Employer: [employer]     Comment: 2005    Social Needs     Financial resource strain: Not on file     Food insecurity     Worry: Not on file     Inability: Not on file     Transportation needs     Medical: Not on file     Non-medical: Not on file   Tobacco Use     Smoking status: Former Smoker     Quit date: 1982     Years since quittin.0     Smokeless tobacco: Never Used   Substance and Sexual Activity     Alcohol use: No     Drug use: No     Sexual activity: Yes     Partners: Male   Lifestyle     Physical activity     Days per week: Not on file     Minutes per session: Not on file     Stress: Not on file   Relationships     Social connections     Talks on phone: Not on file     Gets together: Not on file     Attends Jainism service: Not on file     Active member of club or organization: Not on file     Attends meetings of clubs or organizations: Not on file     Relationship status: Not on file     Intimate partner violence     Fear of current or ex partner: Not on file     Emotionally abused: Not on file     Physically abused: Not on file     Forced sexual activity: Not on file   Other Topics Concern     Parent/sibling w/ CABG, MI or angioplasty before 65F 55M? Not Asked   Social History Narrative     Not on file     Family History   Problem Relation Age of Onset     Diabetes Mother      Hypertension Mother      Thyroid Disease Mother      Heart Disease Father      Cardiovascular Father      Neurologic Disorder Father         Parkinson's     Arthritis Father         Fibromyalgia     Gastrointestinal Disease Maternal Grandmother         gallbladder removed     Heart Disease Maternal Grandfather      Diabetes Brother      Hypertension Brother      Neurologic Disorder Brother         ADHD     Unknown/Adopted Son      Unknown/Adopted Daughter      Neurologic Disorder Brother         ADHD     Lab Results   Component Value Date    A1C 6.7 2020    A1C 7.3 2020    A1C 7.2 10/15/2019    A1C 7.4 2019    A1C 7.5 2019     Lab  Results   Component Value Date    WBC 8.9 10/12/2020     Lab Results   Component Value Date    RBC 4.40 10/12/2020     Lab Results   Component Value Date    HGB 13.1 10/12/2020     Lab Results   Component Value Date    HCT 40.9 10/12/2020     No components found for: MCT  Lab Results   Component Value Date    MCV 93 10/12/2020     Lab Results   Component Value Date    MCH 29.8 10/12/2020     Lab Results   Component Value Date    MCHC 32.0 10/12/2020     Lab Results   Component Value Date    RDW 12.6 10/12/2020     Lab Results   Component Value Date     10/12/2020     Last Comprehensive Metabolic Panel:  Sodium   Date Value Ref Range Status   10/12/2020 140 133 - 144 mmol/L Final     Potassium   Date Value Ref Range Status   10/12/2020 4.8 3.4 - 5.3 mmol/L Final     Chloride   Date Value Ref Range Status   10/12/2020 107 94 - 109 mmol/L Final     Carbon Dioxide   Date Value Ref Range Status   10/12/2020 30 20 - 32 mmol/L Final     Anion Gap   Date Value Ref Range Status   10/12/2020 3 3 - 14 mmol/L Final     Glucose   Date Value Ref Range Status   10/12/2020 138 (H) 70 - 99 mg/dL Final     Urea Nitrogen   Date Value Ref Range Status   10/12/2020 20 7 - 30 mg/dL Final     Creatinine   Date Value Ref Range Status   10/12/2020 1.22 (H) 0.52 - 1.04 mg/dL Final     GFR Estimate   Date Value Ref Range Status   10/12/2020 43 (L) >60 mL/min/[1.73_m2] Final     Comment:     Non  GFR Calc  Starting 12/18/2018, serum creatinine based estimated GFR (eGFR) will be   calculated using the Chronic Kidney Disease Epidemiology Collaboration   (CKD-EPI) equation.       Calcium   Date Value Ref Range Status   10/12/2020 9.6 8.5 - 10.1 mg/dL Final     SUBJECTIVE FINDINGS:  A 75-year-old female presents for foot pain, right, and foot check.  She relates that it hurts on the lateral right foot.  She feels her foot is rolling in and that is what is causing it.  It makes it hard to walk.  She is wearing her diabetic  shoes and insoles.  She relates the newer insoles she does not like as much as her previous ones, which are worn.  She relates she has been using suntan lotion on her left foot.  She did not put any on her right because she got a phone call.  She relates she has numbness and tingling in her feet.  No ulcers or sores since we have seen her last.  Left big toenail got some discoloration underneath it that she is concerned with.  She relates no specific injury.  No specific relieving or aggravating factors.      OBJECTIVE FINDINGS:  DP and PT are 2/4 bilaterally.  She has hyperkeratotic tissue buildup with cracking, fifth MPJ bilaterally, plantar first MPJ right, plantar medial hallux mildly bilaterally, plantar heels bilaterally.  She has dry skin on the right, none on the left.  Sharp/dull is decreased with 5.07 [place]-Balbir monofilament bilaterally.  Deep tendon reflexes are intact bilaterally.  She has incurvated, thickened, dystrophic, brittle nails with subungual debris, dystrophy and discoloration to differing degrees bilaterally, mostly on the hallux nails.  The left hallux nail with dried subungual blood and debris.  There are no open lesions bilaterally.  No erythema, no drainage, no odor, no calor bilaterally. There is no pain on palpation, no tendon voids, although she relates the pain is along the fifth ray when it occurs.  Sharp/dull is decreased with 5.07 [place]-Bal[place]ir monofilament bilaterally.  She has mild dorsally contracted digits bilaterally.      ASSESSMENT AND PLAN:  Diabetes with peripheral neuropathy.  Onycholysis, left hallux.  Onychomycosis bilaterally.  Right foot pain with peroneal tendinopathy.  She also has some inversion in her right foot and ankle versus the left.  Tylomas bilaterally.  Tinea pedis bilaterally.  Diagnosis and treatment options discussed with the patient.  All the nails were debrided or reduced bilaterally upon consent; six plus nails were debrided.  Prescription  for Loprox cream given and use discussed with her.  Tylomas bilaterally were sharp debrided with a # 15 blade upon consent.  Prescription for diabetic shoes and insoles given and a [place] brace on the right.  She is given the phone number and address to [facility] to pick these up.  X-rays ordered and use discussed with her.  Return to clinic and see me in 2-3 months.   X-rays with no fracture, joint and cortical margins intact.  She has plantar and posterior calcaneal spurring, some mid-foot spurring and joint space narrowing, subchondral sclerosis, fifth metatarsal base and lateral cuboid spurring noted.  No fractures.

## 2020-12-10 ENCOUNTER — VIRTUAL VISIT (OUTPATIENT)
Dept: BEHAVIORAL HEALTH | Facility: CLINIC | Age: 75
End: 2020-12-10
Payer: COMMERCIAL

## 2020-12-10 DIAGNOSIS — F43.23 ADJUSTMENT DISORDER WITH MIXED ANXIETY AND DEPRESSED MOOD: Primary | ICD-10-CM

## 2020-12-10 PROCEDURE — 90834 PSYTX W PT 45 MINUTES: CPT | Mod: 95

## 2020-12-10 NOTE — PROGRESS NOTES
"                                             Progress Note    Patient Name: Karen Donis  Date: 12/10/2020         Service Type: Individual      Session Start Time: 2pm  Session End Time: 2:45pm     Session Length: 45 min    Session #: 5    Attendees: Client attended alone    Service Modality:  Phone Visit:      Provider verified identity through the following two step process.  Patient provided:  Patient     The patient has been notified of the following:      \"We have found that certain health care needs can be provided without the need for a face to face visit.  This service lets us provide the care you need with a phone conversation.       I will have full access to your [facility] medical record during this entire phone call.   I will be taking notes for your medical record.      Since this is like an office visit, we will bill your insurance company for this service.       There are potential benefits and risks of telephone visits (e.g. limits to patient confidentiality) that differ from in-person visits.?  Confidentiality still applies for telephone services, and nobody will record the visit.  It is important to be in a quiet, private space that is free of distractions (including cell phone or other devices) during the visit.??      If during the course of the call I believe a telephone visit is not appropriate, you will not be charged for this service\"     Consent has been obtained for this service by care team member: Yes        DATA  Interactive Complexity: No  Crisis: No       Progress Since Last Session (Related to Symptoms / Goals / Homework):   Symptoms: No change (symptoms stable)     Current / Ongoing Stressors and Concerns:   Today, client reports that the past week has been good.  Client does not feel anxious.  Client continues to work on better communication with .  Client reports family medical stressors.  Client and therapist discussed the importance of spirituality as a positive " coping mechanism for symptoms of stress and anxiety.       Treatment Objective(s) Addressed in This Session:   Client will learn skills to help her cope with ADD symptoms and improve her interpersonal effectiveness.     Intervention:   DBT: therapist validated the client's experience  ACT: value-based living        ASSESSMENT: Current Emotional / Mental Status (status of significant symptoms):   Risk status (Self / Other harm or suicidal ideation)   Patient denies current fears or concerns for personal safety.   Patient denies current or recent suicidal ideation or behaviors.   Patient denies current or recent homicidal ideation or behaviors.   Patient denies current or recent self injurious behavior or ideation.   Patient denies other safety concerns.   Patient reports there has been no change in risk factors since their last session.     Patient reports there has been no change in protective factors since their last session.     Recommended that patient call 911 or go to the local ED should there be a change in any of these risk factors.     Appearance:   NA    Eye Contact:   NA    Psychomotor Behavior: Normal  NA    Attitude:   Friendly Suspicious    Orientation:   All   Speech    Rate / Production: Normal/ Responsive Normal     Volume:  Normal    Mood:    Normal   Affect:    Appropriate    Thought Content:  Clear    Thought Form:  Coherent    Insight:    Good  and Spiritual insight     Medication Review:   No current psychiatric medications prescribed     Medication Compliance:   NA     Changes in Health Issues:   None reported     Chemical Use Review:   Substance Use: Chemical use reviewed, no active concerns identified      Tobacco Use: No current tobacco use.      Diagnosis:  Attention deficit disorder    Collateral Reports Completed:   Not Applicable     PLAN: (Patient tasks / therapist tasks / other)  Patient will continue to engage in more productive communication with her .  Patient will use  positive self-talk/self-compassion skills.          SHERRY Hernandez, ANNE Intern     12/10/2020    This note has been reviewed and I agree with the plan of care. This note is co-signed by ANNE Tena LICSW, Supervisor, on: 8/9/2021                                                 Treatment Plan    Client's Name: Karen Donis  YOB: 1945    Date: 11/24/2020    DSM-V Diagnoses: Attention deficit disorder  Psychosocial / Contextual Factors: living with adult son; difficulties coping with COVID-19 (feeling bored at home)    Referral / Collaboration:  Referral to another professional/service is not indicated at this time.    Anticipated number of session or this episode of care: TBD      MeasurableTreatment Goal(s) related to diagnosis / functional impairment(s)  Goal 1: Client will be more effective in her communication with her  and her son.    I will know I've met my goal when...      Objective #A (Client Action)    Client will use her hearing aids when her  is talking to her.      Objective #B  Client will learn skills to help her cope with ADD symptoms and improve her interpersonal effectiveness.    Intervention(s)  Therapist will utilize and teach DBT Interpersonal Effectiveness skills.      Goal 2: Client will feel more confident when making decisions and will engage in positive self-talk.      I will know I've met my goal when...      Objective #A (Client Action)    Client will learn how to use positive self-talk and self compassion skills.    Intervention(s)  Therapist will teach and utilize DBT and CBT interventions surrounding mindfulness and positive self-talk.      Patient has reviewed and agreed to the above plan.          SHERRY Hernandez, ANNE Intern   November 24, 2020    Supervised and signed by: ANNE Tena LICSW  12/1/2020

## 2020-12-15 ENCOUNTER — VIRTUAL VISIT (OUTPATIENT)
Dept: PSYCHOLOGY | Facility: CLINIC | Age: 75
End: 2020-12-15
Payer: COMMERCIAL

## 2020-12-15 DIAGNOSIS — F43.20 ADJUSTMENT DISORDER, UNSPECIFIED TYPE: Primary | ICD-10-CM

## 2020-12-15 PROCEDURE — 90834 PSYTX W PT 45 MINUTES: CPT | Mod: 95

## 2020-12-15 ASSESSMENT — ANXIETY QUESTIONNAIRES
GAD7 TOTAL SCORE: 0
6. BECOMING EASILY ANNOYED OR IRRITABLE: NOT AT ALL
2. NOT BEING ABLE TO STOP OR CONTROL WORRYING: NOT AT ALL
3. WORRYING TOO MUCH ABOUT DIFFERENT THINGS: NOT AT ALL
5. BEING SO RESTLESS THAT IT IS HARD TO SIT STILL: NOT AT ALL
7. FEELING AFRAID AS IF SOMETHING AWFUL MIGHT HAPPEN: NOT AT ALL
1. FEELING NERVOUS, ANXIOUS, OR ON EDGE: NOT AT ALL
4. TROUBLE RELAXING: NOT AT ALL

## 2020-12-15 ASSESSMENT — PATIENT HEALTH QUESTIONNAIRE - PHQ9: SUM OF ALL RESPONSES TO PHQ QUESTIONS 1-9: 2

## 2020-12-15 NOTE — PROGRESS NOTES
"                                             Progress Note    Patient Name: Karen Donis  Date: 12/15/2020         Service Type: Individual      Session Start Time: 2pm  Session End Time: 2:45pm     Session Length: 45 min    Session #: 6    Attendees: Client attended alone    Service Modality:  Phone Visit:      Provider verified identity through the following two step process.  Patient provided:  Patient     The patient has been notified of the following:      \"We have found that certain health care needs can be provided without the need for a face to face visit.  This service lets us provide the care you need with a phone conversation.       I will have full access to your [place] medical record during this entire phone call.   I will be taking notes for your medical record.      Since this is like an office visit, we will bill your insurance company for this service.       There are potential benefits and risks of telephone visits (e.g. limits to patient confidentiality) that differ from in-person visits.?  Confidentiality still applies for telephone services, and nobody will record the visit.  It is important to be in a quiet, private space that is free of distractions (including cell phone or other devices) during the visit.??      If during the course of the call I believe a telephone visit is not appropriate, you will not be charged for this service\"     Consent has been obtained for this service by care team member: Yes        DATA  Interactive Complexity: No  Crisis: No       Progress Since Last Session (Related to Symptoms / Goals / Homework):   Symptoms: No change (symptoms stable)     Current / Ongoing Stressors and Concerns:   Today, client reports no changes in symptoms.  Client reports that other than some back pain, everything has been going \"really well.\"  Therapist and client completed PHQ-9 and RAGHAVENDRA-7 assessments.  Therapist discussed the possibility of meeting less frequently, but the client " would still like to meet weekly.  Therapist and client discussed client's previous experiences with therapy and processed the inevitably of losing her brother who is currently in an acute care unit.  Therapist and client processed an interpersonal concern.       Treatment Objective(s) Addressed in This Session:   Client will learn skills to help her cope with ADD symptoms and improve her interpersonal effectiveness.     Intervention:   DBT: therapist validated the client's experience  ACT: value-based living        ASSESSMENT: Current Emotional / Mental Status (status of significant symptoms):   Risk status (Self / Other harm or suicidal ideation)   Patient denies current fears or concerns for personal safety.   Patient denies current or recent suicidal ideation or behaviors.   Patient denies current or recent homicidal ideation or behaviors.   Patient denies current or recent self injurious behavior or ideation.   Patient denies other safety concerns.   Patient reports there has been no change in risk factors since their last session.     Patient reports there has been no change in protective factors since their last session.     Recommended that patient call 911 or go to the local ED should there be a change in any of these risk factors.     Appearance:   NA    Eye Contact:   NA    Psychomotor Behavior: Normal  NA    Attitude:   Friendly Suspicious    Orientation:   All   Speech    Rate / Production: Normal/ Responsive Normal     Volume:  Normal    Mood:    Normal   Affect:    Appropriate    Thought Content:  Clear    Thought Form:  Coherent    Insight:    Good  and Spiritual insight     Medication Review:   No current psychiatric medications prescribed     Medication Compliance:   NA     Changes in Health Issues:   None reported     Chemical Use Review:   Substance Use: Chemical use reviewed, no active concerns identified      Tobacco Use: No current tobacco use.      Diagnosis:  Attention deficit  disorder    Collateral Reports Completed:   Not Applicable     PLAN: (Patient tasks / therapist tasks / other)  Patient will use positive self-talk/self-compassion skills.          SHERRY Hernandez, ANNE Intern     12/15/2020    Supervised and signed by:  ANNE Tena, [facility]  12/17/2020                                                     Treatment Plan    Client's Name: Karen Donis  YOB: 1945    Date: 11/24/2020    DSM-V Diagnoses: Attention deficit disorder  Psychosocial / Contextual Factors: living with adult son; difficulties coping with COVID-19 (feeling bored at home)    Referral / Collaboration:  Referral to another professional/service is not indicated at this time.    Anticipated number of session or this episode of care: TBD      MeasurableTreatment Goal(s) related to diagnosis / functional impairment(s)  Goal 1: Client will be more effective in her communication with her  and her son.    I will know I've met my goal when...      Objective #A (Client Action)    Client will use her hearing aids when her  is talking to her.      Objective #B  Client will learn skills to help her cope with ADD symptoms and improve her interpersonal effectiveness.    Intervention(s)  Therapist will utilize and teach DBT Interpersonal Effectiveness skills.      Goal 2: Client will feel more confident when making decisions and will engage in positive self-talk.      I will know I've met my goal when...      Objective #A (Client Action)    Client will learn how to use positive self-talk and self compassion skills.    Intervention(s)  Therapist will teach and utilize DBT and CBT interventions surrounding mindfulness and positive self-talk.      Patient has reviewed and agreed to the above plan.          SHERRY Hernandez, ANNE Intern   November 24, 2020    Supervised and signed by: ANNE Tena, [facility]  12/1/2020

## 2020-12-16 ASSESSMENT — ANXIETY QUESTIONNAIRES: GAD7 TOTAL SCORE: 0

## 2020-12-22 ENCOUNTER — VIRTUAL VISIT (OUTPATIENT)
Dept: PSYCHOLOGY | Facility: CLINIC | Age: 75
End: 2020-12-22
Payer: COMMERCIAL

## 2020-12-22 DIAGNOSIS — I10 ESSENTIAL HYPERTENSION WITH GOAL BLOOD PRESSURE LESS THAN 140/90: ICD-10-CM

## 2020-12-22 DIAGNOSIS — F43.20 ADJUSTMENT DISORDER, UNSPECIFIED TYPE: Primary | ICD-10-CM

## 2020-12-22 DIAGNOSIS — F98.8 ATTENTION DEFICIT DISORDER (ADD) WITHOUT HYPERACTIVITY: ICD-10-CM

## 2020-12-22 PROCEDURE — 90834 PSYTX W PT 45 MINUTES: CPT | Mod: 95

## 2020-12-22 RX ORDER — METHYLPHENIDATE HYDROCHLORIDE EXTENDED RELEASE 20 MG/1
20 TABLET ORAL EVERY MORNING
Qty: 30 TABLET | Refills: 0 | Status: SHIPPED | OUTPATIENT
Start: 2020-12-22 | End: 2021-01-21

## 2020-12-22 NOTE — TELEPHONE ENCOUNTER
[facility] Call Center    Phone Message    May a detailed message be left on voicemail: yes     Reason for Call: Medication Refill Request    Has the patient contacted the pharmacy for the refill? Yes   Name of medication being requested: methylphenidate (METADATE ER) 20 MG CR tablet  Provider who prescribed the medication: Dr. Hunter  Pharmacy:  [id number] - [facility] [address] [facility]  Date medication is needed: please advise. Thank you.      Action Taken: Message routed to:  Primary Care p [id number]    Travel Screening: Not Applicable

## 2020-12-22 NOTE — TELEPHONE ENCOUNTER
methylphenidate (METADATE ER) 20 MG CR tablet 30 tablet 0 11/24/2020  No   Sig - Route: Take 1 tablet (20 mg) by mouth every morning - Oral   Sent to pharmacy as: Methylphenidate HCl ER 20 MG Oral Tablet Extended Release (METADATE ER)   Class: E-Prescribe   Earliest Fill Date: 11/24/2020     Routing refill request to provider for review/approval because:  Drug not on the [facility] refill protocol     Franca Barker RN  [facility]

## 2020-12-22 NOTE — PROGRESS NOTES
"                                             Progress Note    Patient Name: Karen Donis  Date: 2020         Service Type: Individual      Session Start Time: 2pm  Session End Time: 2:45pm     Session Length: 45 min    Session #: 7    Attendees: Client attended alone    Service Modality:  Phone Visit:      Provider verified identity through the following two step process.  Patient provided:  Patient     The patient has been notified of the following:      \"We have found that certain health care needs can be provided without the need for a face to face visit.  This service lets us provide the care you need with a phone conversation.       I will have full access to your [place] medical record during this entire phone call.   I will be taking notes for your medical record.      Since this is like an office visit, we will bill your insurance company for this service.       There are potential benefits and risks of telephone visits (e.g. limits to patient confidentiality) that differ from in-person visits.?  Confidentiality still applies for telephone services, and nobody will record the visit.  It is important to be in a quiet, private space that is free of distractions (including cell phone or other devices) during the visit.??      If during the course of the call I believe a telephone visit is not appropriate, you will not be charged for this service\"     Consent has been obtained for this service by care team member: Yes        DATA  Interactive Complexity: No  Crisis: No       Progress Since Last Session (Related to Symptoms / Goals / Homework):   Symptoms: No change (symptoms stable)     Current / Ongoing Stressors and Concerns:   Today, client reports no changes in symptoms.  Client reports her brother is being moved to another location.  She feels at peace with the fact that he is making his own decisions about end of life care.  Therapist and client discussed interpersonal relationships within the " family and plans for the holidays.  Client and therapist reviewed progress in treatment plan goals.        Treatment Objective(s) Addressed in This Session:   Client will learn skills to help her cope with ADD symptoms and improve her interpersonal effectiveness.     Intervention:   DBT: therapist validated the client's experience  ACT: value-based living        ASSESSMENT: Current Emotional / Mental Status (status of significant symptoms):   Risk status (Self / Other harm or suicidal ideation)   Patient denies current fears or concerns for personal safety.   Patient denies current or recent suicidal ideation or behaviors.   Patient denies current or recent homicidal ideation or behaviors.   Patient denies current or recent self injurious behavior or ideation.   Patient denies other safety concerns.   Patient reports there has been no change in risk factors since their last session.     Patient reports there has been no change in protective factors since their last session.     Recommended that patient call 911 or go to the local ED should there be a change in any of these risk factors.     Appearance:   NA    Eye Contact:   NA    Psychomotor Behavior: Normal  NA    Attitude:   Friendly Suspicious    Orientation:   All   Speech    Rate / Production: Normal/ Responsive Normal     Volume:  Normal    Mood:    Normal   Affect:    Appropriate    Thought Content:  Clear    Thought Form:  Coherent    Insight:    Good  and Spiritual insight     Medication Review:   No current psychiatric medications prescribed     Medication Compliance:   NA     Changes in Health Issues:   None reported     Chemical Use Review:   Substance Use: Chemical use reviewed, no active concerns identified      Tobacco Use: No current tobacco use.      Diagnosis:  Adjustment Disorder, unspecified type    Collateral Reports Completed:   Not Applicable     PLAN: (Patient tasks / therapist tasks / other)  Patient will use positive  self-talk/self-compassion skills.          SHERRY Hernandez, ANNE Intern     12/22/2020                                                         Treatment Plan    Client's Name: Karen Donis  YOB: 1945    Date: 11/24/2020    DSM-V Diagnoses: Attention deficit disorder  Psychosocial / Contextual Factors: living with adult son; difficulties coping with COVID-19 (feeling bored at home)    Referral / Collaboration:  Referral to another professional/service is not indicated at this time.    Anticipated number of session or this episode of care: TBD      MeasurableTreatment Goal(s) related to diagnosis / functional impairment(s)  Goal 1: Client will be more effective in her communication with her  and her son.    I will know I've met my goal when...      Objective #A (Client Action)    Client will use her hearing aids when her  is talking to her.      Objective #B  Client will learn skills to help her cope with ADD symptoms and improve her interpersonal effectiveness.    Intervention(s)  Therapist will utilize and teach DBT Interpersonal Effectiveness skills.      Goal 2: Client will feel more confident when making decisions and will engage in positive self-talk.      I will know I've met my goal when...      Objective #A (Client Action)    Client will learn how to use positive self-talk and self compassion skills.    Intervention(s)  Therapist will teach and utilize DBT and CBT interventions surrounding mindfulness and positive self-talk.      Patient has reviewed and agreed to the above plan.          SHERRY Hernandez, ANNE Intern   November 24, 2020    Supervised and signed by: ANNE Tena, [facility]  12/1/2020

## 2020-12-29 RX ORDER — LOSARTAN POTASSIUM 100 MG/1
100 TABLET ORAL DAILY
Qty: 90 TABLET | Refills: 3 | Status: SHIPPED | OUTPATIENT
Start: 2020-12-29 | End: 2022-02-08

## 2021-01-05 ENCOUNTER — VIRTUAL VISIT (OUTPATIENT)
Dept: PSYCHOLOGY | Facility: CLINIC | Age: 76
End: 2021-01-05
Payer: COMMERCIAL

## 2021-01-05 DIAGNOSIS — F43.20 ADJUSTMENT DISORDER, UNSPECIFIED TYPE: Primary | ICD-10-CM

## 2021-01-05 PROCEDURE — 90834 PSYTX W PT 45 MINUTES: CPT | Mod: 95

## 2021-01-05 NOTE — PROGRESS NOTES
"                                             Progress Note    Patient Name: Karen Donis  Date: 2021         Service Type: Individual      Session Start Time: 2pm  Session End Time: 2:45pm     Session Length: 45 min    Session #: 8    Attendees: Client attended alone    Service Modality:  Phone Visit:      Provider verified identity through the following two step process.  Patient provided:  Patient     The patient has been notified of the following:      \"We have found that certain health care needs can be provided without the need for a face to face visit.  This service lets us provide the care you need with a phone conversation.       I will have full access to your [place] medical record during this entire phone call.   I will be taking notes for your medical record.      Since this is like an office visit, we will bill your insurance company for this service.       There are potential benefits and risks of telephone visits (e.g. limits to patient confidentiality) that differ from in-person visits.?  Confidentiality still applies for telephone services, and nobody will record the visit.  It is important to be in a quiet, private space that is free of distractions (including cell phone or other devices) during the visit.??      If during the course of the call I believe a telephone visit is not appropriate, you will not be charged for this service\"     Consent has been obtained for this service by care team member: Yes        DATA  Interactive Complexity: No  Crisis: No       Progress Since Last Session (Related to Symptoms / Goals / Homework):   Symptoms: No change (symptoms stable)     Current / Ongoing Stressors and Concerns:   Today, client reports no changes in symptoms.  Client and therapist processed interpersonal situations related to her family.  Client and therapist discussed at length, the impact of [employer] on the client.       Treatment Objective(s) Addressed in This Session:   Client will " learn how to use positive self-talk and self compassion skills     Intervention:   DBT: therapist validated the client's experience  Psychodynamic: processed elements of childhood/family relationships and its impacts on current relationships        ASSESSMENT: Current Emotional / Mental Status (status of significant symptoms):   Risk status (Self / Other harm or suicidal ideation)   Patient denies current fears or concerns for personal safety.   Patient denies current or recent suicidal ideation or behaviors.   Patient denies current or recent homicidal ideation or behaviors.   Patient denies current or recent self injurious behavior or ideation.   Patient denies other safety concerns.   Patient reports there has been no change in risk factors since their last session.     Patient reports there has been no change in protective factors since their last session.     Recommended that patient call 911 or go to the local ED should there be a change in any of these risk factors.     Appearance:   NA    Eye Contact:   NA    Psychomotor Behavior: Normal  NA    Attitude:   Friendly Suspicious    Orientation:   All   Speech    Rate / Production: Normal/ Responsive Normal     Volume:  Normal    Mood:    Normal   Affect:    Appropriate    Thought Content:  Clear    Thought Form:  Coherent    Insight:    Good  and Spiritual insight     Medication Review:   No current psychiatric medications prescribed     Medication Compliance:   NA     Changes in Health Issues:   None reported     Chemical Use Review:   Substance Use: Chemical use reviewed, no active concerns identified      Tobacco Use: No current tobacco use.      Diagnosis:  Adjustment Disorder, unspecified type    Collateral Reports Completed:   Not Applicable     PLAN: (Patient tasks / therapist tasks / other)  Patient will use positive self-talk/self-compassion skills.          SHERRY Hernandez, MSW Intern     1/5/2021                                                      Treatment Plan    Client's Name: Karen Donis  YOB: 1945    Date: 11/24/2020    DSM-V Diagnoses: Attention deficit disorder  Psychosocial / Contextual Factors: living with adult son; difficulties coping with COVID-19 (feeling bored at home)    Referral / Collaboration:  Referral to another professional/service is not indicated at this time.    Anticipated number of session or this episode of care: TBD      MeasurableTreatment Goal(s) related to diagnosis / functional impairment(s)  Goal 1: Client will be more effective in her communication with her  and her son.    I will know I've met my goal when...      Objective #A (Client Action)    Client will use her hearing aids when her  is talking to her.      Objective #B  Client will learn skills to help her cope with ADD symptoms and improve her interpersonal effectiveness.    Intervention(s)  Therapist will utilize and teach DBT Interpersonal Effectiveness skills.      Goal 2: Client will feel more confident when making decisions and will engage in positive self-talk.      I will know I've met my goal when...      Objective #A (Client Action)    Client will learn how to use positive self-talk and self compassion skills.    Intervention(s)  Therapist will teach and utilize DBT and CBT interventions surrounding mindfulness and positive self-talk.      Patient has reviewed and agreed to the above plan.          SHERRY Hernandez, ANNE Intern   November 24, 2020    Supervised and signed by: ANNE Tena, [facility]  12/1/2020

## 2021-01-08 NOTE — TELEPHONE ENCOUNTER
1/8 2nd attempt.  Provided phone number [phone] to schedule follow up in 2-3 months around 2/8-3/8/21.     Uma Hay   Procedure    Ortho/Sports Med/Pod/Ent/Eye  [facility]   [phone]

## 2021-01-12 ENCOUNTER — VIRTUAL VISIT (OUTPATIENT)
Dept: PSYCHOLOGY | Facility: CLINIC | Age: 76
End: 2021-01-12
Payer: COMMERCIAL

## 2021-01-12 DIAGNOSIS — F43.20 ADJUSTMENT DISORDER, UNSPECIFIED TYPE: Primary | ICD-10-CM

## 2021-01-12 PROCEDURE — 99215 OFFICE O/P EST HI 40 MIN: CPT | Mod: 95

## 2021-01-12 NOTE — PROGRESS NOTES
"                                             Progress Note    Patient Name: Karen Donis  Date: 2021         Service Type: Individual      Session Start Time: 2pm  Session End Time: 2:45pm     Session Length: 45 min    Session #: 9    Attendees: Client attended alone    Service Modality:  Phone Visit:      Provider verified identity through the following two step process.  Patient provided:  Patient     The patient has been notified of the following:      \"We have found that certain health care needs can be provided without the need for a face to face visit.  This service lets us provide the care you need with a phone conversation.       I will have full access to your [place] medical record during this entire phone call.   I will be taking notes for your medical record.      Since this is like an office visit, we will bill your insurance company for this service.       There are potential benefits and risks of telephone visits (e.g. limits to patient confidentiality) that differ from in-person visits.?  Confidentiality still applies for telephone services, and nobody will record the visit.  It is important to be in a quiet, private space that is free of distractions (including cell phone or other devices) during the visit.??      If during the course of the call I believe a telephone visit is not appropriate, you will not be charged for this service\"     Consent has been obtained for this service by care team member: Yes        DATA  Interactive Complexity: No  Crisis: No       Progress Since Last Session (Related to Symptoms / Goals / Homework):   Symptoms: Worsening (client experiencing increased anxiety due to recent life stressors)     Current / Ongoing Stressors and Concerns:   Today, client reports feeling more anxious than normal.  Client reports that she and her  need to get tested for COVID-19.  She reports that she is just \"waiting for the ball to drop,\" for the pandemic and family " related stressors.  Client and therapist brainstormed positive coping strategies and discussed the potential of identity work in the coming sessions.         Treatment Objective(s) Addressed in This Session:   Client will learn how to use positive self-talk and self compassion skills     Intervention:   DBT: therapist validated the client's experience  Solution Focused: identifying the client's resilience and what has worked in the past        ASSESSMENT: Current Emotional / Mental Status (status of significant symptoms):   Risk status (Self / Other harm or suicidal ideation)   Patient denies current fears or concerns for personal safety.   Patient denies current or recent suicidal ideation or behaviors.   Patient denies current or recent homicidal ideation or behaviors.   Patient denies current or recent self injurious behavior or ideation.   Patient denies other safety concerns.   Patient reports there has been no change in risk factors since their last session.     Patient reports there has been no change in protective factors since their last session.     Recommended that patient call 911 or go to the local ED should there be a change in any of these risk factors.     Appearance:   NA    Eye Contact:   NA    Psychomotor Behavior: Normal  NA    Attitude:   Friendly Suspicious    Orientation:   All   Speech    Rate / Production: Normal/ Responsive Normal     Volume:  Normal    Mood:    Normal   Affect:    Appropriate    Thought Content:  Clear    Thought Form:  Coherent    Insight:    Good  and Spiritual insight     Medication Review:   No current psychiatric medications prescribed     Medication Compliance:   NA     Changes in Health Issues:   Yes: waiting to receive COVID-19 test after a recent exposure     Chemical Use Review:   Substance Use: Chemical use reviewed, no active concerns identified      Tobacco Use: No current tobacco use.      Diagnosis:  Adjustment Disorder, unspecified type    Collateral  Reports Completed:   Not Applicable     PLAN: (Patient tasks / therapist tasks / other)  Patient will use positive self-talk/self-compassion skills.          SHERRY Hernandez, ANNE Intern     1/12/2021                                                     Treatment Plan    Client's Name: Karen Donis  YOB: 1945    Date: 11/24/2020    DSM-V Diagnoses: Attention deficit disorder  Psychosocial / Contextual Factors: living with adult son; difficulties coping with COVID-19 (feeling bored at home)    Referral / Collaboration:  Referral to another professional/service is not indicated at this time.    Anticipated number of session or this episode of care: TBD      MeasurableTreatment Goal(s) related to diagnosis / functional impairment(s)  Goal 1: Client will be more effective in her communication with her  and her son.    I will know I've met my goal when...      Objective #A (Client Action)    Client will use her hearing aids when her  is talking to her.      Objective #B  Client will learn skills to help her cope with ADD symptoms and improve her interpersonal effectiveness.    Intervention(s)  Therapist will utilize and teach DBT Interpersonal Effectiveness skills.      Goal 2: Client will feel more confident when making decisions and will engage in positive self-talk.      I will know I've met my goal when...      Objective #A (Client Action)    Client will learn how to use positive self-talk and self compassion skills.    Intervention(s)  Therapist will teach and utilize DBT and CBT interventions surrounding mindfulness and positive self-talk.      Patient has reviewed and agreed to the above plan.          SHERRY Hernandez, ANNE Intern   November 24, 2020    Supervised and signed by: ANNE Tena, [facility]  12/1/2020

## 2021-01-21 ENCOUNTER — TELEPHONE (OUTPATIENT)
Dept: PEDIATRICS | Facility: CLINIC | Age: 76
End: 2021-01-21

## 2021-01-21 DIAGNOSIS — F98.8 ATTENTION DEFICIT DISORDER (ADD) WITHOUT HYPERACTIVITY: ICD-10-CM

## 2021-01-21 RX ORDER — METHYLPHENIDATE HYDROCHLORIDE EXTENDED RELEASE 20 MG/1
20 TABLET ORAL EVERY MORNING
Qty: 30 TABLET | Refills: 0 | Status: SHIPPED | OUTPATIENT
Start: 2021-01-21 | End: 2021-02-26

## 2021-01-21 NOTE — TELEPHONE ENCOUNTER
Routing refill request to provider for review/approval because:  Drug not on the [facility] refill protocol       Amanda Pineda RN, [facility] Triage

## 2021-01-21 NOTE — TELEPHONE ENCOUNTER
Reason for Call:  Medication or medication refill:    Do you use a [facility]?  Name of the pharmacy and phone number for the current request:  [facility] [phone]    Name of the medication requested: methylphenidate    Other request: none    Can we leave a detailed message on this number? YES    Phone number patient can be reached at: Cell number on file:    Telephone Information:   Mobile [phone]       Best Time: any    Call taken on 1/21/2021 at 9:29 AM by Maritza Peoples

## 2021-01-26 ENCOUNTER — VIRTUAL VISIT (OUTPATIENT)
Dept: PSYCHOLOGY | Facility: CLINIC | Age: 76
End: 2021-01-26
Payer: COMMERCIAL

## 2021-01-26 DIAGNOSIS — F43.20 ADJUSTMENT DISORDER, UNSPECIFIED TYPE: Primary | ICD-10-CM

## 2021-01-26 PROCEDURE — 99215 OFFICE O/P EST HI 40 MIN: CPT | Mod: 95

## 2021-01-26 NOTE — PROGRESS NOTES
"                                             Progress Note    Patient Name: Karen Donis  Date: 2021         Service Type: Individual      Session Start Time: 2pm  Session End Time: 2:45pm     Session Length: 45 min    Session #: 10    Attendees: Client attended alone    Service Modality:  Phone Visit:      Provider verified identity through the following two step process.  Patient provided:  Patient     The patient has been notified of the following:      \"We have found that certain health care needs can be provided without the need for a face to face visit.  This service lets us provide the care you need with a phone conversation.       I will have full access to your [place] medical record during this entire phone call.   I will be taking notes for your medical record.      Since this is like an office visit, we will bill your insurance company for this service.       There are potential benefits and risks of telephone visits (e.g. limits to patient confidentiality) that differ from in-person visits.?  Confidentiality still applies for telephone services, and nobody will record the visit.  It is important to be in a quiet, private space that is free of distractions (including cell phone or other devices) during the visit.??      If during the course of the call I believe a telephone visit is not appropriate, you will not be charged for this service\"     Consent has been obtained for this service by care team member: Yes        DATA  Interactive Complexity: No  Crisis: No       Progress Since Last Session (Related to Symptoms / Goals / Homework):   Symptoms: Improving (improved sleep, communication)     Current / Ongoing Stressors and Concerns:   Today, client reports that things have been going well.  Client and therapist discussed communication in interpersonal relationships.  Client and therapist also reviewed the identity assignment.  Before next session, client will [place] 5 of her most influential " identities and journal about why they are important to her.  Therapist and client will review this at the next session.         Treatment Objective(s) Addressed in This Session:   Client will learn skills to help her cope with ADD symptoms and improve her interpersonal effectiveness.     Intervention:   DBT: therapist validated the client's experience  Identity Work: reviewed journaling exercise        ASSESSMENT: Current Emotional / Mental Status (status of significant symptoms):   Risk status (Self / Other harm or suicidal ideation)   Patient denies current fears or concerns for personal safety.   Patient denies current or recent suicidal ideation or behaviors.   Patient denies current or recent homicidal ideation or behaviors.   Patient denies current or recent self injurious behavior or ideation.   Patient denies other safety concerns.   Patient reports there has been no change in risk factors since their last session.     Patient reports there has been no change in protective factors since their last session.     Recommended that patient call 911 or go to the local ED should there be a change in any of these risk factors.     Appearance:   NA    Eye Contact:   NA    Psychomotor Behavior: Normal  NA    Attitude:   Friendly Suspicious    Orientation:   All   Speech    Rate / Production: Normal/ Responsive Normal     Volume:  Normal    Mood:    Normal   Affect:    Appropriate    Thought Content:  Clear    Thought Form:  Coherent    Insight:    Good  and Spiritual insight     Medication Review:   No current psychiatric medications prescribed     Medication Compliance:   NA     Changes in Health Issues:   None reported     Chemical Use Review:   Substance Use: Chemical use reviewed, no active concerns identified      Tobacco Use: No current tobacco use.      Diagnosis:  Adjustment disorder, unspecified type    Collateral Reports Completed:   Not Applicable     PLAN: (Patient tasks / therapist tasks / other)  Patient  will [place] 5 of her most influential identities and journal about why they are important to her.           SHERRY Hernandez, MSW Intern     1/26/2021                                                     Treatment Plan    Client's Name: aKren Donis  YOB: 1945    Date: 11/24/2020    DSM-V Diagnoses: Attention deficit disorder  Psychosocial / Contextual Factors: living with adult son; difficulties coping with COVID-19 (feeling bored at home)    Referral / Collaboration:  Referral to another professional/service is not indicated at this time.    Anticipated number of session or this episode of care: TBD      MeasurableTreatment Goal(s) related to diagnosis / functional impairment(s)  Goal 1: Client will be more effective in her communication with her  and her son.    I will know I've met my goal when...      Objective #A (Client Action)    Client will use her hearing aids when her  is talking to her.      Objective #B  Client will learn skills to help her cope with ADD symptoms and improve her interpersonal effectiveness.    Intervention(s)  Therapist will utilize and teach DBT Interpersonal Effectiveness skills.      Goal 2: Client will feel more confident when making decisions and will engage in positive self-talk.      I will know I've met my goal when...      Objective #A (Client Action)    Client will learn how to use positive self-talk and self compassion skills.    Intervention(s)  Therapist will teach and utilize DBT and CBT interventions surrounding mindfulness and positive self-talk.      Patient has reviewed and agreed to the above plan.          SHERRY Hernandez, MSW Intern   November 24, 2020    Supervised and signed by: ANNE Tena, [facility]  12/1/2020

## 2021-01-29 ENCOUNTER — NURSE TRIAGE (OUTPATIENT)
Dept: NURSING | Facility: CLINIC | Age: 76
End: 2021-01-29

## 2021-01-29 NOTE — TELEPHONE ENCOUNTER
Patient is asking what the price of her Rxs would be at [facility]. Advised patient to contact that pharmacy to check on prices. She can call back and request refills of medications once she identifies which pharmacy/price she is comfortable with. I have opened a refill encounter. We just need to add the pharmacy and pend the requesting medications.     Patient will call back.     Lucy Gonzalez RN  [facility]

## 2021-01-29 NOTE — TELEPHONE ENCOUNTER
Caller is requesting information regarding change to  Mail order  Pharmacy; was instructed to call her  PCP by insurance company but  Knows hr  PCP has switched clinics   Reviewed EMR; has many refills left and one month by month RX   Call was transferred to clinic RN to facilitate transfer of RX   Dora Summers RN  FNA       Reason for Disposition    Caller has NON-URGENT medication question about med that PCP prescribed and triager unable to answer question    Protocols used: MEDICATION QUESTION CALL-A-OH

## 2021-02-02 ENCOUNTER — OFFICE VISIT (OUTPATIENT)
Dept: FAMILY MEDICINE | Facility: CLINIC | Age: 76
End: 2021-02-02
Payer: COMMERCIAL

## 2021-02-02 VITALS
TEMPERATURE: 98.3 F | SYSTOLIC BLOOD PRESSURE: 138 MMHG | DIASTOLIC BLOOD PRESSURE: 75 MMHG | BODY MASS INDEX: 39.51 KG/M2 | WEIGHT: 223 LBS | OXYGEN SATURATION: 96 % | HEIGHT: 63 IN | HEART RATE: 63 BPM

## 2021-02-02 DIAGNOSIS — F98.8 ATTENTION DEFICIT DISORDER (ADD) WITHOUT HYPERACTIVITY: Chronic | ICD-10-CM

## 2021-02-02 DIAGNOSIS — C18.9 MALIGNANT NEOPLASM OF COLON, UNSPECIFIED PART OF COLON (H): ICD-10-CM

## 2021-02-02 DIAGNOSIS — Z00.00 ENCOUNTER FOR MEDICARE ANNUAL WELLNESS EXAM: Primary | ICD-10-CM

## 2021-02-02 DIAGNOSIS — E11.42 DIABETIC POLYNEUROPATHY ASSOCIATED WITH TYPE 2 DIABETES MELLITUS (H): ICD-10-CM

## 2021-02-02 DIAGNOSIS — F32.5 MAJOR DEPRESSION IN COMPLETE REMISSION (H): ICD-10-CM

## 2021-02-02 DIAGNOSIS — M54.16 LUMBAR RADICULOPATHY: ICD-10-CM

## 2021-02-02 DIAGNOSIS — N18.30 STAGE 3 CHRONIC KIDNEY DISEASE, UNSPECIFIED WHETHER STAGE 3A OR 3B CKD (H): ICD-10-CM

## 2021-02-02 DIAGNOSIS — N18.5 TYPE 2 DIABETES MELLITUS WITH STAGE 5 CHRONIC KIDNEY DISEASE NOT ON CHRONIC DIALYSIS, WITHOUT LONG-TERM CURRENT USE OF INSULIN (H): ICD-10-CM

## 2021-02-02 DIAGNOSIS — G89.29 CHRONIC LEFT SHOULDER PAIN: ICD-10-CM

## 2021-02-02 DIAGNOSIS — E66.01 MORBID OBESITY DUE TO EXCESS CALORIES (H): ICD-10-CM

## 2021-02-02 DIAGNOSIS — E11.22 TYPE 2 DIABETES MELLITUS WITH STAGE 5 CHRONIC KIDNEY DISEASE NOT ON CHRONIC DIALYSIS, WITHOUT LONG-TERM CURRENT USE OF INSULIN (H): ICD-10-CM

## 2021-02-02 DIAGNOSIS — E11.9 TYPE 2 DIABETES MELLITUS WITHOUT COMPLICATION, WITHOUT LONG-TERM CURRENT USE OF INSULIN (H): ICD-10-CM

## 2021-02-02 DIAGNOSIS — M25.512 CHRONIC LEFT SHOULDER PAIN: ICD-10-CM

## 2021-02-02 DIAGNOSIS — J44.9 CHRONIC OBSTRUCTIVE PULMONARY DISEASE, UNSPECIFIED COPD TYPE (H): ICD-10-CM

## 2021-02-02 LAB
ANION GAP SERPL CALCULATED.3IONS-SCNC: 5 MMOL/L (ref 3–14)
BUN SERPL-MCNC: 17 MG/DL (ref 7–30)
CALCIUM SERPL-MCNC: 9.2 MG/DL (ref 8.5–10.1)
CHLORIDE SERPL-SCNC: 107 MMOL/L (ref 94–109)
CO2 SERPL-SCNC: 29 MMOL/L (ref 20–32)
CREAT SERPL-MCNC: 1.07 MG/DL (ref 0.52–1.04)
CREAT UR-MCNC: 114 MG/DL
GFR SERPL CREATININE-BSD FRML MDRD: 50 ML/MIN/{1.73_M2}
GLUCOSE SERPL-MCNC: 134 MG/DL (ref 70–99)
HBA1C MFR BLD: 7.1 % (ref 0–5.6)
MICROALBUMIN UR-MCNC: 6 MG/L
MICROALBUMIN/CREAT UR: 5.59 MG/G CR (ref 0–25)
POTASSIUM SERPL-SCNC: 4.2 MMOL/L (ref 3.4–5.3)
SODIUM SERPL-SCNC: 141 MMOL/L (ref 133–144)

## 2021-02-02 PROCEDURE — 83036 HEMOGLOBIN GLYCOSYLATED A1C: CPT | Performed by: INTERNAL MEDICINE

## 2021-02-02 PROCEDURE — 82043 UR ALBUMIN QUANTITATIVE: CPT | Performed by: INTERNAL MEDICINE

## 2021-02-02 PROCEDURE — 99214 OFFICE O/P EST MOD 30 MIN: CPT | Mod: 25 | Performed by: INTERNAL MEDICINE

## 2021-02-02 PROCEDURE — 36415 COLL VENOUS BLD VENIPUNCTURE: CPT | Performed by: INTERNAL MEDICINE

## 2021-02-02 PROCEDURE — 99207 PR FOOT EXAM NO CHARGE: CPT | Performed by: INTERNAL MEDICINE

## 2021-02-02 PROCEDURE — 80048 BASIC METABOLIC PNL TOTAL CA: CPT | Performed by: INTERNAL MEDICINE

## 2021-02-02 PROCEDURE — G0439 PPPS, SUBSEQ VISIT: HCPCS | Performed by: INTERNAL MEDICINE

## 2021-02-02 RX ORDER — METFORMIN HCL 500 MG
1000 TABLET, EXTENDED RELEASE 24 HR ORAL
Qty: 180 TABLET | Refills: 1 | Status: SHIPPED | OUTPATIENT
Start: 2021-02-02 | End: 2021-06-16

## 2021-02-02 ASSESSMENT — MIFFLIN-ST. JEOR: SCORE: 1461.68

## 2021-02-02 ASSESSMENT — PAIN SCALES - GENERAL: PAINLEVEL: MILD PAIN (3)

## 2021-02-02 NOTE — LETTER
February 4, 2021      Karen CORINE Gagandeep  [address] [place] [address]        Dear ,    We are writing to inform you of your test results.    1. A1c is acceptable.  2. Electrolyte panel is normal except kidney function is stage 3 kidney disease range but stable.   3. Diabetes follow up in 6 months as planned.       Resulted Orders   Albumin Random Urine Quantitative with Creat Ratio   Result Value Ref Range    Creatinine Urine 114 mg/dL    Albumin Urine mg/L 6 mg/L    Albumin Urine mg/g Cr 5.59 0 - 25 mg/g Cr   Hemoglobin A1c   Result Value Ref Range    Hemoglobin A1C 7.1 (H) 0 - 5.6 %      Comment:      Normal <5.7% Prediabetes 5.7-6.4%  Diabetes 6.5% or higher - adopted from ADA   consensus guidelines.     Basic metabolic panel   Result Value Ref Range    Sodium 141 133 - 144 mmol/L    Potassium 4.2 3.4 - 5.3 mmol/L    Chloride 107 94 - 109 mmol/L    Carbon Dioxide 29 20 - 32 mmol/L    Anion Gap 5 3 - 14 mmol/L    Glucose 134 (H) 70 - 99 mg/dL      Comment:      Non Fasting    Urea Nitrogen 17 7 - 30 mg/dL    Creatinine 1.07 (H) 0.52 - 1.04 mg/dL    GFR Estimate 50 (L) >60 mL/min/[1.73_m2]      Comment:      Non  GFR Calc  Starting 12/18/2018, serum creatinine based estimated GFR (eGFR) will be   calculated using the Chronic Kidney Disease Epidemiology Collaboration   (CKD-EPI) equation.      GFR Estimate If Black 58 (L) >60 mL/min/[1.73_m2]      Comment:       GFR Calc  Starting 12/18/2018, serum creatinine based estimated GFR (eGFR) will be   calculated using the Chronic Kidney Disease Epidemiology Collaboration   (CKD-EPI) equation.      Calcium 9.2 8.5 - 10.1 mg/dL       If you have any questions or concerns, please call the clinic at the number listed above.       Sincerely,      Lakia Hunter MD PhD

## 2021-02-02 NOTE — PATIENT INSTRUCTIONS
Make appointment(s) for:   -- get labs today.   -- diabetes follow up with fasting labs in 6 months.   -- physical therapy at [facility].   -- sports medicine at [facility].       [facility] COVID-19 vaccine for 65 years and older:  https://[place].gov/covid19/vaccine/find-vaccine/index.jsp    [facility] Covid -19 Vaccine for 75 years and older:  We are working hard to begin vaccinating more people against COVID-19. Currently, we are only vaccinating individuals age 75 and older and Phase 1a workers - healthcare workers who are unable to do their job remotely. Vaccine availability is very limited.    If you are 75 or older, or a healthcare worker who is unable to do your job remotely, please log in to [employer] using this link to see if we have an open appointment and schedule an appointment.  If there are no appointments left, you will be unable to schedule and need to check back later.  If you are a healthcare worker, you will be asked to provide proof of employment at your appointment. If you cannot, you will be turned away.    Vaccine appointments are being added as they become available. Please check your [employer] account frequently for availability. If you have technical difficulty using [employer], call [phone] for assistance.    You can learn more about the [facility]'s phased approach to administering the vaccine, with details on each phase, [email or web address]://[email or web address].us/diseases/coronavirus/vaccine/plan.html.      Aa vaccine supply increases and we are able to open appointments to more groups, we will share that information on our website [email or web address]. Check this website to stay up to date on COVID-19 vaccination information.      Medication(s) prescribed today:    Orders Placed This Encounter   Medications     metFORMIN (GLUCOPHAGE-XR) 500 MG 24 hr tablet     Sig: Take 2 tablets (1,000 mg) by mouth daily (with dinner)     Dispense:  180 tablet     Refill:  1     Dose adjusted.            Patient Education   Personalized Prevention Plan  You are due for the preventive services outlined below.  Your care team is available to assist you in scheduling these services.  If you have already completed any of these items, please share that information with your care team to update in your medical record.  Health Maintenance Due   Topic Date Due     COPD Action Plan  1945     COVID-19 Vaccine (1 of 2) 02/13/1959     Hepatitis C Screening  02/13/1961     Zoster (Shingles) Vaccine (2 of 3) 07/06/2012     Diabetic Foot Exam  11/18/2020

## 2021-02-02 NOTE — PROGRESS NOTES
"  SUBJECTIVE:   Karen Donis is a 75 year old female who presents for Preventive Visit.  HPI:  Has virtual visit with [facility]. Doing better.   Her brother is still hospitalized at [facility] for heart failure, getting ready to be discharged to [facility]. Pt is not abl to visit him due to the pandemic.    Chronic left shoulder pain. Recalled having injection years ago. It is acting up again. Feel like she may need another injection.    Left hip pain from the side to the left upper thigh toward the knee. Had similar pain on the right side in the past. Had injection as well but didn't feel the injection was at the right spot. But also stated the pain eventual went away.         PHQ-9 SCORE 11/10/2020 11/18/2020 12/15/2020   PHQ-9 Total Score - - -   PHQ-9 Total Score 12 2 2     RAGHAVENDRA-7 SCORE 11/10/2020 11/18/2020 12/15/2020   Total Score - - -   Total Score 7 0 0        Patient has been advised of split billing requirements and indicates understanding: Yes     Are you in the first 12 months of your Medicare Part B coverage?  No    Physical Health:    In general, how would you rate your overall physical health? fair    Outside of work, how many days during the week do you exercise? none    Outside of work, approximately how many minutes a day do you exercise?not applicable    If you drink alcohol do you typically have >3 drinks per day or >7 drinks per week? No    Do you usually eat at least 4 servings of fruit and vegetables a day, include whole grains & fiber and avoid regularly eating high fat or \"junk\" foods? Yes    Do you have any problems taking medications regularly?  YES- sometimes    Do you have any side effects from medications? none    Needs assistance for the following daily activities: no assistance needed    Which of the following safety concerns are present in your home?  lack of grab bars in the bathroom     Hearing impairment: Yes, has hearing aids    In the past 6 " months, have you been bothered by leaking of urine? yes    Mental Health:    In general, how would you rate your overall mental or emotional health? fair  PHQ-2 Score:      Do you feel safe in your environment? Yes    Have you ever done Advance Care Planning? (For example, a Health Directive, POLST, or a discussion with a medical provider or your loved ones about your wishes): No, advance care planning information given to patient to review.  Patient plans to discuss their wishes with loved ones or provider.      Additional concerns to address?  No    Fall risk:  Fallen 2 or more times in the past year?: No  Any fall with injury in the past year?: No    Cognitive Screenin) Repeat 3 items (Leader, Season, Table)    2) Clock draw: NORMAL  3) 3 item recall: Recalls 3 objects  Results: 3 items recalled: COGNITIVE IMPAIRMENT LESS LIKELY    Mini-CogTM Copyright S David. Licensed by the author for use in [facility]; reprinted with permission (soeverett@[facility]). All rights reserved.      Do you have sleep apnea, excessive snoring or daytime drowsiness?: yes- CPAP    -------------------------------------    Reviewed and updated as needed this visit by clinical staff  Tobacco  Allergies  Meds   Med Hx  Surg Hx  Fam Hx  Soc Hx        Reviewed and updated as needed this visit by Provider                Social History     Tobacco Use     Smoking status: Former Smoker     Quit date: 1982     Years since quittin.1     Smokeless tobacco: Never Used   Substance Use Topics     Alcohol use: No                           Current providers sharing in care for this patient include:   Patient Care Team:  Lakia Hunter MD PhD as PCP - General (Internal Medicine)  Brent Bunn DPM as MD (Podiatry)  Danie Garcia MD as MD (Family Medicine - Sports Medicine)  Maritza Woodall [facility] as Pharmacist (Pharmacist)  Lakia Hunter MD PhD as Assigned PCP  Maribel Chan MD as Assigned Endocrinology  "Provider  Brent Bunn DPM as Assigned Musculoskeletal Provider    The following health maintenance items are reviewed in Epic and correct as of today:  Health Maintenance   Topic Date Due     COPD ACTION PLAN  1945     COVID-19 Vaccine (1 of 2) 02/13/1959     HEPATITIS C SCREENING  02/13/1961     ZOSTER IMMUNIZATION (2 of 3) 07/06/2012     PHQ-9  06/15/2021     DEXA  06/20/2021     EYE EXAM  07/01/2021     A1C  08/02/2021     LIPID  08/28/2021     URINE DRUG SCREEN  08/31/2021     FALL RISK ASSESSMENT  10/12/2021     MEDICARE ANNUAL WELLNESS VISIT  02/02/2022     BMP  02/02/2022     MICROALBUMIN  02/02/2022     DIABETIC FOOT EXAM  02/02/2022     COLORECTAL CANCER SCREENING  12/11/2023     ADVANCE CARE PLANNING  02/02/2026     DTAP/TDAP/TD IMMUNIZATION (5 - Td) 06/08/2028     SPIROMETRY  Completed     DEPRESSION ACTION PLAN  Completed     INFLUENZA VACCINE  Completed     Pneumococcal Vaccine: Pediatrics (0 to 5 Years) and At-Risk Patients (6 to 64 Years)  Completed     Pneumococcal Vaccine: 65+ Years  Completed     IPV IMMUNIZATION  Aged Out     MENINGITIS IMMUNIZATION  Aged Out     Labs reviewed in [facility]  Mammogram Screening: Mammogram Screening - Patient over age 75, has elected to continue with screening.    ROS:  Constitutional, HEENT, cardiovascular, pulmonary, gi and gu systems are negative, except as otherwise noted.    OBJECTIVE:   /75 (BP Location: Right arm, Patient Position: Sitting, Cuff Size: Adult Large)   Pulse 63   Temp 98.3  F (36.8  C) (Oral)   Ht 1.594 m (5' 2.75\")   Wt 101.2 kg (223 lb)   SpO2 96%   BMI 39.82 kg/m   Estimated body mass index is 39.82 kg/m  as calculated from the following:    Height as of this encounter: 1.594 m (5' 2.75\").    Weight as of this encounter: 101.2 kg (223 lb).  EXAM:   GENERAL: healthy, alert and no distress  EYES: Eyes grossly normal to inspection, PERRL and conjunctivae and sclerae normal  HENT: ear canals and TM's normal, nose and mouth " without ulcers or lesions  NECK: no adenopathy, no asymmetry, masses, or scars and thyroid normal to palpation  RESP: lungs clear to auscultation - no rales, rhonchi or wheezes  BREAST: normal without masses, tenderness or nipple discharge and no palpable axillary masses or adenopathy  CV: regular rate and rhythm, normal S1 S2, no S3 or S4, no murmur, click or rub, no peripheral edema and peripheral pulses strong  ABDOMEN: soft, nontender, no hepatosplenomegaly, no masses and bowel sounds normal  MS: no gross musculoskeletal defects noted, no edema  SKIN: no suspicious lesions or rashes  NEURO: Normal strength and tone, mentation intact and speech normal  PSYCH: mentation appears normal, affect normal/bright  Foot exam: normal sensation, DP 2+, filament test normal.      Left shoulder: limited internal rotation, okay with lateral flexion and external rotation.   Diagnostic Test Results:  Results for orders placed or performed in visit on 02/02/21 (from the past 24 hour(s))   Albumin Random Urine Quantitative with Creat Ratio   Result Value Ref Range    Creatinine Urine 114 mg/dL    Albumin Urine mg/L 6 mg/L    Albumin Urine mg/g Cr 5.59 0 - 25 mg/g Cr   Hemoglobin A1c   Result Value Ref Range    Hemoglobin A1C 7.1 (H) 0 - 5.6 %   Basic metabolic panel   Result Value Ref Range    Sodium 141 133 - 144 mmol/L    Potassium 4.2 3.4 - 5.3 mmol/L    Chloride 107 94 - 109 mmol/L    Carbon Dioxide 29 20 - 32 mmol/L    Anion Gap 5 3 - 14 mmol/L    Glucose 134 (H) 70 - 99 mg/dL    Urea Nitrogen 17 7 - 30 mg/dL    Creatinine 1.07 (H) 0.52 - 1.04 mg/dL    GFR Estimate 50 (L) >60 mL/min/[1.73_m2]    GFR Estimate If Black 58 (L) >60 mL/min/[1.73_m2]    Calcium 9.2 8.5 - 10.1 mg/dL       ASSESSMENT / PLAN:   Karen was seen today for physical.    Diagnoses and all orders for this visit:    Encounter for Medicare annual wellness exam    Diabetic polyneuropathy associated with type 2 diabetes mellitus (H)  -     Hemoglobin A1c;  Future  -     Albumin Random Urine Quantitative with Creat Ratio; Future  -     Albumin Random Urine Quantitative with Creat Ratio  -     Hemoglobin A1c  -     FOOT EXAM    Stage 3 chronic kidney disease, unspecified whether stage 3a or 3b CKD  -     Basic metabolic panel; Future  -     Albumin Random Urine Quantitative with Creat Ratio; Future  -     Albumin Random Urine Quantitative with Creat Ratio  -     Basic metabolic panel    Type 2 diabetes mellitus with stage 5 chronic kidney disease not on chronic dialysis, without long-term current use of insulin (H)  -     Basic metabolic panel; Future  -     Hemoglobin A1c; Future  -     Albumin Random Urine Quantitative with Creat Ratio; Future  -     Albumin Random Urine Quantitative with Creat Ratio  -     Hemoglobin A1c  -     Basic metabolic panel  -     FOOT EXAM    Attention deficit disorder (ADD) without hyperactivity    Type 2 diabetes mellitus without complication, without long-term current use of insulin (H)  -     metFORMIN (GLUCOPHAGE-XR) 500 MG 24 hr tablet; Take 2 tablets (1,000 mg) by mouth daily (with dinner)    Lumbar radiculopathy  -     [facility] PT, HAND, AND CHIROPRACTIC REFERRAL; Future  -     Orthopedic & Spine  Referral; Future    Chronic left shoulder pain  -     Orthopedic & Spine  Referral; Future    Chronic obstructive pulmonary disease, unspecified COPD type (H)    Major depression in complete remission (H)    Malignant neoplasm of colon, unspecified part of colon (H)    Morbid obesity due to excess calories (H)      Diabetes: improved. Continue with current regimen of insulin and metformin. She reported taking only 1000 mg instead of the 2000 mg prescribed. She may continue at 1000 mg every day.     Left shoulder pain: rotator cuff syndrome. Referred to sports medicine.     Lumbar radiculopathy on the left side: refer to a course of PT and sports medicine at [place].     History of colon cancer. Colonoscopy up to date.  "    Depression: in remission. Continue with counseling as her brother's health is ongoing stress for her.     COPD: no active issue currently    Morbid obesity: lost a few lbs from a year ago. She will work at it.     ADHD: continue with methylphenidate. Not due for refill yet.     Gave info on covid-19 vaccine.     Preventive:   Mammogram and colonoscopy up to date.   Vaccines up to date.       Patient has been advised of split billing requirements and indicates understanding: Yes    COUNSELING:  Reviewed preventive health counseling, as reflected in patient instructions    Estimated body mass index is 39.82 kg/m  as calculated from the following:    Height as of this encounter: 1.594 m (5' 2.75\").    Weight as of this encounter: 101.2 kg (223 lb).    Weight management plan: Discussed healthy diet and exercise guidelines    She reports that she quit smoking about 38 years ago. She has never used smokeless tobacco.    Appropriate preventive services were discussed with this patient, including applicable screening as appropriate for cardiovascular disease, diabetes, osteopenia/osteoporosis, and glaucoma.  As appropriate for age/gender, discussed screening for colorectal cancer, prostate cancer, breast cancer, and cervical cancer. Checklist reviewing preventive services available has been given to the patient.    Reviewed patients plan of care and provided an AVS. The Complex Care Plan (for patients with higher acuity and needing more deliberate coordination of services) for Karen meets the Care Plan requirement. This Care Plan has been established and reviewed with the Patient.    Counseling Resources:  ATP IV Guidelines  Pooled Cohorts Equation Calculator  Breast Cancer Risk Calculator  BRCA-Related Cancer Risk Assessment: FHS-7 Tool  FRAX Risk Assessment  ICSI Preventive Guidelines  Dietary Guidelines for Americans, 2010  USDA's MyPlate  ASA Prophylaxis  Lung CA Screening    Lakia Hunter MD PhD  [facility] " [facility]

## 2021-02-23 DIAGNOSIS — F98.8 ATTENTION DEFICIT DISORDER (ADD) WITHOUT HYPERACTIVITY: ICD-10-CM

## 2021-02-23 NOTE — TELEPHONE ENCOUNTER
Reason for Call:  Medication or medication refill:    Do you use a [facility]?  Name of the pharmacy and phone number for the current request:  [facility] [address]     Name of the medication requested: methylphenidate ER 20mg    Other request: none    Can we leave a detailed message on this number? YES    Phone number patient can be reached at: Home number on file [phone] (home)    Best Time: any    Call taken on 2/23/2021 at 12:53 PM by Jodie Valladares

## 2021-02-23 NOTE — TELEPHONE ENCOUNTER
Routing refill request to provider for review/approval because:  Drug not on the [facility] refill protocol     Elsy AGGARWALN, RN

## 2021-02-25 ENCOUNTER — VIRTUAL VISIT (OUTPATIENT)
Dept: PSYCHOLOGY | Facility: CLINIC | Age: 76
End: 2021-02-25
Payer: COMMERCIAL

## 2021-02-25 DIAGNOSIS — F43.20 ADJUSTMENT DISORDER, UNSPECIFIED TYPE: Primary | ICD-10-CM

## 2021-02-25 PROCEDURE — 99213 OFFICE O/P EST LOW 20 MIN: CPT | Mod: 95

## 2021-02-25 ASSESSMENT — ANXIETY QUESTIONNAIRES
6. BECOMING EASILY ANNOYED OR IRRITABLE: NEARLY EVERY DAY
5. BEING SO RESTLESS THAT IT IS HARD TO SIT STILL: NOT AT ALL
3. WORRYING TOO MUCH ABOUT DIFFERENT THINGS: MORE THAN HALF THE DAYS
2. NOT BEING ABLE TO STOP OR CONTROL WORRYING: NEARLY EVERY DAY
1. FEELING NERVOUS, ANXIOUS, OR ON EDGE: NOT AT ALL
7. FEELING AFRAID AS IF SOMETHING AWFUL MIGHT HAPPEN: MORE THAN HALF THE DAYS

## 2021-02-25 ASSESSMENT — PATIENT HEALTH QUESTIONNAIRE - PHQ9: SUM OF ALL RESPONSES TO PHQ QUESTIONS 1-9: 8

## 2021-02-25 NOTE — PROGRESS NOTES
"                                             Progress Note    Patient Name: Karen Donis  Date: 2021         Service Type: Individual      Session Start Time: 12:10pm  Session End Time: 12:30pm     Session Length: 20 min    Session #: 11    Attendees: Client attended alone    Service Modality:  Phone Visit:      Provider verified identity through the following two step process.  Patient provided:  Patient     The patient has been notified of the following:      \"We have found that certain health care needs can be provided without the need for a face to face visit.  This service lets us provide the care you need with a phone conversation.       I will have full access to your [facility] medical record during this entire phone call.   I will be taking notes for your medical record.      Since this is like an office visit, we will bill your insurance company for this service.       There are potential benefits and risks of telephone visits (e.g. limits to patient confidentiality) that differ from in-person visits.?  Confidentiality still applies for telephone services, and nobody will record the visit.  It is important to be in a quiet, private space that is free of distractions (including cell phone or other devices) during the visit.??      If during the course of the call I believe a telephone visit is not appropriate, you will not be charged for this service\"     Consent has been obtained for this service by care team member: Yes        DATA  Interactive Complexity: No  Crisis: No       Progress Since Last Session (Related to Symptoms / Goals / Homework):   Symptoms: Worsening (increased symptoms of depression and anxiety)     Current / Ongoing Stressors and Concerns:   Today, client reports worsening symptoms of depression and anxiety.  Therapist went through PHQ-9 and RAGHAVENDRA-7 with client.  After the assessments, client decided she needed to end the session early to get some needed sleep/rest.  Therapist " will check in with client next week.        Treatment Objective(s) Addressed in This Session:   Reviewed client symptoms through PHQ-9 and RAGHAVENDRA-7 assessments.       Intervention:   DBT: therapist validated the client's experience        ASSESSMENT: Current Emotional / Mental Status (status of significant symptoms):   Risk status (Self / Other harm or suicidal ideation)   Patient denies current fears or concerns for personal safety.   Patient denies current or recent suicidal ideation or behaviors.   Patient denies current or recent homicidal ideation or behaviors.   Patient denies current or recent self injurious behavior or ideation.   Patient denies other safety concerns.   Patient reports there has been no change in risk factors since their last session.     Patient reports there has been no change in protective factors since their last session.     Recommended that patient call 911 or go to the local ED should there be a change in any of these risk factors.     Appearance:   NA    Eye Contact:   NA    Psychomotor Behavior: Normal  NA    Attitude:   Friendly Suspicious    Orientation:   All   Speech    Rate / Production: Normal/ Responsive Normal     Volume:  Normal    Mood:    Normal   Affect:    Appropriate    Thought Content:  Clear    Thought Form:  Coherent    Insight:    Good  and Spiritual insight     Medication Review:   No current psychiatric medications prescribed     Medication Compliance:   NA     Changes in Health Issues:   None reported     Chemical Use Review:   Substance Use: Chemical use reviewed, no active concerns identified      Tobacco Use: No current tobacco use.      Diagnosis:  Adjustment disorder, unspecified type    Collateral Reports Completed:   Not Applicable     PLAN: (Patient tasks / therapist tasks / other)  Therapist will check in with client next week.          SHERRY Hernandez, MSW Intern     2/25/2021                                                     Treatment  Plan    Client's Name: Karen Donis  YOB: 1945    Date: 11/24/2020    DSM-V Diagnoses: Attention deficit disorder  Psychosocial / Contextual Factors: living with adult son; difficulties coping with COVID-19 (feeling bored at home)    Referral / Collaboration:  Referral to another professional/service is not indicated at this time.    Anticipated number of session or this episode of care: TBD      MeasurableTreatment Goal(s) related to diagnosis / functional impairment(s)  Goal 1: Client will be more effective in her communication with her  and her son.    I will know I've met my goal when...      Objective #A (Client Action)    Client will use her hearing aids when her  is talking to her.      Objective #B  Client will learn skills to help her cope with ADD symptoms and improve her interpersonal effectiveness.    Intervention(s)  Therapist will utilize and teach DBT Interpersonal Effectiveness skills.      Goal 2: Client will feel more confident when making decisions and will engage in positive self-talk.      I will know I've met my goal when...      Objective #A (Client Action)    Client will learn how to use positive self-talk and self compassion skills.    Intervention(s)  Therapist will teach and utilize DBT and CBT interventions surrounding mindfulness and positive self-talk.      Patient has reviewed and agreed to the above plan.          SHERRY Hernandez, MSW Intern   November 24, 2020    Supervised and signed by: ANNE Tena, [facility]  12/1/2020

## 2021-02-26 RX ORDER — METHYLPHENIDATE HYDROCHLORIDE EXTENDED RELEASE 20 MG/1
20 TABLET ORAL EVERY MORNING
Qty: 30 TABLET | Refills: 0 | Status: SHIPPED | OUTPATIENT
Start: 2021-02-26 | End: 2021-04-06

## 2021-03-03 ENCOUNTER — VIRTUAL VISIT (OUTPATIENT)
Dept: PSYCHOLOGY | Facility: CLINIC | Age: 76
End: 2021-03-03
Payer: COMMERCIAL

## 2021-03-03 DIAGNOSIS — F41.1 GAD (GENERALIZED ANXIETY DISORDER): Primary | ICD-10-CM

## 2021-03-03 PROCEDURE — 90834 PSYTX W PT 45 MINUTES: CPT | Mod: 95

## 2021-03-03 ASSESSMENT — ANXIETY QUESTIONNAIRES
7. FEELING AFRAID AS IF SOMETHING AWFUL MIGHT HAPPEN: NEARLY EVERY DAY
2. NOT BEING ABLE TO STOP OR CONTROL WORRYING: NEARLY EVERY DAY
5. BEING SO RESTLESS THAT IT IS HARD TO SIT STILL: NOT AT ALL
3. WORRYING TOO MUCH ABOUT DIFFERENT THINGS: MORE THAN HALF THE DAYS
GAD7 TOTAL SCORE: 10
6. BECOMING EASILY ANNOYED OR IRRITABLE: NOT AT ALL
1. FEELING NERVOUS, ANXIOUS, OR ON EDGE: NOT AT ALL

## 2021-03-03 ASSESSMENT — PATIENT HEALTH QUESTIONNAIRE - PHQ9
5. POOR APPETITE OR OVEREATING: MORE THAN HALF THE DAYS
SUM OF ALL RESPONSES TO PHQ QUESTIONS 1-9: 8

## 2021-03-03 NOTE — PROGRESS NOTES
"                                             Progress Note    Patient Name: Karen Donis  Date: 3/3/2021         Service Type: Individual      Session Start Time: 2:10pm  Session End Time: 3pm     Session Length: 50 min    Session #: 12    Attendees: Client attended alone    Service Modality:  Phone Visit:      Provider verified identity through the following two step process.  Patient provided:  Patient     The patient has been notified of the following:      \"We have found that certain health care needs can be provided without the need for a face to face visit.  This service lets us provide the care you need with a phone conversation.       I will have full access to your [place] medical record during this entire phone call.   I will be taking notes for your medical record.      Since this is like an office visit, we will bill your insurance company for this service.       There are potential benefits and risks of telephone visits (e.g. limits to patient confidentiality) that differ from in-person visits.?  Confidentiality still applies for telephone services, and nobody will record the visit.  It is important to be in a quiet, private space that is free of distractions (including cell phone or other devices) during the visit.??      If during the course of the call I believe a telephone visit is not appropriate, you will not be charged for this service\"     Consent has been obtained for this service by care team member: Yes      Treatment Plan: 3/3/2021  RAGHAVENDRA-7/PHQ-9: 3/3/2021    DATA  Interactive Complexity: No  Crisis: No       Progress Since Last Session (Related to Symptoms / Goals / Homework):   Symptoms: Worsening (increased symptoms of depression and anxiety)     Current / Ongoing Stressors and Concerns:   Today, client processed an interpersonal familial concern.  The client has guardianship over her niece with autism who's group home has recently been scheduled to close.  Finding a new living " situation has been a source of extreme stress lately.  Client and therapist processed the situation and identified self-care strategies that the client can engage in so that she is taking care of herself as well as other people in her life.  Client's goal is to dye her hair and shower more regularly in the coming weeks before the next session.       Treatment Objective(s) Addressed in This Session:   Client will learn how to use positive self-talk and self compassion skills.  Therapist and client updated treatment plan.     Intervention:   DBT: therapist validated the client's experience    CBT: behavioral activation   Updated treatment plan        ASSESSMENT: Current Emotional / Mental Status (status of significant symptoms):   Risk status (Self / Other harm or suicidal ideation)   Patient denies current fears or concerns for personal safety.   Patient denies current or recent suicidal ideation or behaviors.   Patient denies current or recent homicidal ideation or behaviors.   Patient denies current or recent self injurious behavior or ideation.   Patient denies other safety concerns.   Patient reports there has been no change in risk factors since their last session.     Patient reports there has been no change in protective factors since their last session.     Recommended that patient call 911 or go to the local ED should there be a change in any of these risk factors.     Appearance:   NA    Eye Contact:   NA    Psychomotor Behavior: Normal  NA    Attitude:   Friendly Suspicious    Orientation:   All   Speech    Rate / Production: Normal/ Responsive Normal     Volume:  Normal    Mood:    Normal   Affect:    Appropriate    Thought Content:  Clear    Thought Form:  Coherent    Insight:    Good  and Spiritual insight     Medication Review:   No current psychiatric medications prescribed     Medication Compliance:   NA     Changes in Health Issues:   None reported     Chemical Use Review:   Substance Use: Chemical  use reviewed, no active concerns identified      Tobacco Use: No current tobacco use.      Diagnosis:  Generalized Anxiety Disorder     Collateral Reports Completed:   Not Applicable     PLAN: (Patient tasks / therapist tasks / other)  Client will dye her hair and shower more regularly in the coming weeks before the next session.          ALESSIA HernandezW, MSW Intern     3/3/2021                                                     Treatment Plan    Client's Name: Karen Donis  YOB: 1945    Date: 3/3/2021    DSM-V Diagnoses: Attention deficit disorder  Psychosocial / Contextual Factors: living with adult son; difficulties coping with COVID-19 (feeling bored at home)    Referral / Collaboration:  Referral to another professional/service is not indicated at this time.    Anticipated number of session or this episode of care: TBD      MeasurableTreatment Goal(s) related to diagnosis / functional impairment(s)  Goal 1: Client will be more effective in her communication with her  and her son.    I will know I've met my goal when...    Status: Accomplished (3/3/2021), working towards maintenance    Objective #A (Client Action)    Client will use her hearing aids when her  is talking to her.      Objective #B  Client will learn skills to help her cope with ADD symptoms and improve her interpersonal effectiveness.    Intervention(s)  Therapist will utilize and teach DBT Interpersonal Effectiveness skills.      Goal 2: Client will feel more confident when making decisions and will engage in positive self-talk.      I will know I've met my goal when...    Status: Progressing (3/3/2021), working more specifically on confidence    Objective #A (Client Action)    Client will learn how to use positive self-talk and self compassion skills.    Intervention(s)  Therapist will teach and utilize DBT and CBT interventions surrounding mindfulness and positive self-talk.      Patient has reviewed and  agreed to the above plan.          SHERRY Hernandez, MSW Intern   March 3, 2021    Supervised and signed by: ANNE Tena, [facility]  12/1/2020

## 2021-03-04 ASSESSMENT — ANXIETY QUESTIONNAIRES: GAD7 TOTAL SCORE: 10

## 2021-03-18 ENCOUNTER — VIRTUAL VISIT (OUTPATIENT)
Dept: PSYCHOLOGY | Facility: CLINIC | Age: 76
End: 2021-03-18
Payer: COMMERCIAL

## 2021-03-18 DIAGNOSIS — F41.1 GAD (GENERALIZED ANXIETY DISORDER): Primary | ICD-10-CM

## 2021-03-18 PROCEDURE — 90834 PSYTX W PT 45 MINUTES: CPT | Mod: 95

## 2021-03-18 ASSESSMENT — ANXIETY QUESTIONNAIRES
6. BECOMING EASILY ANNOYED OR IRRITABLE: SEVERAL DAYS
2. NOT BEING ABLE TO STOP OR CONTROL WORRYING: MORE THAN HALF THE DAYS
5. BEING SO RESTLESS THAT IT IS HARD TO SIT STILL: NOT AT ALL
7. FEELING AFRAID AS IF SOMETHING AWFUL MIGHT HAPPEN: MORE THAN HALF THE DAYS
1. FEELING NERVOUS, ANXIOUS, OR ON EDGE: MORE THAN HALF THE DAYS
GAD7 TOTAL SCORE: 10
3. WORRYING TOO MUCH ABOUT DIFFERENT THINGS: MORE THAN HALF THE DAYS

## 2021-03-18 ASSESSMENT — PATIENT HEALTH QUESTIONNAIRE - PHQ9
5. POOR APPETITE OR OVEREATING: SEVERAL DAYS
SUM OF ALL RESPONSES TO PHQ QUESTIONS 1-9: 6

## 2021-03-18 NOTE — PROGRESS NOTES
"                                             Progress Note    Patient Name: Karen Donis  Date: 3/18/2021         Service Type: Individual      Session Start Time: 11am  Session End Time: 12pm     Session Length: 1 hour    Session #: 13    Attendees: Client attended alone    Service Modality:  Phone Visit:      Provider verified identity through the following two step process.  Patient provided:  Patient     The patient has been notified of the following:      \"We have found that certain health care needs can be provided without the need for a face to face visit.  This service lets us provide the care you need with a phone conversation.       I will have full access to your [facility] medical record during this entire phone call.   I will be taking notes for your medical record.      Since this is like an office visit, we will bill your insurance company for this service.       There are potential benefits and risks of telephone visits (e.g. limits to patient confidentiality) that differ from in-person visits.?  Confidentiality still applies for telephone services, and nobody will record the visit.  It is important to be in a quiet, private space that is free of distractions (including cell phone or other devices) during the visit.??      If during the course of the call I believe a telephone visit is not appropriate, you will not be charged for this service\"     Consent has been obtained for this service by care team member: Yes      Treatment Plan: 3/3/2021  RAGHAVENDRA-7/PHQ-9: 3/3/2021    DATA  Interactive Complexity: No  Crisis: No       Progress Since Last Session (Related to Symptoms / Goals / Homework):   Symptoms: No change (primarily symptoms of stress and anxiety)     Current / Ongoing Stressors and Concerns:   Today, client updated therapist on new details involving a distressing interpersonal concern.  Client reports that she has been working on engaging in positive self talk behaviors, and that it has been " helpful in her day to day routine.  Therapist validated client experience.  Therapist capitalized on the client's strengths and her resilience in working through difficult times like these in the past.  A solution-focused approach was taken in session.  While client feels nervous about the important meeting coming up, she is going to focus on what is in her control and continue to engage in positive self care and positive self talk.       Treatment Objective(s) Addressed in This Session:   Client will learn how to use positive self-talk and self compassion skills.     Intervention:   DBT: therapist validated the client's experience    MI: OARS skills   Solution-focused: problem-solving, strengths-based approach   CBT: positive self-talk, challenging negative thoughts        ASSESSMENT: Current Emotional / Mental Status (status of significant symptoms):   Risk status (Self / Other harm or suicidal ideation)   Patient denies current fears or concerns for personal safety.   Patient denies current or recent suicidal ideation or behaviors.   Patient denies current or recent homicidal ideation or behaviors.   Patient denies current or recent self injurious behavior or ideation.   Patient denies other safety concerns.   Patient reports there has been no change in risk factors since their last session.     Patient reports there has been no change in protective factors since their last session.     Recommended that patient call 911 or go to the local ED should there be a change in any of these risk factors.     Appearance:   NA    Eye Contact:   NA    Psychomotor Behavior: Normal  NA    Attitude:   Friendly Suspicious    Orientation:   All   Speech    Rate / Production: Normal/ Responsive Normal     Volume:  Normal    Mood:    Normal   Affect:    Appropriate    Thought Content:  Clear    Thought Form:  Coherent    Insight:    Good  and Spiritual insight     Medication Review:   No current psychiatric medications  prescribed     Medication Compliance:   NA     Changes in Health Issues:   None reported     Chemical Use Review:   Substance Use: Chemical use reviewed, no active concerns identified      Tobacco Use: No current tobacco use.      Diagnosis:  Generalized Anxiety Disorder     Collateral Reports Completed:   Not Applicable     PLAN: (Patient tasks / therapist tasks / other)  Client will continue to engage in self-care, positive self-talk, and will focus on what is within her control.          Gio Gee, ALESSIAW, MSW Intern     3/18/2021                                                     Treatment Plan    Client's Name: Karen Donis  YOB: 1945    Date: 3/3/2021    DSM-V Diagnoses: Attention deficit disorder  Psychosocial / Contextual Factors: living with adult son; difficulties coping with COVID-19 (feeling bored at home)    Referral / Collaboration:  Referral to another professional/service is not indicated at this time.    Anticipated number of session or this episode of care: TBD      MeasurableTreatment Goal(s) related to diagnosis / functional impairment(s)  Goal 1: Client will be more effective in her communication with her  and her son.    I will know I've met my goal when...    Status: Accomplished (3/3/2021), working towards maintenance    Objective #A (Client Action)    Client will use her hearing aids when her  is talking to her.      Objective #B  Client will learn skills to help her cope with ADD symptoms and improve her interpersonal effectiveness.    Intervention(s)  Therapist will utilize and teach DBT Interpersonal Effectiveness skills.      Goal 2: Client will feel more confident when making decisions and will engage in positive self-talk.      I will know I've met my goal when...    Status: Progressing (3/3/2021), working more specifically on confidence    Objective #A (Client Action)    Client will learn how to use positive self-talk and self compassion  skills.    Intervention(s)  Therapist will teach and utilize DBT and CBT interventions surrounding mindfulness and positive self-talk.      Patient has reviewed and agreed to the above plan.          SHERRY Hernandez, MSW Intern   March 3, 2021    Supervised and signed by: ANNE Tena, [facility]  12/1/2020

## 2021-03-19 ASSESSMENT — ANXIETY QUESTIONNAIRES: GAD7 TOTAL SCORE: 10

## 2021-03-31 ENCOUNTER — VIRTUAL VISIT (OUTPATIENT)
Dept: PSYCHOLOGY | Facility: CLINIC | Age: 76
End: 2021-03-31
Payer: COMMERCIAL

## 2021-03-31 DIAGNOSIS — F41.1 GAD (GENERALIZED ANXIETY DISORDER): Primary | ICD-10-CM

## 2021-03-31 PROCEDURE — 90834 PSYTX W PT 45 MINUTES: CPT | Mod: 95

## 2021-03-31 ASSESSMENT — ANXIETY QUESTIONNAIRES
GAD7 TOTAL SCORE: 9
4. TROUBLE RELAXING: SEVERAL DAYS
5. BEING SO RESTLESS THAT IT IS HARD TO SIT STILL: NOT AT ALL
1. FEELING NERVOUS, ANXIOUS, OR ON EDGE: SEVERAL DAYS
2. NOT BEING ABLE TO STOP OR CONTROL WORRYING: SEVERAL DAYS
7. FEELING AFRAID AS IF SOMETHING AWFUL MIGHT HAPPEN: MORE THAN HALF THE DAYS
6. BECOMING EASILY ANNOYED OR IRRITABLE: MORE THAN HALF THE DAYS
3. WORRYING TOO MUCH ABOUT DIFFERENT THINGS: MORE THAN HALF THE DAYS

## 2021-03-31 ASSESSMENT — PATIENT HEALTH QUESTIONNAIRE - PHQ9: SUM OF ALL RESPONSES TO PHQ QUESTIONS 1-9: 8

## 2021-03-31 NOTE — PROGRESS NOTES
"                                             Progress Note    Patient Name: Karen Donis  Date: 3/31/2021         Service Type: Individual      Session Start Time: 2pm  Session End Time: 2:50pm     Session Length: 50 min    Session #: 14    Attendees: Client attended alone    Service Modality:  Phone Visit:      Provider verified identity through the following two step process.  Patient provided:  Patient     The patient has been notified of the following:      \"We have found that certain health care needs can be provided without the need for a face to face visit.  This service lets us provide the care you need with a phone conversation.       I will have full access to your [place] medical record during this entire phone call.   I will be taking notes for your medical record.      Since this is like an office visit, we will bill your insurance company for this service.       There are potential benefits and risks of telephone visits (e.g. limits to patient confidentiality) that differ from in-person visits.?  Confidentiality still applies for telephone services, and nobody will record the visit.  It is important to be in a quiet, private space that is free of distractions (including cell phone or other devices) during the visit.??      If during the course of the call I believe a telephone visit is not appropriate, you will not be charged for this service\"     Consent has been obtained for this service by care team member: Yes      Treatment Plan: 3/3/2021  RAGHAVENDRA-7/PHQ-9: 3/31/2021    DATA  Interactive Complexity: No  Crisis: No       Progress Since Last Session (Related to Symptoms / Goals / Homework):   Symptoms: No change (primarily symptoms of stress and anxiety)     Current / Ongoing Stressors and Concerns:   Today, client and therapist discussed the client's experience of being her niece's guardian.  Specifically, client began to process what it feels like to start making plans for another guardian to " take her place.  Therapist used validation and affirmations to empathize with the client's experience.  Therapist explained how a major change in identity can feel like a loss, and how it is important to take time to grieve the losses of things that were important to us.  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  Client challenged the therapist to reflect on what the therapist has learned from her before time together comes to an end.  In the coming week, client will focus on what is within her scope of control.  During next session, client and therapist will work on interpersonal effectiveness skills.       Treatment Objective(s) Addressed in This Session:   Client will learn how to use positive self-talk and self compassion skills.     Intervention:   DBT: therapist validated the client's experience    MI: OARS skills   Solution-focused: problem-solving, strengths-based approach   CBT: positive self-talk, challenging negative thoughts        ASSESSMENT: Current Emotional / Mental Status (status of significant symptoms):   Risk status (Self / Other harm or suicidal ideation)   Patient denies current fears or concerns for personal safety.   Patient denies current or recent suicidal ideation or behaviors.   Patient denies current or recent homicidal ideation or behaviors.   Patient denies current or recent self injurious behavior or ideation.   Patient denies other safety concerns.   Patient reports there has been no change in risk factors since their last session.     Patient reports there has been no change in protective factors since their last session.     Recommended that patient call 911 or go to the local ED should there be a change in any of these risk factors.     Appearance:   NA    Eye Contact:   NA    Psychomotor Behavior: Normal  NA    Attitude:   Friendly Suspicious    Orientation:   All   Speech    Rate /  Production: Normal/ Responsive Normal     Volume:  Normal    Mood:    Normal   Affect:    Appropriate    Thought Content:  Clear    Thought Form:  Coherent    Insight:    Good  and Spiritual insight     Medication Review:   No current psychiatric medications prescribed     Medication Compliance:   NA     Changes in Health Issues:   None reported     Chemical Use Review:   Substance Use: Chemical use reviewed, no active concerns identified      Tobacco Use: No current tobacco use.      Diagnosis:  Generalized Anxiety Disorder     Collateral Reports Completed:   Not Applicable     PLAN: (Patient tasks / therapist tasks / other)  Client will continue to engage in self-care, positive self-talk, and will focus on what is within her control.           SHERRY Hernandez, MSW Intern     3/31/2021                                                     Treatment Plan    Client's Name: Karen Donis  YOB: 1945    Date: 3/3/2021    DSM-V Diagnoses: Attention deficit disorder  Psychosocial / Contextual Factors: living with adult son; difficulties coping with COVID-19 (feeling bored at home)    Referral / Collaboration:  Referral to another professional/service is not indicated at this time.    Anticipated number of session or this episode of care: TBD      MeasurableTreatment Goal(s) related to diagnosis / functional impairment(s)  Goal 1: Client will be more effective in her communication with her  and her son.    I will know I've met my goal when...    Status: Accomplished (3/3/2021), working towards maintenance    Objective #A (Client Action)    Client will use her hearing aids when her  is talking to her.      Objective #B  Client will learn skills to help her cope with ADD symptoms and improve her interpersonal effectiveness.    Intervention(s)  Therapist will utilize and teach DBT Interpersonal Effectiveness skills.      Goal 2: Client will feel more confident when making decisions and will  engage in positive self-talk.      I will know I've met my goal when...    Status: Progressing (3/3/2021), working more specifically on confidence    Objective #A (Client Action)    Client will learn how to use positive self-talk and self compassion skills.    Intervention(s)  Therapist will teach and utilize DBT and CBT interventions surrounding mindfulness and positive self-talk.      Patient has reviewed and agreed to the above plan.          SHERRY Hernandez, MSW Intern   March 3, 2021    Supervised and signed by: ANNE Tena, [facility]  12/1/2020

## 2021-04-01 ASSESSMENT — ANXIETY QUESTIONNAIRES: GAD7 TOTAL SCORE: 9

## 2021-04-05 DIAGNOSIS — M51.26 LUMBAR DISC HERNIATION: ICD-10-CM

## 2021-04-05 DIAGNOSIS — M51.369 DDD (DEGENERATIVE DISC DISEASE), LUMBAR: Primary | ICD-10-CM

## 2021-04-05 DIAGNOSIS — F98.8 ATTENTION DEFICIT DISORDER (ADD) WITHOUT HYPERACTIVITY: ICD-10-CM

## 2021-04-06 ENCOUNTER — VIRTUAL VISIT (OUTPATIENT)
Dept: PSYCHOLOGY | Facility: CLINIC | Age: 76
End: 2021-04-06
Payer: COMMERCIAL

## 2021-04-06 DIAGNOSIS — F41.1 GAD (GENERALIZED ANXIETY DISORDER): Primary | ICD-10-CM

## 2021-04-06 PROCEDURE — 90834 PSYTX W PT 45 MINUTES: CPT | Mod: 95

## 2021-04-06 RX ORDER — METHYLPHENIDATE HYDROCHLORIDE EXTENDED RELEASE 20 MG/1
20 TABLET ORAL EVERY MORNING
Qty: 30 TABLET | Refills: 0 | Status: SHIPPED | OUTPATIENT
Start: 2021-04-06 | End: 2021-05-12

## 2021-04-06 NOTE — PROGRESS NOTES
"                                             Progress Note    Patient Name: Karen Donis  Date: 2021         Service Type: Individual      Session Start Time: 1pm  Session End Time: 1:50pm     Session Length: 50 min    Session #: 15    Attendees: Client attended alone    Service Modality:  Phone Visit:      Provider verified identity through the following two step process.  Patient provided:  Patient     The patient has been notified of the following:      \"We have found that certain health care needs can be provided without the need for a face to face visit.  This service lets us provide the care you need with a phone conversation.       I will have full access to your [place] medical record during this entire phone call.   I will be taking notes for your medical record.      Since this is like an office visit, we will bill your insurance company for this service.       There are potential benefits and risks of telephone visits (e.g. limits to patient confidentiality) that differ from in-person visits.?  Confidentiality still applies for telephone services, and nobody will record the visit.  It is important to be in a quiet, private space that is free of distractions (including cell phone or other devices) during the visit.??      If during the course of the call I believe a telephone visit is not appropriate, you will not be charged for this service\"     Consent has been obtained for this service by care team member: Yes      Treatment Plan: 3/3/2021  RAGHAVENDRA-7/PHQ-9: 3/31/2021    DATA  Interactive Complexity: No  Crisis: No       Progress Since Last Session (Related to Symptoms / Goals / Homework):   Symptoms: Improving (less anxious)     Current / Ongoing Stressors and Concerns:   Today, client reports that things have been going better.  Her brother is finally settled in a new living situation.  She is still in the midst of looking for housing for her niece.  Therapist validated and affirmed the client's " strengths as a strong advocate for her niece through the years.  Client identified that she wanted to move the conversation about interpersonal effectiveness skills to next session.  Instead, client and therapist discussed aspects of identity and role confusion as it relates to older adulthood.  Client identified her experiences with grieving many losses as a strength that will help her during these times of change.  Client also affirmed her roel as a strong protective factor in coping with stressful situations.  In the coming weeks, client will use positive self-talk.          Treatment Objective(s) Addressed in This Session:   Client will learn how to use positive self-talk and self compassion skills.     Intervention:   DBT: therapist validated the client's experience    MI: OARS skills   Solution-focused: problem-solving, strengths-based approach,    CBT: positive self-talk, challenging negative thoughts        ASSESSMENT: Current Emotional / Mental Status (status of significant symptoms):   Risk status (Self / Other harm or suicidal ideation)   Patient denies current fears or concerns for personal safety.   Patient denies current or recent suicidal ideation or behaviors.   Patient denies current or recent homicidal ideation or behaviors.   Patient denies current or recent self injurious behavior or ideation.   Patient denies other safety concerns.   Patient reports there has been no change in risk factors since their last session.     Patient reports there has been no change in protective factors since their last session.     Recommended that patient call 911 or go to the local ED should there be a change in any of these risk factors.     Appearance:   NA    Eye Contact:   NA    Psychomotor Behavior: NA    Attitude:   Friendly   Orientation:   All   Speech    Rate / Production: Normal/ Responsive Talkative    Volume:  Normal    Mood:    Normal   Affect:    Appropriate    Thought Content:  Clear    Thought  Form:  Coherent    Insight:    Good  and Spiritual insight     Medication Review:   No current psychiatric medications prescribed     Medication Compliance:   NA     Changes in Health Issues:   None reported     Chemical Use Review:   Substance Use: Chemical use reviewed, no active concerns identified      Tobacco Use: No current tobacco use.      Diagnosis:  Generalized Anxiety Disorder     Collateral Reports Completed:   Not Applicable     PLAN: (Patient tasks / therapist tasks / other)  Client will continue to engage in self-care, positive self-talk, and will focus on what is within her control.           Gio Gee, ALESSIAW, MSW Intern     4/6/2021                                                     Treatment Plan    Client's Name: Karen Donis  YOB: 1945    Date: 3/3/2021    DSM-V Diagnoses: Attention deficit disorder  Psychosocial / Contextual Factors: living with adult son; difficulties coping with COVID-19 (feeling bored at home)    Referral / Collaboration:  Referral to another professional/service is not indicated at this time.    Anticipated number of session or this episode of care: TBD      MeasurableTreatment Goal(s) related to diagnosis / functional impairment(s)  Goal 1: Client will be more effective in her communication with her  and her son.    I will know I've met my goal when...    Status: Accomplished (3/3/2021), working towards maintenance    Objective #A (Client Action)    Client will use her hearing aids when her  is talking to her.      Objective #B  Client will learn skills to help her cope with ADD symptoms and improve her interpersonal effectiveness.    Intervention(s)  Therapist will utilize and teach DBT Interpersonal Effectiveness skills.      Goal 2: Client will feel more confident when making decisions and will engage in positive self-talk.      I will know I've met my goal when...    Status: Progressing (3/3/2021), working more specifically on  confidence    Objective #A (Client Action)    Client will learn how to use positive self-talk and self compassion skills.    Intervention(s)  Therapist will teach and utilize DBT and CBT interventions surrounding mindfulness and positive self-talk.      Patient has reviewed and agreed to the above plan.          SHERRY Hernandez, MSW Intern   March 3, 2021    Supervised and signed by: ANNE Tena, [facility]  12/1/2020

## 2021-04-20 ENCOUNTER — VIRTUAL VISIT (OUTPATIENT)
Dept: PSYCHOLOGY | Facility: CLINIC | Age: 76
End: 2021-04-20
Payer: COMMERCIAL

## 2021-04-20 DIAGNOSIS — F41.1 GAD (GENERALIZED ANXIETY DISORDER): Primary | ICD-10-CM

## 2021-04-20 PROCEDURE — 90834 PSYTX W PT 45 MINUTES: CPT | Mod: 95

## 2021-04-20 ASSESSMENT — ANXIETY QUESTIONNAIRES
1. FEELING NERVOUS, ANXIOUS, OR ON EDGE: SEVERAL DAYS
4. TROUBLE RELAXING: NOT AT ALL
3. WORRYING TOO MUCH ABOUT DIFFERENT THINGS: SEVERAL DAYS
GAD7 TOTAL SCORE: 5
2. NOT BEING ABLE TO STOP OR CONTROL WORRYING: SEVERAL DAYS
7. FEELING AFRAID AS IF SOMETHING AWFUL MIGHT HAPPEN: SEVERAL DAYS
5. BEING SO RESTLESS THAT IT IS HARD TO SIT STILL: NOT AT ALL
6. BECOMING EASILY ANNOYED OR IRRITABLE: SEVERAL DAYS

## 2021-04-20 ASSESSMENT — PATIENT HEALTH QUESTIONNAIRE - PHQ9: SUM OF ALL RESPONSES TO PHQ QUESTIONS 1-9: 8

## 2021-04-20 NOTE — PROGRESS NOTES
"                                             Progress Note    Patient Name: Karen Donis  Date: 2021         Service Type: Individual      Session Start Time: 12pm  Session End Time: 12:45pm     Session Length: 45 min    Session #: 16    Attendees: Client attended alone    Service Modality:  Phone Visit:      Provider verified identity through the following two step process.  Patient provided:  Patient     The patient has been notified of the following:      \"We have found that certain health care needs can be provided without the need for a face to face visit.  This service lets us provide the care you need with a phone conversation.       I will have full access to your [place] medical record during this entire phone call.   I will be taking notes for your medical record.      Since this is like an office visit, we will bill your insurance company for this service.       There are potential benefits and risks of telephone visits (e.g. limits to patient confidentiality) that differ from in-person visits.?  Confidentiality still applies for telephone services, and nobody will record the visit.  It is important to be in a quiet, private space that is free of distractions (including cell phone or other devices) during the visit.??      If during the course of the call I believe a telephone visit is not appropriate, you will not be charged for this service\"     Consent has been obtained for this service by care team member: Yes      Treatment Plan: 3/3/2021  RAGHAVENDRA-7/PHQ-9: 2021    DATA  Interactive Complexity: No  Crisis: No       Progress Since Last Session (Related to Symptoms / Goals / Homework):   Symptoms: Improving (less anxious)     Current / Ongoing Stressors and Concerns:   Today, client reports that she is experiencing pain in her back that has been causing her distress.  She followed up with her PCP and is going to call for physical therapy sometime within the coming weeks.  Client and " therapist processed interpersonal situation and discussed the importance of boundary setting when it comes to communication with family members.  Client also reports that processing her past identities has been very useful in building good self-esteem and making meaning out of her life.  Therapist validated the client's experience and progress with positive self-compassion skills.  In the coming week, client will continue with these positive self-talk skills.  During next session, therapist and client will have a conversation about bridging care.       Treatment Objective(s) Addressed in This Session:   Client will learn how to use positive self-talk and self compassion skills.     Intervention:   DBT: therapist validated the client's experience    MI: OARS skills   Solution-focused: problem-solving, strengths-based approach,    CBT: positive self-talk, challenging negative thoughts        ASSESSMENT: Current Emotional / Mental Status (status of significant symptoms):   Risk status (Self / Other harm or suicidal ideation)   Patient denies current fears or concerns for personal safety.   Patient denies current or recent suicidal ideation or behaviors.   Patient denies current or recent homicidal ideation or behaviors.   Patient denies current or recent self injurious behavior or ideation.   Patient denies other safety concerns.   Patient reports there has been no change in risk factors since their last session.     Patient reports there has been no change in protective factors since their last session.     Recommended that patient call 911 or go to the local ED should there be a change in any of these risk factors.     Appearance:   NA    Eye Contact:   NA    Psychomotor Behavior: NA    Attitude:   Friendly   Orientation:   All   Speech    Rate / Production: Normal/ Responsive Talkative    Volume:  Normal    Mood:    Normal   Affect:    Appropriate    Thought Content:  Clear    Thought Form:  Coherent     Insight:    Good  and Spiritual insight     Medication Review:   No current psychiatric medications prescribed     Medication Compliance:   NA     Changes in Health Issues:   None reported     Chemical Use Review:   Substance Use: Chemical use reviewed, no active concerns identified      Tobacco Use: No current tobacco use.      Diagnosis:  Generalized Anxiety Disorder     Collateral Reports Completed:   Not Applicable     PLAN: (Patient tasks / therapist tasks / other)  Client will continue to engage in self-care, positive self-talk.  During next session, therapist and client will discuss continuity of care with another provider.        SHERRY Hernandez, MSW Intern     4/20/2021                                                     Treatment Plan    Client's Name: Karen Donis  YOB: 1945    Date: 3/3/2021    DSM-V Diagnoses: Attention deficit disorder  Psychosocial / Contextual Factors: living with adult son; difficulties coping with COVID-19 (feeling bored at home)    Referral / Collaboration:  Referral to another professional/service is not indicated at this time.    Anticipated number of session or this episode of care: TBD      MeasurableTreatment Goal(s) related to diagnosis / functional impairment(s)  Goal 1: Client will be more effective in her communication with her  and her son.    I will know I've met my goal when...    Status: Accomplished (3/3/2021), working towards maintenance    Objective #A (Client Action)    Client will use her hearing aids when her  is talking to her.      Objective #B  Client will learn skills to help her cope with ADD symptoms and improve her interpersonal effectiveness.    Intervention(s)  Therapist will utilize and teach DBT Interpersonal Effectiveness skills.      Goal 2: Client will feel more confident when making decisions and will engage in positive self-talk.      I will know I've met my goal when...    Status: Progressing (3/3/2021),  working more specifically on confidence    Objective #A (Client Action)    Client will learn how to use positive self-talk and self compassion skills.    Intervention(s)  Therapist will teach and utilize DBT and CBT interventions surrounding mindfulness and positive self-talk.      Patient has reviewed and agreed to the above plan.          SHERRY Hernandez, MSW Intern   March 3, 2021    Supervised and signed by: ANNE Tena, [facility]  12/1/2020

## 2021-04-21 ASSESSMENT — ANXIETY QUESTIONNAIRES: GAD7 TOTAL SCORE: 5

## 2021-05-04 ENCOUNTER — FCC EXTENDED DOCUMENTATION (OUTPATIENT)
Dept: BEHAVIORAL HEALTH | Facility: CLINIC | Age: 76
End: 2021-05-04

## 2021-05-04 NOTE — PROGRESS NOTES
Referral/Transfer of an [facility] Client to Another [facility] Therapist    CLIENT'S NAME: Karen Donis  DATE of Referral/Transfer Request:  May 4, 2021    Referral/Transfer Request Information    Transfer for the same service: Therapist Initiated    Client Phone #:  [phone] (home)        Telephone Information:   Mobile [phone]          Current Therapist: Gio Gee      Therapist Receiving Referral/Transfer: William Esqueda    Referral/Transfer is for: Individual    Rationale for Referral/Transfer: (to be completed by [facility] staff person initiating the referral)  Situation: (What is going on with the client?)  Client is seeking space to process environmental stressors. 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  Client has been appreciative of space to process several changes in roles/identity that have been affecting her at this stage in life.          Background: (What is the clinical background or context?)  Client is being treated for anxiety, but also carried a diagnosis of ADD.  Initial episode of care goals focused on improved communication with her  in regards to her ADD.          Recommendation: (What would you do to correct it?)  Client would benefit from space to process role/identity changes that come with stages of later adulthood.  Client also seems to benefit from self-reflective work when considering her life's accomplishments.  Client is very connected to her roel and talks about this protective factor frequently in sessions.        Referral/Transfer Status:    Therapist Initiated Referral:  Therapist receiving referral has been informed of and has accepted the referral/Was routed this form in an iniFormularyet message      Gio Gee  May 4, 2021

## 2021-05-05 ENCOUNTER — VIRTUAL VISIT (OUTPATIENT)
Dept: PSYCHOLOGY | Facility: CLINIC | Age: 76
End: 2021-05-05
Payer: COMMERCIAL

## 2021-05-05 DIAGNOSIS — F41.1 GAD (GENERALIZED ANXIETY DISORDER): Primary | ICD-10-CM

## 2021-05-05 PROCEDURE — 90834 PSYTX W PT 45 MINUTES: CPT | Mod: 95

## 2021-05-05 NOTE — PROGRESS NOTES
"                                             Progress Note    Patient Name: Karen Donis  Date: 2021         Service Type: Individual      Session Start Time: 2pm  Session End Time: 2:50pm     Session Length: 50 min    Session #: 17    Attendees: Client attended alone    Service Modality:  Phone Visit:      Provider verified identity through the following two step process.  Patient provided:  Patient     The patient has been notified of the following:      \"We have found that certain health care needs can be provided without the need for a face to face visit.  This service lets us provide the care you need with a phone conversation.       I will have full access to your [facility] medical record during this entire phone call.   I will be taking notes for your medical record.      Since this is like an office visit, we will bill your insurance company for this service.       There are potential benefits and risks of telephone visits (e.g. limits to patient confidentiality) that differ from in-person visits.?  Confidentiality still applies for telephone services, and nobody will record the visit.  It is important to be in a quiet, private space that is free of distractions (including cell phone or other devices) during the visit.??      If during the course of the call I believe a telephone visit is not appropriate, you will not be charged for this service\"     Consent has been obtained for this service by care team member: Yes      Treatment Plan: 3/3/2021  RAGHAVENDRA-7/PHQ-9: 2021    DATA  Interactive Complexity: No  Crisis: No       Progress Since Last Session (Related to Symptoms / Goals / Homework):   Symptoms: Improving (less anxious)     Current / Ongoing Stressors and Concerns:   Today was the client and therapist's last session together.  Client reflected on challenges, progress, and future directions.  Therapist validated client's progress, shared what she learned from the client, and underscored the " client's strengths moving forward.  Client's next step is to establish care with her new provider, William.         Treatment Objective(s) Addressed in This Session:   Client will learn how to use positive self-talk and self compassion skills.     Intervention:   DBT: therapist validated the client's experience    MI: OARS skills        ASSESSMENT: Current Emotional / Mental Status (status of significant symptoms):   Risk status (Self / Other harm or suicidal ideation)   Patient denies current fears or concerns for personal safety.   Patient denies current or recent suicidal ideation or behaviors.   Patient denies current or recent homicidal ideation or behaviors.   Patient denies current or recent self injurious behavior or ideation.   Patient denies other safety concerns.   Patient reports there has been no change in risk factors since their last session.     Patient reports there has been no change in protective factors since their last session.     Recommended that patient call 911 or go to the local ED should there be a change in any of these risk factors.     Appearance:   NA    Eye Contact:   NA    Psychomotor Behavior: NA    Attitude:   Friendly   Orientation:   All   Speech    Rate / Production: Normal/ Responsive Talkative    Volume:  Normal    Mood:    Normal   Affect:    Appropriate    Thought Content:  Clear    Thought Form:  Coherent    Insight:    Good  and Spiritual insight     Medication Review:   No current psychiatric medications prescribed     Medication Compliance:   NA     Changes in Health Issues:   None reported     Chemical Use Review:   Substance Use: Chemical use reviewed, no active concerns identified      Tobacco Use: No current tobacco use.      Diagnosis:  Generalized Anxiety Disorder     Collateral Reports Completed:   Not Applicable     PLAN: (Patient tasks / therapist tasks / other)  Client will establish care with her new provider, William.        Gio Gee, BSW, MSW Intern      5/5/2021    This note has been reviewed and I agree with the plan of care. This note is co-signed by ANNE Tena, [facility], Supervisor, on: 8/9/2021                                                 Treatment Plan    Client's Name: Karen Donis  YOB: 1945    Date: 3/3/2021    DSM-V Diagnoses: Attention deficit disorder  Psychosocial / Contextual Factors: living with adult son; difficulties coping with COVID-19 (feeling bored at home)    Referral / Collaboration:  Referral to another professional/service is not indicated at this time.    Anticipated number of session or this episode of care: TBD      MeasurableTreatment Goal(s) related to diagnosis / functional impairment(s)  Goal 1: Client will be more effective in her communication with her  and her son.    I will know I've met my goal when...    Status: Accomplished (3/3/2021), working towards maintenance    Objective #A (Client Action)    Client will use her hearing aids when her  is talking to her.      Objective #B  Client will learn skills to help her cope with ADD symptoms and improve her interpersonal effectiveness.    Intervention(s)  Therapist will utilize and teach DBT Interpersonal Effectiveness skills.      Goal 2: Client will feel more confident when making decisions and will engage in positive self-talk.      I will know I've met my goal when...    Status: Progressing (3/3/2021), working more specifically on confidence    Objective #A (Client Action)    Client will learn how to use positive self-talk and self compassion skills.    Intervention(s)  Therapist will teach and utilize DBT and CBT interventions surrounding mindfulness and positive self-talk.      Patient has reviewed and agreed to the above plan.          SHERRY Hernandez, MSW Intern   March 3, 2021    Supervised and signed by: ANNE Tena, EHSAN  12/1/2020

## 2021-05-10 DIAGNOSIS — F98.8 ATTENTION DEFICIT DISORDER (ADD) WITHOUT HYPERACTIVITY: ICD-10-CM

## 2021-05-12 RX ORDER — METHYLPHENIDATE HYDROCHLORIDE EXTENDED RELEASE 20 MG/1
20 TABLET ORAL EVERY MORNING
Qty: 30 TABLET | Refills: 0 | Status: SHIPPED | OUTPATIENT
Start: 2021-05-14 | End: 2021-06-03

## 2021-05-15 DIAGNOSIS — I10 HYPERTENSION GOAL BP (BLOOD PRESSURE) < 140/90: ICD-10-CM

## 2021-05-17 ENCOUNTER — TELEPHONE (OUTPATIENT)
Dept: FAMILY MEDICINE | Facility: CLINIC | Age: 76
End: 2021-05-17

## 2021-05-17 DIAGNOSIS — I10 HYPERTENSION GOAL BP (BLOOD PRESSURE) < 140/90: ICD-10-CM

## 2021-05-17 DIAGNOSIS — E78.5 HYPERLIPIDEMIA LDL GOAL <100: ICD-10-CM

## 2021-05-17 RX ORDER — PRAVASTATIN SODIUM 10 MG
10 TABLET ORAL DAILY
Qty: 90 TABLET | Refills: 0 | Status: SHIPPED | OUTPATIENT
Start: 2021-05-17 | End: 2021-09-21

## 2021-05-17 RX ORDER — METOPROLOL SUCCINATE 50 MG/1
TABLET, EXTENDED RELEASE ORAL
Qty: 90 TABLET | Refills: 0 | Status: SHIPPED | OUTPATIENT
Start: 2021-05-17 | End: 2021-05-17

## 2021-05-17 RX ORDER — METOPROLOL SUCCINATE 50 MG/1
TABLET, EXTENDED RELEASE ORAL
Qty: 90 TABLET | Refills: 0 | Status: SHIPPED | OUTPATIENT
Start: 2021-05-17 | End: 2021-08-06

## 2021-05-17 RX ORDER — METOPROLOL SUCCINATE 50 MG/1
50 TABLET, EXTENDED RELEASE ORAL DAILY
Qty: 90 TABLET | Refills: 1 | OUTPATIENT
Start: 2021-05-17

## 2021-05-17 NOTE — TELEPHONE ENCOUNTER
Message from [facility] Jose Daniel [phone]    methylphenidate (METADATE ER) 20 MG CR tablet    We only has 20 on hand.  Can we fill her next RX 10 days early to make up for the lesser qty?    Janeen Christian

## 2021-05-17 NOTE — TELEPHONE ENCOUNTER
Called [facility] and let them know that this is okay to fill methylphenidate 10 days early on next refill.

## 2021-05-25 ENCOUNTER — TELEPHONE (OUTPATIENT)
Dept: PSYCHOLOGY | Facility: CLINIC | Age: 76
End: 2021-05-25

## 2021-05-25 NOTE — TELEPHONE ENCOUNTER
Left bunnysg attempting to reach ct for todays MH session. Requested she contact intake if still interested in todays appointment.

## 2021-06-03 DIAGNOSIS — F98.8 ATTENTION DEFICIT DISORDER (ADD) WITHOUT HYPERACTIVITY: ICD-10-CM

## 2021-06-03 RX ORDER — METHYLPHENIDATE HYDROCHLORIDE EXTENDED RELEASE 20 MG/1
20 TABLET ORAL EVERY MORNING
Qty: 30 TABLET | Refills: 0 | Status: SHIPPED | OUTPATIENT
Start: 2021-06-13 | End: 2021-06-08

## 2021-06-07 DIAGNOSIS — F98.8 ATTENTION DEFICIT DISORDER (ADD) WITHOUT HYPERACTIVITY: ICD-10-CM

## 2021-06-07 NOTE — TELEPHONE ENCOUNTER
[facility] pharmacy calling.   Patient's Metadate ER was refilled for 30 tablets 5/14/21 but pharmacy had only #20 so patient has run out earlier this month. Patients last tablet was 6/4/21.  Needing refill today.     Routing to provider to advise.  Elsy Jameson BSN, RN

## 2021-06-08 RX ORDER — METHYLPHENIDATE HYDROCHLORIDE EXTENDED RELEASE 20 MG/1
20 TABLET ORAL EVERY MORNING
Qty: 30 TABLET | Refills: 0 | Status: SHIPPED | OUTPATIENT
Start: 2021-06-08 | End: 2021-07-27

## 2021-06-08 NOTE — TELEPHONE ENCOUNTER
Rx sent   The one with start date of 6/13/2021 was incorrect.   Let pt know her prescription has been approved.

## 2021-06-16 DIAGNOSIS — E11.9 TYPE 2 DIABETES MELLITUS WITHOUT COMPLICATION, WITHOUT LONG-TERM CURRENT USE OF INSULIN (H): ICD-10-CM

## 2021-06-16 RX ORDER — METFORMIN HCL 500 MG
1000 TABLET, EXTENDED RELEASE 24 HR ORAL
Qty: 180 TABLET | Refills: 0 | Status: SHIPPED | OUTPATIENT
Start: 2021-06-16 | End: 2021-08-06

## 2021-08-04 NOTE — PROGRESS NOTES
SUBJECTIVE:   Karen Donis is a 72 year old female who presents to clinic today for the following health issues:      Medication Followup of All meds, feels dizzy      Taking Medication as prescribed: yes    Side Effects:  ? Dizziness    Medication Helping Symptoms:  NO, BP still high 160/99, 135/85.    BG's 169-215         Patient came in for dizziness for 1 1/2 weeks. When she gets out of bed or laying down, see things slowly spinning. She sits and rests and it does a way.     Depression: was worse 3 months ago, so changed citalopram to Duloxetine. Phq9=6 (improved, was 12). It helps her chronic back pain some.     The tongue was sensitive after novocaine injection for capping a tooth. She finally saw a dental specialist, he recommended she saw a neurologist. She has an appointment there next week 9/27/17.     Her blood sugar readings are also going up in the high 100s closer to 200 range many times.     ROS:  Constitutional, HEENT, cardiovascular, pulmonary, gi and gu systems are negative, except as otherwise noted.       Current Outpatient Prescriptions on File Prior to Visit:  methylphenidate (METADATE ER) 20 MG CR tablet Take 1 tablet (20 mg) by mouth every morning   losartan-hydrochlorothiazide (HYZAAR) 100-25 MG per tablet TAKE ONE TABLET BY MOUTH EVERY DAY   Coenzyme Q10 (CO Q 10 PO) Take 1 capsule by mouth daily.   ASPIRIN 81 MG OR TABS 1 TABLET DAILY   VITAMIN B COMPLEX-C OR CAPS 1 capsule PO qd   blood glucose monitoring (ACCU-CHEK SMARTVIEW) test strip Use to test blood sugar 1 times daily or as directed.  Ok to substitute alternative if insurance prefers.   [DISCONTINUED] DULoxetine (CYMBALTA) 30 MG EC capsule Take 1 capsule (30 mg) by mouth daily   omeprazole (PRILOSEC) 20 MG CR capsule Take 1 capsule (20 mg) by mouth daily (Patient not taking: Reported on 9/20/2017)   blood glucose monitoring (ACCU-CHEK FASTCLIX) lancets Use to test blood sugar 1 times daily or as directed.  Ok to substitute  alternative if insurance prefers.   blood glucose (ACCU-CHEK FASTCLIX) lancing device Device to be used with lancets.   blood glucose monitoring (ACCU-CHEK FASTCLIX) lancets Use to test blood sugar 1daily or as directed.   blood glucose monitoring (NO BRAND SPECIFIED) test strip Use to test blood sugars daily or as directed. Accu-check smartview strips   Calcium Carbonate-Vit D-Min (CALCIUM 1200 PO) Take by mouth daily    Flaxseed, Linseed, (FLAX SEED OIL) 1000 MG capsule Take 1 capsule by mouth daily   UNABLE TO FIND Reported on 5/3/2017   vitamin E 400 UNIT capsule Take by mouth daily   econazole nitrate 1 % cream Apply topically daily   Cranberry 300 MG TABS Take by mouth daily    biotin 1000 MCG TABS tablet Take 1,000 mcg by mouth daily   Alpha-D-Galactosidase (BEANO PO) Take by mouth as needed Reported on 5/3/2017   cholecalciferol (VITAMIN  -D) 1000 UNITS capsule Take 2 capsules (2,000 Units) by mouth daily (Patient not taking: Reported on 9/20/2017)   MAGNESIUM OR 1 capsule daily     No current facility-administered medications on file prior to visit.       Problem list, Medication list, Allergies, and Medical/Social/Surgical histories reviewed in [facility] and updated as appropriate.    OBJECTIVE:                                                    /72  Pulse 84  Temp 96.6  F (35.9  C) (Temporal)  Wt 225 lb (102.1 kg)  SpO2 97%  BMI 40.18 kg/m2    GEN: healthy, alert and no distress  HEENT: unremarkable.  Neck:     supple, no thyromegaly, no cervical lymphadenopathy.   LACHELLE:  CTA B/L, no wheezing or crackles.  CV:  RRR no murmur.  Intact distal pulses, good cap refill.  Abd: soft, normal active bowel sounds, non tender, non distended. No mass or organomegaly.   Ext:  no cyanosis, clubbing or edema.      Camarena pike maneuver positive for looking to the right with dizziness reproduced.        Diagnostic test results:  Results for orders placed or performed in visit on 09/20/17 (from the past 24 hour(s))    Hemoglobin A1c   Result Value Ref Range    Hemoglobin A1C 7.2 (H) 4.3 - 6.0 %          ASSESSMENT/PLAN:                                                      72 year old female with the following diagnoses and treatment plan:      ICD-10-CM    1. BPPV (benign paroxysmal positional vertigo), right H81.11 meclizine (ANTIVERT) 25 MG tablet     PHYSICAL THERAPY REFERRAL   2. Recurrent major depressive disorder, in partial remission (H) F33.41 DULoxetine (CYMBALTA) 30 MG EC capsule   3. Fibromyalgia M79.7 DULoxetine (CYMBALTA) 30 MG EC capsule   4. Cervicalgia M54.2 SPORTS MEDICINE REFERRAL   5. Pain in thoracic spine M54.6 SPORTS MEDICINE REFERRAL   6. Chronic midline low back pain without sciatica M54.5 SPORTS MEDICINE REFERRAL    G89.29    7. Flu vaccine need Z23 HC FLU VACCINE, INCREASED ANTIGEN, PRESV FREE   8. Type 2 diabetes mellitus without complication, without long-term current use of insulin (H) E11.9 metFORMIN (GLUCOPHAGE-XR) 500 MG 24 hr tablet       -- Discussed diagnosis of vertigo, meclizine for symptom relief, PT for vestibular rehab.   -- DM: still controlled but A1c much higher. Start low dose metformin  -- depression improved. Fibromyalgia: increase cymbalta to 30 mg bid  -- pt also complained of various pain along the spine. Refer to sports medicine  -- follow up in 1 month on depression/fibromyalgia.       Will call or return to clinic if worsening or symptoms not improving as discussed.  See Patient Instructions.    Lakia Hunter MD-PhD  [facility]    (Note: Chart documentation was done in part with Dragon Voice Recognition software. Although reviewed after completion, some word and grammatical errors may remain.)     N/A

## 2021-08-05 NOTE — PROGRESS NOTES
Assessment & Plan     Karen was seen today for recheck medication.    Diagnoses and all orders for this visit:    Type 2 diabetes mellitus without complication, without long-term current use of insulin (H)  -     HEMOGLOBIN A1C; Future  -     metFORMIN (GLUCOPHAGE-XR) 500 MG 24 hr tablet; Take 2 tablets (1,000 mg) by mouth daily (with dinner)    Major depression in complete remission (H)  -     citalopram (CELEXA) 20 MG tablet; Take 1 tablet (20 mg) by mouth daily    Osteopenia of forearm, unspecified laterality  -     DEXA HIP/PELVIS/SPINE - Future; Future    Encounter for screening mammogram for breast cancer  -     MA SCREENING DIGITAL BILAT - Future  (s+30); Future    Encounter for hepatitis C screening test for low risk patient  -     Hepatitis C Screen Reflex to HCV RNA Quant and Genotype; Future    Hyperlipidemia LDL goal <100  -     Lipid panel reflex to direct LDL Fasting; Future    Asymptomatic menopausal state   -     DEXA HIP/PELVIS/SPINE - Future; Future    Attention deficit disorder (ADD) without hyperactivity  -     Drug Confirmation Panel Urine with Creat - lab collect; Future    Hypertension goal BP (blood pressure) < 140/90  -     metoprolol succinate ER (TOPROL-XL) 50 MG 24 hr tablet; Take 1 tablet (50 mg) by mouth daily    Other orders  -     REVIEW OF HEALTH MAINTENANCE PROTOCOL ORDERS      Per report patient is doing well.  She is due for a number of monitoring labs.  Due for mammogram and bone density.  Patient plans to schedule these at [facility] in [place].  If her A1c is acceptable, return for annual wellness visit in February.    Return in about 6 months (around 2/6/2022) for wellness visit, Fasting Lab appointment.    Lakia Hunter MD PhD  [facility]    Daniela Jones is a 76 year old who presents for the following health issues     HPI     Chief Complaint   Patient presents with     Recheck Medication     pt is not fasting     Patient reported  doing well.  Little stressful.  moving from plane to [place].     Depression Followup    How are you doing with your depression since your last visit? Improved     Are you having other symptoms that might be associated with depression? Yes:  stress    Have you had a significant life event?  Buying new house     Are you feeling anxious or having panic attacks?   No    Do you have any concerns with your use of alcohol or other drugs? No    Social History     Tobacco Use     Smoking status: Former Smoker     Quit date: 1982     Years since quittin.6     Smokeless tobacco: Never Used   Vaping Use     Vaping Use: Never used   Substance Use Topics     Alcohol use: No     Drug use: No     PHQ 3/31/2021 2021 2021   PHQ-9 Total Score 8 8 1   Q9: Thoughts of better off dead/self-harm past 2 weeks Not at all Not at all Not at all     RAGHAVENDRA-7 SCORE 3/18/2021 3/31/2021 2021   Total Score - - -   Total Score 10 9 5         Diabetes Follow-up      How often are you checking your blood sugar? Not at all    What concerns do you have today about your diabetes? Discuss continuous sensor     Do you have any of these symptoms? (Select all that apply)  Numbness in feet    Have you had a diabetic eye exam in the last 12 months? No      Hyperlipidemia Follow-Up      Are you regularly taking any medication or supplement to lower your cholesterol?   Yes- pravastatin 10 mg    Are you having muscle aches or other side effects that you think could be caused by your cholesterol lowering medication?  Yes-loss of strength    Hypertension Follow-up      Do you check your blood pressure regularly outside of the clinic? Yes     Are you following a low salt diet? Yes    Are your blood pressures ever more than 140 on the top number (systolic) OR more   than 90 on the bottom number (diastolic), for example 140/90? No    BP Readings from Last 2 Encounters:   21 132/56   21 138/75     Hemoglobin A1C (%)   Date Value    02/02/2021 7.1 (H)   08/28/2020 6.7 (H)     LDL Cholesterol Calculated (mg/dL)   Date Value   08/28/2020 100 (H)   10/15/2019 77         How many servings of fruits and vegetables do you eat daily?  2-3    On average, how many sweetened beverages do you drink each day (Examples: soda, juice, sweet tea, etc.  Do NOT count diet or artificially sweetened beverages)?   0    How many days per week do you exercise enough to make your heart beat faster? 3 or less    How many minutes a day do you exercise enough to make your heart beat faster? 9 or less  How many days per week do you miss taking your medication? Three times per month    What makes it hard for you to take your medications?  remembering to take      Review of Systems   Constitutional, HEENT, cardiovascular, pulmonary, gi and gu systems are negative, except as otherwise noted.      Objective    /56 (BP Location: Right arm, Patient Position: Sitting, Cuff Size: Adult Regular)   Pulse 52   Temp 98.6  F (37  C) (Oral)   Resp 16   Wt 99.9 kg (220 lb 4.8 oz)   BMI 39.34 kg/m    Body mass index is 39.34 kg/m .  Physical Exam   GENERAL: healthy, alert and no distress  RESP: lungs clear to auscultation - no rales, rhonchi or wheezes  CV: regular rate and rhythm, normal S1 S2, no S3 or S4, no murmur, click or rub, no peripheral edema and peripheral pulses strong  PSYCH: mentation appears normal, affect normal/bright

## 2021-08-06 ENCOUNTER — OFFICE VISIT (OUTPATIENT)
Dept: FAMILY MEDICINE | Facility: CLINIC | Age: 76
End: 2021-08-06
Payer: COMMERCIAL

## 2021-08-06 VITALS
RESPIRATION RATE: 16 BRPM | DIASTOLIC BLOOD PRESSURE: 56 MMHG | HEART RATE: 52 BPM | TEMPERATURE: 98.6 F | SYSTOLIC BLOOD PRESSURE: 132 MMHG | WEIGHT: 220.3 LBS | BODY MASS INDEX: 39.34 KG/M2

## 2021-08-06 DIAGNOSIS — F32.5 MAJOR DEPRESSION IN COMPLETE REMISSION (H): ICD-10-CM

## 2021-08-06 DIAGNOSIS — F98.8 ATTENTION DEFICIT DISORDER (ADD) WITHOUT HYPERACTIVITY: ICD-10-CM

## 2021-08-06 DIAGNOSIS — Z11.59 ENCOUNTER FOR HEPATITIS C SCREENING TEST FOR LOW RISK PATIENT: ICD-10-CM

## 2021-08-06 DIAGNOSIS — E11.9 TYPE 2 DIABETES MELLITUS WITHOUT COMPLICATION, WITHOUT LONG-TERM CURRENT USE OF INSULIN (H): Primary | ICD-10-CM

## 2021-08-06 DIAGNOSIS — I10 HYPERTENSION GOAL BP (BLOOD PRESSURE) < 140/90: ICD-10-CM

## 2021-08-06 DIAGNOSIS — Z78.0 ASYMPTOMATIC MENOPAUSAL STATE: ICD-10-CM

## 2021-08-06 DIAGNOSIS — Z12.31 ENCOUNTER FOR SCREENING MAMMOGRAM FOR BREAST CANCER: ICD-10-CM

## 2021-08-06 DIAGNOSIS — M85.839 OSTEOPENIA OF FOREARM, UNSPECIFIED LATERALITY: ICD-10-CM

## 2021-08-06 DIAGNOSIS — E78.5 HYPERLIPIDEMIA LDL GOAL <100: ICD-10-CM

## 2021-08-06 PROCEDURE — 99214 OFFICE O/P EST MOD 30 MIN: CPT | Performed by: INTERNAL MEDICINE

## 2021-08-06 RX ORDER — METOPROLOL SUCCINATE 50 MG/1
TABLET, EXTENDED RELEASE ORAL
Qty: 90 TABLET | Refills: 3 | Status: SHIPPED | OUTPATIENT
Start: 2021-08-06 | End: 2022-10-19

## 2021-08-06 RX ORDER — METFORMIN HCL 500 MG
1000 TABLET, EXTENDED RELEASE 24 HR ORAL
Qty: 180 TABLET | Refills: 1 | Status: SHIPPED | OUTPATIENT
Start: 2021-08-06 | End: 2022-02-08

## 2021-08-06 RX ORDER — CITALOPRAM HYDROBROMIDE 20 MG/1
20 TABLET ORAL DAILY
Qty: 90 TABLET | Refills: 1 | Status: SHIPPED | OUTPATIENT
Start: 2021-08-06 | End: 2022-02-08

## 2021-08-06 ASSESSMENT — PATIENT HEALTH QUESTIONNAIRE - PHQ9: SUM OF ALL RESPONSES TO PHQ QUESTIONS 1-9: 1

## 2021-08-06 ASSESSMENT — PAIN SCALES - GENERAL: PAINLEVEL: NO PAIN (0)

## 2021-08-26 DIAGNOSIS — F98.8 ATTENTION DEFICIT DISORDER (ADD) WITHOUT HYPERACTIVITY: ICD-10-CM

## 2021-08-26 NOTE — TELEPHONE ENCOUNTER
Methylphenidate refill request  Last OV: 8/6/21  Routing refill request to provider for review/approval because:  Drug not on the [facility] refill protocol   Ruby Lynch RN

## 2021-08-27 RX ORDER — METHYLPHENIDATE HYDROCHLORIDE EXTENDED RELEASE 20 MG/1
TABLET ORAL
Qty: 30 TABLET | Refills: 0 | Status: SHIPPED | OUTPATIENT
Start: 2021-08-27 | End: 2021-10-15

## 2021-09-20 DIAGNOSIS — E78.5 HYPERLIPIDEMIA LDL GOAL <100: ICD-10-CM

## 2021-09-20 NOTE — TELEPHONE ENCOUNTER
MTM referral from: [facility]     MTM referral outreach attempt #2 on October 27, 2017 at 1:38 PM      Outcome: Patient not reachable after several attempts, will route to MTM Pharmacist/Provider as an FYI. Thank you for the referral.    Elizabeth Gale, MTM Coordinator      
MTM referral from: [facility]     MTM referral outreach attempt #1 on October 26, 2017 at 10:14 AM      Outcome: Left Message    Elizabeth Gale MTM Coordinator      
show

## 2021-09-21 RX ORDER — PRAVASTATIN SODIUM 10 MG
10 TABLET ORAL DAILY
Qty: 90 TABLET | Refills: 0 | Status: SHIPPED | OUTPATIENT
Start: 2021-09-21 | End: 2022-01-11

## 2021-09-21 NOTE — TELEPHONE ENCOUNTER
Routing refill request to provider for review/approval because:  Labs not current:    Lab Results   Component Value Date    CHOL 174 08/28/2020     Lab Results   Component Value Date    HDL 54 08/28/2020     Lab Results   Component Value Date     08/28/2020     Lab Results   Component Value Date    TRIG 101 08/28/2020     Lab Results   Component Value Date    CHOLHDLRATIO 3.3 10/28/2014       A break in medication  Future appointment 2/8/22    Manasa Holt RN

## 2021-10-09 DIAGNOSIS — F98.8 ATTENTION DEFICIT DISORDER (ADD) WITHOUT HYPERACTIVITY: ICD-10-CM

## 2021-10-11 RX ORDER — METHYLPHENIDATE HYDROCHLORIDE EXTENDED RELEASE 20 MG/1
TABLET ORAL
Qty: 30 TABLET | Refills: 0 | OUTPATIENT
Start: 2021-10-11

## 2021-10-15 DIAGNOSIS — F98.8 ATTENTION DEFICIT DISORDER (ADD) WITHOUT HYPERACTIVITY: ICD-10-CM

## 2021-10-15 RX ORDER — METHYLPHENIDATE HYDROCHLORIDE EXTENDED RELEASE 20 MG/1
TABLET ORAL
Qty: 30 TABLET | Refills: 0 | Status: CANCELLED | OUTPATIENT
Start: 2021-10-15

## 2021-10-15 RX ORDER — METHYLPHENIDATE HYDROCHLORIDE EXTENDED RELEASE 20 MG/1
TABLET ORAL
Qty: 30 TABLET | Refills: 0 | Status: SHIPPED | OUTPATIENT
Start: 2021-10-15 | End: 2021-11-18

## 2021-10-15 NOTE — TELEPHONE ENCOUNTER
Provider:  Are you willing to refill her methylphenidate (METADATE ER) 20 MG CR tablet? Patient to check with the pharamcy on the status of this request. Thank you. Sonia Sauceda R.N.     Patient reports that she believes that her methylphenidate (METADATE ER) 20 MG CR tablet was sent to the incorrect provider.  This needs to go to Dr. Lakia Hunter.       Ok to leave a message with the provider's response.

## 2021-10-15 NOTE — TELEPHONE ENCOUNTER
Please remind patient that she is due for fasting labs. She hasn't done this yet since the august visit. I cannot refill in the future until she gets labs done.

## 2021-11-17 DIAGNOSIS — F98.8 ATTENTION DEFICIT DISORDER (ADD) WITHOUT HYPERACTIVITY: ICD-10-CM

## 2021-11-17 NOTE — TELEPHONE ENCOUNTER
Routing refill request to provider for review/approval because:  Drug not on the [facility] refill protocol   Apoorva Wiggins BSN, RN

## 2021-11-18 RX ORDER — METHYLPHENIDATE HYDROCHLORIDE EXTENDED RELEASE 20 MG/1
TABLET ORAL
Qty: 30 TABLET | Refills: 0 | Status: SHIPPED | OUTPATIENT
Start: 2021-11-18 | End: 2021-12-29

## 2021-12-26 DIAGNOSIS — F98.8 ATTENTION DEFICIT DISORDER (ADD) WITHOUT HYPERACTIVITY: ICD-10-CM

## 2021-12-29 RX ORDER — METHYLPHENIDATE HYDROCHLORIDE EXTENDED RELEASE 20 MG/1
TABLET ORAL
Qty: 30 TABLET | Refills: 0 | Status: SHIPPED | OUTPATIENT
Start: 2021-12-29 | End: 2022-02-08

## 2022-01-08 DIAGNOSIS — E78.5 HYPERLIPIDEMIA LDL GOAL <100: ICD-10-CM

## 2022-01-11 RX ORDER — PRAVASTATIN SODIUM 10 MG
10 TABLET ORAL DAILY
Qty: 90 TABLET | Refills: 0 | Status: SHIPPED | OUTPATIENT
Start: 2022-01-11 | End: 2022-02-08

## 2022-01-11 NOTE — TELEPHONE ENCOUNTER
Routing refill request to provider for review/approval because:  Labs not current:    Recent Labs   Lab Test 08/28/20  1150 10/15/19  0844 08/23/16  0930 10/28/14  1026   CHOL 174 151   < > 175   HDL 54 55   < > 53   * 77   < > 94   TRIG 101 96   < > 138   CHOLHDLRATIO  --   --   --  3.3    < > = values in this interval not displayed.     Maritza Wisdom RN, [facility]

## 2022-01-31 ENCOUNTER — NURSE TRIAGE (OUTPATIENT)
Dept: NURSING | Facility: CLINIC | Age: 77
End: 2022-01-31
Payer: COMMERCIAL

## 2022-01-31 NOTE — TELEPHONE ENCOUNTER
Attempted to contact patient regarding triage note.    Per protocol, should be seen by PCP today or tomorrow.  Attempted to call Pt to schedule appointment with provider tomorrow.   Left message with number to return call at [phone].    If patient returns call, refer to BA RN Team.    Haylee Main RN, BSN  [facility]

## 2022-01-31 NOTE — TELEPHONE ENCOUNTER
Middle finger cannot straighten since last visit.    Last 2 days shoulder to finger numb. Left side.  No other numbness, speaking clearly.    In last 2 months have fallen 3 x. Has not hurt her neck.    She hit herself in the face on accident his morning with her own hand, the numb arm fell and got her. She was laying in bed when it happened.    Can she be seen sooner than her scheduled appointment on 2/8/22??    Jolie Portillo RN  [facility] Nurse Advisor          Reason for Disposition    Numbness (i.e., loss of sensation) in hand or fingers    Additional Information    Negative: Shock suspected (e.g., cold/pale/clammy skin, too weak to stand, low BP, rapid pulse)    Negative: Similar pain previously and it was from 'heart attack'    Negative: Similar pain previously from 'angina' and not relieved by nitroglycerin    Negative: Sounds like a life-threatening emergency to the triager    Negative: Followed an injury to arm    Negative: Chest pain    Negative: Wound looks infected    Negative: Elbow pain is the main symptom    Negative: Hand or wrist pain is the main symptom    Negative: Difficulty breathing or unusual sweating (e.g., sweating without exertion)    Negative: Chest pain lasting longer than 5 minutes    Negative: Age > 40 and no obvious cause for pain, pain still present even when not moving the arm    Negative: Fever and red area (or area very tender to touch)    Negative: Fever and swollen joint    Negative: Entire arm is swollen    Negative: Patient sounds very sick or weak to the triager    Negative: Cast on wrist or arm and now increasing pain    Negative: Red area or streak and large (> 2 in. or 5 cm)    Negative: SEVERE pain (e.g., excruciating, unable to do any normal activities)    Negative: Weakness (i.e., loss of strength) in hand or fingers    Negative: Arm pains with exertion (e.g., occurs with walking; goes away on resting)    Negative: Painful rash with multiple small blisters  grouped together (i.e., dermatomal distribution or 'band' or 'stripe')    Negative: Looks like a boil, infected sore, deep ulcer, or other infected rash (spreading redness, pus)    Negative: Localized rash is very painful (no fever)    Protocols used: ARM PAIN-A-OH

## 2022-02-01 ENCOUNTER — ANCILLARY PROCEDURE (OUTPATIENT)
Dept: GENERAL RADIOLOGY | Facility: CLINIC | Age: 77
End: 2022-02-01
Attending: INTERNAL MEDICINE
Payer: COMMERCIAL

## 2022-02-01 ENCOUNTER — OFFICE VISIT (OUTPATIENT)
Dept: FAMILY MEDICINE | Facility: CLINIC | Age: 77
End: 2022-02-01
Payer: COMMERCIAL

## 2022-02-01 VITALS
HEART RATE: 55 BPM | RESPIRATION RATE: 20 BRPM | BODY MASS INDEX: 37.14 KG/M2 | DIASTOLIC BLOOD PRESSURE: 78 MMHG | SYSTOLIC BLOOD PRESSURE: 126 MMHG | WEIGHT: 208 LBS | OXYGEN SATURATION: 97 %

## 2022-02-01 DIAGNOSIS — N18.31 STAGE 3A CHRONIC KIDNEY DISEASE (H): ICD-10-CM

## 2022-02-01 DIAGNOSIS — E11.42 TYPE 2 DIABETES MELLITUS WITH DIABETIC POLYNEUROPATHY, WITHOUT LONG-TERM CURRENT USE OF INSULIN (H): ICD-10-CM

## 2022-02-01 DIAGNOSIS — R29.6 FALLS FREQUENTLY: ICD-10-CM

## 2022-02-01 DIAGNOSIS — G89.29 CHRONIC LEFT SHOULDER PAIN: ICD-10-CM

## 2022-02-01 DIAGNOSIS — R20.2 NUMBNESS AND TINGLING IN LEFT ARM: Primary | ICD-10-CM

## 2022-02-01 DIAGNOSIS — R20.0 NUMBNESS AND TINGLING IN LEFT ARM: Primary | ICD-10-CM

## 2022-02-01 DIAGNOSIS — M79.645 PAIN OF LEFT MIDDLE FINGER: ICD-10-CM

## 2022-02-01 DIAGNOSIS — M25.512 CHRONIC LEFT SHOULDER PAIN: ICD-10-CM

## 2022-02-01 DIAGNOSIS — F33.41 RECURRENT MAJOR DEPRESSIVE DISORDER, IN PARTIAL REMISSION (H): ICD-10-CM

## 2022-02-01 DIAGNOSIS — J43.2 CENTRILOBULAR EMPHYSEMA (H): ICD-10-CM

## 2022-02-01 DIAGNOSIS — E78.5 HYPERLIPIDEMIA LDL GOAL <100: Chronic | ICD-10-CM

## 2022-02-01 DIAGNOSIS — I10 HYPERTENSION GOAL BP (BLOOD PRESSURE) < 140/90: Chronic | ICD-10-CM

## 2022-02-01 PROBLEM — E11.22 TYPE 2 DIABETES MELLITUS WITH STAGE 5 CHRONIC KIDNEY DISEASE NOT ON CHRONIC DIALYSIS, WITHOUT LONG-TERM CURRENT USE OF INSULIN (H): Status: RESOLVED | Noted: 2020-10-12 | Resolved: 2022-02-01

## 2022-02-01 PROBLEM — N18.5 TYPE 2 DIABETES MELLITUS WITH STAGE 5 CHRONIC KIDNEY DISEASE NOT ON CHRONIC DIALYSIS, WITHOUT LONG-TERM CURRENT USE OF INSULIN (H): Status: RESOLVED | Noted: 2020-10-12 | Resolved: 2022-02-01

## 2022-02-01 LAB
ALBUMIN SERPL-MCNC: 3.7 G/DL (ref 3.4–5)
ALP SERPL-CCNC: 88 U/L (ref 40–150)
ALT SERPL W P-5'-P-CCNC: 27 U/L (ref 0–50)
ANION GAP SERPL CALCULATED.3IONS-SCNC: 3 MMOL/L (ref 3–14)
AST SERPL W P-5'-P-CCNC: 12 U/L (ref 0–45)
BILIRUB SERPL-MCNC: 0.4 MG/DL (ref 0.2–1.3)
BUN SERPL-MCNC: 16 MG/DL (ref 7–30)
CALCIUM SERPL-MCNC: 9.3 MG/DL (ref 8.5–10.1)
CHLORIDE BLD-SCNC: 106 MMOL/L (ref 94–109)
CHOLEST SERPL-MCNC: 163 MG/DL
CO2 SERPL-SCNC: 29 MMOL/L (ref 20–32)
CREAT SERPL-MCNC: 1.13 MG/DL (ref 0.52–1.04)
CREAT UR-MCNC: 128 MG/DL
FASTING STATUS PATIENT QL REPORTED: NO
GFR SERPL CREATININE-BSD FRML MDRD: 50 ML/MIN/1.73M2
GLUCOSE BLD-MCNC: 179 MG/DL (ref 70–99)
HBA1C MFR BLD: 6.3 % (ref 0–5.6)
HDLC SERPL-MCNC: 60 MG/DL
LDLC SERPL CALC-MCNC: 79 MG/DL
MICROALBUMIN UR-MCNC: 6 MG/L
MICROALBUMIN/CREAT UR: 4.69 MG/G CR (ref 0–25)
NONHDLC SERPL-MCNC: 103 MG/DL
POTASSIUM BLD-SCNC: 4.3 MMOL/L (ref 3.4–5.3)
PROT SERPL-MCNC: 7.6 G/DL (ref 6.8–8.8)
SODIUM SERPL-SCNC: 138 MMOL/L (ref 133–144)
TRIGL SERPL-MCNC: 118 MG/DL

## 2022-02-01 PROCEDURE — 80053 COMPREHEN METABOLIC PANEL: CPT | Performed by: INTERNAL MEDICINE

## 2022-02-01 PROCEDURE — 73030 X-RAY EXAM OF SHOULDER: CPT | Mod: LT | Performed by: RADIOLOGY

## 2022-02-01 PROCEDURE — 36415 COLL VENOUS BLD VENIPUNCTURE: CPT | Performed by: INTERNAL MEDICINE

## 2022-02-01 PROCEDURE — 83036 HEMOGLOBIN GLYCOSYLATED A1C: CPT | Performed by: INTERNAL MEDICINE

## 2022-02-01 PROCEDURE — 80061 LIPID PANEL: CPT | Performed by: INTERNAL MEDICINE

## 2022-02-01 PROCEDURE — 99214 OFFICE O/P EST MOD 30 MIN: CPT | Performed by: INTERNAL MEDICINE

## 2022-02-01 PROCEDURE — 73140 X-RAY EXAM OF FINGER(S): CPT | Mod: LT | Performed by: RADIOLOGY

## 2022-02-01 PROCEDURE — 82043 UR ALBUMIN QUANTITATIVE: CPT | Performed by: INTERNAL MEDICINE

## 2022-02-01 NOTE — TELEPHONE ENCOUNTER
Attempted to contact patient to schedule an appointment. No answer, left message to return call to [phone].    Sandra Alberto RN [facility]

## 2022-02-01 NOTE — TELEPHONE ENCOUNTER
Patient called back to the clinic regarding a voicemail message. Patient was relayed that she needs to be sooner per triage protocol. Patient verbalized understanding. Patient was made an appointment today 2/1/22 for an inperson office visit. No further questions or concerns at this time.     Sahra Jean RN, BSN  [facility]

## 2022-02-01 NOTE — PROGRESS NOTES
"  Assessment & Plan     1.  Numbness and tingling of the left arm 2 days ago with symptoms lasting maybe 15 to 20 minutes.  Since she woke up with it it is possible that she may have had a nerve compression.  Symptoms have self resolve so nothing further needs to be done.  2.  Chronic left shoulder pain.  Range of motion was minimally restricted.  We will obtain an x-ray of the left shoulder and get back to results and recommendation.  3.  Pain of the left middle finger and also having difficulty straightening it out.  She has some tenderness of the MCP joints of the may be some arthritis there but I am suspecting mild pitting contraction.  We will x-ray the finger and get back to the recommendation.  4.  Essential hypertension with blood pressure under control.  Continue with losartan and metoprolol.  5.  Type 2 diabetes mellitus treated with Metformin 1 g a day.  Will check A1c and urine microalbumin.  Get back to her with results and recommendation.  Continue same treatment for now.  6.  Hyperlipidemia been treated with pravastatin 10 mg a day.  Will check lipid profile.  7.  Recurrent major depression and under good control in partial remission.  Continue with citalopram 20 mg a day.  8.  Frequent falls.  In the last couple of months she has fallen 2-3 times.  No loss of consciousness or dizziness.  Weakness of the legs felt.  Neurological exam today is intact.    We will check above-mentioned x-rays, A1c, CMP, lipids due to microalbumin.  I will get back to results and recommendation.  She has a follow-up appointment with her PCP next week.       BMI:   Estimated body mass index is 37.14 kg/m  as calculated from the following:    Height as of 2/2/21: 1.594 m (5' 2.75\").    Weight as of this encounter: 94.3 kg (208 lb).   Weight management plan: Discussed healthy diet and exercise guidelines        Return in about 1 week (around 2/8/2022) for Routine Visit.    Terry Nielson MD  [facility] " NICKY Joens is a 76 year old who presents for the following health issues     HPI     -pt is here today to follow up on hand pain/numbness in the elbow and shoulder, middle finger cannot be straightened, having numbness in her left arm down into her fingers, the numbness and pain comes and goes   -last 2 months pt has fallen 3 times    The patient comes in stating that she woke up with tingling and numbness of left upper arm 2 days ago that lasted 1520 minutes and then it went away.  She also complains of some chronic left shoulder pain for about a year.  She also has some pain left middle finger and has noticed she cannot straighten that finger.  In the past couple months she has fallen 2 or possibly 3 times.  No loss of consciousness or dizziness.  No palpitation or chest pain.  The leg just felt weak.  She felt that she should come in and have lab done to check out her chronic health issues and the symptoms.      Review of Systems   Constitutional, HEENT, cardiovascular, pulmonary, GI, , musculoskeletal, neuro, skin, endocrine and psych systems are negative, except as otherwise noted.      Objective    There were no vitals taken for this visit.  There is no height or weight on file to calculate BMI.  Physical Exam   GENERAL: healthy, alert and no distress  NECK: no adenopathy, no asymmetry, masses, or scars and thyroid normal to palpation  RESP: lungs clear to auscultation - no rales, rhonchi or wheezes  CV: regular rate and rhythm, normal S1 S2, no S3 or S4, no murmur, click or rub, no peripheral edema and peripheral pulses strong  ABDOMEN: soft, nontender, no hepatosplenomegaly, no masses and bowel sounds normal  MS: The left shoulder examination reveals no swelling or redness.  Some tenderness in the subacromial region.  Abduction limited by maybe only 10 to 20 degrees.  Internal and external rotation is mildly limited.  The left hand examination reveals some tenderness over the MCP joint of  the left middle finger.  Full extension is slightly limited and I am suspecting the could be related to mild Dupuytren contraction.  NEURO: Normal strength and tone, mentation intact and speech normal.  Her strength in the upper and lower extremities of the grade 4-5 bilaterally symmetrical.  Pain and touch perception is also intact.  Diabetic foot exam: normal DP and PT pulses, no trophic changes or ulcerative lesions and normal sensory exam

## 2022-02-01 NOTE — LETTER
February 2, 2022      Karen Donis  [address] [place] [address]        Dear ,    We are writing to inform you of your test results.    The shoulder Xray shows osteoarthritis. If you are having lot of pain, cortisone injection can be considered. The xray of the finger is negative. I think the finger issue is due to contraction of the tendon.  The urine micro-albumin test is normal. A1c which measures average blood sugar over 3 months is good at 6.3. The diabetes is under good control.  The Cholesterol profile is very good.  The electrolytes, kidney and liver test is normal.   Dr. Hunter will review the results with you next week.    Resulted Orders   Albumin Random Urine Quantitative with Creat Ratio   Result Value Ref Range    Creatinine Urine mg/dL 128 mg/dL    Albumin Urine mg/L 6 mg/L    Albumin Urine mg/g Cr 4.69 0.00 - 25.00 mg/g Cr   Lipid Profile   Result Value Ref Range    Cholesterol 163 <200 mg/dL    Triglycerides 118 <150 mg/dL    Direct Measure HDL 60 >=50 mg/dL    LDL Cholesterol Calculated 79 <=100 mg/dL    Non HDL Cholesterol 103 <130 mg/dL    Patient Fasting > 8hrs? No     Narrative    Cholesterol  Desirable:  <200 mg/dL    Triglycerides  Normal:  Less than 150 mg/dL  Borderline High:  150-199 mg/dL  High:  200-499 mg/dL  Very High:  Greater than or equal to 500 mg/dL    Direct Measure HDL  Female:  Greater than or equal to 50 mg/dL   Male:  Greater than or equal to 40 mg/dL    LDL Cholesterol  Desirable:  <100mg/dL  Above Desirable:  100-129 mg/dL   Borderline High:  130-159 mg/dL   High:  160-189 mg/dL   Very High:  >= 190 mg/dL    Non HDL Cholesterol  Desirable:  130 mg/dL  Above Desirable:  130-159 mg/dL  Borderline High:  160-189 mg/dL  High:  190-219 mg/dL  Very High:  Greater than or equal to 220 mg/dL   Hemoglobin A1c   Result Value Ref Range    Hemoglobin A1C 6.3 (H) 0.0 - 5.6 %      Comment:      Normal <5.7%   Prediabetes 5.7-6.4%    Diabetes 6.5% or higher      Note: Adopted from ADA consensus guidelines.   Comprehensive metabolic panel   Result Value Ref Range    Sodium 138 133 - 144 mmol/L    Potassium 4.3 3.4 - 5.3 mmol/L    Chloride 106 94 - 109 mmol/L    Carbon Dioxide (CO2) 29 20 - 32 mmol/L    Anion Gap 3 3 - 14 mmol/L    Urea Nitrogen 16 7 - 30 mg/dL    Creatinine 1.13 (H) 0.52 - 1.04 mg/dL    Calcium 9.3 8.5 - 10.1 mg/dL    Glucose 179 (H) 70 - 99 mg/dL    Alkaline Phosphatase 88 40 - 150 U/L    AST 12 0 - 45 U/L    ALT 27 0 - 50 U/L    Protein Total 7.6 6.8 - 8.8 g/dL    Albumin 3.7 3.4 - 5.0 g/dL    Bilirubin Total 0.4 0.2 - 1.3 mg/dL    GFR Estimate 50 (L) >60 mL/min/1.73m2      Comment:      Effective December 21, 2021 eGFRcr in adults is calculated using the 2021 CKD-EPI creatinine equation which includes age and gender (Markos et al., NEJM, DOI: 10.1056/GBALbn3918246)       If you have any questions or concerns, please call the clinic at the number listed above.       Sincerely,      Terry Nielson MD

## 2022-02-08 ENCOUNTER — OFFICE VISIT (OUTPATIENT)
Dept: FAMILY MEDICINE | Facility: CLINIC | Age: 77
End: 2022-02-08
Payer: COMMERCIAL

## 2022-02-08 VITALS
DIASTOLIC BLOOD PRESSURE: 67 MMHG | HEIGHT: 63 IN | WEIGHT: 210 LBS | HEART RATE: 58 BPM | BODY MASS INDEX: 37.21 KG/M2 | OXYGEN SATURATION: 97 % | TEMPERATURE: 98.9 F | SYSTOLIC BLOOD PRESSURE: 140 MMHG

## 2022-02-08 DIAGNOSIS — F33.42 RECURRENT MAJOR DEPRESSIVE DISORDER, IN FULL REMISSION (H): ICD-10-CM

## 2022-02-08 DIAGNOSIS — N63.0 LUMP OR MASS IN BREAST: ICD-10-CM

## 2022-02-08 DIAGNOSIS — N63.0 BREAST MASS: ICD-10-CM

## 2022-02-08 DIAGNOSIS — E78.5 HYPERLIPIDEMIA LDL GOAL <100: ICD-10-CM

## 2022-02-08 DIAGNOSIS — M85.89 OSTEOPENIA OF MULTIPLE SITES: ICD-10-CM

## 2022-02-08 DIAGNOSIS — N18.31 STAGE 3A CHRONIC KIDNEY DISEASE (H): ICD-10-CM

## 2022-02-08 DIAGNOSIS — E11.42 DIABETIC POLYNEUROPATHY ASSOCIATED WITH TYPE 2 DIABETES MELLITUS (H): ICD-10-CM

## 2022-02-08 DIAGNOSIS — E11.9 TYPE 2 DIABETES MELLITUS WITHOUT COMPLICATION, WITHOUT LONG-TERM CURRENT USE OF INSULIN (H): ICD-10-CM

## 2022-02-08 DIAGNOSIS — I10 ESSENTIAL HYPERTENSION WITH GOAL BLOOD PRESSURE LESS THAN 140/90: ICD-10-CM

## 2022-02-08 DIAGNOSIS — F41.1 GAD (GENERALIZED ANXIETY DISORDER): ICD-10-CM

## 2022-02-08 DIAGNOSIS — C18.9 MALIGNANT NEOPLASM OF COLON, UNSPECIFIED PART OF COLON (H): ICD-10-CM

## 2022-02-08 DIAGNOSIS — E66.01 MORBID OBESITY DUE TO EXCESS CALORIES (H): ICD-10-CM

## 2022-02-08 DIAGNOSIS — Z00.00 ENCOUNTER FOR MEDICARE ANNUAL WELLNESS EXAM: Primary | ICD-10-CM

## 2022-02-08 DIAGNOSIS — N64.4 BREAST PAIN: ICD-10-CM

## 2022-02-08 DIAGNOSIS — K21.9 GASTROESOPHAGEAL REFLUX DISEASE WITHOUT ESOPHAGITIS: ICD-10-CM

## 2022-02-08 DIAGNOSIS — F98.8 ATTENTION DEFICIT DISORDER (ADD) WITHOUT HYPERACTIVITY: ICD-10-CM

## 2022-02-08 DIAGNOSIS — Z51.81 ENCOUNTER FOR THERAPEUTIC DRUG MONITORING: ICD-10-CM

## 2022-02-08 LAB
CREAT UR-MCNC: 109 MG/DL
HGB BLD-MCNC: 12.9 G/DL (ref 11.7–15.7)
PTH-INTACT SERPL-MCNC: 62 PG/ML (ref 18–80)

## 2022-02-08 PROCEDURE — 83970 ASSAY OF PARATHORMONE: CPT | Performed by: INTERNAL MEDICINE

## 2022-02-08 PROCEDURE — 36415 COLL VENOUS BLD VENIPUNCTURE: CPT | Performed by: INTERNAL MEDICINE

## 2022-02-08 PROCEDURE — G0439 PPPS, SUBSEQ VISIT: HCPCS | Performed by: INTERNAL MEDICINE

## 2022-02-08 PROCEDURE — 99214 OFFICE O/P EST MOD 30 MIN: CPT | Mod: 25 | Performed by: INTERNAL MEDICINE

## 2022-02-08 PROCEDURE — 82306 VITAMIN D 25 HYDROXY: CPT | Performed by: INTERNAL MEDICINE

## 2022-02-08 PROCEDURE — 80307 DRUG TEST PRSMV CHEM ANLYZR: CPT | Performed by: INTERNAL MEDICINE

## 2022-02-08 PROCEDURE — 85018 HEMOGLOBIN: CPT | Performed by: INTERNAL MEDICINE

## 2022-02-08 RX ORDER — LOSARTAN POTASSIUM 100 MG/1
100 TABLET ORAL DAILY
Qty: 90 TABLET | Refills: 3 | Status: SHIPPED | OUTPATIENT
Start: 2022-02-08

## 2022-02-08 RX ORDER — PRAVASTATIN SODIUM 10 MG
10 TABLET ORAL DAILY
Qty: 90 TABLET | Refills: 3 | Status: SHIPPED | OUTPATIENT
Start: 2022-02-08

## 2022-02-08 RX ORDER — OMEPRAZOLE 40 MG/1
40 CAPSULE, DELAYED RELEASE ORAL DAILY
Qty: 90 CAPSULE | Refills: 3 | Status: SHIPPED | OUTPATIENT
Start: 2022-02-08

## 2022-02-08 RX ORDER — CITALOPRAM HYDROBROMIDE 20 MG/1
20 TABLET ORAL DAILY
Qty: 90 TABLET | Refills: 1 | Status: SHIPPED | OUTPATIENT
Start: 2022-02-08 | End: 2022-10-19

## 2022-02-08 RX ORDER — METFORMIN HCL 500 MG
1000 TABLET, EXTENDED RELEASE 24 HR ORAL
Qty: 180 TABLET | Refills: 1 | Status: SHIPPED | OUTPATIENT
Start: 2022-02-08 | End: 2022-10-05

## 2022-02-08 RX ORDER — METHYLPHENIDATE HYDROCHLORIDE EXTENDED RELEASE 20 MG/1
TABLET ORAL
Qty: 30 TABLET | Refills: 0 | Status: SHIPPED | OUTPATIENT
Start: 2022-02-08 | End: 2022-03-18

## 2022-02-08 ASSESSMENT — ENCOUNTER SYMPTOMS
NERVOUS/ANXIOUS: 0
SORE THROAT: 0
BREAST MASS: 1
ABDOMINAL PAIN: 1
HEARTBURN: 0
COUGH: 1
JOINT SWELLING: 0
PARESTHESIAS: 0
CONSTIPATION: 0
HEMATOCHEZIA: 0
DIARRHEA: 0
FEVER: 0
DIZZINESS: 0
CHILLS: 0
EYE PAIN: 0
HEADACHES: 1
ARTHRALGIAS: 1
WEAKNESS: 0
FREQUENCY: 0
PALPITATIONS: 0
SHORTNESS OF BREATH: 0
HEMATURIA: 0

## 2022-02-08 ASSESSMENT — ANXIETY QUESTIONNAIRES
7. FEELING AFRAID AS IF SOMETHING AWFUL MIGHT HAPPEN: NOT AT ALL
GAD7 TOTAL SCORE: 1
GAD7 TOTAL SCORE: 1
5. BEING SO RESTLESS THAT IT IS HARD TO SIT STILL: NOT AT ALL
7. FEELING AFRAID AS IF SOMETHING AWFUL MIGHT HAPPEN: NOT AT ALL
3. WORRYING TOO MUCH ABOUT DIFFERENT THINGS: NOT AT ALL
1. FEELING NERVOUS, ANXIOUS, OR ON EDGE: NOT AT ALL
2. NOT BEING ABLE TO STOP OR CONTROL WORRYING: NOT AT ALL
GAD7 TOTAL SCORE: 1
4. TROUBLE RELAXING: NOT AT ALL
6. BECOMING EASILY ANNOYED OR IRRITABLE: SEVERAL DAYS

## 2022-02-08 ASSESSMENT — PATIENT HEALTH QUESTIONNAIRE - PHQ9
SUM OF ALL RESPONSES TO PHQ QUESTIONS 1-9: 3
SUM OF ALL RESPONSES TO PHQ QUESTIONS 1-9: 3
10. IF YOU CHECKED OFF ANY PROBLEMS, HOW DIFFICULT HAVE THESE PROBLEMS MADE IT FOR YOU TO DO YOUR WORK, TAKE CARE OF THINGS AT HOME, OR GET ALONG WITH OTHER PEOPLE: NOT DIFFICULT AT ALL

## 2022-02-08 ASSESSMENT — ACTIVITIES OF DAILY LIVING (ADL): CURRENT_FUNCTION: NO ASSISTANCE NEEDED

## 2022-02-08 ASSESSMENT — MIFFLIN-ST. JEOR: SCORE: 1401.68

## 2022-02-08 ASSESSMENT — PAIN SCALES - GENERAL: PAINLEVEL: NO PAIN (0)

## 2022-02-08 NOTE — PATIENT INSTRUCTIONS
Additional instructions:    Schedule mammogram, ultrasound and bone density. Call [phone] to schedule at [facility]      Get Shingrix vaccine second dose at [facility].     Enroll for blood pressure support at [facility].    Virtual visit follow up on BP in 2 weeks and diabetes follow up with non fasting labs in 6 months.      Patient Education   Personalized Prevention Plan  You are due for the preventive services outlined below.  Your care team is available to assist you in scheduling these services.  If you have already completed any of these items, please share that information with your care team to update in your medical record.  Health Maintenance Due   Topic Date Due     Parathyroid Lab  Never done     Hepatitis C Screening  Never done     Mammogram  12/20/2020     Hemoglobin  04/12/2021     Osteoporosis Screening  06/20/2021     Eye Exam  07/01/2021     URINE DRUG SCREEN  08/31/2021     FALL RISK ASSESSMENT  02/02/2022     Zoster (Shingles) Vaccine (3 of 3) 01/06/2022     Depression Assessment  02/06/2022     Your Health Risk Assessment indicates you feel you are not in good health    A healthy lifestyle helps keep the body fit and the mind alert. It helps protect you from disease, helps you fight disease, and helps prevent chronic disease (disease that doesn't go away) from getting worse. This is important as you get older and begin to notice twinges in muscles and joints and a decline in the strength and stamina you once took for granted. A healthy lifestyle includes good healthcare, good nutrition, weight control, recreation, and regular exercise. Avoid harmful substances and do what you can to keep safe. Another part of a healthy lifestyle is stay mentally active and socially involved.    Good healthcare     Have a wellness visit every year.     If you have new symptoms, let us know right away. Don't wait until the next checkup.     Take medicines exactly  as prescribed and keep your medicines in a safe place. Tell us if your medicine causes problems.   Healthy diet and weight control     Eat 3 or 4 small, nutritious, low-fat, high-fiber meals a day. Include a variety of fruits, vegetables, and whole-grain foods.     Make sure you get enough calcium in your diet. Calcium, vitamin D, and exercise help prevent osteoporosis (bone thinning).     If you live alone, try eating with others when you can. That way you get a good meal and have company while you eat it.     Try to keep a healthy weight. If you eat more calories than your body uses for energy, it will be stored as fat and you will gain weight.     Recreation   Recreation is not limited to sports and team events. It includes any activity that provides relaxation, interest, enjoyment, and exercise. Recreation provides an outlet for physical, mental, and social energy. It can give a sense of worth and achievement. It can help you stay healthy.    Mental Exercise and Social Involvement  Mental and emotional health is as important as physical health. Keep in touch with friends and family. Stay as active as possible. Continue to learn and challenge yourself.   Things you can do to stay mentally active are:    Learn something new, like a foreign language or musical instrument.     Play SCRABBLE or do crossword puzzles. If you cannot find people to play these games with you at home, you can play them with others on your computer through the Internet.     Join a games club--anything from card games to chess or checkers or lawn bowling.     Start a new hobby.     Go back to school.     Volunteer.     Read.   Keep up with world events.    Exercise for a Healthier Heart  You may wonder how you can improve the health of your heart. If you re thinking about exercise, you re on the right track. You don t need to become an athlete. But you do need a certain amount of brisk exercise to help strengthen your heart. If you have been  diagnosed with a heart condition, your healthcare provider may advise exercise to help stabilize your condition. To help make exercise a habit, choose safe, fun activities.      Exercise with a friend. When activity is fun, you're more likely to stick with it.   Before you start  Check with your healthcare provider before starting an exercise program. This is especially important if you have not been active for a while. It's also important if you have a long-term (chronic) health problem such as heart disease, diabetes, or obesity. Or if you are at high risk for having these problems.   Why exercise?  Exercising regularly offers many healthy rewards. It can help you do all of the following:     Improve your blood cholesterol level to help prevent further heart trouble    Lower your blood pressure to help prevent a stroke or heart attack    Control diabetes, or reduce your risk of getting this disease    Improve your heart and lung function    Reach and stay at a healthy weight    Make your muscles stronger so you can stay active    Prevent falls and fractures by slowing the loss of bone mass (osteoporosis)    Manage stress better    Reduce your blood pressure    Improve your sense of self and your body image  Exercise tips      Ease into your routine. Set small goals. Then build on them. If you are not sure what your activity level should be, talk with your healthcare provider first before starting an exercise routine.    Exercise on most days. Aim for a total of 150 minutes (2 hours and 30 minutes) or more of moderate-intensity aerobic activity each week. Or 75 minutes (1 hour and 15 minutes) or more of vigorous-intensity aerobic activity each week. Or try for a combination of both. Moderate activity means that you breathe heavier and your heart rate increases but you can still talk. Think about doing 40 minutes of moderate exercise, 3 to 4 times a week. For best results, activity should last for about 40 minutes  to lower blood pressure and cholesterol. It's OK to work up to the 40-minute period over time. Examples of moderate-intensity activity are walking 1 mile in 15 minutes. Or doing 30 to 45 minutes of yard work.    Step up your daily activity level.  Along with your exercise program, try being more active the whole day. Walk instead of drive. Or park further away so that you take more steps each day. Do more household tasks or yard work. You may not be able to meet the advised mount of physical activity. But doing some moderate- or vigorous-intensity aerobic activity can help reduce your risk for heart disease. Your healthcare provider can help you figure out what is best for you.    Choose 1 or more activities you enjoy.  Walking is one of the easiest things you can do. You can also try swimming, riding a bike, dancing, or taking an exercise class.    When to call your healthcare provider  Call your healthcare provider if you have any of these:     Chest pain or feel dizzy or lightheaded    Burning, tightness, pressure, or heaviness in your chest, neck, shoulders, back, or arms    Abnormal shortness of breath    More joint or muscle pain    A very fast or irregular heartbeat (palpitations)  [employer] last reviewed this educational content on 7/1/2019 2000-2021 [employer]. All rights reserved. This information is not intended as a substitute for professional medical care. Always follow your healthcare professional's instructions.          Preventing Falls at Home  A person can fall for many reasons. Older adults may fall because reaction time slows down as we age. Your muscles and joints may get stiff, weak, or less flexible because of illness, medicines, or a physical condition.   Other health problems that make falls more likely include:     Arthritis    Dizziness or lightheadedness when you stand up (orthostatic hypotension)    History of a stroke    Dizziness    Anemia    Certain medicines taken  for mental illness or to control blood pressure.    Problems with balance or gait    Bladder or urinary problems    History of falling    Changes in vision (vision impairment)    Changes in thinking skills and memory (cognitive impairment)  Injuries from a fall can include serious injuries such as broken bones, dislocated joints, internal bleeding and cuts. Injuries like these can limit your independence.   Prevention tips  To help prevent falls and fall-related injuries, follow the tips below.    Floors  To make floors safer:     Put nonskid pads under area rugs.    Remove small rugs.    Replace worn floor coverings.    Tack carpets firmly to each step on carpeted stairs. Put nonskid strips on the edges of uncarpeted stairs.    Keep floors and stairs free of clutter and cords.    Arrange furniture so there are clear pathways.    Clean up any spills right away.  Bathrooms    To make bathrooms safer:     Install grab bars in the tub or shower.    Apply nonskid strips or put a nonskid rubber mat in the tub or shower.    Sit on a bath chair to bathe.    Use bathmats with nonskid backing.  Lighting  To improve visibility in your home:      Keep a flashlight in each room. Or put a lamp next to the bed within easy reach.    Put nightlights in the bedrooms, hallways, kitchen, and bathrooms.    Make sure all stairways have good lighting.    Take your time when going up and down stairs.    Put handrails on both sides of stairs and in walkways for more support. To prevent injury to your wrist or arm, don t use handrails to pull yourself up.    Install grab bars to pull yourself up.    Move or rearrange items that you use often. This will make them easier to find or reach.    Look at your home to find any safety hazards. Especially look at doorways, walkways, and the driveway. Remove or repair any safety problems that you find.  Other changes to make    Look around to find any safety hazards. Look closely at doorways,  walkways, and the driveway. Remove or repair any safety problems that you find.    Wear shoes that fit well.    Take your time when going up and down stairs.    Put handrails on both sides of stairs and in walkways for more support. To prevent injury to your wrist or arm, don t use handrails to pull yourself up.    Install grab bars wherever needed to pull yourself up.    Arrange items that you use often. This will make them easier to find or reach.    [employer] last reviewed this educational content on 3/1/2020    6276-4775 [employer]. All rights reserved. This information is not intended as a substitute for professional medical care. Always follow your healthcare professional's instructions.          10 Healthy Habits  Eat Better ? Move More ? Live Well  1.  Eat breakfast daily.      Try to eat within the first hour after you wake up. This helps you control your appetite during the day, and you are less likely to snack in the evening.     80% of people who skip meals are overweight or obese.     2.   Include protein with every meal, even breakfast.     Protein will suppress your appetite longer than other types of food. This helps you  feel more satisfied with the amount of food you have eaten.     3.  Fill up on Fiber    Fiber comes from plants--fruits, veggies, whole grains, nuts/seeds and beans    Fiber is low in calories, high in phytonutrients and helps you stay full longer     4.  Eat S-L-[id number]-Y    Take 20-30 minutes to eat each meal by taking small bites, chewing foods to applesauce consistency or 20-30 times before you swallow    Eating foods too fast can delay satiety/fullness signals and increase overeating      5.  Avoid Mindless Eating    Be present when you eat--take note of the smell, taste and quality of your food    Make a list of alternative activities you could do to prevent boredom/stress eating  Go for a walk, call a friend, chew gum, paint your nails     6.  Keep a Journal     Writing down what you eat, how you feel and when you are active helps you identify new changes to work on from week to week          Look for ways to cut 100 calories from your current diet 2-3 times per day     7.  Drink 64 ounces of Non Calorie drinks between meals    Water    Zero calorie Propel , Vitamin Water , Crystal Light     Avoid regular soda pop     8.  Stop thinking about food choices as a  diet.     Instead, think of them as healthy, lifelong habits--an effort toward a new way of eating.    Weigh yourself once a week instead of everyday.      9.  Get enough sleep--at least 7 to 8 hours each night.     Lack of sleep is one reason that fat builds up around the waist.     10.  Exercise five to six times per week     Do a combination aerobic exercise (fast walking, biking, swimming) and strength training (Dumbbells, pushups, weight machines, resistance bands).    Start at 10 minutes per day and slowly work your way up to 30 minutes or more.   Taking the stairs instead of the elevator, park at the far end of the parking lot, pace while on the phone, take the dog for a walk.      [email or web address]

## 2022-02-08 NOTE — LETTER
February 11, 2022      Karen Donis  [address] [facility] [place] [address]        Dear ,    We are writing to inform you of your test results.    Your test results fall within the expected range(s) or remain unchanged from previous results.  Please continue with current treatment plan.    Resulted Orders   Vitamin D Deficiency   Result Value Ref Range    Vitamin D, Total (25-Hydroxy) 32 20 - 75 ug/L    Narrative    Season, race, dietary intake, and treatment affect the concentration of 25-hydroxy-Vitamin D. Values may decrease during winter months and increase during summer months. Values 20-29 ug/L may indicate Vitamin D insufficiency and values <20 ug/L may indicate Vitamin D deficiency.    Vitamin D determination is routinely performed by an immunoassay specific for 25 hydroxyvitamin D3.  If an individual is on vitamin D2(ergocalciferol) supplementation, please specify 25 OH vitamin D2 and D3 level determination by LCMSMS test VITD23.     Hemoglobin   Result Value Ref Range    Hemoglobin 12.9 11.7 - 15.7 g/dL   Parathyroid Hormone Intact   Result Value Ref Range    Parathyroid Hormone Intact 62 18 - 80 pg/mL   Urine Drug Confirmation Panel   Result Value Ref Range    Methylphenidate (Ritalin) ng/mL 93 (H) <50 ng/mL    Methylphenidate (Ritalin) 85 Absent ng/mg [creat]      Comment:      Sources of methylphenidate are scheduled prescription medications.    Ritalinic Acid ng/mL 5,220 (H) <50 ng/mL    Ritalinic Acid 4,789 Absent ng/mg [creat]      Comment:      Ritalinic acid is an expected metabolite of methylphenidate.    Narrative    This test was developed and its performance characteristics determined by the [facility],  [facility]. It has not been cleared or approved by the FDA. The laboratory is regulated under CLIA as qualified to perform high-complexity testing. This test is used for clinical purposes. It should not be regarded as  investigational or for research.   Urine Creatinine for Drug Screen Panel   Result Value Ref Range    Creatinine Urine for Drug Screen 109 mg/dL      Comment:      The reference range has not been established for creatinine in random urines. The results should be integrated into the clinical context for interpretation.       If you have any questions or concerns, please call the clinic at the number listed above.       Sincerely,      Lakia Hunter MD PhD

## 2022-02-08 NOTE — PROGRESS NOTES
"SUBJECTIVE:   Karen Donis is a 76 year old female who presents for Preventive Visit.    Pt needs med refills today  Also thinks she may have lump on Lt breast  Her brother Madi passed away 2 weeks ago. Had been in and out of hospital 11 times in the last elver. She is doing okay with this.   On Citalopram for depression/anxiety. Doing well at the current dose.     Are you in the first 12 months of your Medicare coverage?  No    Healthy Habits:     In general, how would you rate your overall health?  Fair    Frequency of exercise:  None    Do you usually eat at least 4 servings of fruit and vegetables a day, include whole grains    & fiber and avoid regularly eating high fat or \"junk\" foods?  Yes    Taking medications regularly:  Yes    Medication side effects:  None    Ability to successfully perform activities of daily living:  No assistance needed    Home Safety:  No safety concerns identified    Hearing Impairment:  No hearing concerns    In the past 6 months, have you been bothered by leaking of urine? Yes    In general, how would you rate your overall mental or emotional health?  Good      PHQ-2 Total Score: 0    Additional concerns today:  Yes    Do you feel safe in your environment? Yes    Have you ever done Advance Care Planning? (For example, a Health Directive, POLST, or a discussion with a medical provider or your loved ones about your wishes): No, advance care planning information given to patient to review.  Advanced care planning was discussed at today's visit.       Fall risk  Fallen 2 or more times in the past year?: (P) Yes  Any fall with injury in the past year?: (P) No    Cognitive Screening   1) Repeat 3 items (Leader, Season, Table)    2) Clock draw:   3) 3 item recall: Recalls 3 objects  Results: 3 items recalled: COGNITIVE IMPAIRMENT LESS LIKELY    Mini-CogTM Copyright S David. Licensed by the author for use in [employer]; reprinted with permission (sher@.[facility]). All rights " reserved.      Do you have sleep apnea, excessive snoring or daytime drowsiness?: yes    Reviewed and updated as needed this visit by clinical staff  Tobacco  Allergies  Meds   Med Hx  Surg Hx  Fam Hx  Soc Hx       Reviewed and updated as needed this visit by Provider               Social History     Tobacco Use     Smoking status: Former Smoker     Quit date: 1982     Years since quittin.1     Smokeless tobacco: Never Used   Substance Use Topics     Alcohol use: No         Alcohol Use 2022   Prescreen: >3 drinks/day or >7 drinks/week? No   Prescreen: >3 drinks/day or >7 drinks/week? -           Current providers sharing in care for this patient include:   Patient Care Team:  Lakia Hunter MD PhD as PCP - General (Internal Medicine)  Brent Bunn DPM as MD (Podiatry)  Danie Garcia MD as MD (Family Medicine - Sports Medicine)  Maritza Woodall [facility] as Pharmacist (Pharmacist)  Brent Bunn DPM as Assigned Musculoskeletal Provider  Lakia Hunter MD PhD as Assigned PCP    The following health maintenance items are reviewed in Epic and correct as of today:  Health Maintenance Due   Topic Date Due     PARATHYROID  Never done     HEPATITIS C SCREENING  Never done     MAMMO SCREENING  2020     HEMOGLOBIN  2021     DEXA  2021     EYE EXAM  2021     URINE DRUG SCREEN  2021     FALL RISK ASSESSMENT  2022     ZOSTER IMMUNIZATION (3 of 3) 2022     PHQ-9  2022     Labs reviewed in [facility]  Mammogram Screening: Mammogram Screening - Patient over age 75, has elected to continue with screening.      Review of Systems   Constitutional: Negative for chills and fever.   HENT: Negative for congestion, ear pain, hearing loss and sore throat.    Eyes: Negative for pain and visual disturbance.   Respiratory: Positive for cough (just a tickle, chronic). Negative for shortness of breath.    Cardiovascular: Negative for chest pain, palpitations and  "peripheral edema.   Gastrointestinal: Positive for abdominal pain (LLQ, maybe scar tissue from prior colon surgery). Negative for constipation, diarrhea, heartburn and hematochezia.   Breasts:  Positive for breast mass. Negative for tenderness and discharge.   Genitourinary: Negative for frequency, genital sores, hematuria, pelvic pain, urgency, vaginal bleeding and vaginal discharge.   Musculoskeletal: Positive for arthralgias. Negative for joint swelling.   Skin: Negative for rash.   Neurological: Positive for headaches (whiles a whike, goes away very fast without meds). Negative for dizziness, weakness and paresthesias.   Psychiatric/Behavioral: Negative for mood changes. The patient is not nervous/anxious.        OBJECTIVE:   BP (!) 140/67   Pulse 58   Temp 98.9  F (37.2  C) (Oral)   Ht 1.6 m (5' 3\")   Wt 95.3 kg (210 lb)   SpO2 97%   BMI 37.20 kg/m   Estimated body mass index is 37.2 kg/m  as calculated from the following:    Height as of this encounter: 1.6 m (5' 3\").    Weight as of this encounter: 95.3 kg (210 lb).  Physical Exam  GENERAL: healthy, alert and no distress  NECK: no adenopathy, no asymmetry, masses, or scars and thyroid normal to palpation  RESP: lungs clear to auscultation - no rales, rhonchi or wheezes  BREAST: positive for possible lump at 9 o'clock position of the left breast. No skin changes.  CV: regular rate and rhythm, normal S1 S2, no S3 or S4, no murmur, click or rub, no peripheral edema and peripheral pulses strong  ABDOMEN: soft, nontender, no hepatosplenomegaly, no masses and bowel sounds normal  MS: no gross musculoskeletal defects noted, no edema    Office Visit on 02/01/2022   Component Date Value Ref Range Status     Creatinine Urine mg/dL 02/01/2022 128  mg/dL Final     Albumin Urine mg/L 02/01/2022 6  mg/L Final     Albumin Urine mg/g Cr 02/01/2022 4.69  0.00 - 25.00 mg/g Cr Final     Cholesterol 02/01/2022 163  <200 mg/dL Final     Triglycerides 02/01/2022 118  <150 " mg/dL Final     Direct Measure HDL 02/01/2022 60  >=50 mg/dL Final     LDL Cholesterol Calculated 02/01/2022 79  <=100 mg/dL Final     Non HDL Cholesterol 02/01/2022 103  <130 mg/dL Final     Patient Fasting > 8hrs? 02/01/2022 No   Final     Hemoglobin A1C 02/01/2022 6.3* 0.0 - 5.6 % Final    Normal <5.7%   Prediabetes 5.7-6.4%    Diabetes 6.5% or higher     Note: Adopted from ADA consensus guidelines.     Sodium 02/01/2022 138  133 - 144 mmol/L Final     Potassium 02/01/2022 4.3  3.4 - 5.3 mmol/L Final     Chloride 02/01/2022 106  94 - 109 mmol/L Final     Carbon Dioxide (CO2) 02/01/2022 29  20 - 32 mmol/L Final     Anion Gap 02/01/2022 3  3 - 14 mmol/L Final     Urea Nitrogen 02/01/2022 16  7 - 30 mg/dL Final     Creatinine 02/01/2022 1.13* 0.52 - 1.04 mg/dL Final     Calcium 02/01/2022 9.3  8.5 - 10.1 mg/dL Final     Glucose 02/01/2022 179* 70 - 99 mg/dL Final     Alkaline Phosphatase 02/01/2022 88  40 - 150 U/L Final     AST 02/01/2022 12  0 - 45 U/L Final     ALT 02/01/2022 27  0 - 50 U/L Final     Protein Total 02/01/2022 7.6  6.8 - 8.8 g/dL Final     Albumin 02/01/2022 3.7  3.4 - 5.0 g/dL Final     Bilirubin Total 02/01/2022 0.4  0.2 - 1.3 mg/dL Final     GFR Estimate 02/01/2022 50* >60 mL/min/1.73m2 Final    Effective December 21, 2021 eGFRcr in adults is calculated using the 2021 CKD-EPI creatinine equation which includes age and gender (Markos posey al., NEJ, DOI: 10.1056/TLBVvd4513250)        PHQ-9 SCORE 4/20/2021 8/6/2021 2/8/2022   PHQ-9 Total Score - - -   PHQ-9 Total Score MyChart - - 3 (Minimal depression)   PHQ-9 Total Score 8 1 3     RAGHAVENDRA-7 SCORE 3/31/2021 4/20/2021 2/8/2022   Total Score - - -   Total Score - - 1 (minimal anxiety)   Total Score 9 5 1       ASSESSMENT / PLAN:   Karen was seen today for wellness visit.    Diagnoses and all orders for this visit:    Encounter for Medicare annual wellness exam    Hyperlipidemia LDL goal <100  Comments:  at goal  Orders:  -     pravastatin (PRAVACHOL) 10  MG tablet; Take 1 tablet (10 mg) by mouth daily    Essential hypertension with goal blood pressure less than 140/90  Comments:  enroll in BP support at [facility]. virtual visit follow up for BP in 4 weeks.   Orders:  -     losartan (COZAAR) 100 MG tablet; Take 1 tablet (100 mg) by mouth daily    Osteopenia of multiple sites  -     DX Hip/Pelvis/Spine; Future  -     Vitamin D Deficiency; Future  -     Vitamin D Deficiency    Stage 3a chronic kidney disease (H)  Comments:  Cr stable.   Orders:  -     Parathyroid Hormone Intact; Future  -     Hemoglobin; Future  -     Hemoglobin  -     Parathyroid Hormone Intact    Diabetic polyneuropathy associated with type 2 diabetes mellitus (H)  -     Adult Eye Referral; Future    Malignant neoplasm of colon, unspecified part of colon (H)  Comments:  last colonocopy in 12/20218. Next due in 2018    Morbid obesity due to excess calories (H)  Comments:  last 13 lbs gradually over the last year. continue to work on weight loss.     Recurrent major depressive disorder, in full remission (H)  -     AK BEHAV ASSMT W/SCORE & DOCD/STAND INSTRUMENT  -     citalopram (CELEXA) 20 MG tablet; Take 1 tablet (20 mg) by mouth daily    Encounter for therapeutic drug monitoring  -     Drug Confirmation Panel Urine with Creat - lab collect; Future  -     Drug Confirmation Panel Urine with Creat - lab collect    Gastroesophageal reflux disease without esophagitis  -     omeprazole (PRILOSEC) 40 MG DR capsule; Take 1 capsule (40 mg) by mouth daily Take 30-60 minutes before a meal.    Type 2 diabetes mellitus without complication, without long-term current use of insulin (H)  Comments:  well controlled.   Orders:  -     metFORMIN (GLUCOPHAGE-XR) 500 MG 24 hr tablet; Take 2 tablets (1,000 mg) by mouth daily (with dinner)    Lump or mass in breast  -     MA Diagnostic Digital Bilateral; Future    Breast mass  -     MA Diagnostic Digital Bilateral; Future  -     US Breast Left Complete 4 Quadrants;  "Future    Breast pain  -     MA Diagnostic Digital Bilateral; Future  -     US Breast Left Complete 4 Quadrants; Future    RAGHAVENDRA (generalized anxiety disorder)  Comments:  doing well  Orders:  -     [place] BEHAV ASSMT W/SCORE & DOCD/STAND INSTRUMENT  -     citalopram (CELEXA) 20 MG tablet; Take 1 tablet (20 mg) by mouth daily    Attention deficit disorder (ADD) without hyperactivity  Comments:  continue Methylphenidate  Orders:  -     methylphenidate (METADATE ER) 20 MG CR tablet; TAKE ONE TABLET BY MOUTH EVERY DAY IN THE MORNING        Patient has been advised of split billing requirements and indicates understanding: Yes    COUNSELING:  Reviewed preventive health counseling, as reflected in patient instructions    Estimated body mass index is 37.2 kg/m  as calculated from the following:    Height as of this encounter: 1.6 m (5' 3\").    Weight as of this encounter: 95.3 kg (210 lb).    Weight management plan: Discussed healthy diet and exercise guidelines    She reports that she quit smoking about 39 years ago. She has never used smokeless tobacco.      Appropriate preventive services were discussed with this patient, including applicable screening as appropriate for cardiovascular disease, diabetes, osteopenia/osteoporosis, and glaucoma.  As appropriate for age/gender, discussed screening for colorectal cancer, prostate cancer, breast cancer, and cervical cancer. Checklist reviewing preventive services available has been given to the patient.    Reviewed patients plan of care and provided an AVS. The Complex Care Plan (for patients with higher acuity and needing more deliberate coordination of services) for Karen meets the Care Plan requirement. This Care Plan has been established and reviewed with the Patient.    Counseling Resources:  ATP IV Guidelines  Pooled Cohorts Equation Calculator  Breast Cancer Risk Calculator  Breast Cancer: Medication to Reduce Risk  FRAX Risk Assessment  ICSI Preventive Guidelines  Dietary " Guidelines for Americans, 2010  USDA's MyPlate  ASA Prophylaxis  Lung CA Screening    Lakia Hunter MD PhD  [facility]    Identified Health Risks:  Answers for HPI/ROS submitted by the patient on 2/8/2022  If you checked off any problems, how difficult have these problems made it for you to do your work, take care of things at home, or get along with other people?: Not difficult at all  PHQ9 TOTAL SCORE: 3  RAGHAVENDRA 7 TOTAL SCORE: 1        The patient was provided with suggestions to help her develop a healthy physical lifestyle.  She is at risk for lack of exercise and has been provided with information to increase physical activity for the benefit of her well-being.  She is at risk for falling and has been provided with information to reduce the risk of falling at home.

## 2022-02-08 NOTE — LETTER
February 9, 2022      Karen Donis  [address] [place] [address]        Dear ,    We are writing to inform you of your test results.    Your test results fall within the expected range(s) or remain unchanged from previous results.  Please continue with current treatment plan.    Resulted Orders   Vitamin D Deficiency   Result Value Ref Range    Vitamin D, Total (25-Hydroxy) 32 20 - 75 ug/L    Narrative    Season, race, dietary intake, and treatment affect the concentration of 25-hydroxy-Vitamin D. Values may decrease during winter months and increase during summer months. Values 20-29 ug/L may indicate Vitamin D insufficiency and values <20 ug/L may indicate Vitamin D deficiency.    Vitamin D determination is routinely performed by an immunoassay specific for 25 hydroxyvitamin D3.  If an individual is on vitamin D2(ergocalciferol) supplementation, please specify 25 OH vitamin D2 and D3 level determination by LCMSMS test VITD23.     Hemoglobin   Result Value Ref Range    Hemoglobin 12.9 11.7 - 15.7 g/dL   Parathyroid Hormone Intact   Result Value Ref Range    Parathyroid Hormone Intact 62 18 - 80 pg/mL   Urine Creatinine for Drug Screen Panel   Result Value Ref Range    Creatinine Urine for Drug Screen 109 mg/dL      Comment:      The reference range has not been established for creatinine in random urines. The results should be integrated into the clinical context for interpretation.       If you have any questions or concerns, please call the clinic at the number listed above.       Sincerely,      Lakia Hunter MD PhD

## 2022-02-09 LAB — DEPRECATED CALCIDIOL+CALCIFEROL SERPL-MC: 32 UG/L (ref 20–75)

## 2022-02-09 ASSESSMENT — ANXIETY QUESTIONNAIRES: GAD7 TOTAL SCORE: 1

## 2022-02-09 ASSESSMENT — PATIENT HEALTH QUESTIONNAIRE - PHQ9: SUM OF ALL RESPONSES TO PHQ QUESTIONS 1-9: 3

## 2022-02-11 LAB
ME-PHENIDATE UR CFM-MCNC: 5220 NG/ML
ME-PHENIDATE UR CFM-MCNC: 93 NG/ML
ME-PHENIDATE/CREAT UR: 4789 NG/MG {CREAT}
ME-PHENIDATE/CREAT UR: 85 NG/MG {CREAT}

## 2022-02-14 ENCOUNTER — TELEPHONE (OUTPATIENT)
Dept: FAMILY MEDICINE | Facility: CLINIC | Age: 77
End: 2022-02-14
Payer: COMMERCIAL

## 2022-02-22 ENCOUNTER — VIRTUAL VISIT (OUTPATIENT)
Dept: FAMILY MEDICINE | Facility: CLINIC | Age: 77
End: 2022-02-22
Payer: COMMERCIAL

## 2022-02-22 DIAGNOSIS — I10 HYPERTENSION GOAL BP (BLOOD PRESSURE) < 140/90: Primary | ICD-10-CM

## 2022-02-22 PROCEDURE — 99213 OFFICE O/P EST LOW 20 MIN: CPT | Mod: 95 | Performed by: INTERNAL MEDICINE

## 2022-02-22 NOTE — PROGRESS NOTES
Karen is a 77 year old who is being evaluated via a billable telephone visit.      What phone number would you like to be contacted at? [phone]   How would you like to obtain your AVS? Mail a copy    Assessment & Plan     Diagnoses and all orders for this visit:    Hypertension goal BP (blood pressure) < 140/90  Comments:  uncontrolled due to high salt intake. advised to stop eating popcorn, follow DASH diet, see AVS. will mail to her home.     continue Losartan and Metoprolol for now.     Return in about 2 weeks (around 3/8/2022) for Virtual visit.    Lakia Hunter MD PhD  [facility]    Daniela Jones is a 77 year old who presents for the following health issues     Follow up:  Pt has enrolled in BP support through [employer]. They provide a supplement to lower BP and monitor her BP there. BP has been going higher. /70. Most of her readings are in the 150s.     She has been eating 3/4 a bag of popcorn every night. Buttered and salted, theater popcorn from the stores. Started eating it 2 weeks ago. She didn't know why.          Review of Systems   Constitutional, HEENT, cardiovascular, pulmonary, gi and gu systems are negative, except as otherwise noted.      Objective           Vitals:  No vitals were obtained today due to virtual visit.    Physical Exam   healthy, alert and no distress  PSYCH: Alert and oriented times 3; coherent speech, normal   rate and volume, able to articulate logical thoughts, able   to abstract reason, no tangential thoughts, no hallucinations   or delusions  Her affect is normal  RESP: No cough, no audible wheezing, able to talk in full sentences  Remainder of exam unable to be completed due to telephone visits        Phone call duration: 10 minutes

## 2022-02-22 NOTE — PATIENT INSTRUCTIONS
Patient Education     Eating Heart-Healthy Food: Using the DASH Plan    Eating for your heart doesn t have to be hard or boring. You just need to know how to make healthier choices. The DASH eating plan has been developed to help you do just that. DASH stands for Dietary Approaches to Stop Hypertension. It is a plan that has been proven to be healthier for your heart and to lower your risk for high blood pressure. It can also help lower your risk for cancer, heart disease, osteoporosis, and diabetes.  Choosing from each food group  Choose foods from each of the food groups below each day. Try to get the recommended number of servings for each food group. The serving numbers are based on a diet of 2,000 calories a day. Talk with your healthcare provider if you re not sure about your calorie needs. Along with getting the correct servings, the DASH plan also advises less than 2,300 mg of salt (sodium) per day. Lowering sodium intake to 1,500 mg per day lowers blood pressure even more. (There's about 2,300 mg of sodium in 1 teaspoon of salt.)      Grains  Servings: 6 to 8 a day  A serving is:    1 slice bread    1 ounce dry cereal    Half a cup cooked rice, pasta or cereal  Best choices: Whole grains and any grains high in fiber. Vegetables  Servings: 4 to 5 a day  A serving is:    1 cup raw leafy vegetable    Half a cup cut-up raw or cooked vegetable    Half a cup vegetable juice  Best choices: Fresh or frozen vegetables prepared without added salt or fat.   Fruits  Servings: 4 to 5 a day  A serving is:    1 medium fruit    One-quarter cup dried fruit    Half a cup fresh, frozen, or canned fruit    Half a cup of 100% fruit juices  Best choices: A variety of fresh fruits of different colors. Whole fruits are a better choice than fruit juices. Low-fat or fat-free dairy  Servings: 2 to 3 a day  A serving is:    1 cup milk    1 cup yogurt    One and a half ounces cheese  Best choices: Skim or 1% milk, low-fat or fat-free  yogurt or buttermilk, and low-fat cheeses.         Lean meats, poultry, fish  Servings: 6 or fewer a day  A serving is:    1 ounce cooked meats, poultry, or fish    1 egg  Best choices: Lean poultry and fish. Trim away visible fat. Broil, grill, roast, or boil instead of frying. Remove skin from poultry before eating. Limit how much red meat you eat.  Nuts, seeds, beans  Servings: 4 to 5 a week  A serving is:    One-third cup nuts (one and a half ounces)    2 tablespoons nut butter or seeds    Half a cup cooked dry beans or legumes  Best choices: Dry roasted nuts with no salt added, lentils, kidney beans, garbanzo beans, and whole lopez beans.   Fats and oils  Servings: 2 to 3 a day  A serving is:    1 teaspoon vegetable oil    1 teaspoon soft margarine    1 tablespoon mayonnaise    2 tablespoons salad dressing  Best choices: Nut and vegetable oils (nontropical vegetable oils), such as olive and canola oil. Sweets  Servings: 5 a week or fewer  A serving is:    1 tablespoon sugar, maple syrup, or honey    1 tablespoon jam or jelly    1 half-ounce jelly beans (about 15)    1 cup lemonade  Best choices: Dried fruit can be a satisfying sweet. Choose low-fat sweets. And watch your serving sizes!      For more on the DASH eating plan, visit:  www.nhlbi.nih.gov/health/health-topics/topics/dash   Ashwin last reviewed this educational content on 7/1/2019 2000-2021 [employer]. All rights reserved. This information is not intended as a substitute for professional medical care. Always follow your healthcare professional's instructions.

## 2022-03-18 DIAGNOSIS — F98.8 ATTENTION DEFICIT DISORDER (ADD) WITHOUT HYPERACTIVITY: ICD-10-CM

## 2022-03-18 RX ORDER — METHYLPHENIDATE HYDROCHLORIDE EXTENDED RELEASE 20 MG/1
TABLET ORAL
Qty: 30 TABLET | Refills: 0 | Status: SHIPPED | OUTPATIENT
Start: 2022-03-18 | End: 2022-04-28

## 2022-05-25 ENCOUNTER — TRANSFERRED RECORDS (OUTPATIENT)
Dept: HEALTH INFORMATION MANAGEMENT | Facility: CLINIC | Age: 77
End: 2022-05-25

## 2022-05-25 LAB — RETINOPATHY: NEGATIVE

## 2022-06-27 ENCOUNTER — TELEPHONE (OUTPATIENT)
Dept: FAMILY MEDICINE | Facility: CLINIC | Age: 77
End: 2022-06-27

## 2022-06-27 NOTE — TELEPHONE ENCOUNTER
Reason for Call:  Other appointment and call back    Detailed comments: RUDDY ANIL CALLED TO SCHEDULE 3MO F/U FOR DIABETIC APPT AND TO GET RENEWAL FOR HANIAP STICKER BEFORE SEPT IS WHEN IT EXPIRES    Phone Number Patient can be reached at: Home number on file [phone] (home)    Best Time: ANYTIME    Can we leave a detailed message on this number? YES    Call taken on 6/27/2022 at 4:01 PM by Lobo Pereira MA

## 2022-06-28 NOTE — TELEPHONE ENCOUNTER
A1c was last checked in February.  Under good control.  Please schedule for diabetes follow-up in August. Okay to use NP slots.

## 2022-06-28 NOTE — TELEPHONE ENCOUNTER
Davinm for pt informing her that she is due for diabetes follow up with pcp. I provided her with clinic number.

## 2022-07-14 DIAGNOSIS — F98.8 ATTENTION DEFICIT DISORDER (ADD) WITHOUT HYPERACTIVITY: ICD-10-CM

## 2022-07-15 RX ORDER — METHYLPHENIDATE HYDROCHLORIDE EXTENDED RELEASE 20 MG/1
TABLET ORAL
Qty: 30 TABLET | Refills: 0 | Status: SHIPPED | OUTPATIENT
Start: 2022-07-15 | End: 2022-08-31

## 2022-08-30 DIAGNOSIS — F98.8 ATTENTION DEFICIT DISORDER (ADD) WITHOUT HYPERACTIVITY: ICD-10-CM

## 2022-08-30 NOTE — TELEPHONE ENCOUNTER
Routing refill request to provider for review/approval because:  Drug not on the [facility] refill protocol       Estela Moore RN, BSN

## 2022-08-31 RX ORDER — METHYLPHENIDATE HYDROCHLORIDE EXTENDED RELEASE 20 MG/1
TABLET ORAL
Qty: 30 TABLET | Refills: 0 | OUTPATIENT
Start: 2022-08-31

## 2022-08-31 RX ORDER — METHYLPHENIDATE HYDROCHLORIDE EXTENDED RELEASE 20 MG/1
TABLET ORAL
Qty: 30 TABLET | Refills: 0 | Status: SHIPPED | OUTPATIENT
Start: 2022-08-31 | End: 2022-10-07

## 2022-10-05 DIAGNOSIS — E11.9 TYPE 2 DIABETES MELLITUS WITHOUT COMPLICATION, WITHOUT LONG-TERM CURRENT USE OF INSULIN (H): ICD-10-CM

## 2022-10-05 RX ORDER — METFORMIN HCL 500 MG
TABLET, EXTENDED RELEASE 24 HR ORAL
Qty: 180 TABLET | Refills: 0 | Status: SHIPPED | OUTPATIENT
Start: 2022-10-05

## 2022-10-07 DIAGNOSIS — F98.8 ATTENTION DEFICIT DISORDER (ADD) WITHOUT HYPERACTIVITY: ICD-10-CM

## 2022-10-07 RX ORDER — METHYLPHENIDATE HYDROCHLORIDE EXTENDED RELEASE 20 MG/1
TABLET ORAL
Qty: 30 TABLET | Refills: 0 | Status: SHIPPED | OUTPATIENT
Start: 2022-10-07 | End: 2022-11-18

## 2022-11-17 DIAGNOSIS — F98.8 ATTENTION DEFICIT DISORDER (ADD) WITHOUT HYPERACTIVITY: ICD-10-CM

## 2022-11-18 RX ORDER — METHYLPHENIDATE HYDROCHLORIDE EXTENDED RELEASE 20 MG/1
TABLET ORAL
Qty: 30 TABLET | Refills: 0 | Status: SHIPPED | OUTPATIENT
Start: 2022-11-18 | End: 2023-01-02

## 2022-12-01 ENCOUNTER — VIRTUAL VISIT (OUTPATIENT)
Dept: FAMILY MEDICINE | Facility: CLINIC | Age: 77
End: 2022-12-01
Payer: COMMERCIAL

## 2022-12-01 DIAGNOSIS — I73.9 CLAUDICATION OF BOTH LOWER EXTREMITIES (H): ICD-10-CM

## 2022-12-01 DIAGNOSIS — E78.5 HYPERLIPIDEMIA LDL GOAL <100: ICD-10-CM

## 2022-12-01 DIAGNOSIS — E11.42 DIABETIC POLYNEUROPATHY ASSOCIATED WITH TYPE 2 DIABETES MELLITUS (H): Primary | ICD-10-CM

## 2022-12-01 DIAGNOSIS — N18.31 STAGE 3A CHRONIC KIDNEY DISEASE (H): ICD-10-CM

## 2022-12-01 DIAGNOSIS — I10 HYPERTENSION GOAL BP (BLOOD PRESSURE) < 140/90: Chronic | ICD-10-CM

## 2022-12-01 PROCEDURE — 99443 PR PHYSICIAN TELEPHONE EVALUATION 21-30 MIN: CPT | Performed by: INTERNAL MEDICINE

## 2022-12-01 RX ORDER — AMLODIPINE BESYLATE 5 MG/1
5 TABLET ORAL DAILY
Qty: 90 TABLET | Refills: 0 | Status: SHIPPED | OUTPATIENT
Start: 2022-12-01

## 2022-12-01 ASSESSMENT — PATIENT HEALTH QUESTIONNAIRE - PHQ9
10. IF YOU CHECKED OFF ANY PROBLEMS, HOW DIFFICULT HAVE THESE PROBLEMS MADE IT FOR YOU TO DO YOUR WORK, TAKE CARE OF THINGS AT HOME, OR GET ALONG WITH OTHER PEOPLE: SOMEWHAT DIFFICULT
SUM OF ALL RESPONSES TO PHQ QUESTIONS 1-9: 8
SUM OF ALL RESPONSES TO PHQ QUESTIONS 1-9: 8

## 2022-12-01 NOTE — PROGRESS NOTES
Karen is a 77 year old who is being evaluated via a billable telephone visit.      What phone number would you like to be contacted at?   [phone]        How would you like to obtain your AVS? Mail a copy    Assessment & Plan     Karen was seen today for diabetes.    Diagnoses and all orders for this visit:    Diabetic polyneuropathy associated with type 2 diabetes mellitus (H)  -     diclofenac (VOLTAREN) 1 % topical gel; Apply 2 g topically 4 times daily    Stage 3a chronic kidney disease (H)  -     Hemoglobin; Future  -     Comprehensive metabolic panel; Future  -     Albumin Random Urine Quantitative with Creat Ratio; Future    Hyperlipidemia LDL goal <100  -     Lipid panel reflex to direct LDL Fasting; Future  -     Comprehensive metabolic panel; Future    Hypertension goal BP (blood pressure) < 140/90  -     Comprehensive metabolic panel; Future  -     amLODIPine (NORVASC) 5 MG tablet; Take 1 tablet (5 mg) by mouth daily    Claudication of both lower extremities (H)  -     US MARY Doppler No Exercise; Future    Other orders  -     REVIEW OF HEALTH MAINTENANCE PROTOCOL ORDERS       We'll have her return before the end of the year to get her routine labs done, mammogram and bone density done. We'll follow up on abnormal results by phone.     Get MARY done with her foot symptoms, neuropathy vs PAD or both.     Her BP is not controlled. Will add amlodipine. She will establish care with her new doctor in her insurance network in January.     Her back issue will be best addressed with new network sports med or ortho. Pt is agreeable with this.     Return in about 1 month (around 1/2/2023) for with your new doctor in [place].    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     She will be switching to another clinic in [place] next month for 2023. She has a new health insurance for 2023. She has been assigned a new doctor there.   Her brother passed away at the beginning of the year. 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     Her feet are numb, get cold easy. Hard to keep them warm. Has had this for at least a year. Initially it was pain in the toes, not just cold and numb. Sometimes when she goes to walk, she goes side ways every once a while. No calf pain with walking. Has known diabetic neuropathy. In 2019, had negative PAD test.     Previously on duloxetine but had profuse sweating. Tried gabapentin, had dizziness, leg pain, hip pain, poor concentration.    She has been taking Tylenol 650 mg, which helps. She has diclofenac gel, didn't remember if it helped. She moved in September 2021. A lot of medicine were lost.     BP this morning 154/81. This is her typical range 149-159, diastolic 71-80    Chronic back pain, had injection in the past with our sports medicine MD but hasn't been seen for a few years. Now back pain is acting up again.         History of Present Illness       Diabetes:   She presents for follow up of diabetes.  She is checking home blood glucose a few times a month. She checks blood glucose after meals.  Blood glucose is never over 200 and never under 70. When her blood glucose is low, the patient is asymptomatic for confusion, blurred vision, lethargy and reports not feeling dizzy, shaky, or weak.  She has no concerns regarding her diabetes at this time.  She is having numbness in feet and excessive thirst.          She eats 2-3 servings of fruits and vegetables daily.She consumes 0 sweetened beverage(s) daily.She exercises with enough effort to increase her heart rate 9 or less minutes per day.  She exercises with enough effort to increase her heart rate 3 or less days per week. She is missing 1 dose(s) of medications per week.  She is not taking prescribed medications regularly due to remembering to take.    Today's PHQ-9         PHQ-9 Total Score: 8    PHQ-9 Q9 Thoughts of better off dead/self-harm past 2 weeks :   Not at all    How difficult have these problems made it for you to do your work, take care of things at home, or get along with other people: Somewhat difficult       Diabetes Follow-up    How often are you checking your blood sugar? A few times a month  What time of day are you checking your blood sugars (select all that apply)?  After meals  Have you had any blood sugars above 200?  No  Have you had any blood sugars below 70?  No    What symptoms do you notice when your blood sugar is low?  None    What concerns do you have today about your diabetes? None     Do you have any of these symptoms? (Select all that apply)  Numbness in feet and Excessive thirst      BP Readings from Last 2 Encounters:   02/08/22 (!) 140/67   02/01/22 126/78     Hemoglobin A1C (%)   Date Value   02/01/2022 6.3 (H)   02/02/2021 7.1 (H)   08/28/2020 6.7 (H)     LDL Cholesterol Calculated (mg/dL)   Date Value   02/01/2022 79   08/28/2020 100 (H)   10/15/2019 77         Review of Systems   Constitutional, HEENT, cardiovascular, pulmonary, gi and gu systems are negative, except as otherwise noted.      Objective    Vitals - Patient Reported  Systolic (Patient Reported): (!) 144  Diastolic (Patient Reported): 80  Pain Score: Moderate Pain (4)        Physical Exam   healthy, alert and no distress  PSYCH: Alert and oriented times 3; coherent speech, normal   rate and volume, able to articulate logical thoughts, able   to abstract  reason, no tangential thoughts, no hallucinations   or delusions  Her affect is normal  RESP: No cough, no audible wheezing, able to talk in full sentences  Remainder of exam unable to be completed due to telephone visits            Phone call duration: 33 minutes

## 2022-12-01 NOTE — PATIENT INSTRUCTIONS
Additional instructions:  Schedule for mammogram, bone density, ultrasound, and fast lab tests at  [facility]: Call [phone] to schedule at [facility]    See new doctor in January for back issue.   Call your new doctor in [place] for establishing care visit in January.

## 2022-12-26 ENCOUNTER — ANCILLARY PROCEDURE (OUTPATIENT)
Dept: ULTRASOUND IMAGING | Facility: CLINIC | Age: 77
End: 2022-12-26
Attending: INTERNAL MEDICINE
Payer: COMMERCIAL

## 2022-12-26 ENCOUNTER — ANCILLARY PROCEDURE (OUTPATIENT)
Dept: MAMMOGRAPHY | Facility: CLINIC | Age: 77
End: 2022-12-26
Attending: INTERNAL MEDICINE
Payer: COMMERCIAL

## 2022-12-26 ENCOUNTER — ANCILLARY PROCEDURE (OUTPATIENT)
Dept: BONE DENSITY | Facility: CLINIC | Age: 77
End: 2022-12-26
Attending: INTERNAL MEDICINE
Payer: COMMERCIAL

## 2022-12-26 ENCOUNTER — LAB (OUTPATIENT)
Dept: LAB | Facility: CLINIC | Age: 77
End: 2022-12-26
Payer: COMMERCIAL

## 2022-12-26 DIAGNOSIS — I73.9 CLAUDICATION OF BOTH LOWER EXTREMITIES (H): ICD-10-CM

## 2022-12-26 DIAGNOSIS — N63.0 BREAST MASS: ICD-10-CM

## 2022-12-26 DIAGNOSIS — N63.0 LUMP OR MASS IN BREAST: ICD-10-CM

## 2022-12-26 DIAGNOSIS — N18.31 STAGE 3A CHRONIC KIDNEY DISEASE (H): ICD-10-CM

## 2022-12-26 DIAGNOSIS — I10 HYPERTENSION GOAL BP (BLOOD PRESSURE) < 140/90: Chronic | ICD-10-CM

## 2022-12-26 DIAGNOSIS — E78.5 HYPERLIPIDEMIA LDL GOAL <100: ICD-10-CM

## 2022-12-26 DIAGNOSIS — M85.89 OSTEOPENIA OF MULTIPLE SITES: ICD-10-CM

## 2022-12-26 DIAGNOSIS — N64.4 BREAST PAIN: ICD-10-CM

## 2022-12-26 DIAGNOSIS — E11.9 TYPE 2 DIABETES MELLITUS WITHOUT COMPLICATION, WITHOUT LONG-TERM CURRENT USE OF INSULIN (H): ICD-10-CM

## 2022-12-26 DIAGNOSIS — Z11.59 NEED FOR HEPATITIS C SCREENING TEST: ICD-10-CM

## 2022-12-26 LAB
ALBUMIN SERPL-MCNC: 3.5 G/DL (ref 3.4–5)
ALP SERPL-CCNC: 86 U/L (ref 40–150)
ALT SERPL W P-5'-P-CCNC: 27 U/L (ref 0–50)
ANION GAP SERPL CALCULATED.3IONS-SCNC: 6 MMOL/L (ref 3–14)
AST SERPL W P-5'-P-CCNC: 20 U/L (ref 0–45)
BILIRUB SERPL-MCNC: 0.3 MG/DL (ref 0.2–1.3)
BUN SERPL-MCNC: 17 MG/DL (ref 7–30)
CALCIUM SERPL-MCNC: 9.5 MG/DL (ref 8.5–10.1)
CHLORIDE BLD-SCNC: 107 MMOL/L (ref 94–109)
CHOLEST SERPL-MCNC: 171 MG/DL
CO2 SERPL-SCNC: 27 MMOL/L (ref 20–32)
CREAT SERPL-MCNC: 1.13 MG/DL (ref 0.52–1.04)
CREAT UR-MCNC: 157 MG/DL
FASTING STATUS PATIENT QL REPORTED: YES
GFR SERPL CREATININE-BSD FRML MDRD: 49 ML/MIN/1.73M2
GLUCOSE BLD-MCNC: 144 MG/DL (ref 70–99)
HBA1C MFR BLD: 6.8 % (ref 0–5.6)
HDLC SERPL-MCNC: 62 MG/DL
HGB BLD-MCNC: 13.1 G/DL (ref 11.7–15.7)
LDLC SERPL CALC-MCNC: 88 MG/DL
MICROALBUMIN UR-MCNC: 9 MG/L
MICROALBUMIN/CREAT UR: 5.73 MG/G CR (ref 0–25)
NONHDLC SERPL-MCNC: 109 MG/DL
POTASSIUM BLD-SCNC: 5 MMOL/L (ref 3.4–5.3)
PROT SERPL-MCNC: 7.6 G/DL (ref 6.8–8.8)
SODIUM SERPL-SCNC: 140 MMOL/L (ref 133–144)
TRIGL SERPL-MCNC: 103 MG/DL

## 2022-12-26 PROCEDURE — 93922 UPR/L XTREMITY ART 2 LEVELS: CPT | Performed by: RADIOLOGY

## 2022-12-26 PROCEDURE — 86803 HEPATITIS C AB TEST: CPT

## 2022-12-26 PROCEDURE — 82043 UR ALBUMIN QUANTITATIVE: CPT

## 2022-12-26 PROCEDURE — 76642 ULTRASOUND BREAST LIMITED: CPT | Mod: LT | Performed by: RADIOLOGY

## 2022-12-26 PROCEDURE — 80061 LIPID PANEL: CPT

## 2022-12-26 PROCEDURE — 83036 HEMOGLOBIN GLYCOSYLATED A1C: CPT

## 2022-12-26 PROCEDURE — 77080 DXA BONE DENSITY AXIAL: CPT | Performed by: RADIOLOGY

## 2022-12-26 PROCEDURE — 80053 COMPREHEN METABOLIC PANEL: CPT

## 2022-12-26 PROCEDURE — G0279 TOMOSYNTHESIS, MAMMO: HCPCS | Performed by: RADIOLOGY

## 2022-12-26 PROCEDURE — 77066 DX MAMMO INCL CAD BI: CPT | Performed by: RADIOLOGY

## 2022-12-26 PROCEDURE — 36415 COLL VENOUS BLD VENIPUNCTURE: CPT

## 2022-12-26 PROCEDURE — 82570 ASSAY OF URINE CREATININE: CPT

## 2022-12-26 PROCEDURE — 85018 HEMOGLOBIN: CPT

## 2022-12-26 NOTE — LETTER
December 28, 2022      Karen Hooksler  [address] [address] [address]        Dear ,    We are writing to inform you of your test results.    --The bone density test shows osteopenia. This is an intermediate category that is in between normal and osteoporosis.  People with osteopenia should work on taking in 3439-6737 mg of calcium with vitamin D 1000 international unit(s) daily. They should also be getting daily weight bearing exercise (walking works)     Resulted Orders   DX Wrist Heel Radius    Narrative    HISTORY: Osteopenia follow up; Osteopenia of multiple sites    COMPARISON: 6/20/2019    [age] years.  Height: 62.8 inches  Weight: 210.0 pounds  Sex: Female  Ethnicity: White    Image quality: Adequate    Lumbar spine T-score in region of L4 = -0.9   L4 percent change: 3.5%     HIPS:  Mean total hip T-score: -1.1  Mean total hip percent change: -3.6%     Left femoral neck T-score = -1.6  Right femoral neck T-score= -1.8     Radius 33% T-score = -1.7  Radius 33% percent change: -6.7%     COMPARISON TO PRIOR:  Percent change in the hips is significant accounting to the precision  errors for this facility. Percent change in the lumbar spine and  radius is NOT significant (or considered invalid) accounting for the  precision errors for this facility.     FRAX:  10 year probability of major osteoporotic fracture: 13.6%  10 year probability of hip fracture: 3.5%  The 10 year probability of fracture may be lower than reported if the  patient has received treatment. FRAX data should be disregarded in  patient's taking bisphosphonates.    World Health Organization definition of osteoporosis and osteopenia  for  women:   Normal: T-score at or above -1.0  Low Bone Mass (Osteopenia): T-score between -1.0 and -2.5.   Osteoporosis: T-score at or below -2.5   T-scores are reported for postmenopausal women and men over 50 years  of age.      Impression    IMPRESSION:    Osteopenia.    NATHALY TIWARI MD          SYSTEM ID:  [id number]       If you have any questions or concerns, please call the clinic at the number listed above.       Sincerely,      Lakia Hunter MD PhD

## 2022-12-27 LAB — HCV AB SERPL QL IA: NONREACTIVE

## 2022-12-29 NOTE — RESULT ENCOUNTER NOTE
Lab result letter created 12/28/22 and routed to care team to mail to patient. 
Return to the ED for any new or worsening symptoms  Take your medication as prescribed  Follow up with your PMD within the week for a recheck   Dialysis on Friday as scheduled   Surgicel removal in 2 days   Dressing care as described

## 2023-01-02 DIAGNOSIS — F98.8 ATTENTION DEFICIT DISORDER (ADD) WITHOUT HYPERACTIVITY: ICD-10-CM

## 2023-01-02 RX ORDER — METHYLPHENIDATE HYDROCHLORIDE EXTENDED RELEASE 20 MG/1
TABLET ORAL
Qty: 30 TABLET | Refills: 0 | Status: SHIPPED | OUTPATIENT
Start: 2023-01-02

## 2023-01-04 DIAGNOSIS — E11.42 DIABETIC POLYNEUROPATHY ASSOCIATED WITH TYPE 2 DIABETES MELLITUS (H): ICD-10-CM

## 2023-02-17 DIAGNOSIS — I10 ESSENTIAL HYPERTENSION WITH GOAL BLOOD PRESSURE LESS THAN 140/90: ICD-10-CM

## 2023-02-17 NOTE — TELEPHONE ENCOUNTER
Please call patient: She changed insurance, will be seen a doctor in [place].  Has she establish care with a new doctor yet?

## 2023-03-01 NOTE — TELEPHONE ENCOUNTER
Called pt and she states that the pharmacy has probably not changed her Rxs over to her new provider.  Please remove pended med and close encounter. I do not have security to close the encounter.Thanks!

## 2023-03-02 RX ORDER — LOSARTAN POTASSIUM 100 MG/1
TABLET ORAL
Qty: 90 TABLET | Refills: 0 | OUTPATIENT
Start: 2023-03-02

## 2023-08-23 DIAGNOSIS — E11.9 TYPE 2 DIABETES MELLITUS WITHOUT COMPLICATION, WITHOUT LONG-TERM CURRENT USE OF INSULIN (H): ICD-10-CM

## 2023-08-23 RX ORDER — METFORMIN HCL 500 MG
TABLET, EXTENDED RELEASE 24 HR ORAL
Qty: 180 TABLET | Refills: 0 | OUTPATIENT
Start: 2023-08-23

## 2023-10-11 ENCOUNTER — TELEPHONE (OUTPATIENT)
Dept: FAMILY MEDICINE | Facility: CLINIC | Age: 78
End: 2023-10-11
Payer: COMMERCIAL

## 2023-10-11 NOTE — TELEPHONE ENCOUNTER
Patient Quality Outreach    Patient is due for the following:   Hypertension -  BP check    Next Steps:   Schedule a nurse only visit for blood pressure check    Type of outreach:    Sent letter.    Next Steps:  Reach out within 90 days via Letter.    Max number of attempts reached: No. Will try again in 90 days if patient still on fail list.    Questions for provider review:    None           Diane L. Schoenherr, RN

## 2023-10-11 NOTE — LETTER
[address] [address]  [phone]  Dept: [phone]    October 11, 2023    Karen Donis  [address] [address] [address]    Dear Karen,    At [facility] we care about your health and are committed to providing quality patient care.     Here is a list of Health Maintenance topics that are due now or due soon:  Health Maintenance Due   Topic Date Due    COPD ACTION PLAN  Never done    HEPATITIS B IMMUNIZATION (1 of 3 - Risk 3-dose series) Never done    RSV VACCINE 60+ (1 - 1-dose 60+ series) Never done    DIABETIC FOOT EXAM  02/01/2023    MEDICARE ANNUAL WELLNESS VISIT  02/08/2023    URINE DRUG SCREEN  02/08/2023    FALL RISK ASSESSMENT  02/08/2023    COVID-19 Vaccine (6 - Moderna series) 03/14/2023    EYE EXAM  05/25/2023    PHQ-9  06/01/2023    A1C  06/26/2023    INFLUENZA VACCINE (1) 09/01/2023    HEMOGLOBIN  12/26/2023        We are recommending that you:  Hyptertension: If you have a home blood pressure monitor, please respond to this message with your latest home blood pressure reading. If you do not, please schedule a free blood pressure check with our clinic.    To schedule an appointment or discuss this further, you may contact us by phone at the [facility] at [phone] or online through the patient [email or web address] @ [email or web address]    Thank you for trusting [facility] and we appreciate the opportunity to serve you.  We look forward to supporting your healthcare needs in the future.    Your partners in health,      Quality Committee at [facility]

## 2023-12-15 ENCOUNTER — TELEPHONE (OUTPATIENT)
Dept: FAMILY MEDICINE | Facility: CLINIC | Age: 78
End: 2023-12-15
Payer: COMMERCIAL

## 2023-12-15 NOTE — LETTER
[facility]  [address] [address] [phone]  Dept: [phone]    December 15, 2023    Karen Donis  [address] [address] [address]    Dear Karen,    At [facility] we care about your health and are committed to providing quality patient care.     Here is a list of Health Maintenance topics that are due now or due soon:  Health Maintenance Due   Topic Date Due    COPD ACTION PLAN  Never done    RSV VACCINE (Pregnancy & 60+) (1 - 1-dose 60+ series) Never done    DIABETIC FOOT EXAM  02/01/2023    MEDICARE ANNUAL WELLNESS VISIT  02/08/2023    URINE DRUG SCREEN  02/08/2023    FALL RISK ASSESSMENT  02/08/2023    EYE EXAM  05/25/2023    PHQ-9  06/01/2023    A1C  06/26/2023    INFLUENZA VACCINE (1) 09/01/2023    COVID-19 Vaccine (6 - 2023-24 season) 09/01/2023    ANNUAL REVIEW OF HM ORDERS  12/01/2023    COLORECTAL CANCER SCREENING  12/11/2023    BMP  12/26/2023    LIPID  12/26/2023    MICROALBUMIN  12/26/2023    MAMMO SCREENING  12/26/2023    HEMOGLOBIN  12/26/2023        We are recommending that you:  Schedule a WELLNESS (Preventative/Physical) APPOINTMENT with your primary care provider. If you go elsewhere for your wellness appointments then please disregard this reminder    ,   Schedule a COLONOSCOPY to assess for colon cancer (due every 10 years or 5 years in higher risk situations.)       Colon cancer is now the second leading cause of cancer-related deaths in the [place] for both men and women and there are over 130,000 new cases and 50,000 deaths per year from colon cancer.  Colonoscopies can prevent 90-95% of these deaths.  Problem lesions can be removed before they ever become cancer.  This test is not only looking for cancer, but also getting rid of precancerious lesions.    If you are under/uninsured, we recommend you contact the [employer] program. [employer] is a free colorectal cancer screening program that  provides colonoscopies for eligible under/uninsured [place] men and women. If you are interested in receiving a free colonoscopy, please call [employer] at [phone] (mention code Sc[employer]Web) to see if you re eligible.     If you do not wish to do a colonoscopy or cannot afford to do one, at this time, there is another option. It is called a FIT test or Fecal Immunochemical Occult Blood Test (take home stool sample kit).  It does not replace the colonoscopy for colorectal cancer screening, but it can detect hidden bleeding in the lower colon.  It does need to be repeated every year and if a positive result is obtained, you would be referred for a colonoscopy.    If you have completed either one of these tests at another facility, please call/respond to this message with the details of when and where the tests were done and if they were normal or not. Or have the records sent to our clinic so that we can best coordinate your care.,   Hyptertension: If you have a home blood pressure monitor, please respond to this message with your latest home blood pressure reading. If you do not, please schedule a free blood pressure check with our clinic.    ,    Diabetic Eye Exam is due:  If this was done within the last 12 months then please contact the clinic or respond to this message with the date and location so we can update your records.    ,   Complete the attached Questionnaire:  You are due to complete the attached PHQ questionnaire. Please complete as soon as you are able.    , and   Schedule a Nurse-Only appointment to update your immunizations: Your records indicate that you are not up to date with your immunizations, please schedule a nurse-only appointment to get these updated or update them at your next office visit. If this is incorrect, please disregard.    To schedule an appointment or discuss this further, you may contact us by phone at the [facility] [place] [facility] at [phone] or online through the patient  [email or web address] @ [email or web address]    Thank you for trusting [facility] and we appreciate the opportunity to serve you.  We look forward to supporting your healthcare needs in the future.    Your partners in health,      Quality Committee at [facility]

## 2023-12-15 NOTE — TELEPHONE ENCOUNTER
Patient Quality Outreach    Patient is due for the following:   Diabetes -  A1C, Eye Exam, and Foot Exam  Hypertension -  BP check  Colon Cancer Screening  Depression  -  PHQ-9 needed  Physical Annual Wellness Visit      Topic Date Due    Flu Vaccine (1) 09/01/2023    COVID-19 Vaccine (6 - 2023-24 season) 09/01/2023       Next Steps:   Schedule a Annual Wellness Visit    Type of outreach:    Sent letter.    Next Steps:  Reach out within 90 days via Letter.    Max number of attempts reached: Yes. Will try again in 90 days if patient still on fail list.    Questions for provider review:    None           Jess Chau

## 2024-05-11 NOTE — TELEPHONE ENCOUNTER
Medical screening examination initiated.  I have conducted a focused provider triage encounter, findings are as follows:    Brief history of present illness:  Right great toe pain    Vitals:    05/10/24 2015   BP: (!) 199/122   BP Location: Right arm   Patient Position: Sitting   Pulse: 89   Resp: 18   Temp: 98.4 °F (36.9 °C)   TempSrc: Oral   SpO2: 100%       Pertinent physical exam:      Brief workup plan:  Labs, imaging    Preliminary workup initiated; this workup will be continued and followed by the physician or advanced practice provider that is assigned to the patient when roomed.   Tier Exception APPROVED     Medication: Methylphenidate HCl ER, XR, 20 MG CP24  Insurance Company: [employer] - Phone [phone] Fax [phone]  Pharmacy Filling the RX: [facility] - [id number] [address]  Filling Pharmacy Phone: [phone]  Copay Amount: $2.00    Notified patient, patient is on her way to get script filled.

## 2024-05-15 NOTE — TELEPHONE ENCOUNTER
- no longer   [facility] Call Center    Phone Message    Name of Caller: Karen    Phone Number: Home number on file [phone] (home)    Best time to return call: Any     May a detailed message be left on voicemail: yes    Relation to patient: Self    Reason for Call: Karen called and said her Blood Glucose has been trending high.  She take readings between 11a and 12p.  Yesterday her BG was 186mg/dl and today it was 176mg/dl.  Requesting a call to discuss.  Thank you.     Action Taken: Message routed to:  Primary Care p [phone]

## 2024-10-16 NOTE — PATIENT INSTRUCTIONS
Make appointment(s) for:   -- clinic follow up in 1 month.   -- mammogram      Check with your insurance regarding coverage for Shingrix (RZV) - the new shingles vaccine.       Go off Gabapentin:  Week 1: Duloxetine 20 mg and Gabapentin 300 mg once a day  Week 2: Duloxetine 20 mg daily. Stop Gabapentin.     Medication(s) prescribed today:    Orders Placed This Encounter   Medications     DULoxetine (CYMBALTA) 20 MG capsule     Sig: Take 1 capsule (20 mg) by mouth daily     Dispense:  30 capsule     Refill:  0            [FreeTextEntry1] : further discussion for top surgery